# Patient Record
Sex: FEMALE | Race: WHITE | NOT HISPANIC OR LATINO | Employment: FULL TIME | URBAN - METROPOLITAN AREA
[De-identification: names, ages, dates, MRNs, and addresses within clinical notes are randomized per-mention and may not be internally consistent; named-entity substitution may affect disease eponyms.]

---

## 2017-01-30 ENCOUNTER — ALLSCRIPTS OFFICE VISIT (OUTPATIENT)
Dept: OTHER | Facility: OTHER | Age: 42
End: 2017-01-30

## 2017-01-30 DIAGNOSIS — R00.2 PALPITATIONS: ICD-10-CM

## 2017-01-30 DIAGNOSIS — G35 MULTIPLE SCLEROSIS (HCC): ICD-10-CM

## 2017-02-01 ENCOUNTER — GENERIC CONVERSION - ENCOUNTER (OUTPATIENT)
Dept: OTHER | Facility: OTHER | Age: 42
End: 2017-02-01

## 2017-02-10 ENCOUNTER — ALLSCRIPTS OFFICE VISIT (OUTPATIENT)
Dept: OTHER | Facility: OTHER | Age: 42
End: 2017-02-10

## 2017-02-21 ENCOUNTER — GENERIC CONVERSION - ENCOUNTER (OUTPATIENT)
Dept: OTHER | Facility: OTHER | Age: 42
End: 2017-02-21

## 2017-02-28 ENCOUNTER — HOSPITAL ENCOUNTER (OUTPATIENT)
Dept: NON INVASIVE DIAGNOSTICS | Facility: CLINIC | Age: 42
Discharge: HOME | End: 2017-02-28
Payer: COMMERCIAL

## 2017-02-28 ENCOUNTER — GENERIC CONVERSION - ENCOUNTER (OUTPATIENT)
Dept: OTHER | Facility: OTHER | Age: 42
End: 2017-02-28

## 2017-02-28 DIAGNOSIS — R00.2 PALPITATIONS: ICD-10-CM

## 2017-02-28 PROCEDURE — 93306 TTE W/DOPPLER COMPLETE: CPT

## 2017-04-01 ENCOUNTER — LAB CONVERSION - ENCOUNTER (OUTPATIENT)
Dept: OTHER | Facility: OTHER | Age: 42
End: 2017-04-01

## 2017-04-01 LAB
A/G RATIO (HISTORICAL): 0.9 (CALC) (ref 1–2.5)
ALBUMIN SERPL BCP-MCNC: 3.6 G/DL (ref 3.6–5.1)
ALP SERPL-CCNC: 49 U/L (ref 33–115)
ALT SERPL W P-5'-P-CCNC: 11 U/L (ref 6–29)
AST SERPL W P-5'-P-CCNC: 13 U/L (ref 10–30)
BASOPHILS # BLD AUTO: 0.5 %
BASOPHILS # BLD AUTO: 21 CELLS/UL (ref 0–200)
BILIRUB SERPL-MCNC: 0.5 MG/DL (ref 0.2–1.2)
BILIRUBIN DIRECT (HISTORICAL): 0.1 MG/DL
DEPRECATED RDW RBC AUTO: 13.5 % (ref 11–15)
EOSINOPHIL # BLD AUTO: 152 CELLS/UL (ref 15–500)
EOSINOPHIL # BLD AUTO: 3.7 %
GAMMA GLOBULIN (HISTORICAL): 4 G/DL (CALC) (ref 1.9–3.7)
HCT VFR BLD AUTO: 38 % (ref 35–45)
HGB BLD-MCNC: 12.5 G/DL (ref 11.7–15.5)
INDIRECT BILIRUBIN (HISTORICAL): 0.4 MG/DL (CALC) (ref 0.2–1.2)
LYMPHOCYTES # BLD AUTO: 1480 CELLS/UL (ref 850–3900)
LYMPHOCYTES # BLD AUTO: 36.1 %
MCH RBC QN AUTO: 29.7 PG (ref 27–33)
MCHC RBC AUTO-ENTMCNC: 32.8 G/DL (ref 32–36)
MCV RBC AUTO: 90.4 FL (ref 80–100)
MONOCYTES # BLD AUTO: 287 CELLS/UL (ref 200–950)
MONOCYTES (HISTORICAL): 7 %
NEUTROPHILS # BLD AUTO: 2161 CELLS/UL (ref 1500–7800)
NEUTROPHILS # BLD AUTO: 52.7 %
PLATELET # BLD AUTO: 215 THOUSAND/UL (ref 140–400)
PMV BLD AUTO: 9 FL (ref 7.5–12.5)
RBC # BLD AUTO: 4.2 MILLION/UL (ref 3.8–5.1)
TOTAL PROTEIN (HISTORICAL): 7.6 G/DL (ref 6.1–8.1)
WBC # BLD AUTO: 4.1 THOUSAND/UL (ref 3.8–10.8)

## 2017-05-08 ENCOUNTER — ALLSCRIPTS OFFICE VISIT (OUTPATIENT)
Dept: OTHER | Facility: OTHER | Age: 42
End: 2017-05-08

## 2017-05-08 DIAGNOSIS — G35 MULTIPLE SCLEROSIS (HCC): ICD-10-CM

## 2017-07-12 ENCOUNTER — HOSPITAL ENCOUNTER (OUTPATIENT)
Dept: MRI IMAGING | Facility: HOSPITAL | Age: 42
Discharge: HOME | End: 2017-07-12
Payer: COMMERCIAL

## 2017-07-12 DIAGNOSIS — G35 MULTIPLE SCLEROSIS (HCC): ICD-10-CM

## 2017-07-12 PROCEDURE — A9585 GADOBUTROL INJECTION: HCPCS | Performed by: PHYSICIAN ASSISTANT

## 2017-07-12 PROCEDURE — 72156 MRI NECK SPINE W/O & W/DYE: CPT

## 2017-07-12 PROCEDURE — 72157 MRI CHEST SPINE W/O & W/DYE: CPT

## 2017-07-12 RX ADMIN — GADOBUTROL 6 ML: 604.72 INJECTION INTRAVENOUS at 15:25

## 2017-07-31 ENCOUNTER — HOSPITAL ENCOUNTER (OUTPATIENT)
Dept: MRI IMAGING | Facility: HOSPITAL | Age: 42
Discharge: HOME | End: 2017-07-31
Payer: COMMERCIAL

## 2017-07-31 DIAGNOSIS — G35 MULTIPLE SCLEROSIS (HCC): ICD-10-CM

## 2017-07-31 PROCEDURE — 70553 MRI BRAIN STEM W/O & W/DYE: CPT

## 2017-07-31 PROCEDURE — A9585 GADOBUTROL INJECTION: HCPCS | Performed by: RADIOLOGY

## 2017-07-31 RX ADMIN — GADOBUTROL 6 ML: 604.72 INJECTION INTRAVENOUS at 23:28

## 2017-08-31 ENCOUNTER — LAB CONVERSION - ENCOUNTER (OUTPATIENT)
Dept: OTHER | Facility: OTHER | Age: 42
End: 2017-08-31

## 2017-08-31 LAB
A/G RATIO (HISTORICAL): 0.9 (CALC) (ref 1–2.5)
ALBUMIN SERPL BCP-MCNC: 3.9 G/DL (ref 3.6–5.1)
ALP SERPL-CCNC: 57 U/L (ref 33–115)
ALT SERPL W P-5'-P-CCNC: 14 U/L (ref 6–29)
AST SERPL W P-5'-P-CCNC: 16 U/L (ref 10–30)
BASOPHILS # BLD AUTO: 0.6 %
BASOPHILS # BLD AUTO: 40 CELLS/UL (ref 0–200)
BILIRUB SERPL-MCNC: 0.4 MG/DL (ref 0.2–1.2)
BUN SERPL-MCNC: 12 MG/DL (ref 7–25)
BUN/CREA RATIO (HISTORICAL): ABNORMAL (CALC) (ref 6–22)
CALCIUM SERPL-MCNC: 8.9 MG/DL (ref 8.6–10.2)
CHLORIDE SERPL-SCNC: 105 MMOL/L (ref 98–110)
CO2 SERPL-SCNC: 25 MMOL/L (ref 20–31)
CREAT SERPL-MCNC: 0.66 MG/DL (ref 0.5–1.1)
DEPRECATED RDW RBC AUTO: 11.8 % (ref 11–15)
EGFR AFRICAN AMERICAN (HISTORICAL): 125 ML/MIN/1.73M2
EGFR-AMERICAN CALC (HISTORICAL): 107 ML/MIN/1.73M2
EOSINOPHIL # BLD AUTO: 191 CELLS/UL (ref 15–500)
EOSINOPHIL # BLD AUTO: 2.9 %
GAMMA GLOBULIN (HISTORICAL): 4.4 G/DL (CALC) (ref 1.9–3.7)
GLUCOSE (HISTORICAL): 87 MG/DL (ref 65–99)
HCT VFR BLD AUTO: 40.7 % (ref 35–45)
HGB BLD-MCNC: 13.4 G/DL (ref 11.7–15.5)
LYMPHOCYTES # BLD AUTO: 2422 CELLS/UL (ref 850–3900)
LYMPHOCYTES # BLD AUTO: 36.7 %
MCH RBC QN AUTO: 29.8 PG (ref 27–33)
MCHC RBC AUTO-ENTMCNC: 32.9 G/DL (ref 32–36)
MCV RBC AUTO: 90.4 FL (ref 80–100)
MONOCYTES # BLD AUTO: 455 CELLS/UL (ref 200–950)
MONOCYTES (HISTORICAL): 6.9 %
NEUTROPHILS # BLD AUTO: 3491 CELLS/UL (ref 1500–7800)
NEUTROPHILS # BLD AUTO: 52.9 %
PLATELET # BLD AUTO: 227 THOUSAND/UL (ref 140–400)
PMV BLD AUTO: 10.4 FL (ref 7.5–12.5)
POTASSIUM SERPL-SCNC: 5 MMOL/L (ref 3.5–5.3)
RBC # BLD AUTO: 4.5 MILLION/UL (ref 3.8–5.1)
SODIUM SERPL-SCNC: 137 MMOL/L (ref 135–146)
TOTAL PROTEIN (HISTORICAL): 8.3 G/DL (ref 6.1–8.1)
WBC # BLD AUTO: 6.6 THOUSAND/UL (ref 3.8–10.8)

## 2017-09-06 ENCOUNTER — ALLSCRIPTS OFFICE VISIT (OUTPATIENT)
Dept: OTHER | Facility: OTHER | Age: 42
End: 2017-09-06

## 2017-09-06 DIAGNOSIS — R20.0 ANESTHESIA OF SKIN: ICD-10-CM

## 2017-09-06 DIAGNOSIS — G35 MULTIPLE SCLEROSIS (HCC): ICD-10-CM

## 2017-09-06 DIAGNOSIS — E55.9 VITAMIN D DEFICIENCY: ICD-10-CM

## 2017-10-25 ENCOUNTER — GENERIC CONVERSION - ENCOUNTER (OUTPATIENT)
Dept: OTHER | Facility: OTHER | Age: 42
End: 2017-10-25

## 2017-11-08 ENCOUNTER — TRANSCRIBE ORDERS (OUTPATIENT)
Dept: LAB | Facility: CLINIC | Age: 42
End: 2017-11-08

## 2017-11-08 ENCOUNTER — APPOINTMENT (OUTPATIENT)
Dept: LAB | Facility: CLINIC | Age: 42
End: 2017-11-08
Payer: COMMERCIAL

## 2017-11-08 ENCOUNTER — ALLSCRIPTS OFFICE VISIT (OUTPATIENT)
Dept: OTHER | Facility: OTHER | Age: 42
End: 2017-11-08

## 2017-11-08 DIAGNOSIS — D47.2 MONOCLONAL PARAPROTEINEMIA: Primary | ICD-10-CM

## 2017-11-08 DIAGNOSIS — D47.2 MONOCLONAL GAMMOPATHY: ICD-10-CM

## 2017-11-08 DIAGNOSIS — D47.2 MONOCLONAL PARAPROTEINEMIA: ICD-10-CM

## 2017-11-08 LAB
ALBUMIN SERPL BCP-MCNC: 3.3 G/DL (ref 3.5–5)
ALP SERPL-CCNC: 48 U/L (ref 46–116)
ALT SERPL W P-5'-P-CCNC: 22 U/L (ref 12–78)
ANION GAP SERPL CALCULATED.3IONS-SCNC: 7 MMOL/L (ref 4–13)
AST SERPL W P-5'-P-CCNC: 13 U/L (ref 5–45)
BASOPHILS # BLD AUTO: 0.03 THOUSANDS/ΜL (ref 0–0.1)
BASOPHILS NFR BLD AUTO: 1 % (ref 0–1)
BILIRUB SERPL-MCNC: 0.5 MG/DL (ref 0.2–1)
BUN SERPL-MCNC: 13 MG/DL (ref 5–25)
CALCIUM SERPL-MCNC: 8.3 MG/DL (ref 8.3–10.1)
CHLORIDE SERPL-SCNC: 104 MMOL/L (ref 100–108)
CO2 SERPL-SCNC: 29 MMOL/L (ref 21–32)
CREAT SERPL-MCNC: 0.74 MG/DL (ref 0.6–1.3)
EOSINOPHIL # BLD AUTO: 0.15 THOUSAND/ΜL (ref 0–0.61)
EOSINOPHIL NFR BLD AUTO: 3 % (ref 0–6)
ERYTHROCYTE [DISTWIDTH] IN BLOOD BY AUTOMATED COUNT: 12.3 % (ref 11.6–15.1)
GFR SERPL CREATININE-BSD FRML MDRD: 99 ML/MIN/1.73SQ M
GLUCOSE SERPL-MCNC: 106 MG/DL (ref 65–140)
HCT VFR BLD AUTO: 40.9 % (ref 34.8–46.1)
HGB BLD-MCNC: 12.7 G/DL (ref 11.5–15.4)
IGA SERPL-MCNC: 74 MG/DL (ref 70–400)
IGG SERPL-MCNC: 3000 MG/DL (ref 700–1600)
IGM SERPL-MCNC: 40 MG/DL (ref 40–230)
LYMPHOCYTES # BLD AUTO: 1.5 THOUSANDS/ΜL (ref 0.6–4.47)
LYMPHOCYTES NFR BLD AUTO: 34 % (ref 14–44)
MCH RBC QN AUTO: 29.7 PG (ref 26.8–34.3)
MCHC RBC AUTO-ENTMCNC: 31.1 G/DL (ref 31.4–37.4)
MCV RBC AUTO: 96 FL (ref 82–98)
MONOCYTES # BLD AUTO: 0.3 THOUSAND/ΜL (ref 0.17–1.22)
MONOCYTES NFR BLD AUTO: 7 % (ref 4–12)
NEUTROPHILS # BLD AUTO: 2.48 THOUSANDS/ΜL (ref 1.85–7.62)
NEUTS SEG NFR BLD AUTO: 55 % (ref 43–75)
PLATELET # BLD AUTO: 254 THOUSANDS/UL (ref 149–390)
PMV BLD AUTO: 10.3 FL (ref 8.9–12.7)
POTASSIUM SERPL-SCNC: 3.7 MMOL/L (ref 3.5–5.3)
PROT SERPL-MCNC: 8.5 G/DL (ref 6.4–8.2)
RBC # BLD AUTO: 4.27 MILLION/UL (ref 3.81–5.12)
SODIUM SERPL-SCNC: 140 MMOL/L (ref 136–145)
WBC # BLD AUTO: 4.46 THOUSAND/UL (ref 4.31–10.16)

## 2017-11-08 PROCEDURE — 36415 COLL VENOUS BLD VENIPUNCTURE: CPT

## 2017-11-08 PROCEDURE — 83883 ASSAY NEPHELOMETRY NOT SPEC: CPT

## 2017-11-08 PROCEDURE — 82784 ASSAY IGA/IGD/IGG/IGM EACH: CPT

## 2017-11-08 PROCEDURE — 85025 COMPLETE CBC W/AUTO DIFF WBC: CPT

## 2017-11-08 PROCEDURE — 86334 IMMUNOFIX E-PHORESIS SERUM: CPT

## 2017-11-08 PROCEDURE — 84165 PROTEIN E-PHORESIS SERUM: CPT

## 2017-11-08 PROCEDURE — 80053 COMPREHEN METABOLIC PANEL: CPT

## 2017-11-09 LAB
KAPPA LC FREE SER-MCNC: 6.9 MG/L (ref 3.3–19.4)
KAPPA LC FREE/LAMBDA FREE SER: 0.28 {RATIO} (ref 0.26–1.65)
LAMBDA LC FREE SERPL-MCNC: 24.3 MG/L (ref 5.7–26.3)

## 2017-11-09 NOTE — CONSULTS
Assessment  1  Monoclonal gammopathy of unknown significance (MGUS) (273 1) (D47 2)    Plan  Monoclonal gammopathy of unknown significance (MGUS)    · (1) CBC/PLT/DIFF; Status:Active; Requested DKY:[id number];    Perform:[facility]; IOE:[id number]; Ordered; For:Monoclonal gammopathy of unknown significance (MGUS); Ordered By:Ziggy Vega;   · (1) COMPREHENSIVE METABOLIC PANEL; Status:Active; Requested VOL:[id number];    Perform:[facility]; ZFH:[id number]; Ordered; For:Monoclonal gammopathy of unknown significance (MGUS); Ordered By:Ziggy Vega;   · (1) FREE LIGHT CHAINS, SERUM; Status:Active; Requested OUT:[id number];    Perform:[facility]; LRO:[id number]; Ordered; For:Monoclonal gammopathy of unknown significance (MGUS); Ordered By:Ziggy Vega;   · (1) PROTEIN ELECTRO, SERUM; Status:Active; Requested UWF:[id number];    Perform:[facility]; EQV:[id number]; Ordered;gammopathy of unknown significance (MGUS); Ordered By:Ziggy Vega;   · (Q) IMMUNOFIXATION IGA,IGG,IGM QT  IMMUNOFIXATION SERUM IMMUNOGLOBULIN;Status:Active; Requested GGI:[id number];    Perform:[facility]; XGK:[id number]; Ordered;gammopathy of unknown significance (MGUS); Ordered By:Ziggy Vega;   · Follow-up visit in 2 weeks Evaluation and Treatment  Follow-up  Status: Complete  Done:22Nov2017   Ordered;Monoclonal gammopathy of unknown significance (MGUS); Ordered By: Morris Parker Performed:  Due: [id number]; Last Updated By: Frederic Dodge; 11/8/2017 9:57:07 AM    Discussion/Summary    MGUS : Chart reviewed, 1 blood work showed IgG level is more than 2000 mg in 2016, we do not have SPEP showing the monoclonal band, however her CSF fluid analysis also showed monoclonal band which could be related with MS or a result of systemic MGUS or due to immunoglobin injection for MS  She is status post bone survey a year ago and cervical and thoracic spine MRI in May of 2017, with no bone lesions identified   She has no other CRAB symptoms suggesting active multiple myeloma  She has no bone marrow biopsy in the pastSPEP, immunofixation, free light chain  on the level of M spike, we may consider bone marrow biopsy for high risk MGUS in 2 weeks  total time of encounter was 40 minutes-- and-- 20 minutes was spent counseling  Chief Complaint   I have MGUS       History of Present Illness  Patient is a 68-year-old  female, initially from [place], recently had a diagnosis of MS, following neurology, her MS was diagnosed based on findings of lumbar puncture and MRI brain; 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, which was diagnosed after reporting having a bad taste  she reported 2 years ago had a physical examination with blood work for insurance purposes, bear day found elevated immunoglobulin level, which lead to diagnosis of MGUS  She reported it is IgG MGUS, she cannot recall the exact protein amount, she had bone survey about a year half ago, and MRI of cervical and thoracic spine which is not revealing  She was following Dr Dylan Meredith hematology oncologist in [facility] [place] [facility] for a year in 2015 to 2016, then lost to follow-up after physician left the practice  She was referred to Hematology Oncology today to establish care  came in today by herself  She reported overall at baseline health status, she has no new weight loss, no energy level change, no fever chills, no nausea vomiting no bleeding no easy bruises  She has no bone pain, no recent fractures, no chest pain, dyspnea, cough or hemoptysis, no abdominal pain, no change of urination bowel movement habits  She reported bad taste ongoing for a year  is not a smoker, not drinking alcohol, lives with , with 2 kids  She works in [employer]  Review of Systems   Constitutional: feeling tired, but-- no fever,-- no recent weight gain,-- no chills-- and-- no recent weight loss    Eyes: No complaints of eye pain, no red eyes, no eyesight problems, no discharge, no dry eyes, no itching of eyes  ENT: no complaints of earache, no loss of hearing, no nose bleeds, no nasal discharge, no sore throat, no hoarseness  Cardiovascular: No complaints of slow heart rate, no fast heart rate, no chest pain, no palpitations, no leg claudication, no lower extremity edema  Respiratory: No complaints of shortness of breath, no wheezing, no cough, no SOB on exertion, no orthopnea, no PND  Gastrointestinal: No complaints of abdominal pain, no constipation, no nausea or vomiting, no diarrhea, no bloody stools  Genitourinary: No complaints of dysuria, no incontinence, no pelvic pain, no dysmenorrhea, no vaginal discharge or bleeding  Musculoskeletal: No complaints of arthralgias, no myalgias, no joint swelling or stiffness, no limb pain or swelling  Integumentary: No complaints of skin rash or lesions, no itching, no skin wounds, no breast pain or lump  Neurological: No complaints of headache, no confusion, no convulsions, no numbness, no dizziness or fainting, no tingling, no limb weakness, no difficulty walking  Psychiatric: Not suicidal, no sleep disturbance, no anxiety or depression, no change in personality, no emotional problems  Endocrine: No complaints of proptosis, no hot flashes, no muscle weakness, no deepening of the voice, no feelings of weakness  Hematologic/Lymphatic: No complaints of swollen glands, no swollen glands in the neck, does not bleed easily, does not bruise easily  Active Problems  1  Chest pain (786 50) (R07 9)   2  Monoclonal gammopathy of unknown significance (MGUS) (273 1) (D47 2)   3  Multiple sclerosis (340) (G35)   4  Nonspecific abnormal electrocardiogram (ECG) (EKG) (794 31) (R94 31)   5  Numbness of tongue (782 0) (R20 0)   6  Palpitations (785 1) (R00 2)   7  Shortness of breath (786 05) (R06 02)   8  Vitamin D deficiency (268 9) (E55 9)    Past Medical History  1  History of Benign neoplasm of skin of face (216 3) (D23 30)   2  History of Malignant Skin Neoplasm (V10 83)    Surgical History  1  History of  Section    Family History  Mother    1  No pertinent family history  Family History    2  Family history of Rhythm Disorder    The family history was reviewed and updated today  Social History     · Never A Smoker  The social history was reviewed and updated today  Current Meds   1  Copaxone 40 MG/ML Subcutaneous Solution Prefilled Syringe; INJECT 40 MG (1 ML) UNDER THE SKIN THREE TIMES A WEEK; Therapy: [id number] to (Last TM:[id number])  Requested for: 25OKB2116 Ordered    The medication list was reviewed and updated today  Allergies  1  No Known Drug Allergies    Vitals  Vital Signs    Recorded: 21RUG3689 09:20AM   Temperature 97 9 F   Heart Rate 65   Respiration 16   Systolic 877   Diastolic 70   Height 5 ft 5 in   Weight 130 lb    BMI Calculated 21 63   BSA Calculated 1 65   O2 Saturation 98   Pain Scale 0       Physical Exam   Constitutional  General appearance: No acute distress, well appearing and well nourished  Eyes  Conjunctiva and lids: No swelling, erythema or discharge  Ears, Nose, Mouth, and Throat  External inspection of ears and nose: Normal    Pulmonary  Respiratory effort: No increased work of breathing or signs of respiratory distress  Auscultation of lungs: Clear to auscultation  Cardiovascular  Palpation of heart: Normal PMI, no thrills  Auscultation of heart: Normal rate and rhythm, normal S1 and S2, without murmurs  Examination of extremities for edema and/or varicosities: Normal    Carotid pulses: Normal    Abdomen  Abdomen: Non-tender, no masses  Liver and spleen: No hepatomegaly or splenomegaly  Lymphatic  Palpation of lymph nodes in neck: No lymphadenopathy     Musculoskeletal  Gait and station: Normal    Psychiatric  Orientation to person, place, and time: Normal    Mood and affect: Normal         ECOG 0       Future Appointments    Date/Time Provider Specialty Site 01/08/2018 11:00 AM HERNANDO Romano   Neurology [address] [facility]       Signatures   Electronically signed by : Angella Najera MD; Nov 8 2017 10:07AM EST                       (Author)    Electronically signed by : Angella Najera MD; Nov 10 2017  2:46PM EST                       (Author)

## 2017-11-10 LAB
ALBUMIN SERPL ELPH-MCNC: 4.05 G/DL (ref 3.5–5)
ALBUMIN SERPL ELPH-MCNC: 47.6 % (ref 52–65)
ALPHA1 GLOB SERPL ELPH-MCNC: 0.26 G/DL (ref 0.1–0.4)
ALPHA1 GLOB SERPL ELPH-MCNC: 3.1 % (ref 2.5–5)
ALPHA2 GLOB SERPL ELPH-MCNC: 0.64 G/DL (ref 0.4–1.2)
ALPHA2 GLOB SERPL ELPH-MCNC: 7.5 % (ref 7–13)
BETA GLOB ABNORMAL SERPL ELPH-MCNC: 0.4 G/DL (ref 0.4–0.8)
BETA1 GLOB SERPL ELPH-MCNC: 4.7 % (ref 5–13)
BETA2 GLOB SERPL ELPH-MCNC: 3 % (ref 2–8)
BETA2+GAMMA GLOB SERPL ELPH-MCNC: 0.26 G/DL (ref 0.2–0.5)
GAMMA GLOB ABNORMAL SERPL ELPH-MCNC: 2.9 G/DL (ref 0.5–1.6)
GAMMA GLOB SERPL ELPH-MCNC: 34.1 % (ref 12–22)
IGG/ALB SER: 0.91 {RATIO} (ref 1.1–1.8)
INTERPRETATION UR IFE-IMP: NORMAL
M PROTEIN 1 MFR SERPL ELPH: 29.6 %
M PROTEIN 1 SERPL ELPH-MCNC: 2.52 G/DL
PROT SERPL-MCNC: 8.5 G/DL (ref 6.4–8.2)

## 2017-11-22 ENCOUNTER — GENERIC CONVERSION - ENCOUNTER (OUTPATIENT)
Dept: OTHER | Facility: OTHER | Age: 42
End: 2017-11-22

## 2017-12-28 ENCOUNTER — HOSPITAL ENCOUNTER (OUTPATIENT)
Dept: CT IMAGING | Facility: HOSPITAL | Age: 42
Discharge: HOME | End: 2017-12-28
Attending: INTERNAL MEDICINE | Admitting: RADIOLOGY
Payer: COMMERCIAL

## 2017-12-28 ENCOUNTER — GENERIC CONVERSION - ENCOUNTER (OUTPATIENT)
Dept: OTHER | Facility: OTHER | Age: 42
End: 2017-12-28

## 2017-12-28 VITALS
BODY MASS INDEX: 22.16 KG/M2 | HEIGHT: 65 IN | OXYGEN SATURATION: 98 % | HEART RATE: 72 BPM | TEMPERATURE: 98.7 F | WEIGHT: 133 LBS | DIASTOLIC BLOOD PRESSURE: 50 MMHG | SYSTOLIC BLOOD PRESSURE: 99 MMHG | RESPIRATION RATE: 20 BRPM

## 2017-12-28 DIAGNOSIS — D47.2 MONOCLONAL PARAPROTEINEMIA: ICD-10-CM

## 2017-12-28 LAB — EXT PREGNANCY TEST URINE: NEGATIVE

## 2017-12-28 PROCEDURE — 81025 URINE PREGNANCY TEST: CPT | Performed by: RADIOLOGY

## 2017-12-28 PROCEDURE — 88365 INSITU HYBRIDIZATION (FISH): CPT | Performed by: INTERNAL MEDICINE

## 2017-12-28 PROCEDURE — 88313 SPECIAL STAINS GROUP 2: CPT | Performed by: INTERNAL MEDICINE

## 2017-12-28 PROCEDURE — 88305 TISSUE EXAM BY PATHOLOGIST: CPT | Performed by: INTERNAL MEDICINE

## 2017-12-28 PROCEDURE — 77012 CT SCAN FOR NEEDLE BIOPSY: CPT

## 2017-12-28 PROCEDURE — 88185 FLOWCYTOMETRY/TC ADD-ON: CPT

## 2017-12-28 PROCEDURE — 88311 DECALCIFY TISSUE: CPT | Performed by: INTERNAL MEDICINE

## 2017-12-28 PROCEDURE — 88341 IMHCHEM/IMCYTCHM EA ADD ANTB: CPT | Performed by: INTERNAL MEDICINE

## 2017-12-28 PROCEDURE — 88333 PATH CONSLTJ SURG CYTO XM 1: CPT | Performed by: INTERNAL MEDICINE

## 2017-12-28 PROCEDURE — 88184 FLOWCYTOMETRY/ TC 1 MARKER: CPT | Performed by: INTERNAL MEDICINE

## 2017-12-28 PROCEDURE — 88342 IMHCHEM/IMCYTCHM 1ST ANTB: CPT | Performed by: INTERNAL MEDICINE

## 2017-12-28 PROCEDURE — 85097 BONE MARROW INTERPRETATION: CPT | Performed by: INTERNAL MEDICINE

## 2017-12-28 PROCEDURE — 38221 DX BONE MARROW BIOPSIES: CPT

## 2017-12-28 PROCEDURE — 88374 M/PHMTRC ALYS ISHQUANT/SEMIQ: CPT

## 2017-12-28 PROCEDURE — 38220 DX BONE MARROW ASPIRATIONS: CPT

## 2017-12-28 PROCEDURE — 88367 INSITU HYBRIDIZATION AUTO: CPT

## 2017-12-28 PROCEDURE — 88373 M/PHMTRC ALYS ISHQUANT/SEMIQ: CPT

## 2017-12-28 PROCEDURE — 99152 MOD SED SAME PHYS/QHP 5/>YRS: CPT

## 2017-12-28 RX ORDER — GLATIRAMER ACETATE 20 MG/ML
40 INJECTION, SOLUTION SUBCUTANEOUS 3 TIMES WEEKLY
COMMUNITY
End: 2018-05-08 | Stop reason: DRUGHIGH

## 2017-12-28 RX ORDER — FENTANYL CITRATE 50 UG/ML
INJECTION, SOLUTION INTRAMUSCULAR; INTRAVENOUS CODE/TRAUMA/SEDATION MEDICATION
Status: COMPLETED | OUTPATIENT
Start: 2017-12-28 | End: 2017-12-28

## 2017-12-28 RX ORDER — MIDAZOLAM HYDROCHLORIDE 1 MG/ML
INJECTION INTRAMUSCULAR; INTRAVENOUS CODE/TRAUMA/SEDATION MEDICATION
Status: COMPLETED | OUTPATIENT
Start: 2017-12-28 | End: 2017-12-28

## 2017-12-28 RX ORDER — SODIUM CHLORIDE 9 MG/ML
75 INJECTION, SOLUTION INTRAVENOUS CONTINUOUS
Status: DISCONTINUED | OUTPATIENT
Start: 2017-12-28 | End: 2017-12-29 | Stop reason: HOSPADM

## 2017-12-28 RX ADMIN — FENTANYL CITRATE 50 MCG: 50 INJECTION INTRAMUSCULAR; INTRAVENOUS at 09:33

## 2017-12-28 RX ADMIN — MIDAZOLAM HYDROCHLORIDE 1 MG: 1 INJECTION, SOLUTION INTRAMUSCULAR; INTRAVENOUS at 09:40

## 2017-12-28 RX ADMIN — FENTANYL CITRATE 50 MCG: 50 INJECTION INTRAMUSCULAR; INTRAVENOUS at 09:40

## 2017-12-28 RX ADMIN — MIDAZOLAM HYDROCHLORIDE 1 MG: 1 INJECTION, SOLUTION INTRAMUSCULAR; INTRAVENOUS at 09:33

## 2017-12-28 RX ADMIN — FENTANYL CITRATE 50 MCG: 50 INJECTION INTRAMUSCULAR; INTRAVENOUS at 09:48

## 2017-12-28 NOTE — PROGRESS NOTES
Vascular and Interventional Radiology Pre Procedure Note    Diagnosis:  MGUS, high risk    Procedure:  Image guided bone marrow biopsy    HPI: 39-year-old female with prior diagnosis of MGUS 2016, presenting with continued laboratory abnormalities  Patient reports receiving prior therapy  Bone marrow biopsy is requested for diagnosis and therapy guidance  Patient denies current chest pain, shortness of breath, fever, chills, nausea or vomiting  Exam:  General:  Awake, alert, oriented x3, no acute distress  Neck:  Soft, supple, nontender  Chest:  Nontender, no deformity  Abdomen:  Soft, nontender, nondistended, no guarding, no rebound  Cardiac:  S1-S2, regular rate and rhythm  Lungs:  Clear to auscultation bilaterally, nonlabored breathing, speaking in full sentences    Labs:  Not required by department policy  Imaging:  No relevant imaging is available for review    Plan:  Proceed with image guided bone marrow biopsy    I discussed the procedure, its details, risks, benefits and alternatives with the patient  Alternatives discussed include observation, foregoing the procedure, rescheduling the procedure, and surgical biopsy  All questions were answered  Patient elects to proceed with the procedure  H&P was reviewed

## 2017-12-28 NOTE — BRIEF OP NOTE (RAD/CATH)
CT BONE MARROW BIOPSY AND ASPIRATION  Procedure Note    PATIENT NAME: Charles Leigh  : 1975  MRN: [id number]     Pre-op Diagnosis:   1  Monoclonal paraproteinemia      Post-op Diagnosis:   1  Monoclonal paraproteinemia        Surgeon:   Nikolai Rojas MD  Assistants:     No qualified resident was available, Resident is only observing    Estimated Blood Loss:  Minimal, less than 1 mL not including the specimen  Findings:  Unremarkable appearance of bone on pre-procedure CT  Successful bone marrow biopsy of right iliac bone  Post imaging demonstrates no acute abnormality or complication      Specimens:  8 mL marrow aspirate, 3 cm core biopsy    Complications:  None immediately    Anesthesia: Conscious sedation and Local    Nikolai Rojas MD     Date: 2017  Time: 10:28 AM

## 2017-12-28 NOTE — PROGRESS NOTES
Vascular and Interventional Radiology brief note    Patient seen and examined in recovery  No current complaints  Patient denies pain  No fever or chills  No bleeding  Patient feels well  Exam:  General:  Awake, alert, oriented x3, no acute distress  Abdomen:  Soft, nontender, nondistended, no guarding, no rebound    77-year-old female with MGUS, now status post bone marrow biopsy earlier today recovering well  Patient may be discharged once she meets criteria  She will follow up with Dr Tamara Zamora of Hematology/Oncology for biopsy results and any required therapy

## 2017-12-28 NOTE — DISCHARGE INSTRUCTIONS
Bone Marrow Biopsy     WHAT YOU NEED TO KNOW:   A bone marrow biopsy is a procedure to remove a small amount of bone marrow from your bone  Bone marrow is the soft tissue inside your bone that helps to make blood cells  The sample is tested for disease or infection  DISCHARGE INSTRUCTIONS:     1  Limit your activities day of biopsy as directed by your doctor  2  Use medication as ordered  3  Return to your normal diet  Small sips of flat soda will help with nausea  4  Remove band-aid or dressing 24 hours after procedure  Contact Interventional Radiology at [phone] [place] PATIENTS: Contact Interventional Radiology at [phone]) [place] PATIENTS: Contact Interventional Radiology at [phone]) if:    1  Difficulty breathing, nausea or vomiting  2  Chills or fever above 101 F     3  Pain at biopsy site not relieved by medication  4  Develop any redness, swelling, heat, unusual drainage, heavy bruising or bleeding from biopsy site

## 2017-12-29 ENCOUNTER — APPOINTMENT (OUTPATIENT)
Dept: LAB | Facility: CLINIC | Age: 42
End: 2017-12-29
Payer: COMMERCIAL

## 2017-12-29 ENCOUNTER — TRANSCRIBE ORDERS (OUTPATIENT)
Dept: ADMINISTRATIVE | Facility: HOSPITAL | Age: 42
End: 2017-12-29

## 2017-12-29 ENCOUNTER — HOSPITAL ENCOUNTER (OUTPATIENT)
Dept: RADIOLOGY | Facility: HOSPITAL | Age: 42
Discharge: HOME | End: 2017-12-29
Attending: INTERNAL MEDICINE
Payer: COMMERCIAL

## 2017-12-29 ENCOUNTER — GENERIC CONVERSION - ENCOUNTER (OUTPATIENT)
Dept: OTHER | Facility: OTHER | Age: 42
End: 2017-12-29

## 2017-12-29 DIAGNOSIS — C90.00 MULTIPLE MYELOMA, REMISSION STATUS UNSPECIFIED (HCC): Primary | ICD-10-CM

## 2017-12-29 DIAGNOSIS — D47.2 MONOCLONAL GAMMOPATHY: ICD-10-CM

## 2017-12-29 DIAGNOSIS — G35 MULTIPLE SCLEROSIS (HCC): Primary | ICD-10-CM

## 2017-12-29 DIAGNOSIS — E55.9 VITAMIN D DEFICIENCY: ICD-10-CM

## 2017-12-29 DIAGNOSIS — R20.0 ANESTHESIA OF SKIN: ICD-10-CM

## 2017-12-29 LAB
25(OH)D3 SERPL-MCNC: 21.8 NG/ML (ref 30–100)
ALBUMIN SERPL BCP-MCNC: 3.2 G/DL (ref 3.5–5)
ALP SERPL-CCNC: 51 U/L (ref 46–116)
ALT SERPL W P-5'-P-CCNC: 22 U/L (ref 12–78)
ANION GAP SERPL CALCULATED.3IONS-SCNC: 8 MMOL/L (ref 4–13)
AST SERPL W P-5'-P-CCNC: 16 U/L (ref 5–45)
BASOPHILS # BLD AUTO: 0.02 THOUSANDS/ΜL (ref 0–0.1)
BASOPHILS NFR BLD AUTO: 0 % (ref 0–1)
BILIRUB SERPL-MCNC: 0.5 MG/DL (ref 0.2–1)
BUN SERPL-MCNC: 17 MG/DL (ref 5–25)
CALCIUM SERPL-MCNC: 8.4 MG/DL (ref 8.3–10.1)
CHLORIDE SERPL-SCNC: 104 MMOL/L (ref 100–108)
CO2 SERPL-SCNC: 26 MMOL/L (ref 21–32)
CREAT SERPL-MCNC: 0.76 MG/DL (ref 0.6–1.3)
EOSINOPHIL # BLD AUTO: 0.11 THOUSAND/ΜL (ref 0–0.61)
EOSINOPHIL NFR BLD AUTO: 1 % (ref 0–6)
ERYTHROCYTE [DISTWIDTH] IN BLOOD BY AUTOMATED COUNT: 12.5 % (ref 11.6–15.1)
GFR SERPL CREATININE-BSD FRML MDRD: 96 ML/MIN/1.73SQ M
GLUCOSE SERPL-MCNC: 96 MG/DL (ref 65–140)
HCT VFR BLD AUTO: 41.7 % (ref 34.8–46.1)
HGB BLD-MCNC: 13 G/DL (ref 11.5–15.4)
LYMPHOCYTES # BLD AUTO: 1.56 THOUSANDS/ΜL (ref 0.6–4.47)
LYMPHOCYTES NFR BLD AUTO: 21 % (ref 14–44)
MCH RBC QN AUTO: 29.9 PG (ref 26.8–34.3)
MCHC RBC AUTO-ENTMCNC: 31.2 G/DL (ref 31.4–37.4)
MCV RBC AUTO: 96 FL (ref 82–98)
MONOCYTES # BLD AUTO: 0.41 THOUSAND/ΜL (ref 0.17–1.22)
MONOCYTES NFR BLD AUTO: 5 % (ref 4–12)
NEUTROPHILS # BLD AUTO: 5.49 THOUSANDS/ΜL (ref 1.85–7.62)
NEUTS SEG NFR BLD AUTO: 73 % (ref 43–75)
PLATELET # BLD AUTO: 229 THOUSANDS/UL (ref 149–390)
PMV BLD AUTO: 10.6 FL (ref 8.9–12.7)
POTASSIUM SERPL-SCNC: 4.3 MMOL/L (ref 3.5–5.3)
PROT SERPL-MCNC: 8.4 G/DL (ref 6.4–8.2)
RBC # BLD AUTO: 4.35 MILLION/UL (ref 3.81–5.12)
SODIUM SERPL-SCNC: 138 MMOL/L (ref 136–145)
VIT B12 SERPL-MCNC: 212 PG/ML (ref 100–900)
WBC # BLD AUTO: 7.59 THOUSAND/UL (ref 4.31–10.16)

## 2017-12-29 PROCEDURE — 85025 COMPLETE CBC W/AUTO DIFF WBC: CPT

## 2017-12-29 PROCEDURE — 82306 VITAMIN D 25 HYDROXY: CPT

## 2017-12-29 PROCEDURE — 82607 VITAMIN B-12: CPT

## 2017-12-29 PROCEDURE — 36415 COLL VENOUS BLD VENIPUNCTURE: CPT

## 2017-12-29 PROCEDURE — 80053 COMPREHEN METABOLIC PANEL: CPT

## 2017-12-29 PROCEDURE — 77075 RADEX OSSEOUS SURVEY COMPL: CPT

## 2018-01-02 LAB — SCAN RESULT: NORMAL

## 2018-01-04 LAB — MISCELLANEOUS LAB TEST RESULT: NORMAL

## 2018-01-08 ENCOUNTER — TRANSCRIBE ORDERS (OUTPATIENT)
Dept: ADMINISTRATIVE | Facility: HOSPITAL | Age: 43
End: 2018-01-08

## 2018-01-08 ENCOUNTER — HOSPITAL ENCOUNTER (EMERGENCY)
Facility: HOSPITAL | Age: 43
Discharge: HOME | End: 2018-01-08
Attending: EMERGENCY MEDICINE | Admitting: EMERGENCY MEDICINE
Payer: COMMERCIAL

## 2018-01-08 ENCOUNTER — ALLSCRIPTS OFFICE VISIT (OUTPATIENT)
Dept: OTHER | Facility: OTHER | Age: 43
End: 2018-01-08

## 2018-01-08 ENCOUNTER — APPOINTMENT (EMERGENCY)
Dept: RADIOLOGY | Facility: HOSPITAL | Age: 43
End: 2018-01-08
Payer: COMMERCIAL

## 2018-01-08 VITALS
SYSTOLIC BLOOD PRESSURE: 134 MMHG | DIASTOLIC BLOOD PRESSURE: 61 MMHG | OXYGEN SATURATION: 99 % | HEART RATE: 86 BPM | RESPIRATION RATE: 18 BRPM | BODY MASS INDEX: 21.43 KG/M2 | WEIGHT: 128.8 LBS | TEMPERATURE: 98.3 F

## 2018-01-08 DIAGNOSIS — M79.604 PAIN OF RIGHT LEG: ICD-10-CM
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
## 2018-01-08 DIAGNOSIS — S16.1XXA ACUTE STRAIN OF NECK MUSCLE, INITIAL ENCOUNTER: Primary | ICD-10-CM

## 2018-01-08 DIAGNOSIS — G35 MS (MULTIPLE SCLEROSIS) (HCC): Primary | ICD-10-CM

## 2018-01-08 DIAGNOSIS — G56.03 CARPAL TUNNEL SYNDROME, BILATERAL: ICD-10-CM

## 2018-01-08 PROCEDURE — 99284 EMERGENCY DEPT VISIT MOD MDM: CPT

## 2018-01-08 PROCEDURE — 72040 X-RAY EXAM NECK SPINE 2-3 VW: CPT

## 2018-01-08 NOTE — DISCHARGE INSTRUCTIONS
Cervical Strain   WHAT YOU NEED TO KNOW:   A cervical strain is a stretched or torn muscle or tendon in your neck  Tendons are strong tissues that connect muscles to bones  Common causes of cervical strains include a car accident, a fall, or a sports injury  DISCHARGE INSTRUCTIONS:   Return to the emergency department if:   · You have pain or numbness from your shoulder down to your hand  · You have problems with your vision, hearing, or balance  · You feel confused or cannot concentrate  · You have problems with movement and strength  Contact your healthcare provider if:   · You have increased swelling or pain in your neck  · You have questions or concerns about your condition or care  Medicines: You may need any of the following:  · Acetaminophen  decreases pain and fever  It is available without a doctor's order  Ask how much to take and how often to take it  Follow directions  Read the labels of all other medicines you are using to see if they also contain acetaminophen, or ask your doctor or pharmacist  Acetaminophen can cause liver damage if not taken correctly  Do not use more than 4 grams (4,000 milligrams) total of acetaminophen in one day  · NSAIDs , such as ibuprofen, help decrease swelling, pain, and fever  This medicine is available with or without a doctor's order  NSAIDs can cause stomach bleeding or kidney problems in certain people  If you take blood thinner medicine, always ask your healthcare provider if NSAIDs are safe for you  Always read the medicine label and follow directions  · Muscle relaxers  help decrease pain and muscle spasms  · Prescription pain medicine  may be given  Ask your healthcare provider how to take this medicine safely  Some prescription pain medicines contain acetaminophen  Do not take other medicines that contain acetaminophen without talking to your healthcare provider  Too much acetaminophen may cause liver damage   Prescription pain medicine may cause constipation  Ask your healthcare provider how to prevent or treat constipation  · Take your medicine as directed  Contact your healthcare provider if you think your medicine is not helping or if you have side effects  Tell him or her if you are allergic to any medicine  Keep a list of the medicines, vitamins, and herbs you take  Include the amounts, and when and why you take them  Bring the list or the pill bottles to follow-up visits  Carry your medicine list with you in case of an emergency  Manage your symptoms:   · Apply heat  on your neck for 15 to 20 minutes, 4 to 6 times a day or as directed  Heat helps decrease pain, stiffness, and muscle spasms  · Begin gentle neck exercises  as soon as you can move your neck without pain  Exercises will help decrease stiffness and improve the strength and movement of your neck  Ask your healthcare provider what kind of exercises you should do  · Gradually return to your usual activities as directed  Stop if you have pain  Avoid activities that can cause more damage to your neck, such as heavy lifting or strenuous exercise  · Sleep without a pillow  to help decrease pain  Instead, roll a small towel tightly and place it under your neck  · Go to physical therapy as directed  A physical therapist teaches you exercises to help improve movement and strength, and to decrease pain  Prevent neck injury:   · Drive safely  Make sure everyone in your car wears a seatbelt  A seatbelt can save your life if you are in an accident  Do not use your cell phone when you are driving  This could distract you and cause an accident  Pull over if you need to make a call or send a text message  · Wear helmets, lifejackets, and protective gear  Always wear a helmet when you ride a bike or motorcycle, go skiing, or play sports that could cause a head injury  Wear protective equipment when you play sports   Wear a lifejacket when you are on a boat or doing water sports  Follow up with your healthcare provider as directed: You may be referred to an orthopedist or physical therapies  Write down your questions so you remember to ask them during your visits  © 2017 [address] [employer] Information is for End User's use only and may not be sold, redistributed or otherwise used for commercial purposes  All illustrations and images included in CareNotes® are the copyrighted property of A D A M , Inc  or [employer]  The above information is an  only  It is not intended as medical advice for individual conditions or treatments  Talk to your doctor, nurse or pharmacist before following any medical regimen to see if it is safe and effective for you
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
## 2018-01-10 ENCOUNTER — ALLSCRIPTS OFFICE VISIT (OUTPATIENT)
Dept: OTHER | Facility: OTHER | Age: 43
End: 2018-01-10

## 2018-01-10 ENCOUNTER — APPOINTMENT (OUTPATIENT)
Dept: LAB | Facility: CLINIC | Age: 43
End: 2018-01-10
Payer: COMMERCIAL

## 2018-01-10 DIAGNOSIS — C90.00 MULTIPLE MYELOMA NOT HAVING ACHIEVED REMISSION (HCC): ICD-10-CM

## 2018-01-10 DIAGNOSIS — C90.00 MULTIPLE MYELOMA, REMISSION STATUS UNSPECIFIED (HCC): ICD-10-CM

## 2018-01-10 LAB
ALBUMIN SERPL BCP-MCNC: 3.4 G/DL (ref 3.5–5)
ALP SERPL-CCNC: 50 U/L (ref 46–116)
ALT SERPL W P-5'-P-CCNC: 23 U/L (ref 12–78)
ANION GAP SERPL CALCULATED.3IONS-SCNC: 7 MMOL/L (ref 4–13)
AST SERPL W P-5'-P-CCNC: 11 U/L (ref 5–45)
BASOPHILS # BLD AUTO: 0.02 THOUSANDS/ΜL (ref 0–0.1)
BASOPHILS NFR BLD AUTO: 0 % (ref 0–1)
BILIRUB SERPL-MCNC: 0.6 MG/DL (ref 0.2–1)
BUN SERPL-MCNC: 14 MG/DL (ref 5–25)
CALCIUM SERPL-MCNC: 8.4 MG/DL (ref 8.3–10.1)
CHLORIDE SERPL-SCNC: 105 MMOL/L (ref 100–108)
CO2 SERPL-SCNC: 27 MMOL/L (ref 21–32)
CREAT SERPL-MCNC: 0.69 MG/DL (ref 0.6–1.3)
EOSINOPHIL # BLD AUTO: 0.07 THOUSAND/ΜL (ref 0–0.61)
EOSINOPHIL NFR BLD AUTO: 1 % (ref 0–6)
ERYTHROCYTE [DISTWIDTH] IN BLOOD BY AUTOMATED COUNT: 12.4 % (ref 11.6–15.1)
GFR SERPL CREATININE-BSD FRML MDRD: 106 ML/MIN/1.73SQ M
GLUCOSE SERPL-MCNC: 102 MG/DL (ref 65–140)
HCG SERPL QL: NEGATIVE
HCT VFR BLD AUTO: 41.9 % (ref 34.8–46.1)
HGB BLD-MCNC: 12.8 G/DL (ref 11.5–15.4)
LYMPHOCYTES # BLD AUTO: 1.69 THOUSANDS/ΜL (ref 0.6–4.47)
LYMPHOCYTES NFR BLD AUTO: 21 % (ref 14–44)
MCH RBC QN AUTO: 29.3 PG (ref 26.8–34.3)
MCHC RBC AUTO-ENTMCNC: 30.5 G/DL (ref 31.4–37.4)
MCV RBC AUTO: 96 FL (ref 82–98)
MONOCYTES # BLD AUTO: 0.35 THOUSAND/ΜL (ref 0.17–1.22)
MONOCYTES NFR BLD AUTO: 4 % (ref 4–12)
NEUTROPHILS # BLD AUTO: 5.77 THOUSANDS/ΜL (ref 1.85–7.62)
NEUTS SEG NFR BLD AUTO: 74 % (ref 43–75)
PLATELET # BLD AUTO: 238 THOUSANDS/UL (ref 149–390)
PMV BLD AUTO: 10.4 FL (ref 8.9–12.7)
POTASSIUM SERPL-SCNC: 3.8 MMOL/L (ref 3.5–5.3)
PROT SERPL-MCNC: 8.6 G/DL (ref 6.4–8.2)
RBC # BLD AUTO: 4.37 MILLION/UL (ref 3.81–5.12)
SODIUM SERPL-SCNC: 139 MMOL/L (ref 136–145)
WBC # BLD AUTO: 7.9 THOUSAND/UL (ref 4.31–10.16)

## 2018-01-10 PROCEDURE — 36415 COLL VENOUS BLD VENIPUNCTURE: CPT

## 2018-01-10 PROCEDURE — 84703 CHORIONIC GONADOTROPIN ASSAY: CPT

## 2018-01-10 PROCEDURE — 80053 COMPREHEN METABOLIC PANEL: CPT

## 2018-01-10 PROCEDURE — 85025 COMPLETE CBC W/AUTO DIFF WBC: CPT

## 2018-01-10 PROCEDURE — 82232 ASSAY OF BETA-2 PROTEIN: CPT

## 2018-01-11 LAB — B2 MICROGLOB SERPL-MCNC: 1.1 MG/L (ref 0.6–2.4)

## 2018-01-11 NOTE — CONSULTS
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, which was diagnosed after reporting having a bad taste  she reported 2 years ago had a physical examination with blood work for insurance purposes, bear day found elevated immunoglobulin level, which lead to diagnosis of MGUS  She reported it is IgG MGUS, she cannot recall the exact protein amount, she had bone survey about a year half ago, and MRI of cervical and thoracic spine which is not revealing  She was following Dr Shahana Bee hematology oncologist in [facility] for [facility] for a year in 2015 to 2016, then lost to follow-up after physician left the practice  She was referred to Hematology Oncology today to establish care  She came in today by herself  She reported overall at baseline health status, she has no new weight loss, no energy level change, no fever chills, no nausea vomiting no bleeding no easy bruises  She has no bone pain, no recent fractures, no chest pain, dyspnea, cough or hemoptysis, no abdominal pain, no change of urination bowel movement habits  She reported bad taste ongoing for a year  She is not a smoker, not drinking alcohol, lives with , with 2 kids  She works in [employer]  1 Amended By: Rene Baca; Nov 10 2017 2:43 PM EST    Review of Systems    Constitutional: feeling tired, but no fever, no recent weight gain, no chills and no recent weight loss  Eyes: No complaints of eye pain, no red eyes, no eyesight problems, no discharge, no dry eyes, no itching of eyes  ENT: no complaints of earache, no loss of hearing, no nose bleeds, no nasal discharge, no sore throat, no hoarseness     Cardiovascular: No complaints of slow heart rate, no fast heart rate, no chest pain, no palpitations, no leg claudication, no lower extremity edema  Respiratory: No complaints of shortness of breath, no wheezing, no cough, no SOB on exertion, no orthopnea, no PND  Gastrointestinal: No complaints of abdominal pain, no constipation, no nausea or vomiting, no diarrhea, no bloody stools  Genitourinary: No complaints of dysuria, no incontinence, no pelvic pain, no dysmenorrhea, no vaginal discharge or bleeding  Musculoskeletal: No complaints of arthralgias, no myalgias, no joint swelling or stiffness, no limb pain or swelling  Integumentary: No complaints of skin rash or lesions, no itching, no skin wounds, no breast pain or lump  Neurological: No complaints of headache, no confusion, no convulsions, no numbness, no dizziness or fainting, no tingling, no limb weakness, no difficulty walking  Psychiatric: Not suicidal, no sleep disturbance, no anxiety or depression, no change in personality, no emotional problems  Endocrine: No complaints of proptosis, no hot flashes, no muscle weakness, no deepening of the voice, no feelings of weakness  Hematologic/Lymphatic: No complaints of swollen glands, no swollen glands in the neck, does not bleed easily, does not bruise easily  Active Problems   1  Chest pain (786 50) (R07 9)  2  Monoclonal gammopathy of unknown significance (MGUS) (273 1) (D47 2)  3  Multiple sclerosis (340) (G35)  4  Nonspecific abnormal electrocardiogram (ECG) (EKG) (794 31) (R94 31)  5  Numbness of tongue (782 0) (R20 0)  6  Palpitations (785 1) (R00 2)  7  Shortness of breath (786 05) (R06 02)  8   Vitamin D deficiency (268 9) (E55 9)    Past Medical History    · History of Benign neoplasm of skin of face (216 3) (D23 30)   · History of Malignant Skin Neoplasm (V10 83)    Surgical History    · History of  Section    Family History    · No pertinent family history    · Family history of Rhythm Disorder    The family history was reviewed and updated today  Social History    · Never A Smoker  The social history was reviewed and updated today  Current Meds  1  Copaxone 40 MG/ML Subcutaneous Solution Prefilled Syringe; INJECT 40 MG (1 ML)   UNDER THE SKIN THREE TIMES A WEEK; Therapy: 92STO5967 to (Last KV:23IVO5651)  Requested for: 78XXW6023 Ordered    The medication list was reviewed and updated today  Allergies   1  No Known Drug Allergies    Vitals   Recorded: 10QEF4081 09:20AM   Temperature 97 9 F   Heart Rate 65   Respiration 16   Systolic 228   Diastolic 70   Height 5 ft 5 in   Weight 130 lb    BMI Calculated 21 63   BSA Calculated 1 65   O2 Saturation 98   Pain Scale 0     Physical Exam    Constitutional   General appearance: No acute distress, well appearing and well nourished  Eyes   Conjunctiva and lids: No swelling, erythema or discharge  Ears, Nose, Mouth, and Throat   External inspection of ears and nose: Normal     Pulmonary   Respiratory effort: No increased work of breathing or signs of respiratory distress  Auscultation of lungs: Clear to auscultation  Cardiovascular   Palpation of heart: Normal PMI, no thrills  Auscultation of heart: Normal rate and rhythm, normal S1 and S2, without murmurs  Examination of extremities for edema and/or varicosities: Normal     Carotid pulses: Normal     Abdomen   Abdomen: Non-tender, no masses  Liver and spleen: No hepatomegaly or splenomegaly  Lymphatic   Palpation of lymph nodes in neck: No lymphadenopathy  Musculoskeletal   Gait and station: Normal     Psychiatric   Orientation to person, place, and time: Normal     Mood and affect: Normal         ECOG 0       Assessment   1  Monoclonal gammopathy of unknown significance (MGUS) (273 1) (D47 2)    Plan  Monoclonal gammopathy of unknown significance (MGUS)    · (1) CBC/PLT/DIFF; Status:Active; Requested RGD:[id number];   Perform:[facility]; SPK:30PCN8010; Ordered;  For:Monoclonal gammopathy   of unknown significance (MGUS); Ordered By:Jani Vega;   · (1) COMPREHENSIVE METABOLIC PANEL; Status:Active; Requested LYJ:30QGH8006;   Perform:[facility]; PRA:71MXC3910; Ordered; For:Monoclonal gammopathy   of unknown significance (MGUS); Ordered By:Jani Vega;   · (1) FREE LIGHT CHAINS, SERUM; Status:Active; Requested TLU:89CGG8349;   Perform:[facility]; SDN:79DUW0028; Ordered; For:Monoclonal gammopathy   of unknown significance (MGUS); Ordered By:Jani Vega;   · (1) PROTEIN ELECTRO, SERUM; Status:Active; Requested UOY:11IWD1836;   Perform:[facility]; OSC:76HTI4517; Ordered; For:Monoclonal gammopathy   of unknown significance (MGUS); Ordered By:Jani Vega;   · (Q) IMMUNOFIXATION IGA,IGG,IGM QT  IMMUNOFIXATION SERUM IMMUNOGLOBULIN;  Status:Active; Requested GCY:54LZT3936;   Perform:[facility]; MAYITO:37DTQ5912; Ordered; For:Monoclonal gammopathy of unknown   significance (MGUS); Ordered By:Jani Vega;   · Follow-up visit in 2 weeks Evaluation and Treatment  Follow-up  Status: Complete  Done:  00TOP3144  Ordered; For: Monoclonal gammopathy of unknown significance (MGUS); Ordered By:   Tiffany Duong  Performed:   Due: 76DUN4201; Last Updated By: Jas Newton; 11/8/2017 9:57:07 AM    Discussion/Summary    MGUS : Chart reviewed, 1 blood work showed IgG level is more than 2000 mg in 2016, we do not have SPEP showing the monoclonal band, however her CSF fluid analysis also showed monoclonal band which could be related with MS or a result of systemic MGUS or due to immunoglobin injection for MS  1  She is status post bone survey a year ago and cervical and thoracic spine MRI in May of 2017, with no bone lesions identified  She has no other CRAB symptoms suggesting active multiple myeloma  She has no bone marrow biopsy in the past  --check SPEP, immunofixation, free light chain  --depending on the level of M spike, we may consider bone marrow biopsy for high risk MGUS     --follow-up in 2 weeks total time of encounter was 40 minutes and 20 minutes was spent counseling         1 Amended By: Pita Adams; Nov 10 2017 2:46 PM EST    Signatures   Electronically signed by : Viky Romano MD; Nov 10 2017  2:46PM EST                       (Author)

## 2018-01-11 NOTE — PROGRESS NOTES
Assessment   1  Multiple myeloma (203 00) (C90 00)    Plan   Multiple myeloma    · Acyclovir 400 MG Oral Tablet; TAKE 1 TABLET TWICE DAILY   Rx By: Keaton Castro; Dispense: 60 Days ; #:120 Tablet; Refill: 5;For: Multiple myeloma; TIFFANIE = N; Verified Transmission to [facility] 209/115; Last Updated By: System NOMAD [employer]; 1/10/2018 9:27:54 AM   · Allopurinol 100 MG Oral Tablet; TAKE 1 TABLET DAILY   Rx By: Keaton Castro; Dispense: 28 Days ; #:28 Tablet; Refill: 0;For: Multiple myeloma; TIFFANIE = N; Verified Transmission to [facility]-/115; Last Updated By: System NOMAD GOODS; 1/10/2018 9:27:54 AM   · Dexamethasone 4 MG Oral Tablet; 10 pills with food weekly x 4   Rx By: Keaton Castro; Dispense: 12 Days ; #:120 Tablet; Refill: 2;For: Multiple myeloma; TIFFANIE = N; Verified Transmission to [facility] 209/115; Last Updated By: System, [employer]; 1/10/2018 9:27:54 AM   · Revlimid 25 MG Oral Capsule; Take 25mg PO daily for days 1 through 21   Rx By: Keaton Castro; Dispense: 21 Days ; #:21 Capsule; Refill: 0;For: Multiple myeloma; TIFFANIE = N; Print Rx; Msg to Pharmacy: Adult Female of Reproductive Potential Auth #:; Last Updated By: Lisbet Carvalho; 1/10/2018 8:57:12 AM   · (1) B-2 MICROGLOBULIN; Status:Active; Requested PSJ:[id number]; Perform:[facility]; Order Comments:today on 1/10; NSL:[id number];OIFGEFE; For:Multiple myeloma; Ordered By:Lady Vega;   · (1) CBC/PLT/DIFF; Status:Active; Requested for:12Mcf5056; Perform:[facility]; LZW:76CFS0792; Last Updated By:Maryse Goel; 1/10/2018 10:09:43 AM;Ordered; For:Multiple myeloma; Ordered By:Lady Vega;   · (1) CBC/PLT/DIFF; Status:Active; Requested OVH:85SXM3752; Perform:[facility]; XJQ:97NHI4880;NKCHKOM; For:Multiple myeloma; Ordered By:Lady Vega;   · (1) CBC/PLT/DIFF; Status:Active; Requested OQK:[id number]; Perform:[facility]; MNM:80AUS3570; Last Updated By:Maryse Goel; 1/10/2018 10:09:22 AM;Ordered; For:Multiple myeloma; Ordered By:Raquel Vega;   · (1) CBC/PLT/DIFF; Status:Active; Requested UTR:[id number]; Perform:[facility]; GRC:85NZL8277; Last Updated By:Maribeth Goel; 1/10/2018 10:09:35 AM;Ordered; For:Multiple myeloma; Ordered By:Raquel Vega;   · (1) CBC/PLT/DIFF; Status:Complete; Requested for:Recurring Schedule: 1/12/2018;    1/19/2018; 1/26/2018; 2/2/2018 ; Perform:[facility]; AAT:83NOS2642;XHOKTLZ; For:Multiple myeloma; Ordered By:Raquel Vega;   · (1) COMPREHENSIVE METABOLIC PANEL; Status:Active; Requested for:56Hcm8293; Perform:[facility]; TIK:17ATZ0642; Last Updated By:Hilda Goel; 1/10/2018 10:09:43 AM;Ordered; For:Multiple myeloma; Ordered By:Raquel Vega;   · (1) COMPREHENSIVE METABOLIC PANEL; Status:Active; Requested YAQ:02YMS2627; Perform:[facility]; IFK:01SEA2435;GGPHQNV; For:Multiple myeloma; Ordered By:Raquel Vega;   · (1) COMPREHENSIVE METABOLIC PANEL; Status:Active; Requested YCN:76TIC4387; Perform:[facility]; TOT:52QRO4285; Last Updated By:Hilda Goel; 1/10/2018 10:09:22 AM;Ordered; For:Multiple myeloma; Ordered By:Raquel Vega;   · (1) COMPREHENSIVE METABOLIC PANEL; Status:Active; Requested JIR:[id number]; Perform:[facility]; YWH:[id number]; Last Updated By:Maribeth Goel; 1/10/2018 10:09:35 AM;Ordered; For:Multiple myeloma; Ordered By:Raquel Vega;   · (1) COMPREHENSIVE METABOLIC PANEL; Status:Complete; Requested for:Recurring    Schedule: 1/12/2018; 1/19/2018; 1/26/2018; 2/2/2018 ; Perform:[facility]; UBF:11PJI8485;PCCGRLC; For:Multiple myeloma; Ordered By:Raquel Vega;   · (1) PREGNANCY TEST (HCG QUALITATIVE); Status:Active - Retrospective By Protocol    Authorization;  Requested for:PRN Schedule: ;    Perform:[facility]; Order Comments:Needs 1st pregnancy test completed and negative result, then complete the 2nd pregnancy test 10-14 days after with negative results, then weekly X4 with continued negative results ; Due:42Fkm6642; Last Updated By:Lazaro Matias; 1/10/2018 8:59:55 AM;Ordered; For:Multiple myeloma; Ordered By:Derik Vega;   · Follow-up Visit in 4 Weeks Evaluation and Treatment  lab every wednesday x 4 , starting    TODAY    Follow-up in 3 weeks  Status: Hold For - Scheduling  Requested for: 17GDY0977   Ordered; For: Multiple myeloma; Ordered By: Thi Vo Performed:  Due: 42FGB3605   · 3 - Dedra Mendez MD, Bella Hamilton (Hematology/Oncology) Co-Management  *  Status: Hold For -    Scheduling  Requested for: 41VQO3206   Ordered; For: Multiple myeloma; Ordered By: Thi Vo Performed:  Due: 91PEW2187  are Referring to a non-SL Preferred Provider : Established Patient  Care Summary provided  : Yes   ·  Referral Other Co-Management  insurance and social support  Status:    Hold For - Scheduling  Requested for: 05WMV8587   Ordered; For: Multiple myeloma; Ordered By: Thi Vo Performed:  Due: 06MCZ4283  are Referring to a non-SL Preferred Provider : Established Patient  Care Summary provided  : Yes    Discussion/Summary   Discussion Summary:    High risk Smoldering MM: normal cytogenetics = standard risk myeloma per ISS  BM : 12/2017 : 25% plasma cell  M spike : igG lambda, 2 5 g; IgG > 3000  2000 mg in 2016  normal cytogenetics NO CRAB : bone survey in 2016 and cervical and thoracic spine MRI in May of 2017, with no bone lesions identified  overall prognosis discussed with pt; high risk SMM; need MM therapy  1) induction by VRd; 2) stem cell / auto transplant 3) maintenance therapy  induction : VRd:  Velcade : 1 3 mg / m2 SC injection D 1, 8, 15 / 28d Revlimid : 25 mg po daily D1-21 / 28d Dex 40 mg po D1, 8, 15 / 28d supportive care : allopurinol 100 mg po daily x 1 m then stop ; AS A 81 mg po daily once on Revlimid  ; Acyclovir 400 mg po bid      :  start Vd next Wednesday 1/17 process for Revlimid approval  once approved will add to regimen , and counted as cycle # 1  lab CBC, CMP weekly  F/U in 3 weeks  refer to Dr Preet Diego worker referral     Counseling Documentation With Imm: The patient was counseled regarding diagnostic results,-- prognosis  total time of encounter was 60 minutes-- and-- 40 minutes was spent counseling  Chief Complaint   Chief Complaint Free Text Note Form: follow up for MGUS      History of Present Illness   HPI: Patient is a 68-year-old  female, initially from [place], recently had a diagnosis of MS, following neurology, her MS was diagnosed based on findings of lumbar puncture and MRI brain; 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, which was diagnosed after reporting having a bad taste  she reported 2 years ago had a physical examination with blood work for insurance purposes, bear day found elevated immunoglobulin level, which lead to diagnosis of MGUS  She reported it is IgG MGUS, she cannot recall the exact protein amount, she had bone survey about a year half ago, and MRI of cervical and thoracic spine which is not revealing  She was following Dr Jay Gaona hematology oncologist in [facility] for a year in 2015 to 2016, then lost to follow-up after physician left the practice  She was referred to Hematology Oncology today to establish care  came in today by herself  She reported overall at baseline health status, she has no new weight loss, no energy level change, no fever chills, no nausea vomiting no bleeding no easy bruises  She has no bone pain, no recent fractures, no chest pain, dyspnea, cough or hemoptysis, no abdominal pain, no change of urination bowel movement habits  She reported bad taste ongoing for a year  is not a smoker, not drinking alcohol, lives with , with 2 kids  She works in [employer]  Interval History: 1/10 : F/U : She is off IVIG injection till January of 2018 for her MS  overall at baseline health status   some skin rash, numbness / tingling in her oral mucosa  MRI to be done , ordered by neurology  Review of Systems   Complete-Female:      Constitutional: feeling tired, but-- no fever,-- no recent weight gain,-- no chills-- and-- no recent weight loss  Eyes: No complaints of eye pain, no red eyes, no eyesight problems, no discharge, no dry eyes, no itching of eyes  ENT: no complaints of earache, no loss of hearing, no nose bleeds, no nasal discharge, no sore throat, no hoarseness  Cardiovascular: No complaints of slow heart rate, no fast heart rate, no chest pain, no palpitations, no leg claudication, no lower extremity edema  Respiratory: No complaints of shortness of breath, no wheezing, no cough, no SOB on exertion, no orthopnea, no PND  Gastrointestinal: No complaints of abdominal pain, no constipation, no nausea or vomiting, no diarrhea, no bloody stools  Genitourinary: No complaints of dysuria, no incontinence, no pelvic pain, no dysmenorrhea, no vaginal discharge or bleeding  Musculoskeletal: No complaints of arthralgias, no myalgias, no joint swelling or stiffness, no limb pain or swelling  Integumentary: No complaints of skin rash or lesions, no itching, no skin wounds, no breast pain or lump  Neurological: No complaints of headache, no confusion, no convulsions, no numbness, no dizziness or fainting, no tingling, no limb weakness, no difficulty walking  Psychiatric: Not suicidal, no sleep disturbance, no anxiety or depression, no change in personality, no emotional problems  Endocrine: No complaints of proptosis, no hot flashes, no muscle weakness, no deepening of the voice, no feelings of weakness  Hematologic/Lymphatic: No complaints of swollen glands, no swollen glands in the neck, does not bleed easily, does not bruise easily  Active Problems   1  Bilateral carpal tunnel syndrome (354 0) (G56 03)   2  Chest pain (786 50) (R07 9)   3   Monoclonal gammopathy of unknown significance (MGUS) (273 1) (D47 2)   4  Multiple sclerosis (340) (G35)   5  Nonspecific abnormal electrocardiogram (ECG) (EKG) (794 31) (R94 31)   6  Numbness of tongue (782 0) (R20 0)   7  Palpitations (785 1) (R00 2)   8  Right leg pain (729 5) (M79 604)   9  Shortness of breath (786 05) (R06 02)   10  Vitamin D deficiency (268 9) (E55 9)    Past Medical History   1  History of Benign neoplasm of skin of face (216 3) (D23 30)   2  History of Malignant Skin Neoplasm (V10 83)    Surgical History   1  History of  Section    Family History   Mother    1  No pertinent family history  Family History    2  Family history of Rhythm Disorder  Family History Reviewed: The family history was reviewed and updated today  Social History    · Never A Smoker  Social History Reviewed: The social history was reviewed and updated today  Current Meds    1  Copaxone 40 MG/ML Subcutaneous Solution Prefilled Syringe; INJECT 40 MG (1 ML)     UNDER THE SKIN THREE TIMES A WEEK; Therapy: 81RWV0348 to (Last NK:12IEM6703)  Requested for: 03KMA7526 Ordered   2  Vitamin D (Ergocalciferol) 25056 UNIT Oral Capsule; 1 CAPSULE WEEKLY FOR THE     NEXT 8 WEEKS; Therapy: 13YSK6690 to (Last Rx:16Ubo1798)  Requested for: 26Woj2361 Ordered  Medication List Reviewed: The medication list was reviewed and updated today  Allergies   1  No Known Drug Allergies    Vitals   Vital Signs    Recorded: 54ECI3831 08:42AM   Temperature 98 8 F   Heart Rate 97   Respiration 16   Systolic 001   Diastolic 80   Height 5 ft 5 in   Weight 130 lb    BMI Calculated 21 63   BSA Calculated 1 65   O2 Saturation 99   Pain Scale 2     Physical Exam        Constitutional      General appearance: No acute distress, well appearing and well nourished  Eyes      Conjunctiva and lids: No swelling, erythema or discharge         Ears, Nose, Mouth, and Throat      External inspection of ears and nose: Normal        Pulmonary      Respiratory effort: No increased work of breathing or signs of respiratory distress  Auscultation of lungs: Clear to auscultation  Cardiovascular      Palpation of heart: Normal PMI, no thrills  Auscultation of heart: Normal rate and rhythm, normal S1 and S2, without murmurs  Examination of extremities for edema and/or varicosities: Normal        Carotid pulses: Normal        Abdomen      Abdomen: Non-tender, no masses  Liver and spleen: No hepatomegaly or splenomegaly  Lymphatic      Palpation of lymph nodes in neck: No lymphadenopathy         Musculoskeletal      Gait and station: Normal        Psychiatric      Orientation to person, place, and time: Normal        Mood and affect: Normal            ECOG 0       Signatures    Electronically signed by : Juni Burden MD; Noe 10 2018 10:15AM EST                       (Author)

## 2018-01-12 VITALS
DIASTOLIC BLOOD PRESSURE: 70 MMHG | WEIGHT: 130 LBS | BODY MASS INDEX: 21.66 KG/M2 | OXYGEN SATURATION: 98 % | HEIGHT: 65 IN | RESPIRATION RATE: 16 BRPM | HEART RATE: 65 BPM | SYSTOLIC BLOOD PRESSURE: 110 MMHG | TEMPERATURE: 97.9 F

## 2018-01-12 DIAGNOSIS — C90.00 MULTIPLE MYELOMA NOT HAVING ACHIEVED REMISSION (HCC): ICD-10-CM

## 2018-01-13 VITALS
HEART RATE: 84 BPM | DIASTOLIC BLOOD PRESSURE: 60 MMHG | HEIGHT: 65 IN | BODY MASS INDEX: 21.99 KG/M2 | WEIGHT: 132 LBS | RESPIRATION RATE: 14 BRPM | SYSTOLIC BLOOD PRESSURE: 116 MMHG

## 2018-01-13 VITALS
DIASTOLIC BLOOD PRESSURE: 76 MMHG | HEIGHT: 65 IN | SYSTOLIC BLOOD PRESSURE: 124 MMHG | WEIGHT: 132 LBS | HEART RATE: 88 BPM | OXYGEN SATURATION: 98 % | BODY MASS INDEX: 21.99 KG/M2

## 2018-01-13 VITALS
BODY MASS INDEX: 22.46 KG/M2 | SYSTOLIC BLOOD PRESSURE: 134 MMHG | HEART RATE: 89 BPM | DIASTOLIC BLOOD PRESSURE: 66 MMHG | WEIGHT: 135 LBS

## 2018-01-13 NOTE — MISCELLANEOUS
To Whom it May Concern,             Car Huerta  1975 is currently being treated at [facility]  Due to her medical condition, please allow patient to take breaks from walking  as needed  Please contact my office with any questions or concerns at [phone]  Sincerely,     Viry Willson PA-C             Electronically signed by:Thea Quarles [facility]  Oct 25 2017 12:05PM EST Author

## 2018-01-14 VITALS
WEIGHT: 135.75 LBS | HEART RATE: 73 BPM | OXYGEN SATURATION: 99 % | HEIGHT: 65 IN | SYSTOLIC BLOOD PRESSURE: 119 MMHG | BODY MASS INDEX: 22.62 KG/M2 | DIASTOLIC BLOOD PRESSURE: 64 MMHG | RESPIRATION RATE: 16 BRPM

## 2018-01-17 ENCOUNTER — HOSPITAL ENCOUNTER (OUTPATIENT)
Dept: INFUSION CENTER | Facility: CLINIC | Age: 43
Discharge: HOME | End: 2018-01-17
Payer: COMMERCIAL

## 2018-01-17 VITALS
HEIGHT: 65 IN | DIASTOLIC BLOOD PRESSURE: 72 MMHG | BODY MASS INDEX: 21.45 KG/M2 | WEIGHT: 128.75 LBS | HEART RATE: 89 BPM | OXYGEN SATURATION: 98 % | RESPIRATION RATE: 20 BRPM | TEMPERATURE: 98.7 F | SYSTOLIC BLOOD PRESSURE: 108 MMHG

## 2018-01-17 PROCEDURE — 96401 CHEMO ANTI-NEOPL SQ/IM: CPT

## 2018-01-17 RX ADMIN — BORTEZOMIB 2.1 MG: 3.5 INJECTION, POWDER, LYOPHILIZED, FOR SOLUTION INTRAVENOUS; SUBCUTANEOUS at 11:28

## 2018-01-17 NOTE — RESULT NOTES
Verified Results  ECHO COMPLETE WITH CONTRAST IF INDICATED [id number] 01:53PM Ceferino Hsu Order Number: [id number]    - Patient Instructions: To schedule this appointment, please contact Central Scheduling at [phone]  For Nu Curran, please call [phone]  Test Name Result Flag Reference   ECHO COMPLETE WITH CONTRAST IF INDICATED (Report)     Anne Marie 65, [address] [facility]   [phone]     Transthoracic Echocardiogram   2D, M-mode, and Color Doppler     Study date: 2017     Patient: Sissy Edwards   MR number: [id number]   Account number: [de-identified]   : 1975   Age: 35 years   Gender: Female   Status: Outpatient   Location: [facility]   Height: 65 in   Weight: 132 lb   BP: 124/ 76 mmHg     Indications: Palpitations  Diagnoses: R00 2 - Palpitations     Sonographer: Zamudio RCS   Primary Physician: Fernando Garcias   Referring Physician: Lorenza Gallardo MD   Group: [facility]   Interpreting Physician: Lorenza Gallarod MD     SUMMARY     LEFT VENTRICLE:   Ejection fraction was estimated to be 60 %  There were no regional wall motion abnormalities  TRICUSPID VALVE:   There was trace regurgitation  HISTORY: PRIOR HISTORY: Multiple sclerosis  PROCEDURE: The study was performed in the [address] [place] [address]  This was a routine study  The transthoracic approach was used  The study included complete 2D imaging, M-mode, and color Doppler  The heart rate was 74 bpm, at the   start of the study  Images were obtained from the parasternal, apical, subcostal, and suprasternal notch acoustic windows  Image quality was adequate  LEFT VENTRICLE: Size was normal  Ejection fraction was estimated to be 60 %  There were no regional wall motion abnormalities   DOPPLER: Left ventricular diastolic function parameters were normal      RIGHT VENTRICLE: The size was normal  Systolic function was normal  Wall thickness was normal      LEFT ATRIUM: Size was normal      RIGHT ATRIUM: Size was normal      MITRAL VALVE: Valve structure was normal  There was normal leaflet separation  DOPPLER: The transmitral velocity was within the normal range  There was no evidence for stenosis  There was no regurgitation  AORTIC VALVE: The valve was not well visualized  TRICUSPID VALVE: The valve structure was normal  There was normal leaflet separation  DOPPLER: The transtricuspid velocity was within the normal range  There was no evidence for stenosis  There was trace regurgitation  PULMONIC VALVE: Leaflets exhibited normal thickness, no calcification, and normal cuspal separation  DOPPLER: The transpulmonic velocity was within the normal range  There was no regurgitation  PERICARDIUM: There was no pericardial effusion  The pericardium was normal in appearance  AORTA: The root exhibited normal size  SYSTEM MEASUREMENT TABLES     Apical four chamber   4 chamber Left Atrium Volume Index; Planimetry; End Systole; Apical four chamber;: 13 53 cm2   Left Ventricular Diastolic Area; Method of Disks, Single Plane; End Diastole; Apical four chamber;: 32 31 cm2   Left Ventricular Ejection Fraction; Method of Disks, Single Plane; Apical four chamber;: 66 1 %   Left Ventricular systolic Area; Method of Disks, Single Plane; End Systole; Apical four chamber;: 17 09 cm2   Right Atrium Systolic Area; Planimetry; End Systole; Apical four chamber;: 10 97 cm2   Right Ventricular Internal Diastolic Dimension; End Diastole; Apical four chamber;: 30 7 mm   TAPSE: 25 2 mm     Unspecified Scan Mode   Aortic Root Diameter; End Systole;: 28 7 mm   Gradient Pressure, Peak; Simplified Bernoulli; Antegrade Flow; Systole;: 9 8 mm[Hg]   Gradient pressure, average; Simplified Bernoulli; Antegrade Flow; Systole;: 6 2 mm[Hg]   Left atrial diameter; End Diastole;: 32 6 mm   Cardiac Output;  Teichholz; Systole;: 4 71 L/min   Heart rate; Teichholz;: 80 {H  B }/min   Interventricular Septum Diastolic Thickness; Teichholz; End Diastole;: 6 8 mm   Left Ventricle Internal End Diastolic Dimension; Teichholz;: 42 5 mm   Left Ventricle Internal Systolic Dimension; Teichholz; End Systole;: 24 8 mm   Left Ventricle Mass; Mass AVCube with Teichholz; End Diastole;: 99 g   Left Ventricle Posterior Wall Diastolic Thickness; Teichholz; End Diastole;: 8 7 mm   Left Ventricular Ejection Fraction; Teichholz;: 72 9 %   Left Ventricular End Diastolic Volume; Teichholz;: 80 8 ml   Left Ventricular End Systolic Volume; Teichholz;: 21 9 ml   Left Ventricular Fractional Shortening;: 41 6 %   Stroke volume;  Teichholz; Systole;: 58 9 ml   Mitral Valve Area; Area by Pressure Half-Time; Systole;: 4 49 cm2   Mitral Valve E to A Ratio; Systole;: 0 99   Pressure half time; Diastole;: 0 05 s   Maximum Tricuspid valve regurgitation pressure gradient; Regurgitant Flow; Systole;: 19 7 mm[Hg]     [facility] Accredited [facility]ch[facility]     Prepared and electronically signed by     Jorge Callahan MD   Signed [id number] 16:37:08

## 2018-01-17 NOTE — PROGRESS NOTES
Pt tolerated treatment well  Velcade information sheet given as asked  Pt instructed to get labs a few days before next treatment as pt thought she could get labs drawn immediately after this shot  Verbalized understanding,  Aware of next appt  AVS declined

## 2018-01-17 NOTE — PROGRESS NOTES
Pt resting with no complaints, vitals stable, labs within parameters for treatment, call bell within reach  Will continue to monitor

## 2018-01-19 ENCOUNTER — GENERIC CONVERSION - ENCOUNTER (OUTPATIENT)
Dept: OTHER | Facility: OTHER | Age: 43
End: 2018-01-19

## 2018-01-20 ENCOUNTER — APPOINTMENT (OUTPATIENT)
Dept: LAB | Facility: HOSPITAL | Age: 43
End: 2018-01-20
Attending: INTERNAL MEDICINE
Payer: COMMERCIAL

## 2018-01-20 PROCEDURE — 80053 COMPREHEN METABOLIC PANEL: CPT | Performed by: INTERNAL MEDICINE

## 2018-01-20 PROCEDURE — 85025 COMPLETE CBC W/AUTO DIFF WBC: CPT | Performed by: INTERNAL MEDICINE

## 2018-01-20 PROCEDURE — 84703 CHORIONIC GONADOTROPIN ASSAY: CPT | Performed by: INTERNAL MEDICINE

## 2018-01-20 PROCEDURE — 36415 COLL VENOUS BLD VENIPUNCTURE: CPT | Performed by: INTERNAL MEDICINE

## 2018-01-22 VITALS
DIASTOLIC BLOOD PRESSURE: 62 MMHG | SYSTOLIC BLOOD PRESSURE: 100 MMHG | HEIGHT: 65 IN | BODY MASS INDEX: 21.41 KG/M2 | HEART RATE: 96 BPM | RESPIRATION RATE: 16 BRPM | WEIGHT: 128.5 LBS

## 2018-01-22 VITALS
HEART RATE: 74 BPM | DIASTOLIC BLOOD PRESSURE: 72 MMHG | TEMPERATURE: 97.1 F | OXYGEN SATURATION: 99 % | WEIGHT: 133 LBS | SYSTOLIC BLOOD PRESSURE: 110 MMHG | RESPIRATION RATE: 16 BRPM | HEIGHT: 65 IN | BODY MASS INDEX: 22.16 KG/M2

## 2018-01-23 ENCOUNTER — GENERIC CONVERSION - ENCOUNTER (OUTPATIENT)
Dept: OTHER | Facility: OTHER | Age: 43
End: 2018-01-23

## 2018-01-23 VITALS
WEIGHT: 130 LBS | BODY MASS INDEX: 21.66 KG/M2 | RESPIRATION RATE: 16 BRPM | DIASTOLIC BLOOD PRESSURE: 80 MMHG | HEIGHT: 65 IN | OXYGEN SATURATION: 99 % | SYSTOLIC BLOOD PRESSURE: 120 MMHG | TEMPERATURE: 98.8 F | HEART RATE: 97 BPM

## 2018-01-23 NOTE — MISCELLANEOUS
Message  Called patient and reminded her she needs to go for another pregnancy test 1/20 or after so we can receive her Revlimid  Patient reviewed she is going tomorrow and then requested we not leave messages on her home phone only her cell phone so that her children do not hear  Reviewed I would have someone correct this in her chart  Active Problems    1  Bilateral carpal tunnel syndrome (354 0) (G56 03)   2  Chest pain (786 50) (R07 9)   3  Monoclonal gammopathy of unknown significance (MGUS) (273 1) (D47 2)   4  Multiple myeloma (203 00) (C90 00)   5  Multiple sclerosis (340) (G35)   6  Nonspecific abnormal electrocardiogram (ECG) (EKG) (794 31) (R94 31)   7  Numbness of tongue (782 0) (R20 0)   8  Palpitations (785 1) (R00 2)   9  Right leg pain (729 5) (M79 604)   10  Shortness of breath (786 05) (R06 02)   11  Vitamin D deficiency (268 9) (E55 9)    Current Meds   1  Acyclovir 400 MG Oral Tablet; TAKE 1 TABLET TWICE DAILY; Therapy: 97YJE4154 to (Evaluate:05Jan2019)  Requested for: 14JGI2232; Last   Rx:10Jan2018 Ordered   2  Allopurinol 100 MG Oral Tablet; TAKE 1 TABLET DAILY; Therapy: 79JKV2075 to (Evaluate:70Jbt1549)  Requested for: 98EWD8367; Last   Rx:10Jan2018 Ordered   3  Copaxone 40 MG/ML Subcutaneous Solution Prefilled Syringe (Glatiramer Acetate); INJECT 40 MG (1 ML) UNDER THE SKIN THREE TIMES A   WEEK; Therapy: 73HVS9118 to (Last JJ:83NYW4522)  Requested for: 80HJO7247 Ordered   4  Dexamethasone 4 MG Oral Tablet; 10 pills with food weekly x 4;   Therapy: 60QLQ3939 to (Evaluate:72Jts0023)  Requested for: 87TLH5499; Last   Rx:10Jan2018 Ordered   5  Revlimid 25 MG Oral Capsule; Take 25mg PO daily for days 1 through 21;   Therapy: 77VTF1407 to (Evaluate:31Jan2018); Last Rx:10Jan2018 Ordered   6  Vitamin D (Ergocalciferol) 29174 UNIT Oral Capsule; 1 CAPSULE WEEKLY FOR THE   NEXT 8 WEEKS; Therapy: 30MJO1981 to (Last Rx:76Gle2107)  Requested for: 06Yqt1315 Ordered    Allergies    1   No Known Drug Allergies    Signatures   Electronically signed by : Josué Bruno, ; Jan 19 2018  8:43AM EST                       (Author)

## 2018-01-23 NOTE — RESULT NOTES
Verified Results  (1) CBC/PLT/DIFF 29Mfu4647 09:51AM Salty Ca Order Number: [id number]     Test Name Result Flag Reference   WBC COUNT 7 59 Thousand/uL  4 31-10 16   RBC COUNT 4 35 Million/uL  3 81-5 12   HEMOGLOBIN 13 0 g/dL  11 5-15 4   HEMATOCRIT 41 7 %  34 8-46  1   MCV 96 fL  82-98   MCH 29 9 pg  26 8-34 3   MCHC 31 2 g/dL L 31 4-37 4   RDW 12 5 %  11 6-15 1   MPV 10 6 fL  8 9-12 7   PLATELET COUNT 462 Thousands/uL  149-390   NEUTROPHILS RELATIVE PERCENT 73 %  43-75   LYMPHOCYTES RELATIVE PERCENT 21 %  14-44   MONOCYTES RELATIVE PERCENT 5 %  4-12   EOSINOPHILS RELATIVE PERCENT 1 %  0-6   BASOPHILS RELATIVE PERCENT 0 %  0-1   NEUTROPHILS ABSOLUTE COUNT 5 49 Thousands/? ??L  1 85-7 62   LYMPHOCYTES ABSOLUTE COUNT 1 56 Thousands/? ??L  0 60-4 47   MONOCYTES ABSOLUTE COUNT 0 41 Thousand/? ??L  0 17-1 22   EOSINOPHILS ABSOLUTE COUNT 0 11 Thousand/? ??L  0 00-0 61   BASOPHILS ABSOLUTE COUNT 0 02 Thousands/? ??L  0 00-0 10     (1) COMPREHENSIVE METABOLIC PANEL 16SCZ0499 01:14KY Salty Ca Order Number: [id number]     Test Name Result Flag Reference   GLUCOSE,RANDM 96 mg/dL     If the patient is fasting, the ADA then defines impaired fasting glucose as > 100 mg/dL and diabetes as > or equal to 123 mg/dL  Specimen collection should occur prior to Sulfasalazine administration due to the potential for falsely depressed results  Specimen collection should occur prior to Sulfapyridine administration due to the potential for falsely elevated results     SODIUM 138 mmol/L  136-145   POTASSIUM 4 3 mmol/L  3 5-5 3   CHLORIDE 104 mmol/L  100-108   CARBON DIOXIDE 26 mmol/L  21-32   ANION GAP (CALC) 8 mmol/L  4-13   BLOOD UREA NITROGEN 17 mg/dL  5-25   CREATININE 0 76 mg/dL  0 60-1 30   Standardized to IDMS reference method   CALCIUM 8 4 mg/dL  8 3-10 1   BILI, TOTAL 0 50 mg/dL  0 20-1 00   ALK PHOSPHATAS 51 U/L     ALT (SGPT) 22 U/L  12-78   Specimen collection should occur prior to Sulfasalazine administration due to the potential for falsely depressed results  AST(SGOT) 16 U/L  5-45   Specimen collection should occur prior to Sulfasalazine administration due to the potential for falsely depressed results  ALBUMIN 3 2 g/dL L 3 5-5 0   TOTAL PROTEIN 8 4 g/dL H 6 4-8 2   eGFR 96 ml/min/1 73sq m     National Kidney Disease Education Program recommendations are as follows:  GFR calculation is accurate only with a steady state creatinine  Chronic Kidney disease less than 60 ml/min/1 73 sq  meters  Kidney failure less than 15 ml/min/1 73 sq  meters  (1) VITAMIN B12 10Ukn3490 09:51AM Vinicio Freddy Order Number: [id number]     Test Name Result Flag Reference   VITAMIN B12 212 pg/mL  100-900     (1) VITAMIN D 25-HYDROXY 93YEQ2480 09:51AM [facility]    Order Number: [id number]     Test Name Result Flag Reference   VIT D 25-HYDROX 21 8 ng/mL L 30 0-100 0   This assay is a certified procedure of the CDC Vitamin D Standardization Certification Program (VDSCP)     Deficiency <20ng/ml   Insufficiency 20-30ng/ml   Sufficient  ng/ml     *Patients undergoing fluorescein dye angiography may retain small amounts of fluorescein in the body for 48-72 hours post procedure  Samples containing fluorescein can produce falsely elevated Vitamin D values  If the patient had this procedure, a specimen should be resubmitted post fluorescein clearance         Plan  Vitamin D deficiency    · Vitamin D (Ergocalciferol) 70195 UNIT Oral Capsule; 1 CAPSULE WEEKLY FOR  THE NEXT 8 WEEKS

## 2018-01-24 ENCOUNTER — HOSPITAL ENCOUNTER (OUTPATIENT)
Dept: MRI IMAGING | Facility: HOSPITAL | Age: 43
Discharge: HOME | End: 2018-01-24
Attending: PSYCHIATRY & NEUROLOGY
Payer: COMMERCIAL

## 2018-01-24 ENCOUNTER — HOSPITAL ENCOUNTER (OUTPATIENT)
Dept: INFUSION CENTER | Facility: CLINIC | Age: 43
Discharge: HOME | End: 2018-01-24
Payer: COMMERCIAL

## 2018-01-24 VITALS
HEART RATE: 79 BPM | SYSTOLIC BLOOD PRESSURE: 131 MMHG | BODY MASS INDEX: 21.55 KG/M2 | WEIGHT: 129.5 LBS | DIASTOLIC BLOOD PRESSURE: 69 MMHG | TEMPERATURE: 98.9 F | RESPIRATION RATE: 20 BRPM

## 2018-01-24 DIAGNOSIS — M79.604 PAIN OF RIGHT LEG: ICD-10-CM

## 2018-01-24 PROCEDURE — 96401 CHEMO ANTI-NEOPL SQ/IM: CPT

## 2018-01-24 PROCEDURE — 72148 MRI LUMBAR SPINE W/O DYE: CPT

## 2018-01-24 RX ADMIN — BORTEZOMIB 2.1 MG: 3.5 INJECTION, POWDER, LYOPHILIZED, FOR SOLUTION INTRAVENOUS; SUBCUTANEOUS at 14:43

## 2018-01-24 NOTE — PROGRESS NOTES
Patient tolerated velcade injection without complications  Patient aware of upcoming appointments   Declined AVS

## 2018-01-24 NOTE — MISCELLANEOUS
Message   Recorded as Task   Date: 01/23/2018 12:35 PM, Created By: Narciso Huber   Task Name: Care Coordination   Assigned To: eDvika Riley   Regarding Patient: Charles Downs, Status: In Progress   Comment:    Maribeth Goel - 23 Jan 2018 12:35 PM     TASK CREATED  Caller: Self; Care Coordination; [phone] (Home)  stated that her job just called her about the FMLA paperwork  It looks like we forgot to fill something out  Call back at [address] [place] [address] - 23 Jan 2018 1:01 PM     TASK IN PROGRESS   Spoke with patient and she reviewed section 5B needed number of hours  So I completed this correction on FMLA p/w and initialed it, and emailed back to [email or web address]  Active Problems    1  Bilateral carpal tunnel syndrome (354 0) (G56 03)   2  Chest pain (786 50) (R07 9)   3  Monoclonal gammopathy of unknown significance (MGUS) (273 1) (D47 2)   4  Multiple myeloma (203 00) (C90 00)   5  Multiple sclerosis (340) (G35)   6  Nonspecific abnormal electrocardiogram (ECG) (EKG) (794 31) (R94 31)   7  Numbness of tongue (782 0) (R20 0)   8  Palpitations (785 1) (R00 2)   9  Right leg pain (729 5) (M79 604)   10  Shortness of breath (786 05) (R06 02)   11  Vitamin D deficiency (268 9) (E55 9)    Current Meds   1  Acyclovir 400 MG Oral Tablet; TAKE 1 TABLET TWICE DAILY; Therapy: 06VGZ8928 to (Evaluate:05Jan2019)  Requested for: 75RQY6669; Last   Rx:10Jan2018 Ordered   2  Allopurinol 100 MG Oral Tablet; TAKE 1 TABLET DAILY; Therapy: 47JYD6191 to (Evaluate:72Wmb7473)  Requested for: 91PWA2181; Last   Rx:10Jan2018 Ordered   3  Copaxone 40 MG/ML Subcutaneous Solution Prefilled Syringe (Glatiramer Acetate); INJECT 40 MG (1 ML) UNDER THE SKIN THREE TIMES A   WEEK; Therapy: 58YFL7973 to (Last VN:63UDO6784)  Requested for: 67JVX4787 Ordered   4   Dexamethasone 4 MG Oral Tablet; 10 pills with food weekly x 4;   Therapy: 46AWR7358 to (Evaluate:31Hsl1130)  Requested for: 70QSC3711; Last   Rx:10Jan2018 Ordered   5  Revlimid 25 MG Oral Capsule; Take 25mg PO daily for days 1 through 21;   Therapy: 65XVD0258 to (Evaluate:70Rue4597); Last Rx:22Jan2018; Status: ACTIVE -   Retrospective By Protocol Authorization Ordered   6  Vitamin D (Ergocalciferol) 34733 UNIT Oral Capsule; 1 CAPSULE WEEKLY FOR THE   NEXT 8 WEEKS; Therapy: 83LZF2481 to (Last Rx:22Lns3097)  Requested for: 96Yfp7759 Ordered    Allergies    1   No Known Drug Allergies    Signatures   Electronically signed by : Emily Ramsay, ; Jan 23 2018  1:06PM EST                       (Author)

## 2018-01-25 ENCOUNTER — TELEPHONE (OUTPATIENT)
Dept: HEMATOLOGY ONCOLOGY | Facility: CLINIC | Age: 43
End: 2018-01-25

## 2018-01-25 NOTE — TELEPHONE ENCOUNTER
Stated that she received the Revlimid and needs to go over the instructions for you  She stated that she wont be able to speak with you until Wednesday so she req that you call her back today

## 2018-01-25 NOTE — TELEPHONE ENCOUNTER
Spoke with patient and reviewed per Dr Singh Mcclelland to start her [facility] today D9 (1/25/18) through D21 (2/6/18) and then take a week off  When she sees Dr Singh Mcclelland on 2/6/18 appointment then she can review new start date of Revlimid with cycle #2 on 2/14/18 for next 21 day cycle  Additionally, patient reviewed she has been having trouble sleeping due to anxiety and just thinking too much  She reviewed she also has a lot of pain at time in her bones and followed up with her neurologist yesterday  She states the pain in in her back and legs  I told patient I would review with Dr Singh Mcclelland and determine if there was something he could prescribe for the pain/anxiety  Additionally, reviewed I completed the FMLA form for 3rd time with requested corrections and emailed to [address]  Reviewed she should let us know if they need anymore additional information

## 2018-01-26 ENCOUNTER — TELEPHONE (OUTPATIENT)
Dept: NEUROLOGY | Facility: CLINIC | Age: 43
End: 2018-01-26

## 2018-01-26 NOTE — TELEPHONE ENCOUNTER
----- Message from Manuelito Chavez MD sent at 1/25/2018 12:05 PM EST -----  Please call pt and let her know mri lumbar spine with disc disease particularly at L4/5 with pinched nerve on the right at L5    Please see if pt would like referal to pain mx to further access

## 2018-01-27 ENCOUNTER — APPOINTMENT (OUTPATIENT)
Dept: LAB | Facility: HOSPITAL | Age: 43
End: 2018-01-27
Attending: INTERNAL MEDICINE
Payer: COMMERCIAL

## 2018-01-27 DIAGNOSIS — C90.00 MULTIPLE MYELOMA NOT HAVING ACHIEVED REMISSION (HCC): ICD-10-CM

## 2018-01-27 PROCEDURE — 80053 COMPREHEN METABOLIC PANEL: CPT | Performed by: INTERNAL MEDICINE

## 2018-01-27 PROCEDURE — 85025 COMPLETE CBC W/AUTO DIFF WBC: CPT | Performed by: INTERNAL MEDICINE

## 2018-01-27 PROCEDURE — 84703 CHORIONIC GONADOTROPIN ASSAY: CPT | Performed by: INTERNAL MEDICINE

## 2018-01-27 PROCEDURE — 36415 COLL VENOUS BLD VENIPUNCTURE: CPT | Performed by: INTERNAL MEDICINE

## 2018-01-31 ENCOUNTER — HOSPITAL ENCOUNTER (OUTPATIENT)
Dept: INFUSION CENTER | Facility: CLINIC | Age: 43
Discharge: HOME | End: 2018-01-31
Payer: COMMERCIAL

## 2018-01-31 VITALS
DIASTOLIC BLOOD PRESSURE: 78 MMHG | SYSTOLIC BLOOD PRESSURE: 106 MMHG | BODY MASS INDEX: 21.72 KG/M2 | RESPIRATION RATE: 18 BRPM | OXYGEN SATURATION: 99 % | WEIGHT: 130.5 LBS | TEMPERATURE: 98.2 F | HEART RATE: 77 BPM

## 2018-01-31 PROCEDURE — 96401 CHEMO ANTI-NEOPL SQ/IM: CPT

## 2018-01-31 RX ADMIN — BORTEZOMIB 2.1 MG: 3.5 INJECTION, POWDER, LYOPHILIZED, FOR SOLUTION INTRAVENOUS; SUBCUTANEOUS at 09:09

## 2018-01-31 NOTE — PROGRESS NOTES
Dr Ross Aid here and assessed pt's rash  He told her to put some cream on it and she is going to see him on Tuesday   He told her to continue the revilmid as well

## 2018-01-31 NOTE — PROGRESS NOTES
Pt is here for velcade  She states she started her revilmid 2 days ago  She states she has developed a rash that starts on her chest at her bra line, travels to shoulders and on the top of her back  She states it itches  It is a fine red non raised rash  She also complains of night sweats  She states she has had these before but feel these are different  Ethan Hugo rn was notified   She states pt is ok for her velcade and she will notify Dr Tyron Duron about the rash

## 2018-02-01 NOTE — TELEPHONE ENCOUNTER
Pt called returning our call regarding below which she verbalized understanding  At this time, she declined a referral to pain mx  Denies any issues or concern at this time

## 2018-02-03 ENCOUNTER — TRANSCRIBE ORDERS (OUTPATIENT)
Dept: ADMINISTRATIVE | Facility: HOSPITAL | Age: 43
End: 2018-02-03

## 2018-02-03 ENCOUNTER — APPOINTMENT (OUTPATIENT)
Dept: LAB | Facility: HOSPITAL | Age: 43
End: 2018-02-03
Attending: INTERNAL MEDICINE
Payer: COMMERCIAL

## 2018-02-03 PROCEDURE — 84703 CHORIONIC GONADOTROPIN ASSAY: CPT | Performed by: INTERNAL MEDICINE

## 2018-02-03 PROCEDURE — 80053 COMPREHEN METABOLIC PANEL: CPT | Performed by: INTERNAL MEDICINE

## 2018-02-03 PROCEDURE — 85025 COMPLETE CBC W/AUTO DIFF WBC: CPT | Performed by: INTERNAL MEDICINE

## 2018-02-03 PROCEDURE — 36415 COLL VENOUS BLD VENIPUNCTURE: CPT | Performed by: INTERNAL MEDICINE

## 2018-02-06 ENCOUNTER — OFFICE VISIT (OUTPATIENT)
Dept: HEMATOLOGY ONCOLOGY | Facility: CLINIC | Age: 43
End: 2018-02-06
Payer: COMMERCIAL

## 2018-02-06 VITALS
SYSTOLIC BLOOD PRESSURE: 110 MMHG | RESPIRATION RATE: 16 BRPM | TEMPERATURE: 98.2 F | WEIGHT: 132 LBS | OXYGEN SATURATION: 97 % | HEART RATE: 86 BPM | DIASTOLIC BLOOD PRESSURE: 70 MMHG | HEIGHT: 65 IN | BODY MASS INDEX: 21.99 KG/M2

## 2018-02-06 DIAGNOSIS — Z51.11 ENCOUNTER FOR CHEMOTHERAPY MANAGEMENT: ICD-10-CM

## 2018-02-06 DIAGNOSIS — R10.9 ABDOMINAL PAIN, UNSPECIFIED ABDOMINAL LOCATION: ICD-10-CM

## 2018-02-06 DIAGNOSIS — D64.9 ANEMIA, UNSPECIFIED TYPE: ICD-10-CM

## 2018-02-06 DIAGNOSIS — R21 SKIN RASH: ICD-10-CM

## 2018-02-06 DIAGNOSIS — C90.00 MULTIPLE MYELOMA NOT HAVING ACHIEVED REMISSION (HCC): Primary | ICD-10-CM

## 2018-02-06 PROCEDURE — 99215 OFFICE O/P EST HI 40 MIN: CPT | Performed by: INTERNAL MEDICINE

## 2018-02-06 RX ORDER — ALLOPURINOL 100 MG/1
1 TABLET ORAL DAILY
COMMUNITY
Start: 2018-01-10 | End: 2018-02-14 | Stop reason: ALTCHOICE

## 2018-02-06 RX ORDER — ACYCLOVIR 400 MG/1
1 TABLET ORAL 2 TIMES DAILY
COMMUNITY
Start: 2018-01-10 | End: 2018-11-14 | Stop reason: SDUPTHER

## 2018-02-06 RX ORDER — BIOTIN 1 MG
1 TABLET ORAL WEEKLY
COMMUNITY
Start: 2017-12-29 | End: 2018-02-14 | Stop reason: CLARIF

## 2018-02-06 RX ORDER — DEXAMETHASONE 4 MG/1
4 TABLET ORAL DAILY
COMMUNITY
Start: 2018-01-10 | End: 2018-02-06 | Stop reason: SDUPTHER

## 2018-02-06 RX ORDER — DEXAMETHASONE 4 MG/1
40 TABLET ORAL WEEKLY
Qty: 40 TABLET | Refills: 2 | Status: SHIPPED | OUTPATIENT
Start: 2018-02-06 | End: 2018-05-08 | Stop reason: SDUPTHER
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
## 2018-02-07 ENCOUNTER — TELEPHONE (OUTPATIENT)
Dept: HEMATOLOGY ONCOLOGY | Facility: CLINIC | Age: 43
End: 2018-02-07

## 2018-02-07 NOTE — TELEPHONE ENCOUNTER
Stated that she checked with the pharmacy this morning and they haven't rec'vd the other medications that were prescribed yesterday  She was only able to  the Dexamethasone yesterday but not any of the other ones  There were 3 others ones that she should of picked up   Req a call back

## 2018-02-12 DIAGNOSIS — C90.00 MULTIPLE MYELOMA NOT HAVING ACHIEVED REMISSION (HCC): Primary | ICD-10-CM

## 2018-02-12 RX ORDER — LENALIDOMIDE 25 MG/1
25 CAPSULE ORAL DAILY
Qty: 21 EACH | Refills: 0 | Status: SHIPPED | OUTPATIENT
Start: 2018-02-12 | End: 2018-02-28 | Stop reason: SDUPTHER

## 2018-02-12 RX ORDER — LENALIDOMIDE 25 MG/1
25 CAPSULE ORAL DAILY
COMMUNITY
Start: 2018-02-14 | End: 2018-02-12 | Stop reason: CLARIF

## 2018-02-14 ENCOUNTER — TELEPHONE (OUTPATIENT)
Dept: HEMATOLOGY ONCOLOGY | Facility: CLINIC | Age: 43
End: 2018-02-14

## 2018-02-14 ENCOUNTER — HOSPITAL ENCOUNTER (OUTPATIENT)
Dept: INFUSION CENTER | Facility: CLINIC | Age: 43
Discharge: HOME | End: 2018-02-14
Payer: COMMERCIAL

## 2018-02-14 ENCOUNTER — HOSPITAL ENCOUNTER (OUTPATIENT)
Dept: ULTRASOUND IMAGING | Facility: HOSPITAL | Age: 43
Discharge: HOME | End: 2018-02-14
Attending: INTERNAL MEDICINE
Payer: COMMERCIAL

## 2018-02-14 VITALS
HEART RATE: 76 BPM | WEIGHT: 130.5 LBS | HEIGHT: 65 IN | RESPIRATION RATE: 18 BRPM | TEMPERATURE: 98.4 F | DIASTOLIC BLOOD PRESSURE: 62 MMHG | OXYGEN SATURATION: 99 % | BODY MASS INDEX: 21.74 KG/M2 | SYSTOLIC BLOOD PRESSURE: 98 MMHG

## 2018-02-14 DIAGNOSIS — R10.9 ABDOMINAL PAIN, UNSPECIFIED ABDOMINAL LOCATION: ICD-10-CM

## 2018-02-14 DIAGNOSIS — C90.00 MULTIPLE MYELOMA NOT HAVING ACHIEVED REMISSION (HCC): ICD-10-CM

## 2018-02-14 LAB
ALBUMIN SERPL BCP-MCNC: 3 G/DL (ref 3.5–5)
ALP SERPL-CCNC: 53 U/L (ref 46–116)
ALT SERPL W P-5'-P-CCNC: 24 U/L (ref 12–78)
ANION GAP SERPL CALCULATED.3IONS-SCNC: 6 MMOL/L (ref 4–13)
AST SERPL W P-5'-P-CCNC: 14 U/L (ref 5–45)
BASOPHILS # BLD AUTO: 0.05 THOUSANDS/ΜL (ref 0–0.1)
BASOPHILS NFR BLD AUTO: 1 % (ref 0–1)
BILIRUB SERPL-MCNC: 0.6 MG/DL (ref 0.2–1)
BUN SERPL-MCNC: 16 MG/DL (ref 5–25)
CALCIUM SERPL-MCNC: 7.9 MG/DL (ref 8.3–10.1)
CHLORIDE SERPL-SCNC: 106 MMOL/L (ref 100–108)
CO2 SERPL-SCNC: 26 MMOL/L (ref 21–32)
CREAT SERPL-MCNC: 0.7 MG/DL (ref 0.6–1.3)
EOSINOPHIL # BLD AUTO: 0.13 THOUSAND/ΜL (ref 0–0.61)
EOSINOPHIL NFR BLD AUTO: 2 % (ref 0–6)
ERYTHROCYTE [DISTWIDTH] IN BLOOD BY AUTOMATED COUNT: 12.8 % (ref 11.6–15.1)
GFR SERPL CREATININE-BSD FRML MDRD: 106 ML/MIN/1.73SQ M
GLUCOSE P FAST SERPL-MCNC: 100 MG/DL (ref 65–99)
GLUCOSE SERPL-MCNC: 100 MG/DL (ref 65–140)
HCT VFR BLD AUTO: 37.5 % (ref 34.8–46.1)
HGB BLD-MCNC: 12 G/DL (ref 11.5–15.4)
LYMPHOCYTES # BLD AUTO: 1.61 THOUSANDS/ΜL (ref 0.6–4.47)
LYMPHOCYTES NFR BLD AUTO: 26 % (ref 14–44)
MCH RBC QN AUTO: 30.2 PG (ref 26.8–34.3)
MCHC RBC AUTO-ENTMCNC: 32 G/DL (ref 31.4–37.4)
MCV RBC AUTO: 94 FL (ref 82–98)
MONOCYTES # BLD AUTO: 0.61 THOUSAND/ΜL (ref 0.17–1.22)
MONOCYTES NFR BLD AUTO: 10 % (ref 4–12)
NEUTROPHILS # BLD AUTO: 3.8 THOUSANDS/ΜL (ref 1.85–7.62)
NEUTS SEG NFR BLD AUTO: 61 % (ref 43–75)
PLATELET # BLD AUTO: 192 THOUSANDS/UL (ref 149–390)
PMV BLD AUTO: 9.9 FL (ref 8.9–12.7)
POTASSIUM SERPL-SCNC: 4.1 MMOL/L (ref 3.5–5.3)
PROT SERPL-MCNC: 7.1 G/DL (ref 6.4–8.2)
RBC # BLD AUTO: 3.98 MILLION/UL (ref 3.81–5.12)
SODIUM SERPL-SCNC: 138 MMOL/L (ref 136–145)
WBC # BLD AUTO: 6.2 THOUSAND/UL (ref 4.31–10.16)

## 2018-02-14 PROCEDURE — 80053 COMPREHEN METABOLIC PANEL: CPT

## 2018-02-14 PROCEDURE — 76700 US EXAM ABDOM COMPLETE: CPT

## 2018-02-14 PROCEDURE — 96401 CHEMO ANTI-NEOPL SQ/IM: CPT

## 2018-02-14 PROCEDURE — 85025 COMPLETE CBC W/AUTO DIFF WBC: CPT

## 2018-02-14 RX ORDER — ERGOCALCIFEROL 1.25 MG/1
50000 CAPSULE ORAL WEEKLY
COMMUNITY
End: 2018-03-16

## 2018-02-14 RX ADMIN — BORTEZOMIB 2.1 MG: 3.5 INJECTION, POWDER, LYOPHILIZED, FOR SOLUTION INTRAVENOUS; SUBCUTANEOUS at 09:44

## 2018-02-14 NOTE — TELEPHONE ENCOUNTER
Spoke with patient and she confirmed taking two forms of birth control  Reviewed this with [facility] and they stated her [employer]of[employer] Rubinstein number was in good standing and patient will get Revlimid

## 2018-02-14 NOTE — PLAN OF CARE
Problem: Knowledge Deficit  Goal: Patient/family/caregiver demonstrates understanding of disease process, treatment plan, medications, and discharge instructions  Complete learning assessment and assess knowledge base  Interventions:  - Provide teaching at level of understanding  - Provide teaching via preferred learning methods   Outcome: Progressing      Problem: PAIN - ADULT  Goal: Verbalizes/displays adequate comfort level or baseline comfort level  Interventions:  - Encourage patient to monitor pain and request assistance  - Assess pain using appropriate pain scale  - Administer analgesics based on type and severity of pain and evaluate response  - Implement non-pharmacological measures as appropriate and evaluate response  - Consider cultural and social influences on pain and pain management  - Notify physician/advanced practitioner if interventions unsuccessful or patient reports new pain  Outcome: Progressing      Problem: INFECTION - ADULT  Goal: Absence or prevention of progression during hospitalization  INTERVENTIONS:  - Assess and monitor for signs and symptoms of infection  - Monitor lab/diagnostic results  - Monitor all insertion sites, i e  indwelling lines, tubes, and drains  - Monitor endotracheal (as able) and nasal secretions for changes in amount and color  - Moca appropriate cooling/warming therapies per order  - Administer medications as ordered  - Instruct and encourage patient and family to use good hand hygiene technique  - Identify and instruct in appropriate isolation precautions for identified infection/condition  Outcome: Progressing      Problem: SAFETY ADULT  Goal: Patient will remain free of falls  INTERVENTIONS:  - Assess patient frequently for physical needs  -  Identify cognitive and physical deficits and behaviors that affect risk of falls    -  Moca fall precautions as indicated by assessment   - Educate patient/family on patient safety including physical limitations  - Instruct patient to call for assistance with activity based on assessment  - Modify environment to reduce risk of injury  - Consider OT/PT consult to assist with strengthening/mobility  Outcome: Progressing

## 2018-02-14 NOTE — TELEPHONE ENCOUNTER
ERIGENE NEEDS TO CONFRIM PT'S 2 FORMS OF BIRTHCONTROL IN RE TO HER REVLIMID  PLEASE CALL AND USE REF #[id number]    TXS

## 2018-02-14 NOTE — PROGRESS NOTES
Patient here for cycle 1 day 2 velcade and is doing well  She did not have labs drawn since 2/3/18  Called and spoke with Dr Roberta Murray who confirmed with Dr Jose Alejandro Durbin that we should draw today and wait for results prior to treatment  Reviewed need for weekly labs with patient as well as medications she should be taking per Dr Liliana Mccullough note  She verbalized understanding of all instructions

## 2018-02-14 NOTE — PROGRESS NOTES
Patient tolerated velcade to right abdomen without incident and was discharged  She will RTO in 1 week for next dosing  Outpatient lab script faxed from office and given to patient to ensure labs are done for next dosing  Patient offers no c/o at time of discharge

## 2018-02-17 ENCOUNTER — APPOINTMENT (OUTPATIENT)
Dept: LAB | Facility: HOSPITAL | Age: 43
End: 2018-02-17
Attending: INTERNAL MEDICINE
Payer: COMMERCIAL

## 2018-02-17 PROCEDURE — 36415 COLL VENOUS BLD VENIPUNCTURE: CPT | Performed by: INTERNAL MEDICINE

## 2018-02-17 PROCEDURE — 84703 CHORIONIC GONADOTROPIN ASSAY: CPT | Performed by: INTERNAL MEDICINE

## 2018-02-17 PROCEDURE — 80053 COMPREHEN METABOLIC PANEL: CPT | Performed by: INTERNAL MEDICINE

## 2018-02-17 PROCEDURE — 85025 COMPLETE CBC W/AUTO DIFF WBC: CPT | Performed by: INTERNAL MEDICINE

## 2018-02-21 ENCOUNTER — HOSPITAL ENCOUNTER (OUTPATIENT)
Dept: INFUSION CENTER | Facility: CLINIC | Age: 43
Discharge: HOME | End: 2018-02-21
Payer: COMMERCIAL

## 2018-02-21 VITALS
SYSTOLIC BLOOD PRESSURE: 90 MMHG | RESPIRATION RATE: 18 BRPM | HEART RATE: 73 BPM | TEMPERATURE: 98.7 F | OXYGEN SATURATION: 98 % | DIASTOLIC BLOOD PRESSURE: 56 MMHG

## 2018-02-21 PROCEDURE — 96401 CHEMO ANTI-NEOPL SQ/IM: CPT

## 2018-02-21 RX ADMIN — BORTEZOMIB 2.1 MG: 3.5 INJECTION, POWDER, LYOPHILIZED, FOR SOLUTION INTRAVENOUS; SUBCUTANEOUS at 09:23

## 2018-02-24 ENCOUNTER — APPOINTMENT (OUTPATIENT)
Dept: LAB | Facility: HOSPITAL | Age: 43
End: 2018-02-24
Attending: INTERNAL MEDICINE
Payer: COMMERCIAL

## 2018-02-24 DIAGNOSIS — C90.00 MULTIPLE MYELOMA NOT HAVING ACHIEVED REMISSION (HCC): ICD-10-CM

## 2018-02-24 DIAGNOSIS — R10.9 ABDOMINAL PAIN, UNSPECIFIED ABDOMINAL LOCATION: ICD-10-CM

## 2018-02-24 LAB
IGA SERPL-MCNC: 52 MG/DL (ref 70–400)
IGG SERPL-MCNC: 1590 MG/DL (ref 700–1600)
IGM SERPL-MCNC: 49 MG/DL (ref 40–230)
LIPASE SERPL-CCNC: 151 U/L (ref 73–393)

## 2018-02-24 PROCEDURE — 36415 COLL VENOUS BLD VENIPUNCTURE: CPT | Performed by: INTERNAL MEDICINE

## 2018-02-24 PROCEDURE — 82784 ASSAY IGA/IGD/IGG/IGM EACH: CPT

## 2018-02-24 PROCEDURE — 84703 CHORIONIC GONADOTROPIN ASSAY: CPT | Performed by: INTERNAL MEDICINE

## 2018-02-24 PROCEDURE — 84165 PROTEIN E-PHORESIS SERUM: CPT | Performed by: PATHOLOGY

## 2018-02-24 PROCEDURE — 84165 PROTEIN E-PHORESIS SERUM: CPT

## 2018-02-24 PROCEDURE — 85025 COMPLETE CBC W/AUTO DIFF WBC: CPT | Performed by: INTERNAL MEDICINE

## 2018-02-24 PROCEDURE — 83883 ASSAY NEPHELOMETRY NOT SPEC: CPT

## 2018-02-24 PROCEDURE — 83690 ASSAY OF LIPASE: CPT

## 2018-02-24 PROCEDURE — 80053 COMPREHEN METABOLIC PANEL: CPT | Performed by: INTERNAL MEDICINE

## 2018-02-27 LAB
ALBUMIN SERPL ELPH-MCNC: 3.63 G/DL (ref 3.5–5)
ALBUMIN SERPL ELPH-MCNC: 53.4 % (ref 52–65)
ALPHA1 GLOB SERPL ELPH-MCNC: 0.29 G/DL (ref 0.1–0.4)
ALPHA1 GLOB SERPL ELPH-MCNC: 4.3 % (ref 2.5–5)
ALPHA2 GLOB SERPL ELPH-MCNC: 0.64 G/DL (ref 0.4–1.2)
ALPHA2 GLOB SERPL ELPH-MCNC: 9.4 % (ref 7–13)
BETA GLOB ABNORMAL SERPL ELPH-MCNC: 0.4 G/DL (ref 0.4–0.8)
BETA1 GLOB SERPL ELPH-MCNC: 5.9 % (ref 5–13)
BETA2 GLOB SERPL ELPH-MCNC: 3.2 % (ref 2–8)
BETA2+GAMMA GLOB SERPL ELPH-MCNC: 0.22 G/DL (ref 0.2–0.5)
GAMMA GLOB ABNORMAL SERPL ELPH-MCNC: 1.62 G/DL (ref 0.5–1.6)
GAMMA GLOB SERPL ELPH-MCNC: 23.8 % (ref 12–22)
IGG/ALB SER: 1.15 {RATIO} (ref 1.1–1.8)
KAPPA LC FREE SER-MCNC: 8.2 MG/L (ref 3.3–19.4)
KAPPA LC FREE/LAMBDA FREE SER: 0.59 {RATIO} (ref 0.26–1.65)
LAMBDA LC FREE SERPL-MCNC: 13.8 MG/L (ref 5.7–26.3)
M PROTEIN 1 MFR SERPL ELPH: 19.1 %
M PROTEIN 1 SERPL ELPH-MCNC: 1.3 G/DL
PROT SERPL-MCNC: 6.8 G/DL (ref 6.4–8.2)

## 2018-02-28 ENCOUNTER — HOSPITAL ENCOUNTER (OUTPATIENT)
Dept: INFUSION CENTER | Facility: CLINIC | Age: 43
Discharge: HOME | End: 2018-02-28
Payer: COMMERCIAL

## 2018-02-28 ENCOUNTER — OFFICE VISIT (OUTPATIENT)
Dept: HEMATOLOGY ONCOLOGY | Facility: CLINIC | Age: 43
End: 2018-02-28
Payer: COMMERCIAL

## 2018-02-28 VITALS
BODY MASS INDEX: 21.66 KG/M2 | RESPIRATION RATE: 16 BRPM | DIASTOLIC BLOOD PRESSURE: 68 MMHG | WEIGHT: 130 LBS | HEIGHT: 65 IN | TEMPERATURE: 98.8 F | HEART RATE: 85 BPM | OXYGEN SATURATION: 98 % | SYSTOLIC BLOOD PRESSURE: 106 MMHG

## 2018-02-28 VITALS
TEMPERATURE: 98.5 F | WEIGHT: 130 LBS | HEIGHT: 65 IN | BODY MASS INDEX: 21.66 KG/M2 | DIASTOLIC BLOOD PRESSURE: 51 MMHG | SYSTOLIC BLOOD PRESSURE: 97 MMHG | HEART RATE: 73 BPM | RESPIRATION RATE: 20 BRPM

## 2018-02-28 DIAGNOSIS — C90.00 MULTIPLE MYELOMA NOT HAVING ACHIEVED REMISSION (HCC): Primary | ICD-10-CM

## 2018-02-28 DIAGNOSIS — C90.00 MULTIPLE MYELOMA NOT HAVING ACHIEVED REMISSION (HCC): ICD-10-CM

## 2018-02-28 PROCEDURE — 99214 OFFICE O/P EST MOD 30 MIN: CPT | Performed by: INTERNAL MEDICINE

## 2018-02-28 PROCEDURE — 96401 CHEMO ANTI-NEOPL SQ/IM: CPT

## 2018-02-28 RX ORDER — LENALIDOMIDE 25 MG/1
25 CAPSULE ORAL DAILY
Qty: 21 EACH | Refills: 0 | Status: SHIPPED | OUTPATIENT
Start: 2018-02-28 | End: 2018-03-08 | Stop reason: SDUPTHER

## 2018-02-28 RX ADMIN — BORTEZOMIB 2.1 MG: 3.5 INJECTION, POWDER, LYOPHILIZED, FOR SOLUTION INTRAVENOUS; SUBCUTANEOUS at 10:49

## 2018-02-28 NOTE — PROGRESS NOTES
Pt  Tolerated Velcade SC injection in RLQ of abd w/out adverse reaction  Confirmed pts  Next appt   Pt  Declined AVS
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
## 2018-02-28 NOTE — PROGRESS NOTES
Math rechecked with wt & ht from today & within 10% of ordered dose, labs reviewed from 2/24/18 & within treatment parameters for today's treatment

## 2018-03-01 RX ORDER — ERGOCALCIFEROL 1.25 MG/1
CAPSULE ORAL
Qty: 8 CAPSULE | Refills: 0 | OUTPATIENT
Start: 2018-03-01

## 2018-03-02 ENCOUNTER — TELEPHONE (OUTPATIENT)
Dept: HEMATOLOGY ONCOLOGY | Facility: CLINIC | Age: 43
End: 2018-03-02
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
## 2018-03-03 ENCOUNTER — APPOINTMENT (OUTPATIENT)
Dept: LAB | Facility: CLINIC | Age: 43
End: 2018-03-03
Payer: COMMERCIAL

## 2018-03-03 LAB
ALBUMIN SERPL BCP-MCNC: 3.1 G/DL (ref 3.5–5)
ALP SERPL-CCNC: 68 U/L (ref 46–116)
ALT SERPL W P-5'-P-CCNC: 48 U/L (ref 12–78)
ANION GAP SERPL CALCULATED.3IONS-SCNC: 5 MMOL/L (ref 4–13)
AST SERPL W P-5'-P-CCNC: 26 U/L (ref 5–45)
BASOPHILS # BLD AUTO: 0.11 THOUSANDS/ΜL (ref 0–0.1)
BASOPHILS NFR BLD AUTO: 2 % (ref 0–1)
BILIRUB SERPL-MCNC: 0.75 MG/DL (ref 0.2–1)
BUN SERPL-MCNC: 12 MG/DL (ref 5–25)
CALCIUM SERPL-MCNC: 8.2 MG/DL (ref 8.3–10.1)
CHLORIDE SERPL-SCNC: 103 MMOL/L (ref 100–108)
CO2 SERPL-SCNC: 29 MMOL/L (ref 21–32)
CREAT SERPL-MCNC: 0.69 MG/DL (ref 0.6–1.3)
EOSINOPHIL # BLD AUTO: 0.37 THOUSAND/ΜL (ref 0–0.61)
EOSINOPHIL NFR BLD AUTO: 6 % (ref 0–6)
ERYTHROCYTE [DISTWIDTH] IN BLOOD BY AUTOMATED COUNT: 13.3 % (ref 11.6–15.1)
GFR SERPL CREATININE-BSD FRML MDRD: 106 ML/MIN/1.73SQ M
GLUCOSE SERPL-MCNC: 109 MG/DL (ref 65–140)
HCT VFR BLD AUTO: 42 % (ref 34.8–46.1)
HGB BLD-MCNC: 13.4 G/DL (ref 11.5–15.4)
IGA SERPL-MCNC: 72 MG/DL (ref 70–400)
IGG SERPL-MCNC: 1560 MG/DL (ref 700–1600)
IGM SERPL-MCNC: 61 MG/DL (ref 40–230)
LYMPHOCYTES # BLD AUTO: 1.16 THOUSANDS/ΜL (ref 0.6–4.47)
LYMPHOCYTES NFR BLD AUTO: 19 % (ref 14–44)
MCH RBC QN AUTO: 30.5 PG (ref 26.8–34.3)
MCHC RBC AUTO-ENTMCNC: 31.9 G/DL (ref 31.4–37.4)
MCV RBC AUTO: 96 FL (ref 82–98)
MONOCYTES # BLD AUTO: 1.07 THOUSAND/ΜL (ref 0.17–1.22)
MONOCYTES NFR BLD AUTO: 17 % (ref 4–12)
NEUTROPHILS # BLD AUTO: 3.5 THOUSANDS/ΜL (ref 1.85–7.62)
NEUTS SEG NFR BLD AUTO: 56 % (ref 43–75)
NRBC BLD AUTO-RTO: 0 /100 WBCS
PLATELET # BLD AUTO: 198 THOUSANDS/UL (ref 149–390)
PMV BLD AUTO: 11.7 FL (ref 8.9–12.7)
POTASSIUM SERPL-SCNC: 3.5 MMOL/L (ref 3.5–5.3)
PROT SERPL-MCNC: 8 G/DL (ref 6.4–8.2)
RBC # BLD AUTO: 4.4 MILLION/UL (ref 3.81–5.12)
SODIUM SERPL-SCNC: 137 MMOL/L (ref 136–145)
WBC # BLD AUTO: 6.22 THOUSAND/UL (ref 4.31–10.16)

## 2018-03-03 PROCEDURE — 80053 COMPREHEN METABOLIC PANEL: CPT | Performed by: INTERNAL MEDICINE

## 2018-03-03 PROCEDURE — 82784 ASSAY IGA/IGD/IGG/IGM EACH: CPT | Performed by: INTERNAL MEDICINE

## 2018-03-03 PROCEDURE — 36415 COLL VENOUS BLD VENIPUNCTURE: CPT | Performed by: INTERNAL MEDICINE

## 2018-03-03 PROCEDURE — 84703 CHORIONIC GONADOTROPIN ASSAY: CPT | Performed by: INTERNAL MEDICINE

## 2018-03-03 PROCEDURE — 85025 COMPLETE CBC W/AUTO DIFF WBC: CPT | Performed by: INTERNAL MEDICINE

## 2018-03-03 PROCEDURE — 83883 ASSAY NEPHELOMETRY NOT SPEC: CPT | Performed by: INTERNAL MEDICINE

## 2018-03-06 LAB
KAPPA LC FREE SER-MCNC: 15.3 MG/L (ref 3.3–19.4)
KAPPA LC FREE/LAMBDA FREE SER: 0.6 {RATIO} (ref 0.26–1.65)
LAMBDA LC FREE SERPL-MCNC: 25.4 MG/L (ref 5.7–26.3)

## 2018-03-07 NOTE — PROCEDURES
Current Meds   1  Copaxone 40 MG/ML Subcutaneous Solution Prefilled Syringe; Inject 40mg   subcutaneously 3 times a week; Therapy: [id number] to (Evaluate:11Jan2018); Last Rx:16Jan2017 Ordered    Allergies    1  No Known Drug Allergies    Procedure    Procedure: Viktor Parsons was assessed with a Holter monitor  Indication: palpitations  The patient complains of no symptoms   Duration: 48 hours  Findings: This Holter monitor and analysis was done for a period of approximately 48 hours  Rhythm is sinus  Average heart rate is 81 bpm  Minimum heart rate 54 bpm  Maximum heart rate 125 bpm  There were many PVCs, a few episodes of ventricular bigeminy, trigeminy and ventricular couplets  No other ventricular events noted  There were 39 PACs and one supraventricular couplet  No other supraventricular events noted  No periods of bradycardia or pauses noted  No cardiovascular symptoms reported by the patient  Complications: The test was inconclusive because the Holter electrodes disconnected inadvertently   periods of disconnect        Future Appointments    Date/Time Provider Specialty Site   05/02/2017 08:45 AM Kailash Eddy, [address] [place] [address]     Signatures   Electronically signed by : HERNANDO Mar ; Feb 22 2017 10:44PM EST                       (Author)

## 2018-03-08 ENCOUNTER — TELEPHONE (OUTPATIENT)
Dept: HEMATOLOGY ONCOLOGY | Facility: CLINIC | Age: 43
End: 2018-03-08

## 2018-03-08 DIAGNOSIS — C90.00 MULTIPLE MYELOMA NOT HAVING ACHIEVED REMISSION (HCC): ICD-10-CM

## 2018-03-08 RX ORDER — LENALIDOMIDE 25 MG/1
25 CAPSULE ORAL DAILY
Qty: 21 EACH | Refills: 0 | Status: SHIPPED | OUTPATIENT
Start: 2018-03-08 | End: 2018-03-26 | Stop reason: SDUPTHER

## 2018-03-08 NOTE — TELEPHONE ENCOUNTER
Called Harley reviewing I had obtained an auth on 18, and a negative pregnancy test on 18 and sent to [facility] to be filled  Reviewed I had received confirmation from [address] [place] [address] 18 at 11:01am  Harley explained [facility] never filled the medication and now the authorization code had   Called [facility] and reviewed this information with Anup and she stated they had received the script on 18 but a pharmacist did not look at it until 3/5/18  Reviewed with Daljit Licona this was unacceptable that it was not processed  She stated the auth  3/3/18  Reviewed that was still 72 hours to fill the patients Revlimid and nothing was done  Additionally reviewed no phone call was placed to our office to express the need for a new auth  Reviewed with Daljit Licona the only way I was aware of this was because the patient herself called me  Daljit Licona expressed it was our fault for not feeling sooner  Requested to file a complaint  Was sent to  Vickey Baker  She stated she would follow up so this would not happen again  Requested if I get a new auth and script to them they will fill immediately and overnight to the patient  Sheila Fields stated she could not guarantee this  Explained this is poor care on their part as now my patient will have to wait for her medication again  [facility] and [facility] back, spoke with Haim Bains that confirmed she was immediately sending the auth to pharmacy  Reviewed the [place] was only good for two days and needs to be over nighted to the patient  She stated she would express this to pharmacy  Called patient and reviewed the situation  Apologized for the error of the pharmacy ([address]) and explained how she should expect a phone call for medication delivery by tomorrow  Patient verbalized understanding

## 2018-03-08 NOTE — TELEPHONE ENCOUNTER
Mauricio Rucker with [facility] calling regarding Revlimid that she spoke to you earlier  The prescription is ready to be scheduled  They called the patient and left a message

## 2018-03-08 NOTE — TELEPHONE ENCOUNTER
Stated that [employer] needs the [email or web address] auth#  We can call [email or web address] at [phone]

## 2018-03-10 ENCOUNTER — APPOINTMENT (OUTPATIENT)
Dept: LAB | Facility: CLINIC | Age: 43
End: 2018-03-10
Payer: COMMERCIAL

## 2018-03-10 PROCEDURE — 80053 COMPREHEN METABOLIC PANEL: CPT | Performed by: INTERNAL MEDICINE

## 2018-03-10 PROCEDURE — 36415 COLL VENOUS BLD VENIPUNCTURE: CPT | Performed by: INTERNAL MEDICINE

## 2018-03-10 PROCEDURE — 85025 COMPLETE CBC W/AUTO DIFF WBC: CPT | Performed by: INTERNAL MEDICINE

## 2018-03-14 ENCOUNTER — HOSPITAL ENCOUNTER (OUTPATIENT)
Dept: INFUSION CENTER | Facility: CLINIC | Age: 43
Discharge: HOME | End: 2018-03-14
Payer: COMMERCIAL

## 2018-03-14 VITALS
DIASTOLIC BLOOD PRESSURE: 62 MMHG | RESPIRATION RATE: 20 BRPM | SYSTOLIC BLOOD PRESSURE: 104 MMHG | OXYGEN SATURATION: 97 % | BODY MASS INDEX: 22.24 KG/M2 | TEMPERATURE: 98.8 F | WEIGHT: 133.5 LBS | HEIGHT: 65 IN | HEART RATE: 58 BPM

## 2018-03-14 PROCEDURE — 96401 CHEMO ANTI-NEOPL SQ/IM: CPT

## 2018-03-14 RX ADMIN — BORTEZOMIB 2.1 MG: 3.5 INJECTION, POWDER, LYOPHILIZED, FOR SOLUTION INTRAVENOUS; SUBCUTANEOUS at 12:41

## 2018-03-16 ENCOUNTER — TELEPHONE (OUTPATIENT)
Dept: NEUROLOGY | Facility: CLINIC | Age: 43
End: 2018-03-16

## 2018-03-16 ENCOUNTER — APPOINTMENT (EMERGENCY)
Dept: RADIOLOGY | Facility: HOSPITAL | Age: 43
End: 2018-03-16
Payer: COMMERCIAL

## 2018-03-16 ENCOUNTER — TELEPHONE (OUTPATIENT)
Dept: HEMATOLOGY ONCOLOGY | Facility: CLINIC | Age: 43
End: 2018-03-16

## 2018-03-16 ENCOUNTER — HOSPITAL ENCOUNTER (EMERGENCY)
Facility: HOSPITAL | Age: 43
Discharge: HOME | End: 2018-03-17
Attending: EMERGENCY MEDICINE
Payer: COMMERCIAL

## 2018-03-16 ENCOUNTER — APPOINTMENT (EMERGENCY)
Dept: ULTRASOUND IMAGING | Facility: HOSPITAL | Age: 43
End: 2018-03-16
Payer: COMMERCIAL

## 2018-03-16 DIAGNOSIS — M51.26 HERNIATED LUMBAR INTERVERTEBRAL DISC: Primary | ICD-10-CM

## 2018-03-16 DIAGNOSIS — M54.16 RADICULOPATHY, LUMBAR REGION: Primary | ICD-10-CM

## 2018-03-16 DIAGNOSIS — M51.16 SCIATICA OF RIGHT SIDE DUE TO DISPLACEMENT OF LUMBAR INTERVERTEBRAL DISC: ICD-10-CM

## 2018-03-16 DIAGNOSIS — E86.0 DEHYDRATION: ICD-10-CM

## 2018-03-16 DIAGNOSIS — N39.0 UTI (URINARY TRACT INFECTION): ICD-10-CM

## 2018-03-16 LAB
ALBUMIN SERPL BCP-MCNC: 3.4 G/DL (ref 3.5–5)
ALP SERPL-CCNC: 68 U/L (ref 46–116)
ALT SERPL W P-5'-P-CCNC: 35 U/L (ref 12–78)
ANION GAP SERPL CALCULATED.3IONS-SCNC: 9 MMOL/L (ref 4–13)
AST SERPL W P-5'-P-CCNC: 15 U/L (ref 5–45)
BACTERIA UR QL AUTO: ABNORMAL /HPF
BASOPHILS # BLD AUTO: 0.11 THOUSANDS/ΜL (ref 0–0.1)
BASOPHILS NFR BLD AUTO: 2 % (ref 0–1)
BILIRUB SERPL-MCNC: 0.4 MG/DL (ref 0.2–1)
BILIRUB UR QL STRIP: NEGATIVE
BUN SERPL-MCNC: 26 MG/DL (ref 5–25)
CALCIUM SERPL-MCNC: 7.7 MG/DL (ref 8.3–10.1)
CHLORIDE SERPL-SCNC: 102 MMOL/L (ref 100–108)
CK SERPL-CCNC: 78 U/L (ref 26–192)
CLARITY UR: CLEAR
CO2 SERPL-SCNC: 27 MMOL/L (ref 21–32)
COLOR UR: YELLOW
CREAT SERPL-MCNC: 0.75 MG/DL (ref 0.6–1.3)
EOSINOPHIL # BLD AUTO: 0.25 THOUSAND/ΜL (ref 0–0.61)
EOSINOPHIL NFR BLD AUTO: 3 % (ref 0–6)
ERYTHROCYTE [DISTWIDTH] IN BLOOD BY AUTOMATED COUNT: 13.1 % (ref 11.6–15.1)
EXT PREG TEST URINE: NEGATIVE
GFR SERPL CREATININE-BSD FRML MDRD: 97 ML/MIN/1.73SQ M
GLUCOSE SERPL-MCNC: 106 MG/DL (ref 65–140)
GLUCOSE UR STRIP-MCNC: NEGATIVE MG/DL
HCT VFR BLD AUTO: 38 % (ref 34.8–46.1)
HGB BLD-MCNC: 11.9 G/DL (ref 11.5–15.4)
HGB UR QL STRIP.AUTO: ABNORMAL
KETONES UR STRIP-MCNC: NEGATIVE MG/DL
LEUKOCYTE ESTERASE UR QL STRIP: ABNORMAL
LYMPHOCYTES # BLD AUTO: 2.63 THOUSANDS/ΜL (ref 0.6–4.47)
LYMPHOCYTES NFR BLD AUTO: 35 % (ref 14–44)
MCH RBC QN AUTO: 29.8 PG (ref 26.8–34.3)
MCHC RBC AUTO-ENTMCNC: 31.3 G/DL (ref 31.4–37.4)
MCV RBC AUTO: 95 FL (ref 82–98)
MONOCYTES # BLD AUTO: 1.19 THOUSAND/ΜL (ref 0.17–1.22)
MONOCYTES NFR BLD AUTO: 16 % (ref 4–12)
NEUTROPHILS # BLD AUTO: 3.3 THOUSANDS/ΜL (ref 1.85–7.62)
NEUTS SEG NFR BLD AUTO: 44 % (ref 43–75)
NITRITE UR QL STRIP: NEGATIVE
NON-SQ EPI CELLS URNS QL MICRO: ABNORMAL /HPF
PH UR STRIP.AUTO: 6 [PH] (ref 4.5–8)
PLATELET # BLD AUTO: 231 THOUSANDS/UL (ref 149–390)
PMV BLD AUTO: 10.1 FL (ref 8.9–12.7)
POTASSIUM SERPL-SCNC: 3.3 MMOL/L (ref 3.5–5.3)
PROT SERPL-MCNC: 7 G/DL (ref 6.4–8.2)
PROT UR STRIP-MCNC: NEGATIVE MG/DL
RBC # BLD AUTO: 4 MILLION/UL (ref 3.81–5.12)
RBC #/AREA URNS AUTO: ABNORMAL /HPF
SODIUM SERPL-SCNC: 138 MMOL/L (ref 136–145)
SP GR UR STRIP.AUTO: >=1.03 (ref 1–1.03)
UROBILINOGEN UR QL STRIP.AUTO: 0.2 E.U./DL
WBC # BLD AUTO: 7.48 THOUSAND/UL (ref 4.31–10.16)
WBC #/AREA URNS AUTO: ABNORMAL /HPF

## 2018-03-16 PROCEDURE — 85025 COMPLETE CBC W/AUTO DIFF WBC: CPT | Performed by: EMERGENCY MEDICINE

## 2018-03-16 PROCEDURE — 72100 X-RAY EXAM L-S SPINE 2/3 VWS: CPT

## 2018-03-16 PROCEDURE — 81001 URINALYSIS AUTO W/SCOPE: CPT

## 2018-03-16 PROCEDURE — 93971 EXTREMITY STUDY: CPT

## 2018-03-16 PROCEDURE — 87086 URINE CULTURE/COLONY COUNT: CPT

## 2018-03-16 PROCEDURE — 96361 HYDRATE IV INFUSION ADD-ON: CPT

## 2018-03-16 PROCEDURE — 80053 COMPREHEN METABOLIC PANEL: CPT | Performed by: EMERGENCY MEDICINE

## 2018-03-16 PROCEDURE — 36415 COLL VENOUS BLD VENIPUNCTURE: CPT | Performed by: EMERGENCY MEDICINE

## 2018-03-16 PROCEDURE — 81025 URINE PREGNANCY TEST: CPT | Performed by: EMERGENCY MEDICINE

## 2018-03-16 PROCEDURE — 82550 ASSAY OF CK (CPK): CPT | Performed by: EMERGENCY MEDICINE

## 2018-03-16 RX ORDER — POTASSIUM CHLORIDE 20 MEQ/1
20 TABLET, EXTENDED RELEASE ORAL ONCE
Status: COMPLETED | OUTPATIENT
Start: 2018-03-16 | End: 2018-03-16

## 2018-03-16 RX ORDER — KETOROLAC TROMETHAMINE 30 MG/ML
30 INJECTION, SOLUTION INTRAMUSCULAR; INTRAVENOUS ONCE
Status: COMPLETED | OUTPATIENT
Start: 2018-03-16 | End: 2018-03-17

## 2018-03-16 RX ADMIN — SODIUM CHLORIDE 1000 ML: 0.9 INJECTION, SOLUTION INTRAVENOUS at 23:48

## 2018-03-16 RX ADMIN — POTASSIUM CHLORIDE 20 MEQ: 1500 TABLET, EXTENDED RELEASE ORAL at 23:45

## 2018-03-16 NOTE — TELEPHONE ENCOUNTER
Spoke with patient and she expressed she has lower back pain on her one side and down her leg  Patient struggled to verbalize the pain she felt but expressed she has had nerve pain in the past  States she saw a neurologist and they reviewed it was her nerves not related to her MS Patient reviewed she said its like a sharp tingling pain that runs all the way down her leg  I reviewed signs and symptoms of a blood clot with patient given she is on Revlimid  Patient denied any of these symptoms  Patient requested pain medications but I reviewed Dr Kathy West was not back until 3/19/18 and we don't see her for nerve pain  Encouraged patient to see family doctor or go to Urgent care today to get the pain checked out  Patient stated she wouldn't be able to get in with her PCP today but if it doesn't feel better tomorrow she would either go to Urgent care or ER tomorrow  Reviewed signs and symptoms of blood clot again and requested patient to provide teach back; patient verbalized understanding  Encouraged patient to take pain medications over the counter to try and manage the pain  Patient again reviewed if it was not better she would go to urgent care or Er

## 2018-03-16 NOTE — TELEPHONE ENCOUNTER
Pt is followed by us for her ms  Pt also with lumbar right L5 radiculopathy  Pt already on steroids due to her multiple myeloma  Pt should also notify her pcp as well as heme onc of her acute pain  Pt already taking nsaids as well as steroids  I will not be rxing pain meds at this time  Due to the acuteness of her sxs, ie not getting out of bed or being able to lie on side, pt needs to go to er for further assessment in setting of MM as well as known disc and no pain mx prior seen  Agree pt needs pain mx consultation, but this will not help her acute pain at this time  This particular sxs is not coming from her ms  Needs acute care and consultation in er for her back sxs

## 2018-03-16 NOTE — TELEPHONE ENCOUNTER
Pt has discussed her symptoms w/PCP and she feels that she got nowhere w/them, she will call oncology today  Pt agreeable to going to ED if she still feels this way tomorrow after talking to oncology    Pt is ok when she is up moving around, only has the pain from sitting to standing, or turning side to side    Pt understands the pain management referral will not help current pain but she is hoping to f/u w/them, pt does request the referral to them as you mentioned to her previously

## 2018-03-16 NOTE — TELEPHONE ENCOUNTER
Pt c/o LBP, chronic ongoing, had MRI done in Jan and declined pain management referral @that time, pain has now returned x2days  C/o LBP & R/leg pain, eleuterio was previously only in the leg  Always has numbness/tingling in the legs, nothing new  No incontinence    Taking dexamethasone 4mg, 10tabs Qweek per oncology    Taking ibu for a few days w/minimal relief  Currently taking copaxone 40mg 3/week    Pt requesting pain management referral now, maybe pain medication as well, she cannot get out of bed or turn side to side w/out being in severe pain    Please place order if agreeable    Please advise

## 2018-03-17 VITALS
SYSTOLIC BLOOD PRESSURE: 124 MMHG | RESPIRATION RATE: 18 BRPM | HEART RATE: 70 BPM | TEMPERATURE: 98.2 F | OXYGEN SATURATION: 99 % | DIASTOLIC BLOOD PRESSURE: 78 MMHG

## 2018-03-17 PROCEDURE — 96374 THER/PROPH/DIAG INJ IV PUSH: CPT

## 2018-03-17 PROCEDURE — 93971 EXTREMITY STUDY: CPT | Performed by: SURGERY

## 2018-03-17 PROCEDURE — 99284 EMERGENCY DEPT VISIT MOD MDM: CPT

## 2018-03-17 RX ORDER — METAXALONE 800 MG/1
800 TABLET ORAL 3 TIMES DAILY
Qty: 20 TABLET | Refills: 0 | Status: SHIPPED | OUTPATIENT
Start: 2018-03-17 | End: 2019-02-04

## 2018-03-17 RX ORDER — SULFAMETHOXAZOLE AND TRIMETHOPRIM 800; 160 MG/1; MG/1
1 TABLET ORAL ONCE
Status: COMPLETED | OUTPATIENT
Start: 2018-03-17 | End: 2018-03-17

## 2018-03-17 RX ORDER — SULFAMETHOXAZOLE AND TRIMETHOPRIM 800; 160 MG/1; MG/1
1 TABLET ORAL 2 TIMES DAILY
Qty: 14 TABLET | Refills: 0 | Status: SHIPPED | OUTPATIENT
Start: 2018-03-17 | End: 2018-03-24

## 2018-03-17 RX ORDER — OXYCODONE HYDROCHLORIDE AND ACETAMINOPHEN 5; 325 MG/1; MG/1
1 TABLET ORAL EVERY 4 HOURS PRN
Qty: 20 TABLET | Refills: 0 | Status: SHIPPED | OUTPATIENT
Start: 2018-03-17 | End: 2018-03-21

## 2018-03-17 RX ORDER — NAPROXEN SODIUM 550 MG/1
550 TABLET ORAL 2 TIMES DAILY WITH MEALS
Qty: 20 TABLET | Refills: 0 | Status: SHIPPED | OUTPATIENT
Start: 2018-03-17 | End: 2019-02-04

## 2018-03-17 RX ADMIN — SULFAMETHOXAZOLE AND TRIMETHOPRIM 1 TABLET: 800; 160 TABLET ORAL at 00:51

## 2018-03-17 RX ADMIN — KETOROLAC TROMETHAMINE 30 MG: 30 INJECTION, SOLUTION INTRAMUSCULAR at 00:51

## 2018-03-17 NOTE — DISCHARGE INSTRUCTIONS
Dehydration   WHAT YOU NEED TO KNOW:   Dehydration is a condition that develops when your body does not have enough fluid  You may become dehydrated if you do not drink enough water or lose too much fluid  Fluid loss may also cause loss of electrolytes (minerals), such as sodium  DISCHARGE INSTRUCTIONS:   Return to the emergency department if:   · You have a seizure  · You are confused or cannot think clearly  · You are extremely sleepy, or another person cannot wake you  · You become dizzy or faint when you stand  · You are not able to urinate  · You have trouble breathing  · You have a fast or irregular heartbeat  · Your hands or feet are cold, or your face is pale  Contact your healthcare provider if:   · You have trouble drinking liquids because you are vomiting  · Your symptoms get worse  · You have a fever  · You feel very weak or tired  · You have questions or concerns about your condition or care  Follow up with your healthcare provider as directed:  Write down your questions so you remember to ask them during your visits  Prevent or manage dehydration:   · Drink liquids as directed  Liquids that contain water, sugar, and minerals can help your body hold in fluid and help prevent dehydration  Drink liquids throughout the day, not just when you feel thirsty  Men should drink about 3 liters (13 eight-ounce cups) of liquid each day  Women should drink about 2 liters (9 eight-ounce cups) of liquid each day  Drink even more liquid if you will be outdoors, in the sun for a long time, or exercising  · Stay cool  Limit the time you spend outdoors during the hottest part of the day  Dress in lightweight clothes  · Keep track of how often you urinate  If you urinate less than usual or your urine is darker, drink more liquids    © 2017 [employer] Information is for End User's use only and may not be sold, redistributed or otherwise used for commercial purposes  All illustrations and images included in CareNotes® are the copyrighted property of A D [employer] M , Inc  or [employer]  The above information is an  only  It is not intended as medical advice for individual conditions or treatments  Talk to your doctor, nurse or pharmacist before following any medical regimen to see if it is safe and effective for you  Sciatica   WHAT YOU NEED TO KNOW:   Sciatica is a condition that causes pain along your sciatic nerve  The sciatic nerve runs from your spine through both sides of your buttocks  It then runs down the back of your thigh, into your lower leg and foot  Your sciatic nerve may be compressed, inflamed, irritated, or stretched  DISCHARGE INSTRUCTIONS:   Medicines:   · NSAIDs:  These medicines decrease swelling and pain  NSAIDs are available without a doctor's order  Ask your healthcare provider which medicine is right for you  Ask how much to take and when to take it  Take as directed  NSAIDs can cause stomach bleeding or kidney problems if not taken correctly  · Acetaminophen: This medicine decreases pain  Acetaminophen is available without a doctor's order  Ask how much to take and when to take it  Follow directions  Acetaminophen can cause liver damage if not taken correctly  · Muscle relaxers  help decrease pain and muscle spasms  · Take your medicine as directed  Contact your healthcare provider if you think your medicine is not helping or if you have side effects  Tell him of her if you are allergic to any medicine  Keep a list of the medicines, vitamins, and herbs you take  Include the amounts, and when and why you take them  Bring the list or the pill bottles to follow-up visits  Carry your medicine list with you in case of an emergency  Follow up with your healthcare provider as directed:  Write down your questions so you remember to ask them during your visits     Manage your symptoms:   · Activity:  Decrease your activity  Do not lift heavy objects or twist your back for at least 6 weeks  Slowly return to your usual activity  · Ice:  Ice helps decrease swelling and pain  Ice may also help prevent tissue damage  Use an ice pack, or put crushed ice in a plastic bag  Cover it with a towel and place it on your low back or leg for 15 to 20 minutes every hour or as directed  · Heat:  Heat helps decrease pain and muscle spasms  Apply heat on the area for 20 to 30 minutes every 2 hours for as many days as directed  · Physical therapy:  You may need to see physical therapist to teach you exercises to help improve movement and strength, and to decrease pain  An occupational therapist teaches you skills to help with your daily activities  · Use assistive devices if directed: You may need to wear back support, such as a back brace  You may need crutches, a cane, or a walker to decrease stress on your lower back and leg muscles  Ask your healthcare provider for more information about assistive devices and how to use them correctly  Self-care:   · Avoid pressure on your back and legs:  Do not  lift heavy objects, or stand or sit for long periods of time  · Lift objects safely:  Keep your back straight and bend your knees when you  an object  Do not bend or twist your back when you lift  · Maintain a healthy weight:  Ask your healthcare provider how much you should weigh  Ask him to help you create a weight loss plan if you are overweight  · Exercise:  Ask your healthcare provider about the best stretching, warmup, and exercise plan for you  Contact your healthcare provider if:   · You have pain in your lower back at night or when resting  · You have pain in your lower back with numbness below the knee  · You have weakness in one leg only  · You have questions or concerns about your condition or care    Return to the emergency department if:   · You have trouble holding back your urine or bowel movements  · You have weakness in both legs  · You have numbness in your groin or buttocks  © 2017 [address] [employer]  Information is for End User's use only and may not be sold, redistributed or otherwise used for commercial purposes  All illustrations and images included in CareNotes® are the copyrighted property of A D A M , Inc  or [employer]  The above information is an  only  It is not intended as medical advice for individual conditions or treatments  Talk to your doctor, nurse or pharmacist before following any medical regimen to see if it is safe and effective for you  Urinary Tract Infection in Women   WHAT YOU NEED TO KNOW:   A urinary tract infection (UTI) is caused by bacteria that get inside your urinary tract  Most bacteria that enter your urinary tract come out when you urinate  If the bacteria stay in your urinary tract, you may get an infection  Your urinary tract includes your kidneys, ureters, bladder, and urethra  Urine is made in your kidneys, and it flows from the ureters to the bladder  Urine leaves the bladder through the urethra  A UTI is more common in your lower urinary tract, which includes your bladder and urethra  DISCHARGE INSTRUCTIONS:   Return to the emergency department if:   · You are urinating very little or not at all  · You have a high fever with shaking chills  · You have side or back pain that gets worse  Contact your healthcare provider if:   · You have a fever  · You do not feel better after 2 days of taking antibiotics  · You are vomiting  · You have questions or concerns about your condition or care  Medicines:   · Antibiotics  help fight a bacterial infection  · Medicines  may be given to decrease pain and burning when you urinate  They will also help decrease the feeling that you need to urinate often  These medicines will make your urine orange or red      · Take your medicine as directed  Contact your healthcare provider if you think your medicine is not helping or if you have side effects  Tell him or her if you are allergic to any medicine  Keep a list of the medicines, vitamins, and herbs you take  Include the amounts, and when and why you take them  Bring the list or the pill bottles to follow-up visits  Carry your medicine list with you in case of an emergency  Follow up with your healthcare provider as directed:  Write down your questions so you remember to ask them during your visits  Prevent another UTI:   · Empty your bladder often  Urinate and empty your bladder as soon as you feel the need  Do not hold your urine for long periods of time  · Wipe from front to back after you urinate or have a bowel movement  This will help prevent germs from getting into your urinary tract through your urethra  · Drink liquids as directed  Ask how much liquid to drink each day and which liquids are best for you  You may need to drink more liquids than usual to help flush out the bacteria  Do not drink alcohol, caffeine, or citrus juices  These can irritate your bladder and increase your symptoms  Your healthcare provider may recommend cranberry juice to help prevent a UTI  · Urinate after you have sex  This can help flush out bacteria passed during sex  · Do not douche or use feminine deodorants  These can change the chemical balance in your vagina  · Change sanitary pads or tampons often  This will help prevent germs from getting into your urinary tract  · Do pelvic muscle exercises often  Pelvic muscle exercises may help you start and stop urinating  Strong pelvic muscles may help you empty your bladder easier  Squeeze these muscles tightly for 5 seconds like you are trying to hold back urine  Then relax for 5 seconds  Gradually work up to squeezing for 10 seconds  Do 3 sets of 15 repetitions a day, or as directed    © 2017 [employer] Information is for End User's use only and may not be sold, redistributed or otherwise used for commercial purposes  All illustrations and images included in CareNotes® are the copyrighted property of [employer]  or [employer]  The above information is an  only  It is not intended as medical advice for individual conditions or treatments  Talk to your doctor, nurse or pharmacist before following any medical regimen to see if it is safe and effective for you  Lumbar Disc Herniation   WHAT YOU NEED TO KNOW:   Lumbar disc herniation occurs when a disc in your lumbar spine (lower back) bulges out  Lumbar discs are spongy cushions between the vertebrae (bones) in your spine  The herniated disc may press on your nerves or spinal cord  DISCHARGE INSTRUCTIONS:   Return to the emergency department if:   · You cannot control when you urinate or have a bowel movement  · You are unable to move one or both of your legs  · You lose feeling in your groin or buttocks  Contact your healthcare provider if:   · You have numbness in one or both of your legs  · You have low back pain while resting  · You have trouble moving one or both of your legs  · You begin leaking urine or bowel movement, and it is not normal for you  · Your pain gets worse, even after you take medicine  · You have questions or concerns about your condition or care  Medicines: You may need any of the following:  · NSAIDs , such as ibuprofen, help decrease swelling, pain, and fever  NSAIDs can cause stomach bleeding or kidney problems in certain people  If you take blood thinner medicine, always ask your healthcare provider if NSAIDs are safe for you  Always read the medicine label and follow directions  · Prescription pain medicine  may be given  Ask how to take this medicine safely  · Muscle relaxers  decrease pain and muscle spasms  · Take your medicine as directed    Contact your healthcare provider if you think your medicine is not helping or if you have side effects  Tell him or her if you are allergic to any medicine  Keep a list of the medicines, vitamins, and herbs you take  Include the amounts, and when and why you take them  Bring the list or the pill bottles to follow-up visits  Carry your medicine list with you in case of an emergency  Rest:  Your healthcare provider may have you rest in bed for a few days  It is best to rest on your side with your knees bent  Put a cushion between your knees to help decrease the pressure on your spine and nerves  Ask how long you should rest and when you can return to your daily activities  Heat:  Apply heat on your lower back for 20 to 30 minutes every 2 hours for as many days as directed  Heat helps decrease pain and muscle spasms  Physical therapy:  A physical therapist teaches you exercises to help improve movement and strength, and to decrease pain  A physical therapist can teach you safe ways to bend, lift, sit, and stand to help relieve back pain  Exercise and activity:  Exercises that do not stress your back muscles may help decrease your pain  Examples of low-stress exercises are walking, swimming, and biking  Avoid heavy lifting while your back is healing  Try not to sit for long periods of time  Talk to your healthcare provider before you start any new exercise program   Follow up with your healthcare provider as directed:  Write down your questions so you remember to ask them during your visits  © 2017 [employer] [employer] Information is for End User's use only and may not be sold, redistributed or otherwise used for commercial purposes  All illustrations and images included in CareNotes® are the copyrighted property of [employer]  or [employer]  The above information is an  only  It is not intended as medical advice for individual conditions or treatments   Talk to your doctor, nurse or pharmacist before following any medical regimen to see if it is safe and effective for you  Back Pain   WHAT YOU NEED TO KNOW:   Back pain is common  It can be caused by many conditions, such as arthritis or the breakdown of spinal discs  Your risk for back pain is increased by injuries, lack of activity, or repeated bending and twisting  You may feel sore or stiff on one or both sides of your back  The pain may spread to your buttocks or thighs  DISCHARGE INSTRUCTIONS:   Medicines:   · NSAIDs  help decrease swelling and pain  This medicine is available with or without a doctor's order  NSAIDs can cause stomach bleeding or kidney problems in certain people  If you take blood thinner medicine, always ask your healthcare provider if NSAIDs are safe for you  Always read the medicine label and follow directions  · Acetaminophen  decreases pain  It is available without a doctor's order  Ask how much to take and how often to take it  Follow directions  Acetaminophen can cause liver damage if not taken correctly  · Prescription pain medicine  may be given  Ask your healthcare provider how to take this medicine safely  · Take your medicine as directed  Contact your healthcare provider if you think your medicine is not helping or if you have side effects  Tell him or her if you are allergic to any medicine  Keep a list of the medicines, vitamins, and herbs you take  Include the amounts, and when and why you take them  Bring the list or the pill bottles to follow-up visits  Carry your medicine list with you in case of an emergency  Follow up with your healthcare provider in 2 weeks, or as directed:  Write down your questions so you remember to ask them during your visits  How to manage your back pain:   · Apply ice  on your back or affected area for 15 to 20 minutes every hour or as directed  Use an ice pack, or put crushed ice in a plastic bag  Cover it with a towel  Ice helps prevent tissue damage and decreases pain      · Apply heat on your back or affected area for 20 to 30 minutes every 2 hours for as many days as directed  Heat helps decrease pain and muscle spasms  · Stay active  as much as you can without causing more pain  Bed rest could make your back pain worse  Avoid heavy lifting until your pain is gone  Return to the emergency department if:   · You have pain, numbness, or weakness in one or both legs  · Your pain becomes so severe that you cannot walk  · You cannot control your urine or bowel movements  · You have severe back pain with chest pain  · You have severe back pain, nausea, and vomiting  · You have severe back pain that spreads to your side or genital area  Contact your healthcare provider if:   · You have back pain that does not get better with rest and pain medicine  · You have a fever  · You have pain that worsens when you are on your back or when you rest     · You have pain that worsens when you cough or sneeze  · You lose weight without trying  · You have questions or concerns about your condition or care  © 2017 [facility] [facility] Information is for End User's use only and may not be sold, redistributed or otherwise used for commercial purposes  All illustrations and images included in CareNotes® are the copyrighted property of [employer]  or [employer]  The above information is an  only  It is not intended as medical advice for individual conditions or treatments  Talk to your doctor, nurse or pharmacist before following any medical regimen to see if it is safe and effective for you

## 2018-03-17 NOTE — ED PROVIDER NOTES
History  Chief Complaint   Patient presents with    Back Pain     pt reports lower back pain, onset Wednesday  Pt reports it is OK during the day, but very sore in AM and interfering with sleep  Pain is also in right lower leg  Pt w history Multiple Myeloma  Patient is a 38year old female with worsening right lower back pain with radiation to R calf since this past Wednesday  No trauma  No fever  No N/V  No incontinence or weakness  No travel  Has a h/o multiple sclerosis and multiple myeloma  LMP - 18  States she drove here  Tried ibuprofen without relief  Pain worse at night while trying to sleep  States she had a MRI in January this year showed "IMPRESSION:     Central and slightly right paramedian protrusion type disc herniation superimposed on annular bulging L4-5 results in mild central and moderate right lateral recess stenosis  Correlate for right L5 radiculopathy      Mild degenerative changes elsewhere as described         Workstation performed: [id number]"  Old records reviewed by me and was last seen at this ED on 18 for neck strain  [facility] website checked on this patient and patient not found  History provided by:  Patient   used: No    Back Pain   Associated symptoms: no fever, no numbness and no weakness        Prior to Admission Medications   Prescriptions Last Dose Informant Patient Reported?  Taking?   acyclovir (ZOVIRAX) 400 MG tablet  Self Yes Yes   Sig: Take 1 tablet by mouth 2 (two) times a day   bortezomib (VELCADE)   Yes Yes   Sig: Infuse 2 1 mg into a venous catheter once   cyanocobalamin 100 MCG tablet  Self Yes Yes   Si mcg every 30 (thirty) days injection   dexamethasone (DECADRON) 4 mg tablet  Self No Yes   Sig: Take 10 tablets (40 mg total) by mouth once a week   glatiramer acetate (COPAXONE) 20 mg/mL SOSY injection syringe  Self Yes Yes   Sig: Inject 40 mg under the skin 3 (three) times a week   lenalidomide (REVLIMID) 25 MG CAPS   No Yes   Sig: Take 1 capsule (25 mg total) by mouth daily Days 1 through 21 Q 28 days  Facility-Administered Medications: None       Past Medical History:   Diagnosis Date    Multiple myeloma ([facility] )     Multiple sclerosis ([facility] )        History reviewed  No pertinent surgical history  Family History   Problem Relation Age of Onset    No Known Problems Mother     No Known Problems Father      I have reviewed and agree with the history as documented  Social History   Substance Use Topics    Smoking status: Never Smoker    Smokeless tobacco: Never Used    Alcohol use No        Review of Systems   Constitutional: Negative for fever  Gastrointestinal: Negative for nausea and vomiting  Genitourinary: Negative for difficulty urinating  Musculoskeletal: Positive for back pain and myalgias  Neurological: Negative for weakness and numbness  All other systems reviewed and are negative  Physical Exam  ED Triage Vitals [03/16/18 2135]   Temperature Pulse Respirations Blood Pressure SpO2   98 2 °F (36 8 °C) 68 16 132/62 100 %      Temp Source Heart Rate Source Patient Position - Orthostatic VS BP Location FiO2 (%)   Oral -- -- -- --      Pain Score       6           Orthostatic Vital Signs  Vitals:    03/16/18 2135   BP: 132/62   Pulse: 68       Physical Exam   Constitutional: She is oriented to person, place, and time  She appears distressed (moderate)  HENT:   Head: Normocephalic and atraumatic  Moist mucous membranes  Eyes: No scleral icterus  Neck: Normal range of motion  Neck supple  Cardiovascular: Normal rate, regular rhythm and normal heart sounds  No murmur heard  Pulmonary/Chest: Effort normal and breath sounds normal  No stridor  No respiratory distress  Abdominal: Soft  Bowel sounds are normal  There is no tenderness  Musculoskeletal: She exhibits tenderness (R paravertebral lumbar tenderness and gluteal tenderness and R calf tenderness)   She exhibits no edema or deformity  NVI  Neurological: She is alert and oriented to person, place, and time  Skin: Skin is warm and dry  No rash noted  Psychiatric: She has a normal mood and affect  Nursing note and vitals reviewed  ED Medications  Medications   ketorolac (TORADOL) injection 30 mg (not administered)   sodium chloride 0 9 % bolus 1,000 mL (1,000 mL Intravenous New Bag 3/16/18 2348)   sulfamethoxazole-trimethoprim (BACTRIM DS) 800-160 mg per tablet 1 tablet (not administered)   potassium chloride (K-DUR,KLOR-CON) CR tablet 20 mEq (20 mEq Oral Given 3/16/18 2345)       Diagnostic Studies  Results Reviewed     Procedure Component Value Units Date/Time    Urine Microscopic [[id number]]  (Abnormal) Collected:  03/16/18 2152    Lab Status:  Final result Specimen:  Urine from Urine, Clean Catch Updated:  03/16/18 2315     RBC, UA 2-4 (A) /hpf      WBC, UA 10-20 (A) /hpf      Epithelial Cells Innumerable (A) /hpf      Bacteria, UA Moderate (A) /hpf     Urine culture [[id number]] Collected:  03/16/18 2152    Lab Status: In process Specimen:  Urine from Urine, Clean Catch Updated:  03/16/18 2315    Comprehensive metabolic panel [[id number]]  (Abnormal) Collected:  03/16/18 2247    Lab Status:  Final result Specimen:  Blood from Arm, Left Updated:  03/16/18 2306     Sodium 138 mmol/L      Potassium 3 3 (L) mmol/L      Chloride 102 mmol/L      CO2 27 mmol/L      Anion Gap 9 mmol/L      BUN 26 (H) mg/dL      Creatinine 0 75 mg/dL      Glucose 106 mg/dL      Calcium 7 7 (L) mg/dL      AST 15 U/L      ALT 35 U/L      Alkaline Phosphatase 68 U/L      Total Protein 7 0 g/dL      Albumin 3 4 (L) g/dL      Total Bilirubin 0 40 mg/dL      eGFR 97 ml/min/1 73sq m     Narrative:         National Kidney Disease Education Program recommendations are as follows:  GFR calculation is accurate only with a steady state creatinine  Chronic Kidney disease less than 60 ml/min/1 73 sq  meters  Kidney failure less than 15 ml/min/1 73 sq  meters  CK Total with Reflex CKMB [[id number]]  (Normal) Collected:  03/16/18 2247    Lab Status:  Final result Specimen:  Blood from Arm, Left Updated:  03/16/18 2306     Total CK 78 U/L     POCT pregnancy, urine [[id number]]  (Normal) Resulted:  03/16/18 2257    Lab Status:  Final result Updated:  03/16/18 2257     EXT PREG TEST UR (Ref: Negative) Negative    CBC and differential [[id number]]  (Abnormal) Collected:  03/16/18 2247    Lab Status:  Final result Specimen:  Blood from Arm, Left Updated:  03/16/18 2251     WBC 7 48 Thousand/uL      RBC 4 00 Million/uL      Hemoglobin 11 9 g/dL      Hematocrit 38 0 %      MCV 95 fL      MCH 29 8 pg      MCHC 31 3 (L) g/dL      RDW 13 1 %      MPV 10 1 fL      Platelets 631 Thousands/uL      Neutrophils Relative 44 %      Lymphocytes Relative 35 %      Monocytes Relative 16 (H) %      Eosinophils Relative 3 %      Basophils Relative 2 (H) %      Neutrophils Absolute 3 30 Thousands/µL      Lymphocytes Absolute 2 63 Thousands/µL      Monocytes Absolute 1 19 Thousand/µL      Eosinophils Absolute 0 25 Thousand/µL      Basophils Absolute 0 11 (H) Thousands/µL     ED Urine Macroscopic [[id number]]  (Abnormal) Collected:  03/16/18 2152    Lab Status:  Final result Specimen:  Urine Updated:  03/16/18 2249     Color, UA Yellow     Clarity, UA Clear     pH, UA 6 0     Leukocytes, UA Small (A)     Nitrite, UA Negative     Protein, UA Negative mg/dl      Glucose, UA Negative mg/dl      Ketones, UA Negative mg/dl      Urobilinogen, UA 0 2 E U /dl      Bilirubin, UA Negative     Blood, UA Moderate (A)     Specific Gravity, UA >=1 030    Narrative:       CLINITEK RESULT                 XR lumbar spine 2 or 3 views   ED Interpretation by Ashwin Vera MD (03/17 0002)   No fx and (+) calcified fibroid in pelvis with phleboliths read by me         VAS lower limb venous duplex study, unilateral/limited   ED Interpretation by Ashwin Vera MD (03/16 2303)   No DVT as per [facility] Procedures  Procedures       Phone Contacts  ED Phone Contact    ED Course  ED Course as of Mar 17 0031   Fri Mar 16, 2018   2311 Labs d/w patient and IVFs ordered for dehydration and K-dur ordered for somewhat low potassium  Sat Mar 17, 2018   0013 UA and x-ray d/w patient  MDM  Number of Diagnoses or Management Options  Diagnosis management comments: DDx including but not limited to: sciatica, herniated disc, arthritis, spinal stenosis, strain, sprain; doubt fracture, cauda equina syndrome, epidural abscess, AAA  DDX including but not limited to: DVT, Baker's cyst, cellulitis, strain, rhabdomyolysis, metabolic abnormality; doubt arterial occlusion  Amount and/or Complexity of Data Reviewed  Clinical lab tests: ordered and reviewed  Tests in the radiology section of CPT®: ordered and reviewed  Decide to obtain previous medical records or to obtain history from someone other than the patient: yes  Review and summarize past medical records: yes  Independent visualization of images, tracings, or specimens: yes      CritCare Time    Disposition  Final diagnoses:   Herniated lumbar intervertebral disc   Sciatica of right side due to displacement of lumbar intervertebral disc   UTI (urinary tract infection)   Dehydration     Time reflects when diagnosis was documented in both MDM as applicable and the Disposition within this note     Time User Action Codes Description Comment    3/17/2018 12:28 AM Suresh Nieto Add [M51 26] Herniated lumbar intervertebral disc     3/17/2018 12:28 AM Suresh Nieto Add [M54 31] Sciatica of right side due to displacement of lumbar intervertebral disc     3/17/2018 12:28 AM Suresh Nieto Add [N39 0] UTI (urinary tract infection)     3/17/2018 12:28 AM Suresh Nieto Add [E86 0] Dehydration       ED Disposition     ED Disposition Condition Comment    Discharge  Rashida Goodman discharge to home/self care      Condition at discharge: Stable        Follow-up Information     Follow up With Specialties Details Why Contact Toby Lira MD  Call in 3 days Drink fluids  Return sooner if increased pain, fever, vomiting, diarrhea, difficulty breathing or urinating, incontinence, weakness, numbness, rash, lethargy  No driving with percocet or skelaxin  Do not use acetaminophen with percocet  [address] [facility] Call in 3 days  [address] [place] [address] [phone]        Patient's Medications   Discharge Prescriptions    METAXALONE (SKELAXIN) 800 MG TABLET    Take 1 tablet (800 mg total) by mouth 3 (three) times a day for 7 days       Start Date: 3/17/2018 End Date: 3/24/2018       Order Dose: 800 mg       Quantity: 20 tablet    Refills: 0    NAPROXEN SODIUM (ANAPROX) 550 MG TABLET    Take 1 tablet (550 mg total) by mouth 2 (two) times a day with meals for 10 days       Start Date: 3/17/2018 End Date: 3/27/2018       Order Dose: 550 mg       Quantity: 20 tablet    Refills: 0    OXYCODONE-ACETAMINOPHEN (PERCOCET) 5-325 MG PER TABLET    Take 1 tablet by mouth every 4 (four) hours as needed for moderate pain for up to 4 days Max Daily Amount: 6 tablets       Start Date: 3/17/2018 End Date: 3/21/2018       Order Dose: 1 tablet       Quantity: 20 tablet    Refills: 0    SULFAMETHOXAZOLE-TRIMETHOPRIM (BACTRIM DS) 800-160 MG PER TABLET    Take 1 tablet by mouth 2 (two) times a day for 7 days       Start Date: 3/17/2018 End Date: 3/24/2018       Order Dose: 1 tablet       Quantity: 14 tablet    Refills: 0     No discharge procedures on file      ED Provider  Electronically Signed by           Chinyere Smith MD  03/17/18 0286

## 2018-03-18 LAB — BACTERIA UR CULT: NORMAL

## 2018-03-19 ENCOUNTER — APPOINTMENT (OUTPATIENT)
Dept: LAB | Facility: CLINIC | Age: 43
End: 2018-03-19
Payer: COMMERCIAL

## 2018-03-19 PROCEDURE — 80053 COMPREHEN METABOLIC PANEL: CPT | Performed by: INTERNAL MEDICINE

## 2018-03-19 PROCEDURE — 36415 COLL VENOUS BLD VENIPUNCTURE: CPT | Performed by: INTERNAL MEDICINE

## 2018-03-19 PROCEDURE — 85025 COMPLETE CBC W/AUTO DIFF WBC: CPT | Performed by: INTERNAL MEDICINE

## 2018-03-21 ENCOUNTER — HOSPITAL ENCOUNTER (OUTPATIENT)
Dept: INFUSION CENTER | Facility: CLINIC | Age: 43
Discharge: HOME | End: 2018-03-21
Payer: COMMERCIAL

## 2018-03-21 VITALS
SYSTOLIC BLOOD PRESSURE: 100 MMHG | BODY MASS INDEX: 22.33 KG/M2 | OXYGEN SATURATION: 98 % | DIASTOLIC BLOOD PRESSURE: 58 MMHG | RESPIRATION RATE: 18 BRPM | WEIGHT: 134 LBS | HEART RATE: 81 BPM | TEMPERATURE: 98.5 F

## 2018-03-21 PROCEDURE — 96401 CHEMO ANTI-NEOPL SQ/IM: CPT

## 2018-03-21 RX ADMIN — BORTEZOMIB 2.1 MG: 3.5 INJECTION, POWDER, LYOPHILIZED, FOR SOLUTION INTRAVENOUS; SUBCUTANEOUS at 09:06

## 2018-03-21 NOTE — PROGRESS NOTES
Patient arrived for Velcade injection  Patient still complaining of back pain that radiates down right leg since last week  Patient had spoke with Ethan CHANEL in Dr Kirstin Gonzales office when pain started last week and was seen in ER on 3/16/18  Patient diagnosed with herniated disc and given pain medication, and muscle relaxers  No other complaints

## 2018-03-24 ENCOUNTER — APPOINTMENT (OUTPATIENT)
Dept: LAB | Facility: CLINIC | Age: 43
End: 2018-03-24
Payer: COMMERCIAL

## 2018-03-24 DIAGNOSIS — C90.00 MULTIPLE MYELOMA NOT HAVING ACHIEVED REMISSION (HCC): ICD-10-CM

## 2018-03-24 LAB
ALBUMIN SERPL BCP-MCNC: 3.6 G/DL (ref 3.5–5)
ALP SERPL-CCNC: 55 U/L (ref 46–116)
ALT SERPL W P-5'-P-CCNC: 33 U/L (ref 12–78)
ANION GAP SERPL CALCULATED.3IONS-SCNC: 4 MMOL/L (ref 4–13)
AST SERPL W P-5'-P-CCNC: 20 U/L (ref 5–45)
BASOPHILS # BLD AUTO: 0.07 THOUSANDS/ΜL (ref 0–0.1)
BASOPHILS NFR BLD AUTO: 1 % (ref 0–1)
BILIRUB SERPL-MCNC: 0.56 MG/DL (ref 0.2–1)
BUN SERPL-MCNC: 14 MG/DL (ref 5–25)
CALCIUM SERPL-MCNC: 8.1 MG/DL (ref 8.3–10.1)
CHLORIDE SERPL-SCNC: 103 MMOL/L (ref 100–108)
CO2 SERPL-SCNC: 30 MMOL/L (ref 21–32)
CREAT SERPL-MCNC: 0.75 MG/DL (ref 0.6–1.3)
EOSINOPHIL # BLD AUTO: 0.5 THOUSAND/ΜL (ref 0–0.61)
EOSINOPHIL NFR BLD AUTO: 9 % (ref 0–6)
ERYTHROCYTE [DISTWIDTH] IN BLOOD BY AUTOMATED COUNT: 13.5 % (ref 11.6–15.1)
GFR SERPL CREATININE-BSD FRML MDRD: 97 ML/MIN/1.73SQ M
GLUCOSE P FAST SERPL-MCNC: 104 MG/DL (ref 65–99)
HCT VFR BLD AUTO: 39.8 % (ref 34.8–46.1)
HGB BLD-MCNC: 12.4 G/DL (ref 11.5–15.4)
LYMPHOCYTES # BLD AUTO: 1.16 THOUSANDS/ΜL (ref 0.6–4.47)
LYMPHOCYTES NFR BLD AUTO: 21 % (ref 14–44)
MCH RBC QN AUTO: 30 PG (ref 26.8–34.3)
MCHC RBC AUTO-ENTMCNC: 31.2 G/DL (ref 31.4–37.4)
MCV RBC AUTO: 96 FL (ref 82–98)
MONOCYTES # BLD AUTO: 0.51 THOUSAND/ΜL (ref 0.17–1.22)
MONOCYTES NFR BLD AUTO: 9 % (ref 4–12)
NEUTROPHILS # BLD AUTO: 3.19 THOUSANDS/ΜL (ref 1.85–7.62)
NEUTS SEG NFR BLD AUTO: 60 % (ref 43–75)
NRBC BLD AUTO-RTO: 1 /100 WBCS
PLATELET # BLD AUTO: 181 THOUSANDS/UL (ref 149–390)
PMV BLD AUTO: 12.1 FL (ref 8.9–12.7)
POTASSIUM SERPL-SCNC: 4 MMOL/L (ref 3.5–5.3)
PROT SERPL-MCNC: 7 G/DL (ref 6.4–8.2)
RBC # BLD AUTO: 4.14 MILLION/UL (ref 3.81–5.12)
SODIUM SERPL-SCNC: 137 MMOL/L (ref 136–145)
WBC # BLD AUTO: 5.46 THOUSAND/UL (ref 4.31–10.16)

## 2018-03-24 PROCEDURE — 85025 COMPLETE CBC W/AUTO DIFF WBC: CPT

## 2018-03-24 PROCEDURE — 36415 COLL VENOUS BLD VENIPUNCTURE: CPT

## 2018-03-24 PROCEDURE — 84703 CHORIONIC GONADOTROPIN ASSAY: CPT | Performed by: INTERNAL MEDICINE

## 2018-03-24 PROCEDURE — 80053 COMPREHEN METABOLIC PANEL: CPT

## 2018-03-26 ENCOUNTER — TELEPHONE (OUTPATIENT)
Dept: HEMATOLOGY ONCOLOGY | Facility: CLINIC | Age: 43
End: 2018-03-26

## 2018-03-26 ENCOUNTER — HOSPITAL ENCOUNTER (OUTPATIENT)
Dept: NEUROLOGY | Facility: CLINIC | Age: 43
Discharge: HOME | End: 2018-03-26
Payer: COMMERCIAL

## 2018-03-26 DIAGNOSIS — C90.00 MULTIPLE MYELOMA NOT HAVING ACHIEVED REMISSION (HCC): ICD-10-CM

## 2018-03-26 DIAGNOSIS — G56.03 CARPAL TUNNEL SYNDROME, BILATERAL: ICD-10-CM

## 2018-03-26 PROCEDURE — 95886 MUSC TEST DONE W/N TEST COMP: CPT | Performed by: PSYCHIATRY & NEUROLOGY

## 2018-03-26 PROCEDURE — 95913 NRV CNDJ TEST 13/> STUDIES: CPT | Performed by: PSYCHIATRY & NEUROLOGY

## 2018-03-26 RX ORDER — LENALIDOMIDE 25 MG/1
25 CAPSULE ORAL DAILY
Qty: 21 EACH | Refills: 0 | Status: SHIPPED | OUTPATIENT
Start: 2018-03-26 | End: 2018-11-14

## 2018-03-26 NOTE — TELEPHONE ENCOUNTER
SPOKE [address] ON 3/23/18 REGARDING PATIENT VISIT WITH DR Everton Sandoval AND NEED FOR DX TESTING BEFORE SCHEDULING STEM CELL TRANSPLANT  CORKY STATED SHE HAD SPOKEN WITH THE PATIENT AND DUE TO DISTANCE WOULD LIKE TO OBTAIN (DX TESTS AT [facility]) SINCE IT IS CLOSER TO HOME  WHEN [facility] CALLED TO SCHEDULE WITH PATIENT SHE EXPRESSED UNCERTAINTY AND DID NOT WANT TO SCHEDULE YET  PLACED F/U CALL 3/26/18 TO SEE IF THE PATIENT HAD REVIEWED IF SHE WOULD LIKE TO HAVE DX TESTS SCHEDULED HERE AT [facility] OR AT [facility]  PATIENT EXPRESSED SHE NEEDED MORE TIME AS SHE IS GOING FOR A SECOND OPINION REGARDING CANCER OPTIONS  PATIENT EXPRESSED SHE WILL CALL US BACK WITH HER DECISION 4/12/18  CORKY STALEY FROM [facility] CAN BE REACHED AT #[phone]  PATIENT ALSO REVIEWED SHE WILL NEED REFILL OF REVLIMID 3/28/18  REVIEWED I WILL COMPLETE CELGENE AUTH AND SEND NEW SCRIPT TO [facility]  PATIENT WAS APPRECIATIVE

## 2018-03-28 ENCOUNTER — HOSPITAL ENCOUNTER (OUTPATIENT)
Dept: INFUSION CENTER | Facility: CLINIC | Age: 43
Discharge: HOME | End: 2018-03-28
Payer: COMMERCIAL

## 2018-03-28 VITALS
HEART RATE: 71 BPM | OXYGEN SATURATION: 99 % | DIASTOLIC BLOOD PRESSURE: 66 MMHG | RESPIRATION RATE: 16 BRPM | BODY MASS INDEX: 22.41 KG/M2 | WEIGHT: 134.5 LBS | TEMPERATURE: 99 F | SYSTOLIC BLOOD PRESSURE: 94 MMHG

## 2018-03-28 PROCEDURE — 96401 CHEMO ANTI-NEOPL SQ/IM: CPT

## 2018-03-28 RX ADMIN — BORTEZOMIB 2.1 MG: 3.5 INJECTION, POWDER, LYOPHILIZED, FOR SOLUTION INTRAVENOUS; SUBCUTANEOUS at 10:30

## 2018-04-02 ENCOUNTER — OFFICE VISIT (OUTPATIENT)
Dept: OBGYN CLINIC | Facility: CLINIC | Age: 43
End: 2018-04-02
Payer: COMMERCIAL

## 2018-04-02 VITALS
BODY MASS INDEX: 23.16 KG/M2 | HEIGHT: 65 IN | HEART RATE: 80 BPM | DIASTOLIC BLOOD PRESSURE: 78 MMHG | SYSTOLIC BLOOD PRESSURE: 126 MMHG | WEIGHT: 139 LBS

## 2018-04-02 DIAGNOSIS — M51.16 LUMBAR DISC HERNIATION WITH RADICULOPATHY: Primary | ICD-10-CM

## 2018-04-02 DIAGNOSIS — M62.830 SPASM OF LUMBAR PARASPINOUS MUSCLE: ICD-10-CM

## 2018-04-02 PROCEDURE — 99203 OFFICE O/P NEW LOW 30 MIN: CPT | Performed by: INTERNAL MEDICINE

## 2018-04-02 RX ORDER — CYCLOBENZAPRINE HCL 10 MG
10 TABLET ORAL 3 TIMES DAILY PRN
Qty: 30 TABLET | Refills: 0 | Status: SHIPPED | OUTPATIENT
Start: 2018-04-02 | End: 2019-02-15 | Stop reason: ALTCHOICE

## 2018-04-02 RX ORDER — PREDNISONE 10 MG/1
10 TABLET ORAL DAILY
Qty: 6 TABLET | Refills: 0 | Status: CANCELLED | OUTPATIENT
Start: 2018-04-02 | End: 2018-04-08

## 2018-04-02 NOTE — PROGRESS NOTES
Assessment/Plan:  Assessment/Plan   Diagnoses and all orders for this visit:    Lumbar disc herniation with radiculopathy  -     Ambulatory referral to Physical Therapy; Future  -     predniSONE 10 mg tablet; Take 1 tablet (10 mg total) by mouth daily for 6 days    Spasm of lumbar paraspinous muscle  -     cyclobenzaprine (FLEXERIL) 10 mg tablet; Take 1 tablet (10 mg total) by mouth 3 (three) times a day as needed for muscle spasms  -     Ambulatory referral to Physical Therapy; Future      My clinical impression is that Ms Narayan Hernandez low back pain with right lower extremity radiculopathy is due to  her disc herniation / protrusion with reultant impingement of her lumbosacral nerve root resulting to her lower extremity symptoms as well as the right EHL weakness  Subjective clinical presentation is also Suspicious for a recurrent intermittent lumbosacral muscle spasms  However this was not present today  Given that she experiences  resolution of her symptoms when she takes her Dexamethasone, I have started her on prednisone  10 mg p o  daily x6 days  She will start taking the prednisone 8 a day after she takes her dexamethasone  For example, if she takes the dexamethasone on Wednesday then she will start a prednisone on Thursday x6 days  I have also referred her to physical therapy for rehabilitation  She will start daily home exercise program once she starts physical therapy rehabilitation and follow up for re-evaluation in 6-8 weeks  However, I counseled patient to notify our office if she develops any symptom exacerbation while she is on these recommended treatment  Subjective:   Patient ID: Silvia Juan is a 38 y o  female  HPI    Ms Talya Muñoz is a pleasant 66-year-old female  With history of multiple myeloma, multiple sclerosis, presents for initial  Evaluation of an insidious onset right leg pain which she initially 1st noted around July of 2017      That pain was primarily located in the right lateral leg and was deep inside  At that time, she was having some  Pain exacerbation with  Knee flexion  She subsequently developed louann low back pain around the beginning of this year  However, approximately 3 weeks ago, she noted exquisite pain exacerbation of her low back pain of her low back  The pain was reportedly is bad enough that she went to the emergency room  Her pain was exacerbated  By standing, ambulation, she was having difficulty with lying down to sleep  As a matter of fact, the pain was keeping our out up at night  She was acutely treated in the ED on 3/17/2018  However, before that she has done a lumbar MRI on 1/24/2018 and subsequently also did a lower extremity Doppler ultrasound on 3/16/2018  The Doppler ultrasound was negative for any DVT  The lumbar x-ray is significant for Dd  The MRI was reviewed today and is significant for circumferential disc bulging at L4/L5 and L5/S1 with nerve root compression  The following portions of the patient's history were reviewed and updated as appropriate: allergies, current medications, past family history, past medical history, past social history, past surgical history and problem list     Review of Systems  Review of Systems   Constitutional: Negative  HENT: Negative  Eyes: Negative  Respiratory: Negative  Cardiovascular: Negative  Musculoskeletal:        As per history of present illness   Skin: Negative  Neurological:        As per history of present illness   Psychiatric/Behavioral: Negative  Objective:  Back Exam     Tenderness   The patient is experiencing no tenderness  Range of Motion   Extension: normal   Back flexion: Mild pain exacerbation with forward flexion    Otherwise, range of motion is normal    Lateral Bend Right: normal   Lateral Bend Left: normal   Rotation Right: normal   Rotation Left: normal     Muscle Strength   Back normal muscle strength: 3+/5 right foot EHL strength  Tests   Right straight leg raise test: Equivocal right lower extremity straight leg raise test      Reflexes   Patellar: 1/4  Achilles: normal    Other   Sensation: normal  Erythema: no back redness  Scars: absent            Physical Exam  Constitutional: Oriented to person, place, and time  Well-developed and well-nourished  HENT:   Head: Normocephalic and atraumatic  Eyes: Conjunctivae are normal    Cardiovascular: Normal rate  Pulmonary/Chest: Effort normal    Neurological: Alert and oriented to person, place, and time  Skin: Skin is warm and dry  Psychiatric: Normal mood and affect  I have personally reviewed pertinent films in PACS and my interpretation is Lumbar MRI done on 1/24/2018 is significant for  L2-L4 with minimal left foraminal narrowing at L3/L4 area  L4/L5 and L5/S1 circumferential annular bulge with central canal and right paramedian herniation resulting in mild central canal and lateral recess stenosis on the right side abutting into the L5 nerve root multilevel facet arthropathy noted from L3-S1  Tyler Newell

## 2018-04-04 ENCOUNTER — TELEPHONE (OUTPATIENT)
Dept: OBGYN CLINIC | Facility: CLINIC | Age: 43
End: 2018-04-04

## 2018-04-04 DIAGNOSIS — M51.16 LUMBAR DISC HERNIATION WITH RADICULOPATHY: Primary | ICD-10-CM

## 2018-04-04 RX ORDER — PREDNISONE 10 MG/1
10 TABLET ORAL DAILY
Qty: 6 TABLET | Refills: 0 | Status: SHIPPED | OUTPATIENT
Start: 2018-04-04 | End: 2018-04-10

## 2018-04-04 NOTE — TELEPHONE ENCOUNTER
Tari Ackermanr, I tried calling patient a few times but the line wasn't going through  Can you please let her know that I've sent the steroid and that I apologize for any inconvenience

## 2018-04-04 NOTE — TELEPHONE ENCOUNTER
Caller: dr Minna Link patient  Ph: [phone]  Patient reports the pharmacy did not receive the order for steroid medication  Please process at the [facility] in [facility]  Thank you

## 2018-04-06 ENCOUNTER — TELEPHONE (OUTPATIENT)
Dept: OBGYN CLINIC | Facility: HOSPITAL | Age: 43
End: 2018-04-06

## 2018-04-06 DIAGNOSIS — M62.830 LUMBAR PARASPINAL MUSCLE SPASM: ICD-10-CM

## 2018-04-06 DIAGNOSIS — M48.07 FORAMINAL STENOSIS OF LUMBOSACRAL REGION: ICD-10-CM

## 2018-04-06 DIAGNOSIS — M54.41 ACUTE BILATERAL LOW BACK PAIN WITH RIGHT-SIDED SCIATICA: Primary | ICD-10-CM

## 2018-04-06 NOTE — TELEPHONE ENCOUNTER
Caller: patient  Call back number: [phone]  Patient's doctor: Dr Erik Hernández    Patient called stating that her back pain is increasing  She states that she has not been able to work this week due to it  She states she did start the steroid yesterday  What can she do for the pain?  Please advise

## 2018-04-06 NOTE — TELEPHONE ENCOUNTER
I just put in a pan management/spine referral for Ms  Anjel Moment  Can you please fax it to her (at [phone]) ASAP       Thanks,  Darrell Wilson

## 2018-04-06 NOTE — PROGRESS NOTES
Patient called to report that her low back pain with lower extremity radiculopathy is getting worse  During our last encounter, started her on prednisone and cyclobenzaprine  She started taking the prednisone yesterday  Her pain has not improved  She reports that she has not gone to work in the past couple of days due to the worsening pain  I advised patient to go to closest emergency room  I also recommended following up with pain management for further evaluation and management and need of possible corticosteroid injection  She is agreeable to it  She understands that she can go to the closest emergency room if her pain does not improve in the next couple of hours  I have placed in and a referral to pain and spine service for further evaluation and management

## 2018-04-07 ENCOUNTER — HOSPITAL ENCOUNTER (EMERGENCY)
Facility: HOSPITAL | Age: 43
Discharge: HOME | End: 2018-04-07
Attending: EMERGENCY MEDICINE | Admitting: EMERGENCY MEDICINE
Payer: COMMERCIAL

## 2018-04-07 ENCOUNTER — TRANSCRIBE ORDERS (OUTPATIENT)
Dept: ADMINISTRATIVE | Facility: HOSPITAL | Age: 43
End: 2018-04-07

## 2018-04-07 ENCOUNTER — APPOINTMENT (OUTPATIENT)
Dept: LAB | Facility: CLINIC | Age: 43
End: 2018-04-07
Payer: COMMERCIAL

## 2018-04-07 VITALS
RESPIRATION RATE: 14 BRPM | TEMPERATURE: 98 F | DIASTOLIC BLOOD PRESSURE: 65 MMHG | OXYGEN SATURATION: 99 % | WEIGHT: 130 LBS | HEART RATE: 92 BPM | BODY MASS INDEX: 21.66 KG/M2 | SYSTOLIC BLOOD PRESSURE: 144 MMHG

## 2018-04-07 DIAGNOSIS — M62.830 SPASM OF LUMBAR PARASPINOUS MUSCLE: ICD-10-CM

## 2018-04-07 DIAGNOSIS — M51.16 LUMBAR DISC HERNIATION WITH RADICULOPATHY: Primary | ICD-10-CM

## 2018-04-07 PROCEDURE — 36415 COLL VENOUS BLD VENIPUNCTURE: CPT | Performed by: INTERNAL MEDICINE

## 2018-04-07 PROCEDURE — 80053 COMPREHEN METABOLIC PANEL: CPT | Performed by: INTERNAL MEDICINE

## 2018-04-07 PROCEDURE — 99283 EMERGENCY DEPT VISIT LOW MDM: CPT

## 2018-04-07 PROCEDURE — 85025 COMPLETE CBC W/AUTO DIFF WBC: CPT | Performed by: INTERNAL MEDICINE

## 2018-04-07 PROCEDURE — 96372 THER/PROPH/DIAG INJ SC/IM: CPT

## 2018-04-07 RX ORDER — METHYLPREDNISOLONE 4 MG/1
TABLET ORAL
Qty: 21 TABLET | Refills: 0 | Status: SHIPPED | OUTPATIENT
Start: 2018-04-07 | End: 2019-02-04

## 2018-04-07 RX ORDER — KETOROLAC TROMETHAMINE 30 MG/ML
15 INJECTION, SOLUTION INTRAMUSCULAR; INTRAVENOUS ONCE
Status: COMPLETED | OUTPATIENT
Start: 2018-04-07 | End: 2018-04-07

## 2018-04-07 RX ORDER — DIAZEPAM 5 MG/1
5 TABLET ORAL 2 TIMES DAILY PRN
Qty: 8 TABLET | Refills: 0 | Status: SHIPPED | OUTPATIENT
Start: 2018-04-07 | End: 2019-02-15 | Stop reason: ALTCHOICE

## 2018-04-07 RX ADMIN — KETOROLAC TROMETHAMINE 15 MG: 30 INJECTION, SOLUTION INTRAMUSCULAR at 07:59

## 2018-04-07 NOTE — DISCHARGE INSTRUCTIONS
Take methylprednisolone as directed starting today instead of the prednisone 10 mg  You may additionally use diazepam via instead of the Flexeril previously prescribed  Lumbar Disc Herniation   WHAT YOU NEED TO KNOW:   Lumbar disc herniation occurs when a disc in your lumbar spine (lower back) bulges out  Lumbar discs are spongy cushions between the vertebrae (bones) in your spine  The herniated disc may press on your nerves or spinal cord  DISCHARGE INSTRUCTIONS:   Seek care immediately if:   · You cannot control when you urinate or have a bowel movement  · You are unable to move one or both of your legs  · You lose feeling in your groin or buttocks  Contact your healthcare provider if:   · You have numbness in one or both of your legs  · You have low back pain while resting  · You have trouble moving one or both of your legs  · You begin leaking urine or bowel movement, and it is not normal for you  · Your pain gets worse, even after you take medicine  · You have questions or concerns about your condition or care  Medicines: You may need any of the following:  · NSAIDs , such as ibuprofen, help decrease swelling, pain, and fever  NSAIDs can cause stomach bleeding or kidney problems in certain people  If you take blood thinner medicine, always ask your healthcare provider if NSAIDs are safe for you  Always read the medicine label and follow directions  · Prescription pain medicine  may be given  Ask how to take this medicine safely  · Muscle relaxers  decrease pain and muscle spasms  · Take your medicine as directed  Contact your healthcare provider if you think your medicine is not helping or if you have side effects  Tell him or her if you are allergic to any medicine  Keep a list of the medicines, vitamins, and herbs you take  Include the amounts, and when and why you take them  Bring the list or the pill bottles to follow-up visits   Carry your medicine list with you in case of an emergency  Rest:  Your healthcare provider may have you rest in bed for a few days  It is best to rest on your side with your knees bent  Put a cushion between your knees to help decrease the pressure on your spine and nerves  Ask how long you should rest and when you can return to your daily activities  Heat:  Apply heat on your lower back for 20 to 30 minutes every 2 hours for as many days as directed  Heat helps decrease pain and muscle spasms  Physical therapy:  A physical therapist teaches you exercises to help improve movement and strength, and to decrease pain  A physical therapist can teach you safe ways to bend, lift, sit, and stand to help relieve back pain  Exercise and activity:  Exercises that do not stress your back muscles may help decrease your pain  Examples of low-stress exercises are walking, swimming, and biking  Avoid heavy lifting while your back is healing  Try not to sit for long periods of time  Talk to your healthcare provider before you start any new exercise program   Follow up with your healthcare provider as directed:  Write down your questions so you remember to ask them during your visits  © 2017 [facility] Information is for End User's use only and may not be sold, redistributed or otherwise used for commercial purposes  All illustrations and images included in CareNotes® are the copyrighted property of [employer]  or [address]  The above information is an  only  It is not intended as medical advice for individual conditions or treatments  Talk to your doctor, nurse or pharmacist before following any medical regimen to see if it is safe and effective for you

## 2018-04-07 NOTE — ED PROVIDER NOTES
History  Chief Complaint   Patient presents with    Back Pain     Pt c/o lower back pain for several days  Pt states that the pain radiates down her right hip/buttock and to her right ankle  Pt reports taking oxycodone for the pain  Pt states she was evaluated here and was dx with a herniated disc  58-year-old female presents to the emergency department relating that 3 weeks ago she developed back pain  She relates that she came to the emergency department was prescribed a medication (oxycodone)  She relates that the medication was not working  She explains that she then saw her primary care doctor who did not prescribe any medication but had her follow up with Orthopedics  She relates that her appointment this Monday she felt much better    Over the past couple days pain is not worsened  She gestures to the right lower back and around to the right groin as the site of her discomfort  She additionally gestures to the right lateral ankle and relates that this is swollen  She relates that she was prescribed medication from orthopedic specialist but that this is not helping  She took the muscle relaxer (cyclobenzaprine) yesterday evening without improvement  She has been taking 10 mg of prednisone orally daily  Patient denies recall of any recent injury  History of intermittent back pain in the past   Recent MRI revealed disc herniation with nerve root compression at L4-L5 and L5/S1  Patient has not had any fevers  No nausea, vomiting or problems with urination or defecation  It is difficult to find a comfortable position  Laying down is extremely uncomfortable  Patient denies having numbness in the legs though pain does radiate down the posterior aspect of the leg  No recent instrumentation or illness  Recent orthopedics note reviewed  Prior to Admission Medications   Prescriptions Last Dose Informant Patient Reported?  Taking?   acyclovir (ZOVIRAX) 400 MG tablet  Self Yes No   Sig: Take 1 tablet by mouth 2 (two) times a day   bortezomib (VELCADE)   Yes No   Sig: Infuse 2 1 mg into a venous catheter once   cyanocobalamin 100 MCG tablet  Self Yes No   Si mcg every 30 (thirty) days injection   cyclobenzaprine (FLEXERIL) 10 mg tablet   No No   Sig: Take 1 tablet (10 mg total) by mouth 3 (three) times a day as needed for muscle spasms   dexamethasone (DECADRON) 4 mg tablet  Self No No   Sig: Take 10 tablets (40 mg total) by mouth once a week   glatiramer acetate (COPAXONE) 20 mg/mL SOSY injection syringe  Self Yes No   Sig: Inject 40 mg under the skin 3 (three) times a week   lenalidomide (REVLIMID) 25 MG CAPS   No No   Sig: Take 1 capsule (25 mg total) by mouth daily Days 1 through 21 Q 28 days  metaxalone (SKELAXIN) 800 mg tablet   No No   Sig: Take 1 tablet (800 mg total) by mouth 3 (three) times a day for 7 days   naproxen sodium (ANAPROX) 550 mg tablet   No No   Sig: Take 1 tablet (550 mg total) by mouth 2 (two) times a day with meals for 10 days   predniSONE 10 mg tablet   No No   Sig: Take 1 tablet (10 mg total) by mouth daily for 6 days      Facility-Administered Medications: None       Past Medical History:   Diagnosis Date    [facility]     [facility] sclerosis ([facility] )        Past Surgical History:   Procedure Laterality Date     SECTION         Family History   Problem Relation Age of Onset    No Known Problems Mother     No Known Problems Father      I have reviewed and agree with the history as documented  Social History   Substance Use Topics    Smoking status: Never Smoker    Smokeless tobacco: Never Used    Alcohol use No        Review of Systems   Respiratory: Negative for shortness of breath  Cardiovascular: Negative for chest pain  Gastrointestinal: Negative for abdominal pain  Skin: Negative for rash  All other systems reviewed and are negative        Physical Exam  ED Triage Vitals [18 0659]   Temperature Pulse Respirations Blood Pressure SpO2   98 °F (36 7 °C) 92 14 144/65 99 %      Temp Source Heart Rate Source Patient Position - Orthostatic VS BP Location FiO2 (%)   Oral Monitor Sitting Right arm --      Pain Score       8           Orthostatic Vital Signs  Vitals:    04/07/18 0659   BP: 144/65   Pulse: 92   Patient Position - Orthostatic VS: Sitting       Physical Exam   Constitutional: She is oriented to person, place, and time  She appears well-developed and well-nourished  Uncomfortable appearing standing in the room  Ambulates without difficulty  Appears uncomfortable upon sitting and laying supine  Eyes: Conjunctivae and EOM are normal    Cardiovascular: Normal rate and regular rhythm  Pulmonary/Chest: Effort normal and breath sounds normal    Abdominal: Soft  Bowel sounds are normal  There is no tenderness  Musculoskeletal: Normal range of motion  She exhibits edema (Plus one bilateral ankles  No calf tenderness  )  No tenderness of the thoracic, lumbar or sacral spine  Discomfort is exacerbated with movements including flexion and extension of the back  Neurological: She is alert and oriented to person, place, and time  5/5 strength with bilateral hip flexion, dorsi and plantar flexion  5/5 strength with bilateral knee extension and flexion  + 2 bilateral patellar reflexes  Skin: Skin is warm and dry  Capillary refill takes less than 2 seconds  Psychiatric: She has a normal mood and affect  Her behavior is normal    Vitals reviewed        ED Medications  Medications   ketorolac (TORADOL) injection 15 mg (15 mg Intramuscular Given 4/7/18 7932)       Diagnostic Studies  Results Reviewed     None                 No orders to display              Procedures  Procedures       Phone Contacts  ED Phone Contact    ED Course  ED Course                                MDM  CritCare Time    Disposition  Final diagnoses:   Lumbar disc herniation with radiculopathy   Spasm of lumbar paraspinous muscle     Time reflects when diagnosis was documented in both MDM as applicable and the Disposition within this note     Time User Action Codes Description Comment    4/7/2018  7:55 AM Luevenia Star A Add [M51 16] Lumbar disc herniation with radiculopathy     4/7/2018  7:55 AM Luevenia Star A Add [C57 801] Spasm of lumbar paraspinous muscle       ED Disposition     ED Disposition Condition Comment    Discharge  Merlyn Grey discharge to home/self care      Condition at discharge: Good        Follow-up Information     Follow up With Specialties Details Why Contact Info    Kell Holliday MD Sports Medicine, Orthopedic Surgery Schedule an appointment as soon as possible for a visit  [place] [address] [place] [address]  [phone]          Discharge Medication List as of 4/7/2018  8:03 AM      START taking these medications    Details   diazepam (VALIUM) 5 mg tablet Take 1 tablet (5 mg total) by mouth 2 (two) times a day as needed for muscle spasms, Starting Sat 4/7/2018, Print      Methylprednisolone 4 MG TBPK Use as directed on package, Print         CONTINUE these medications which have NOT CHANGED    Details   acyclovir (ZOVIRAX) 400 MG tablet Take 1 tablet by mouth 2 (two) times a day, Starting Wed 1/10/2018, Historical Med      bortezomib (VELCADE) Infuse 2 1 mg into a venous catheter once, Historical Med      cyanocobalamin 100 MCG tablet 100 mcg every 30 (thirty) days injection, Historical Med      cyclobenzaprine (FLEXERIL) 10 mg tablet Take 1 tablet (10 mg total) by mouth 3 (three) times a day as needed for muscle spasms, Starting Mon 4/2/2018, Normal      dexamethasone (DECADRON) 4 mg tablet Take 10 tablets (40 mg total) by mouth once a week, Starting Tue 2/6/2018, Normal      glatiramer acetate (COPAXONE) 20 mg/mL SOSY injection syringe Inject 40 mg under the skin 3 (three) times a week, Historical Med      lenalidomide (REVLIMID) 25 MG CAPS Take 1 capsule (25 mg total) by mouth daily Days 1 through 21 Q 28 days  , Starting Mon 3/26/2018, Normal      metaxalone (SKELAXIN) 800 mg tablet Take 1 tablet (800 mg total) by mouth 3 (three) times a day for 7 days, Starting Sat 3/17/2018, Until Sat 3/24/2018, Print      naproxen sodium (ANAPROX) 550 mg tablet Take 1 tablet (550 mg total) by mouth 2 (two) times a day with meals for 10 days, Starting Sat 3/17/2018, Until Tue 3/27/2018, Print      predniSONE 10 mg tablet Take 1 tablet (10 mg total) by mouth daily for 6 days, Starting Wed 4/4/2018, Until Tue 4/10/2018, Normal           No discharge procedures on file      ED Provider  Electronically Signed by           Darlyn Patrick MD  04/08/18 3867

## 2018-04-09 ENCOUNTER — TELEPHONE (OUTPATIENT)
Dept: OBGYN CLINIC | Facility: HOSPITAL | Age: 43
End: 2018-04-09

## 2018-04-09 DIAGNOSIS — M54.16 LUMBAR BACK PAIN WITH RADICULOPATHY AFFECTING RIGHT LOWER EXTREMITY: Primary | ICD-10-CM

## 2018-04-09 RX ORDER — GABAPENTIN 300 MG/1
300 CAPSULE ORAL 3 TIMES DAILY
Qty: 90 CAPSULE | Refills: 0 | Status: SHIPPED | OUTPATIENT
Start: 2018-04-09 | End: 2018-11-28 | Stop reason: SDUPTHER

## 2018-04-09 NOTE — TELEPHONE ENCOUNTER
Caller: patient  Call back number: [phone]    Patient called stating that she is having increased pain  She sates it is a 8/10  She took methylprednisolone and valium  She states it is not helping  She is scheduled with spine and pain but not until 4/30  What can she do for this   Please advise

## 2018-04-10 ENCOUNTER — APPOINTMENT (OUTPATIENT)
Dept: PHYSICAL THERAPY | Facility: CLINIC | Age: 43
End: 2018-04-10
Payer: COMMERCIAL

## 2018-04-11 ENCOUNTER — OFFICE VISIT (OUTPATIENT)
Dept: HEMATOLOGY ONCOLOGY | Facility: CLINIC | Age: 43
End: 2018-04-11
Payer: COMMERCIAL

## 2018-04-11 ENCOUNTER — HOSPITAL ENCOUNTER (OUTPATIENT)
Dept: INFUSION CENTER | Facility: CLINIC | Age: 43
Discharge: HOME | End: 2018-04-11
Payer: COMMERCIAL

## 2018-04-11 VITALS
DIASTOLIC BLOOD PRESSURE: 74 MMHG | HEIGHT: 65 IN | RESPIRATION RATE: 18 BRPM | HEART RATE: 80 BPM | TEMPERATURE: 98.8 F | BODY MASS INDEX: 22.33 KG/M2 | SYSTOLIC BLOOD PRESSURE: 116 MMHG | WEIGHT: 134 LBS | OXYGEN SATURATION: 100 %

## 2018-04-11 VITALS
BODY MASS INDEX: 22.33 KG/M2 | HEIGHT: 65 IN | DIASTOLIC BLOOD PRESSURE: 64 MMHG | HEART RATE: 86 BPM | SYSTOLIC BLOOD PRESSURE: 110 MMHG | OXYGEN SATURATION: 99 % | TEMPERATURE: 99.1 F | WEIGHT: 134 LBS

## 2018-04-11 DIAGNOSIS — C90.00 MULTIPLE MYELOMA NOT HAVING ACHIEVED REMISSION (HCC): Primary | ICD-10-CM

## 2018-04-11 PROCEDURE — 99214 OFFICE O/P EST MOD 30 MIN: CPT | Performed by: INTERNAL MEDICINE

## 2018-04-11 PROCEDURE — 96401 CHEMO ANTI-NEOPL SQ/IM: CPT

## 2018-04-11 RX ADMIN — BORTEZOMIB 2.2 MG: 3.5 INJECTION, POWDER, LYOPHILIZED, FOR SOLUTION INTRAVENOUS; SUBCUTANEOUS at 09:15
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
## 2018-04-11 NOTE — PROGRESS NOTES
Pt to clinic for velcade injection, pt offers no complaints at this time, aware of next appointment, declines avs

## 2018-04-13 ENCOUNTER — EVALUATION (OUTPATIENT)
Dept: PHYSICAL THERAPY | Facility: CLINIC | Age: 43
End: 2018-04-13
Payer: COMMERCIAL

## 2018-04-13 DIAGNOSIS — M62.830 SPASM OF LUMBAR PARASPINOUS MUSCLE: ICD-10-CM

## 2018-04-13 DIAGNOSIS — M51.16 LUMBAR DISC HERNIATION WITH RADICULOPATHY: Primary | ICD-10-CM

## 2018-04-13 PROCEDURE — 97110 THERAPEUTIC EXERCISES: CPT | Performed by: PHYSICAL THERAPIST

## 2018-04-13 PROCEDURE — 97162 PT EVAL MOD COMPLEX 30 MIN: CPT | Performed by: PHYSICAL THERAPIST

## 2018-04-13 PROCEDURE — G8981 BODY POS CURRENT STATUS: HCPCS | Performed by: PHYSICAL THERAPIST

## 2018-04-13 PROCEDURE — G8982 BODY POS GOAL STATUS: HCPCS | Performed by: PHYSICAL THERAPIST

## 2018-04-13 PROCEDURE — 97112 NEUROMUSCULAR REEDUCATION: CPT | Performed by: PHYSICAL THERAPIST

## 2018-04-13 NOTE — PROGRESS NOTES
PT Evaluation     Today's date: 2018  Patient name: Bebe Higgins  : 1975  MRN: [id number]  Referring provider: Jeff Herrera MD  Dx:   Encounter Diagnosis     ICD-10-CM    1  Lumbar disc herniation with radiculopathy M51 16    2  Spasm of lumbar paraspinous muscle M62 830                   Assessment  Impairments: impaired physical strength and pain with function  Functional limitations: Increased time to perform functional tasks  Intermittently takes time off of work  Assessment details: Patient is a 38 y o  Female with exacerbation of chronic right distal LE pain  She has constant pain in right L4 dermatome with intermittent reports of right lower back pain  Objective findings are consistent with L4-5 disc derangement and possible sciatic decreased neuromobility  Symptoms in distal LE centralized with extension based program Patient will benefit from skilled PT services in order to decrease her back and radicular symptoms in order to resume her previous level of function  Understanding of Dx/Px/POC: good   Prognosis: good    Goals  STG ( 4 weeks):  1  Patient will report pain as a 5-6/10 at worst with lifting and carrying boxes at work  2  Patient will demonstrate good body mechanics with lifting objects to prevent injury  LTG (8 weeks):  1  Patient will report pain as a 0-1/10 at worst with normal activity  2   Patient will be independent and compliant with a HEP in order to maintain gains made with skilled PT services    Plan  Patient would benefit from: skilled PT  Referral necessary: No  Planned therapy interventions: manual therapy, home exercise program, therapeutic exercise, therapeutic activities, stretching, strengthening, patient education, postural training and body mechanics training  Frequency: 2x week  Duration in weeks: 8  Treatment plan discussed with: patient        Subjective Evaluation    History of Present Illness  Date of onset: 2017  Mechanism of injury: Patient states she had onset of right lateral calf pain in 2017  She saw her neurologist who ordered an MRI which showed degenerative changes in spine and L4-S1 disc herniation  Then, about 4 weeks ago she began having right lower back pain  She has been to the ED twice for pain  She is referred now to outpatient PT services  Recurrent probem    Quality of life: good    Pain  Current pain ratin  At best pain rating: 3  At worst pain rating: 10  Location: right L5 dermatome, right lower back and right buttock  Quality: knife-like  Progression: improved    Social Support  Lives in: multiple-level home  Lives with: adult children and spouse    Employment status: working ([employer]  (+) heavy lifting)  Exercise history: None      Diagnostic Tests  X-ray: abnormal  MRI studies: abnormal  Treatments  Previous treatment: medication  Patient Goals  Patient goal: To abolish lower back pain        Objective     Special Questions  Positive for history of cancer  Negative for night pain, bladder dysfunction, bowel dysfunction and saddle (S4) numbness    Static Posture     Lumbar Spine   Increased lordosis  Palpation   Left   No palpable tenderness to the erector spinae, lumbar paraspinals and quadratus lumborum  Right   No palpable tenderness to the erector spinae, lumbar paraspinals and quadratus lumborum  Tenderness     Lumbar Spine  No tenderness in the spinous process, facet joint, left transverse process and right transverse process  Left Hip   No tenderness in the ASIS and PSIS  Right Hip   No tenderness in the ASIS and PSIS       Neurological Testing     Sensation     Lumbar   Left   Intact: light touch    Right   Intact: light touch    Reflexes   Left   Patellar (L4): normal (2+)  Achilles (S1): normal (2+)    Right   Patellar (L4): normal (2+)  Achilles (S1): normal (2+)    Active Range of Motion     Additional Active Range of Motion Details  Lumbar ROM WFL except flexion (50% and pain)    Strength/Myotome Testing     Left Hip   Planes of Motion   Flexion: 4+  Extension: 4+  Abduction: 4+    Right Hip   Planes of Motion   Flexion: 4+  Extension: 4+  Abduction: 4+    Left Knee   Flexion: 4+  Extension: 4+    Right Knee   Flexion: 4+  Extension: 4+    Left Ankle/Foot   Dorsiflexion: 4+    Right Ankle/Foot   Dorsiflexion: 4    Tests       Thoracic   Positive slump  Lumbar   Positive repeated extension and slumped  Left   Negative crossed SLR and femoral stretch  Right   Negative femoral stretch and passive SLR  Right Pelvic Girdle/Sacrum   Negative: sacrum compression and gapping       Additional Tests Details  Decreased pain with right LE txn    Ambulation     Ambulation: Level Surfaces   Ambulation without assistive device: independent    Ambulation: Stairs   Ascend stairs: independent  Pattern: reciprocal  Descend stairs: independent  Pattern: reciprocal    Observational Gait   Gait: within functional limits           Precautions: Ms, Multiple Myeloma    Daily Treatment Diary     Manual              Right LE long axis txn                                                                     Exercise Diary  4/13            UBE F/R             Webslide rows H,M,L             Stand HS stretch akanksha             Body mechanics lifting JF            Seated nerve glides right sciatic             PPT              PPT with marches             Right piriformis stretch             T-ball ab isometrics             Prone press-ups  2x10                                                                                                                                                  Modalities

## 2018-04-16 ENCOUNTER — TELEPHONE (OUTPATIENT)
Dept: HEMATOLOGY ONCOLOGY | Facility: CLINIC | Age: 43
End: 2018-04-16

## 2018-04-16 ENCOUNTER — APPOINTMENT (OUTPATIENT)
Dept: LAB | Facility: CLINIC | Age: 43
End: 2018-04-16
Payer: COMMERCIAL

## 2018-04-16 ENCOUNTER — CONSULT (OUTPATIENT)
Dept: PAIN MEDICINE | Facility: CLINIC | Age: 43
End: 2018-04-16
Payer: COMMERCIAL

## 2018-04-16 VITALS
BODY MASS INDEX: 22.66 KG/M2 | DIASTOLIC BLOOD PRESSURE: 50 MMHG | WEIGHT: 136 LBS | HEIGHT: 65 IN | TEMPERATURE: 98.2 F | HEART RATE: 78 BPM | SYSTOLIC BLOOD PRESSURE: 118 MMHG

## 2018-04-16 DIAGNOSIS — M51.16 LUMBAR DISC HERNIATION WITH RADICULOPATHY: Primary | ICD-10-CM

## 2018-04-16 PROCEDURE — 99244 OFF/OP CNSLTJ NEW/EST MOD 40: CPT | Performed by: ANESTHESIOLOGY

## 2018-04-16 NOTE — PROGRESS NOTES
Assessment:  1  Lumbar disc herniation with radiculopathy        Plan:  The patient's symptoms, history/physical are consistent with pain that is multifactorial in origin but predominantly the result of the patient's L4-5 disc herniation on the right which is leading to right-sided leg symptoms  At this time, I discussed that the natural history of her condition is that most patients have symptomatic relief within 6 months with conservative treatments  Due to the ongoing severity of the pain, I did discuss performing a right L4-5 transforaminal epidural steroid injection to help reduce swelling and inflammation which is leading to her pain symptoms  She was apprised of the most common risks and would like to proceed  She will be scheduled for an upcoming Tuesday or Thursday under fluoroscopic guidance  She started physical therapy last week which should help with the right lower extremity weakness that she experiences  I will have her take her gabapentin 300 mg 2 tablets at bedtime for better relief  Complete risks and benefits including bleeding, infection, tissue reaction, nerve injury and allergic reaction were discussed  The approach was demonstrated using models and literature was provided  Verbal and written consent was obtained  My impressions and treatment recommendations were discussed in detail with the patient who verbalized understanding and had no further questions  Discharge instructions were provided  I personally saw and examined the patient and I agree with the above discussed plan of care  Orders Placed This Encounter   Procedures    [place] spine and pain procedure     Standing Status:   Future     Standing Expiration Date:   10/16/2018     Order Specific Question:   Reason for Exam:     Answer:   Right L4-5 TF CR     Order Specific Question:   Anticoagulant hold needed? Answer:   No     Order Specific Question:   Is the patient pregnant?      Answer:   No     No orders of the defined types were placed in this encounter  History of Present Illness:    Rebecca Zamora is a 38 y o  female who presents for consultation in regards to lower back and right-sided leg pain  Symptoms have been present for several months without any precipitating injury or trauma  Pain is severe rated 9-10/10 on a numeric rating scale and felt nearly constantly  Symptoms are described to be sharp, dull, aching, pressure-like, burning and cramping and shooting with numbness and paresthesias that radiates down the right leg  She feels weakness of the right leg  Symptoms are aggravated with walking, bending  There is no change with coughing, sneezing or bowel movements  Treatment history has included heat/ice which provides no relief  She recently started physical therapy  She had been prescribed cyclobenzaprine as well as Prednisone  She has been taking ibuprofen 200 mg 3 tablets which provides mild relief  She was also prescribed gabapentin and has been taking it once a day but does not feel it is helping  She does also have a history of multiple myeloma and is currently in treatment with Dr Yessica Bragg  She will also be undergoing stem cell treatment in the future and has confirmed with them that it is okay to have an epidural steroid injection  I have personally reviewed and/or updated the patient's past medical history, past surgical history, family history, social history, current medications, allergies, and vital signs today  Review of Systems:    Review of Systems   Constitutional: Negative for fever and unexpected weight change  HENT: Negative for trouble swallowing  Eyes: Negative for visual disturbance  Respiratory: Negative for shortness of breath and wheezing  Cardiovascular: Negative for chest pain and palpitations  Gastrointestinal: Negative for constipation, diarrhea, nausea and vomiting  Endocrine: Negative for cold intolerance, heat intolerance and polydipsia  Genitourinary: Negative for difficulty urinating and frequency  Musculoskeletal: Positive for gait problem and joint swelling  Negative for arthralgias and myalgias  Skin: Negative for rash  Neurological: Negative for dizziness, seizures, syncope, weakness and headaches  Hematological: Does not bruise/bleed easily  Psychiatric/Behavioral: Negative for dysphoric mood  All other systems reviewed and are negative  Patient Active Problem List   Diagnosis    Multiple myeloma not having achieved remission ([facility] )    Carpal tunnel syndrome, bilateral    Lumbar disc herniation with radiculopathy    Spasm of lumbar paraspinous muscle       Past Medical History:   Diagnosis Date    Multiple myeloma ([facility] )     Multiple sclerosis ([facility] )        Past Surgical History:   Procedure Laterality Date     SECTION         Family History   Problem Relation Age of Onset    No Known Problems Mother     No Known Problems Father        Social History     Occupational History    Not on file       Social History Main Topics    Smoking status: Never Smoker    Smokeless tobacco: Never Used    Alcohol use No    Drug use: No    Sexual activity: Not on file       Current Outpatient Prescriptions on File Prior to Visit   Medication Sig    acyclovir (ZOVIRAX) 400 MG tablet Take 1 tablet by mouth 2 (two) times a day    bortezomib (VELCADE) Infuse 2 1 mg into a venous catheter once    cyanocobalamin 100 MCG tablet 100 mcg every 30 (thirty) days injection    cyclobenzaprine (FLEXERIL) 10 mg tablet Take 1 tablet (10 mg total) by mouth 3 (three) times a day as needed for muscle spasms    dexamethasone (DECADRON) 4 mg tablet Take 10 tablets (40 mg total) by mouth once a week    diazepam (VALIUM) 5 mg tablet Take 1 tablet (5 mg total) by mouth 2 (two) times a day as needed for muscle spasms    gabapentin (NEURONTIN) 300 mg capsule Take 1 capsule (300 mg total) by mouth 3 (three) times a day 1 tab p o  q h s  x3-5 days  If no drowsiness increase to 1 tab p o  t i d     glatiramer acetate (COPAXONE) 20 mg/mL SOSY injection syringe Inject 40 mg under the skin 3 (three) times a week    lenalidomide (REVLIMID) 25 MG CAPS Take 1 capsule (25 mg total) by mouth daily Days 1 through 21 Q 28 days   metaxalone (SKELAXIN) 800 mg tablet Take 1 tablet (800 mg total) by mouth 3 (three) times a day for 7 days    Methylprednisolone 4 MG TBPK Use as directed on package    naproxen sodium (ANAPROX) 550 mg tablet Take 1 tablet (550 mg total) by mouth 2 (two) times a day with meals for 10 days     No current facility-administered medications on file prior to visit  No Known Allergies    Physical Exam:    /50   Pulse 78   Temp 98 2 °F (36 8 °C) (Oral)   Ht 5' 5" (1 651 m)   Wt 61 7 kg (136 lb)   LMP 03/21/2018 (LMP Unknown)   BMI 22 63 kg/m²     Constitutional: normal, well developed, well nourished, alert, in no distress and non-toxic and no overt pain behavior    Eyes: anicteric  HEENT: grossly intact  Neck: supple, symmetric, trachea midline and no masses   Pulmonary:even and unlabored  Cardiovascular:No edema or pitting edema present  Skin:Normal without rashes or lesions and well hydrated  Psychiatric:Mood and affect appropriate  Neurologic:Cranial Nerves II-XII grossly intact  Musculoskeletal:antalgic     Lumbar Spine Exam  Appearance:  Normal lordosis  Palpation/Tenderness:  right piriformis tenderness  Sensory:  no sensory deficits noted  Range of Motion:  Flexion:  Minimally limited  with pain  Extension:  Moderately limited  with pain  Lateral Flexion - Left:  No limitation  without pain  Lateral Flexion - Right:  Minimally limited  with pain  Rotation - Left:  No limitation  without pain  Rotation - Right:  No limitation  without pain  Motor Strength:  Left hip flexion:  5/5  Left hip extension:  5/5  Right hip flexion:  5/5  Right hip extension:  5/5  Left knee flexion:  5/5  Left knee extension:  5/5  Right knee flexion:  5/5  Right knee extension:  5/5  Left foot dorsiflexion:  5/5  Left foot plantar flexion:  5/5  Right foot dorsiflexion:  4/5  Right foot plantar flexion:  5/5  Reflexes:  Left Patellar:  3+   Right Patellar:  3+   Left Achilles:  2+   Right Achilles:  2+   Special Tests:  Left Straight Leg Test:  negative  Right Straight Leg Test:  positive  Left Jose Eduardo's Maneuver:  negative  Right Jose Eduardo's Maneuver:  negative    Imaging    MRI LUMBAR SPINE WITHOUT CONTRAST (1/24/2018)     INDICATION:  Pain in the right lower leg      COMPARISON:  None      TECHNIQUE:  Sagittal T1, sagittal T2, sagittal inversion recovery, axial T1 and axial T2, coronal T2        IMAGE QUALITY:  Diagnostic     FINDINGS:     ALIGNMENT:  Minimal retrolisthesis C3-4 with mild retrolisthesis C4-5 measuring 4 mm      MARROW SIGNAL:  Normal marrow signal is identified within the visualized bony structures  No discrete marrow lesion      DISTAL CORD AND CONUS:  Normal size and signal within the distal cord and conus  The conus ends at the T12-L1 level      PARASPINAL SOFT TISSUES:  Paraspinal soft tissues are unremarkable      SACRUM:  Normal signal within the sacrum  No evidence of insufficiency or stress fracture      LOWER THORACIC DISC SPACES:  Normal disc height and signal   No disc herniation, canal stenosis or foraminal narrowing      LUMBAR DISC SPACES:          L1-L2:  Normal      L2-L3:  Minimal annular bulge without significant central or foraminal narrowing      L3-L4:  Mild annular bulging with mild facet hypertrophy and ligamentous infolding  Small marginal osteophytes are noted  There is minimal left foraminal narrowing      L4-L5:  Mild diffuse annular bulge identified with a superimposed central and slightly right paramedian protrusion type disc herniation  Moderate facet hypertrophy  There is mild central and moderate right lateral recess stenosis    Correlate for right   L5 radiculopathy      L5-S1:  Mild facet hypertrophy  No significant central or foraminal narrowing      IMPRESSION:     Central and slightly right paramedian protrusion type disc herniation superimposed on annular bulging L4-5 results in mild central and moderate right lateral recess stenosis  Correlate for right L5 radiculopathy      Mild degenerative changes elsewhere as described      LUMBAR SPINE     INDICATION: 57-year-old female, low back pain     COMPARISON:  1/24/2018 MRI     VIEWS:  XR SPINE LUMBAR 2 OR 3 VIEWS INJURY        FINDINGS:     Alignment is unremarkable      Mild degenerative spondylosis L4-5 and L5-S1     There is no evidence of acute fracture or destructive osseous lesion      The pedicles appear intact      Right lower quadrant large calcified fibroid     IMPRESSION:     No acute osseous lumbar spinal abnormality     Mild degenerative spondylosis L4-5 and L5-S1     Consistent with MRI

## 2018-04-18 ENCOUNTER — TRANSCRIBE ORDERS (OUTPATIENT)
Dept: ADMINISTRATIVE | Facility: HOSPITAL | Age: 43
End: 2018-04-18

## 2018-04-18 ENCOUNTER — HOSPITAL ENCOUNTER (OUTPATIENT)
Dept: INFUSION CENTER | Facility: CLINIC | Age: 43
Discharge: HOME | End: 2018-04-18
Payer: COMMERCIAL

## 2018-04-18 ENCOUNTER — OFFICE VISIT (OUTPATIENT)
Dept: PHYSICAL THERAPY | Facility: CLINIC | Age: 43
End: 2018-04-18
Payer: COMMERCIAL

## 2018-04-18 VITALS
RESPIRATION RATE: 18 BRPM | HEIGHT: 65 IN | WEIGHT: 136.69 LBS | TEMPERATURE: 98.6 F | SYSTOLIC BLOOD PRESSURE: 110 MMHG | OXYGEN SATURATION: 99 % | HEART RATE: 80 BPM | BODY MASS INDEX: 22.77 KG/M2 | DIASTOLIC BLOOD PRESSURE: 58 MMHG

## 2018-04-18 DIAGNOSIS — M51.16 LUMBAR DISC HERNIATION WITH RADICULOPATHY: Primary | ICD-10-CM

## 2018-04-18 DIAGNOSIS — E85.9 MYELOMA ASSOCIATED AMYLOIDOSIS (HCC): Primary | ICD-10-CM

## 2018-04-18 DIAGNOSIS — M62.830 SPASM OF LUMBAR PARASPINOUS MUSCLE: ICD-10-CM

## 2018-04-18 DIAGNOSIS — C90.00 MYELOMA ASSOCIATED AMYLOIDOSIS (HCC): Primary | ICD-10-CM

## 2018-04-18 PROCEDURE — 97112 NEUROMUSCULAR REEDUCATION: CPT

## 2018-04-18 PROCEDURE — 97110 THERAPEUTIC EXERCISES: CPT

## 2018-04-18 PROCEDURE — 96401 CHEMO ANTI-NEOPL SQ/IM: CPT

## 2018-04-18 RX ADMIN — BORTEZOMIB 2.2 MG: 3.5 INJECTION, POWDER, LYOPHILIZED, FOR SOLUTION INTRAVENOUS; SUBCUTANEOUS at 09:53

## 2018-04-18 NOTE — PROGRESS NOTES
Pt c/o back pain, is scheduled for cortisone injection tomorrow  Labs reviewed, within parameters for treatment today  Dose re-calculated, within 10% of ordered dose

## 2018-04-19 ENCOUNTER — HOSPITAL ENCOUNTER (OUTPATIENT)
Dept: RADIOLOGY | Facility: CLINIC | Age: 43
Discharge: HOME | End: 2018-04-19
Attending: ANESTHESIOLOGY | Admitting: ANESTHESIOLOGY
Payer: COMMERCIAL

## 2018-04-19 ENCOUNTER — APPOINTMENT (OUTPATIENT)
Dept: PHYSICAL THERAPY | Facility: CLINIC | Age: 43
End: 2018-04-19
Payer: COMMERCIAL

## 2018-04-19 VITALS
SYSTOLIC BLOOD PRESSURE: 133 MMHG | OXYGEN SATURATION: 100 % | HEART RATE: 74 BPM | RESPIRATION RATE: 18 BRPM | DIASTOLIC BLOOD PRESSURE: 79 MMHG | TEMPERATURE: 99 F

## 2018-04-19 DIAGNOSIS — M51.16 LUMBAR DISC HERNIATION WITH RADICULOPATHY: ICD-10-CM

## 2018-04-19 PROCEDURE — 64484 NJX AA&/STRD TFRM EPI L/S EA: CPT | Performed by: ANESTHESIOLOGY

## 2018-04-19 PROCEDURE — 64483 NJX AA&/STRD TFRM EPI L/S 1: CPT | Performed by: ANESTHESIOLOGY

## 2018-04-19 RX ORDER — BUPIVACAINE HCL/PF 2.5 MG/ML
10 VIAL (ML) INJECTION ONCE
Status: COMPLETED | OUTPATIENT
Start: 2018-04-19 | End: 2018-04-19

## 2018-04-19 RX ORDER — LIDOCAINE HYDROCHLORIDE 10 MG/ML
10 INJECTION, SOLUTION EPIDURAL; INFILTRATION; INTRACAUDAL; PERINEURAL ONCE
Status: COMPLETED | OUTPATIENT
Start: 2018-04-19 | End: 2018-04-19

## 2018-04-19 RX ORDER — METHYLPREDNISOLONE ACETATE 80 MG/ML
80 INJECTION, SUSPENSION INTRA-ARTICULAR; INTRALESIONAL; INTRAMUSCULAR; PARENTERAL; SOFT TISSUE ONCE
Status: COMPLETED | OUTPATIENT
Start: 2018-04-19 | End: 2018-04-19

## 2018-04-19 RX ADMIN — LIDOCAINE HYDROCHLORIDE 8 ML: 10 INJECTION, SOLUTION EPIDURAL; INFILTRATION; INTRACAUDAL; PERINEURAL at 14:22

## 2018-04-19 RX ADMIN — METHYLPREDNISOLONE ACETATE 80 MG: 80 INJECTION, SUSPENSION INTRA-ARTICULAR; INTRALESIONAL; INTRAMUSCULAR; PARENTERAL; SOFT TISSUE at 14:25

## 2018-04-19 RX ADMIN — Medication 2 ML: at 14:25

## 2018-04-19 RX ADMIN — IOHEXOL 1 ML: 300 INJECTION, SOLUTION INTRAVENOUS at 14:24

## 2018-04-19 NOTE — H&P
History of Present Illness: The patient is a 38 y o  female who presents with complaints of right lower back and leg pain secondary to lumbar disc herniation is here today for right L4-5 transforaminal epidural steroid injection  Patient Active Problem List   Diagnosis    [facility] myel[facility] not having achieved remission [facility]    Carpal tunnel syndrome, bilateral    Lumbar disc herniation with radiculopathy    Spasm of lumbar paraspinous muscle       Past Medical History:   Diagnosis Date    [facility]     Multiple sclerosis ([facility]        Past Surgical History:   Procedure Laterality Date     SECTION           Current Outpatient Prescriptions:     acyclovir (ZOVIRAX) 400 MG tablet, Take 1 tablet by mouth 2 (two) times a day, Disp: , Rfl:     bortezomib (VELCADE), Infuse 2 1 mg into a venous catheter once, Disp: , Rfl:     cyanocobalamin 100 MCG tablet, 100 mcg every 30 (thirty) days injection, Disp: , Rfl:     cyclobenzaprine (FLEXERIL) 10 mg tablet, Take 1 tablet (10 mg total) by mouth 3 (three) times a day as needed for muscle spasms, Disp: 30 tablet, Rfl: 0    dexamethasone (DECADRON) 4 mg tablet, Take 10 tablets (40 mg total) by mouth once a week, Disp: 40 tablet, Rfl: 2    diazepam (VALIUM) 5 mg tablet, Take 1 tablet (5 mg total) by mouth 2 (two) times a day as needed for muscle spasms, Disp: 8 tablet, Rfl: 0    gabapentin (NEURONTIN) 300 mg capsule, Take 1 capsule (300 mg total) by mouth 3 (three) times a day 1 tab p o  q h s  x3-5 days  If no drowsiness increase to 1 tab p o  t i d , Disp: 90 capsule, Rfl: 0    glatiramer acetate (COPAXONE) 20 mg/mL SOSY injection syringe, Inject 40 mg under the skin 3 (three) times a week, Disp: , Rfl:     lenalidomide (REVLIMID) 25 MG CAPS, Take 1 capsule (25 mg total) by mouth daily Days 1 through 21 Q 28 days  , Disp: 21 each, Rfl: 0    metaxalone (SKELAXIN) 800 mg tablet, Take 1 tablet (800 mg total) by mouth 3 (three) times a day for 7 days, Disp: 20 tablet, Rfl: 0    Methylprednisolone 4 MG TBPK, Use as directed on package, Disp: 21 tablet, Rfl: 0    naproxen sodium (ANAPROX) 550 mg tablet, Take 1 tablet (550 mg total) by mouth 2 (two) times a day with meals for 10 days, Disp: 20 tablet, Rfl: 0    No Known Allergies    Physical Exam:   Vitals:    04/19/18 1410   BP: 124/81   Pulse: 77   Resp: 16   Temp: 99 °F (37 2 °C)   SpO2: 98%     General: Awake, Alert, Oriented x 3, Mood and affect appropriate  Respiratory: Respirations even and unlabored  Cardiovascular: Peripheral pulses intact; no edema  Musculoskeletal Exam:  Right lower back and leg tenderness    ASA Score: 1    Assessment:   1   Lumbar disc herniation with radiculopathy        Plan: Right L4-5 TF CR

## 2018-04-19 NOTE — DISCHARGE INSTRUCTIONS
Epidural Steroid Injection   WHAT YOU NEED TO KNOW:   An epidural steroid injection (CR) is a procedure to inject steroid medicine into the epidural space  The epidural space is between your spinal cord and vertebrae  Steroids reduce inflammation and fluid buildup in your spine that may be causing pain  You may be given pain medicine along with the steroids  ACTIVITY  · Do not drive or operate machinery today  · No strenuous activity today - bending, lifting, etc   · You may resume normal activites starting tomorrow - start slowly and as tolerated  · You may shower today, but no tub baths or hot tubs  · You may have numbness for several hours from the local anesthetic  Please use caution and common sense, especially with weight-bearing activities  CARE OF THE INJECTION SITE  · If you have soreness or pain, apply ice to the area today (20 minutes on/20 minutes off)  · Starting tomorrow, you may use warm, moist heat or ice if needed  · You may have an increase or change in your discomfort for 36-48 hours after your treatment  · Apply ice and continue with any pain medication you have been prescribed  · Notify [facility] if you have any of the following: redness, drainage, swelling, headache, stiff neck or fever above 100°F     SPECIAL INSTRUCTIONS  · Our office will contact you in approximately 7 days for a progress report  MEDICATIONS  · Continue to take all routine medications  · Our office may have instructed you to hold some medications  If you have a problem specifically related to your procedure, please call our office at [phone]  Problems not related to your procedure should be directed to your primary care physician

## 2018-04-21 ENCOUNTER — APPOINTMENT (OUTPATIENT)
Dept: LAB | Facility: HOSPITAL | Age: 43
End: 2018-04-21
Attending: INTERNAL MEDICINE
Payer: COMMERCIAL

## 2018-04-21 DIAGNOSIS — C90.00 MULTIPLE MYELOMA NOT HAVING ACHIEVED REMISSION (HCC): ICD-10-CM

## 2018-04-21 PROCEDURE — 83883 ASSAY NEPHELOMETRY NOT SPEC: CPT

## 2018-04-24 LAB
KAPPA LC FREE SER-MCNC: 8.8 MG/L (ref 3.3–19.4)
KAPPA LC FREE/LAMBDA FREE SER: 0.79 {RATIO} (ref 0.26–1.65)
LAMBDA LC FREE SERPL-MCNC: 11.2 MG/L (ref 5.7–26.3)

## 2018-04-25 ENCOUNTER — HOSPITAL ENCOUNTER (OUTPATIENT)
Dept: INFUSION CENTER | Facility: CLINIC | Age: 43
Discharge: HOME | End: 2018-04-25
Payer: COMMERCIAL

## 2018-04-25 ENCOUNTER — OFFICE VISIT (OUTPATIENT)
Dept: PHYSICAL THERAPY | Facility: CLINIC | Age: 43
End: 2018-04-25
Payer: COMMERCIAL

## 2018-04-25 VITALS
DIASTOLIC BLOOD PRESSURE: 60 MMHG | RESPIRATION RATE: 18 BRPM | HEART RATE: 66 BPM | HEIGHT: 65 IN | WEIGHT: 133 LBS | SYSTOLIC BLOOD PRESSURE: 114 MMHG | OXYGEN SATURATION: 97 % | BODY MASS INDEX: 22.16 KG/M2 | TEMPERATURE: 98.7 F

## 2018-04-25 DIAGNOSIS — M51.16 LUMBAR DISC HERNIATION WITH RADICULOPATHY: Primary | ICD-10-CM

## 2018-04-25 PROCEDURE — 97110 THERAPEUTIC EXERCISES: CPT

## 2018-04-25 PROCEDURE — 96409 CHEMO IV PUSH SNGL DRUG: CPT

## 2018-04-25 PROCEDURE — 97140 MANUAL THERAPY 1/> REGIONS: CPT

## 2018-04-25 PROCEDURE — 97112 NEUROMUSCULAR REEDUCATION: CPT

## 2018-04-25 RX ADMIN — BORTEZOMIB 2.2 MG: 3.5 INJECTION, POWDER, LYOPHILIZED, FOR SOLUTION INTRAVENOUS; SUBCUTANEOUS at 09:35

## 2018-04-25 NOTE — PROGRESS NOTES
Daily Note     Today's date: 2018  Patient name: Fabricio Genao  : 1975  MRN: [id number]  Referring provider: Rosa Root MD  Dx:   Encounter Diagnosis     ICD-10-CM    1  Lumbar disc herniation with radiculopathy M51 16                   Subjective: Pt denies any pain upon arrival this morning  She states that she has been practicing her HEP and it is helping  Objective: See treatment diary below  Precautions: Ms, Multiple Myeloma     Daily Treatment Diary      Manual                      Right LE long axis txn  3'  SA                                                                                                                         Exercise Diary                   UBE F/R    5'/5'  5'f/5'b                 Webslide rows H,M,L   Red 2x10 each  RTB 20x                 Stand HS stretch akanksha   3x30"  30" 3x                 Body mechanics lifting JF                     Seated nerve glides right sciatic    15x  15x                 PPT    5" hold 15x  5" 20x                 PPT with marches   20x  20x                 Right piriformis stretch   3x30"  30" 3x                 T-ball ab isometrics   10" hold 10x  10" 10x                 Prone press-ups  2x10 2x10  2x10                                                                                                                                                                                                                                                                       Modalities                                                                                                       Assessment: Pt had no pain post treatment today  She noted feeling a little less tension in her LB and R piriformis post stretches  She was educated on HEP including stretches to decrease tension outside of therapy  Pt will benefit from continued skilled PT to decrease tension  Plan: Progress treatment as tolerated

## 2018-04-25 NOTE — PROGRESS NOTES
Velcade per MD order: Injection given in Abdomen ( RUQ ) without incident: No adverse reactions noted: Verified follow up appt with patient: Declined AVS

## 2018-04-25 NOTE — PROGRESS NOTES
Patient to Tavia for Velcade: Offers no complaints at present time: Lab work ( 04/21/18 ) reviewed:  Within parameters to treat: Confirms taking oral Dexamethasone / Revlimid at home

## 2018-04-26 ENCOUNTER — OFFICE VISIT (OUTPATIENT)
Dept: PHYSICAL THERAPY | Facility: CLINIC | Age: 43
End: 2018-04-26
Payer: COMMERCIAL

## 2018-04-26 DIAGNOSIS — M62.830 SPASM OF LUMBAR PARASPINOUS MUSCLE: ICD-10-CM

## 2018-04-26 DIAGNOSIS — M51.16 LUMBAR DISC HERNIATION WITH RADICULOPATHY: Primary | ICD-10-CM

## 2018-04-26 PROCEDURE — 97112 NEUROMUSCULAR REEDUCATION: CPT | Performed by: PHYSICAL THERAPIST

## 2018-04-26 PROCEDURE — 97110 THERAPEUTIC EXERCISES: CPT | Performed by: PHYSICAL THERAPIST

## 2018-04-26 NOTE — PROGRESS NOTES
Daily Note     Today's date: 2018  Patient name: Yesi Oden  : 1975  MRN: [id number]  Referring provider: Micah Villafuerte MD  Dx:   Encounter Diagnosis     ICD-10-CM    1  Lumbar disc herniation with radiculopathy M51 16    2  Spasm of lumbar paraspinous muscle M62 830                   Subjective: Patient reports she has been pain-free for 3 days  Objective: See treatment diary below  Manual                      Right LE long axis txn  3'  SA                                                                                                                         Exercise Diary                 UBE F/R    5'/5'  5'f/5'b  L3x10'               Webslide rows H,M,L   Red 2x10 each  RTB 20x  RTB x 30               Stand HS stretch akanksha   3x30"  30" 3x  3x30"               Body mechanics lifting JF                     Seated nerve glides right sciatic    15x  15x  np               PPT    5" hold 15x  5" 20x  5"30               PPT with marches   20x  20x  x20               Right piriformis stretch   3x30"  30" 3x  3x30"               T-ball ab isometrics   10" hold 10x  10" 10x  10"x10               Prone press-ups  2x10 2x10  2x10  2x10                                                                                                                                                                                                                                                                     Modalities                                                                                                        Assessment: Tolerated treatment well  Patient would benefit from continued PT  No pain with TE today  VC's correct technique with TE      Plan: Continue per plan of care

## 2018-04-27 ENCOUNTER — TELEPHONE (OUTPATIENT)
Dept: RADIOLOGY | Facility: CLINIC | Age: 43
End: 2018-04-27

## 2018-04-27 NOTE — TELEPHONE ENCOUNTER
Patient returned call  She states that she is doing better  She states there is almost no pain  She has had at least a 95% relief

## 2018-04-30 ENCOUNTER — OFFICE VISIT (OUTPATIENT)
Dept: PHYSICAL THERAPY | Facility: CLINIC | Age: 43
End: 2018-04-30
Payer: COMMERCIAL

## 2018-04-30 ENCOUNTER — HOSPITAL ENCOUNTER (OUTPATIENT)
Dept: PULMONOLOGY | Facility: HOSPITAL | Age: 43
Discharge: HOME | End: 2018-04-30
Payer: COMMERCIAL

## 2018-04-30 ENCOUNTER — HOSPITAL ENCOUNTER (OUTPATIENT)
Dept: NON INVASIVE DIAGNOSTICS | Facility: HOSPITAL | Age: 43
Discharge: HOME | End: 2018-04-30
Payer: COMMERCIAL

## 2018-04-30 DIAGNOSIS — M51.16 LUMBAR DISC HERNIATION WITH RADICULOPATHY: Primary | ICD-10-CM

## 2018-04-30 DIAGNOSIS — C90.00 MYELOMA ASSOCIATED AMYLOIDOSIS (HCC): ICD-10-CM

## 2018-04-30 DIAGNOSIS — E85.9 MYELOMA ASSOCIATED AMYLOIDOSIS (HCC): ICD-10-CM

## 2018-04-30 PROCEDURE — 93306 TTE W/DOPPLER COMPLETE: CPT | Performed by: INTERNAL MEDICINE

## 2018-04-30 PROCEDURE — 97110 THERAPEUTIC EXERCISES: CPT

## 2018-04-30 PROCEDURE — 94760 N-INVAS EAR/PLS OXIMETRY 1: CPT

## 2018-04-30 PROCEDURE — 97112 NEUROMUSCULAR REEDUCATION: CPT

## 2018-04-30 PROCEDURE — 94726 PLETHYSMOGRAPHY LUNG VOLUMES: CPT

## 2018-04-30 PROCEDURE — 94060 EVALUATION OF WHEEZING: CPT | Performed by: INTERNAL MEDICINE

## 2018-04-30 PROCEDURE — 94060 EVALUATION OF WHEEZING: CPT

## 2018-04-30 PROCEDURE — 94729 DIFFUSING CAPACITY: CPT | Performed by: INTERNAL MEDICINE

## 2018-04-30 PROCEDURE — 97140 MANUAL THERAPY 1/> REGIONS: CPT

## 2018-04-30 PROCEDURE — 94729 DIFFUSING CAPACITY: CPT

## 2018-04-30 PROCEDURE — 93306 TTE W/DOPPLER COMPLETE: CPT

## 2018-04-30 PROCEDURE — 94726 PLETHYSMOGRAPHY LUNG VOLUMES: CPT | Performed by: INTERNAL MEDICINE

## 2018-04-30 RX ORDER — ALBUTEROL SULFATE 2.5 MG/3ML
2.5 SOLUTION RESPIRATORY (INHALATION) EVERY 6 HOURS PRN
Status: DISCONTINUED | OUTPATIENT
Start: 2018-04-30 | End: 2018-05-04 | Stop reason: HOSPADM

## 2018-04-30 RX ADMIN — ALBUTEROL SULFATE 2.5 MG: 2.5 SOLUTION RESPIRATORY (INHALATION) at 10:12

## 2018-04-30 NOTE — PROGRESS NOTES
Daily Note     Today's date: 2018  Patient name: Devyn Corona  : 1975  MRN: [id number]  Referring provider: Olga Bobo MD  Dx:   Encounter Diagnosis     ICD-10-CM    1  Lumbar disc herniation with radiculopathy M51 16                   Subjective: Pt denies any pain upon arrival today  She states that her muscles are cold today  Objective: See treatment diary below  Manual                    Right LE long axis txn  3'  SA  SA                                                                                                                       Exercise Diary               UBE F/R    5'/5'  5'f/5'b  L3x10'  L3 10'             Webslide rows H,M,L   Red 2x10 each  RTB 20x  RTB x 30  BTB 30x             Stand HS stretch akanksha   3x30"  30" 3x  3x30"  30" 3x             Body mechanics lifting JF                     Seated nerve glides right sciatic    15x  15x  np  NP             PPT    5" hold 15x  5" 20x  5"30  5" 30x             PPT with marches   20x  20x  x20  20x             Right piriformis stretch   3x30"  30" 3x  3x30"  30" 3x             T-ball ab isometrics   10" hold 10x  10" 10x  10"x10  10" 10x             Prone press-ups  2x10 2x10  2x10  2x10  2x10                                                                                                                                                                                                                                                                   Modalities                                                                                                       Assessment: Pt had no pain throughout nor post treatment today  She noted feeling as if her ms were worked but no pain  She was educated on HEP including correct postural alignment to decrease tension outside of therapy  Plan: Progress treatment as tolerated

## 2018-05-01 ENCOUNTER — OFFICE VISIT (OUTPATIENT)
Dept: PHYSICAL THERAPY | Facility: CLINIC | Age: 43
End: 2018-05-01
Payer: COMMERCIAL

## 2018-05-01 DIAGNOSIS — M51.16 LUMBAR DISC HERNIATION WITH RADICULOPATHY: Primary | ICD-10-CM

## 2018-05-01 PROCEDURE — 97110 THERAPEUTIC EXERCISES: CPT

## 2018-05-01 PROCEDURE — 97112 NEUROMUSCULAR REEDUCATION: CPT

## 2018-05-01 PROCEDURE — 97140 MANUAL THERAPY 1/> REGIONS: CPT

## 2018-05-01 NOTE — PROGRESS NOTES
Daily Note     Today's date: 2018  Patient name: Leena Sin  : 1975  MRN: [id number]  Referring provider: Daniele Molina MD  Dx:   Encounter Diagnosis     ICD-10-CM    1  Lumbar disc herniation with radiculopathy M51 16                   Subjective: Pt c/o 2/10 pain today in LB  She states that she has pain when she bends over today  Objective: See treatment diary below  Manual                  Right LE long axis txn  3'  SA  SA  SA                piriformis stretch        SA                                                                                             Exercise Diary             UBE F/R    5'/5'  5'f/5'b  L3x10'  L3 10'  L3 10'           Webslide rows H,M,L   Red 2x10 each  RTB 20x  RTB x 30  BTB 30x  BTB 30x           Stand HS stretch akanksha   3x30"  30" 3x  3x30"  30" 3x  30" 3x           Body mechanics lifting JF                     Seated nerve glides right sciatic    15x  15x  np  NP  NP           PPT    5" hold 15x  5" 20x  5"30  5" 30x  5" 30x           PPT with marches   20x  20x  x20  20x  20x           Right piriformis stretch   3x30"  30" 3x  3x30"  30" 3x  30" 3x           T-ball ab isometrics   10" hold 10x  10" 10x  10"x10  10" 10x  10" 10x           Prone press-ups  2x10 2x10  2x10  2x10  2x10  2x10           pigeon stretch on table            30" 3x                                                                                                                                                                                                                                         Modalities                                                                                                      Assessment: Pt had little to no pain post treatment today  She noted feeling much better post manual and pigeon stretch  She was educated on HEP including stretches to decrease symptoms outside of therapy   Pt will benefit from continued PT to decrease pain  Plan: Progress treatment as tolerated

## 2018-05-02 ENCOUNTER — OFFICE VISIT (OUTPATIENT)
Dept: NEUROLOGY | Facility: CLINIC | Age: 43
End: 2018-05-02
Payer: COMMERCIAL

## 2018-05-02 VITALS
DIASTOLIC BLOOD PRESSURE: 60 MMHG | BODY MASS INDEX: 22.47 KG/M2 | RESPIRATION RATE: 14 BRPM | SYSTOLIC BLOOD PRESSURE: 129 MMHG | HEART RATE: 80 BPM | WEIGHT: 135 LBS

## 2018-05-02 DIAGNOSIS — G56.03 CARPAL TUNNEL SYNDROME, BILATERAL: ICD-10-CM

## 2018-05-02 DIAGNOSIS — M54.6 ACUTE LEFT-SIDED THORACIC BACK PAIN: ICD-10-CM

## 2018-05-02 DIAGNOSIS — E53.8 VITAMIN B12 DEFICIENCY: ICD-10-CM

## 2018-05-02 DIAGNOSIS — E85.9 MYELOMA ASSOCIATED AMYLOIDOSIS (HCC): ICD-10-CM

## 2018-05-02 DIAGNOSIS — E55.9 VITAMIN D DEFICIENCY: ICD-10-CM

## 2018-05-02 DIAGNOSIS — M51.16 LUMBAR DISC HERNIATION WITH RADICULOPATHY: Primary | ICD-10-CM

## 2018-05-02 DIAGNOSIS — C90.00 MYELOMA ASSOCIATED AMYLOIDOSIS (HCC): ICD-10-CM

## 2018-05-02 PROCEDURE — 99214 OFFICE O/P EST MOD 30 MIN: CPT | Performed by: PSYCHIATRY & NEUROLOGY

## 2018-05-02 NOTE — ASSESSMENT & PLAN NOTE
Pt here for neuro follow up  Neuro exam stable since last visit  Pt last seen in [address]  Since our last visit,the patient diagnosed with multiple myeloma and has just completed her chemo  Pt will be going for stem cell transplantion  Pt remains on tiw copaxone brand name necessary  Pt karla med well  Overall ms wise no new sxs  Pt to call for any new sxs  Pt also with right L5 radiculopathy and just had epidural and much better from pain standpoint  Seeing dr Yeimi Redding  Pt also with left upper quad pain and has discussed with dr Imer Lee and had ultrasound done  Will also check mri t spine to ensure no hug from ms

## 2018-05-02 NOTE — PROGRESS NOTES
Patient ID: Levon Ellison is a 37 y o  female  Assessment/Plan:    Multiple sclerosis (HCC)  Pt here for neuro follow up  Neuro exam stable since last visit  Pt last seen in [address]  Since our last visit,the patient diagnosed with multiple myeloma and has just completed her chemo  Pt will be going for stem cell transplantion  Pt remains on tiw copaxone brand name necessary  Pt karla med well  Overall ms wise no new sxs  Pt to call for any new sxs  Pt also with right L5 radiculopathy and just had epidural and much better from pain standpoint  Seeing dr Rodriguez Solid  Pt also with left upper quad pain and has discussed with dr Chito Baca and had ultrasound done  Will also check mri t spine to ensure no hug from ms       Diagnoses and all orders for this visit:    Lumbar disc herniation with radiculopathy    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HCC)    Carpal tunnel syndrome, bilateral    Vitamin B12 deficiency  -     CBC and differential; Future  -     Hepatic function panel; Future  -     Vitamin B12; Future  -     Methylmalonic acid, serum; Future    Vitamin D deficiency  -     Vitamin D 25 hydroxy; Future    Acute left-sided thoracic back pain  -     MRI thoracic spine with and without contrast; Future           Subjective:    HPI    HPI: Patient is a 37year old female with unremarkable PMH who presented in November 2016 for MS eval  Patient initially seen by hematology, Dr Dylan Meredith, after being found to have elevated globulin and abnormal SPEP on routine labs  She was diagnosed with IgG lambda MGUS  Due to mentioning paresthesias of her chin and tongue, she was referred to neurology  Patient has seen Dr Bhavana Anaya at [facility] for her prior neurologic care  Patient states she did not return to that practice due to complications after a LP and not getting a leave of absence note from their office  She had a post-LP headache and not able to work for about a week after the LP   Patient has noted paresthesias in the arms and legs at times as well  LP completed July 2016  Glucose 64, 0 cells, 1 RBC, Lyme PCR neg, IgG synthesis -5 8, MBP <2, 1 paired band in serum and CSF  Brain W/WO 7/11/16: There are multiple scattered foci of nonenhancing T2 signal hyperintensity at the pericallosal and deep subcortical white matter, having a parallel orientation towards the midline  reminiscent of Schwab's fingers and are in keeping with patients stated history of Multiple Sclerosis  re rev MRI Cervical W/Wo 7/11/16: The cervical cord caliber and signal intensity are normally maintained  There is no abnormal focus of enhancement within the cord  No manifestation of multiple sclerosis are demonstrated at the cervical cord  Moderate multilevel degenerative changes are demonstrated  At C4/C5 there is mild narrowing of the central canal  At C5-C6, C6-C7 there is moderate narrowing of the central canal impinging upon the ventral surface of the descending cervical cord  re rev MRI Thoracic W/WO 7/11/16: Unremarkable evaluation of the thoracic spine and thoracic cord  Patient was diagnosed with MS by previous neurologist but chose not to start on treatment and get a second opinion  Kept above records due to importance of pmh and work up done to date  Patient was started on Copaxone by our office  She started the medication in December 2016  She has overall tolerated the medication well  She says occasionally she will get some redness and itching at the injection site, but nothing too bothersome for her  Pt last seen in [address]  Since our last visit,the patient diagnosed with multiple myeloma and has just completed her chemo  Pt is following with dr Livingston Seip from [facility] from heme / onc and also going to [place] for her transplantation  Pt will be going for stem cell transplantion within the next few weeks  pt remains on tiw copaxone brand name necessary  Pt karla med well  Overall ms wise no new sxs  No falls or trips  No change in bowel or bladder    No change in speech or swallowing  No loc  No sz  No vertigo  Pt notes some burning sensation in her extremities  Pt notes vision going from near to far slightly blurry  No loss of vision  No diplopia  Pt with evidence of mild cts on emg done in march and borderline cts on the left  Pt to wear wrist splint at night     Pt also with right L5 radiculopathy and just had epidural and much better from pain standpoint  Seeing dr Saray Powell from pain mx and feeling a lot better  Mri lumbar spine with evidence of right L5 radiculopathy  Pt had right L4 and right L5 transforaminal epdiural steroid injection on 4/19 which helped a lot  Pt much more relieved from her pain     Pt also with left upper quad pain and has discussed with dr Kevin Reynoso and had ultrasound done  Will also check mri t spine to ensure no hug from ms and no new lesions  re rev MRI brain 7/31/17 compared to July 2016 and stable, moderate chronic MS  No new or enhancing lesions  re rev MRI c-spine stable, no cord lesions  Normal signal within the cervical cord  multilevel degenerative spondylosis  small right central disc protrusion at c5/6  small central disc protrusion c6/7 , small left central disc protrusion C7/t1  no pathological enh  consistent with prior study  Prior MRI t-spine stable, no cord lesions at that time  Hand sxs are most likely related to her cts shara since updated emg with confirmation of diagnosis  Pt has completed chemo for her MM about one week ago  Overall she tolerated the chemo well and following closely with heme  pt with h/o low vit b12 of 212  question contributory to some of her ongoing sxs  rec continued replacement by pcp with interval lab testing to ensure better levels  Pt thinks she had about 5 injections so far  Recommend updated levels to see if any additional im vit b12 injections needed  Total time spent today 25 minutes   Greater than 50% of total time was spent with the patient and / or family counseling and / or coordination of care               The following portions of the patient's history were reviewed and updated as appropriate: allergies, current medications, past family history, past medical history, past social history, past surgical history and problem list          Objective:    Blood pressure 129/60, pulse 80, resp  rate 14, weight 61 2 kg (135 lb), last menstrual period 03/21/2018, not currently breastfeeding  Physical Exam   Constitutional: She appears well-developed and well-nourished  Eyes: EOM are normal  Pupils are equal, round, and reactive to light  Cardiovascular: Intact distal pulses  Neurological: She has normal strength and normal reflexes  Gait normal    Psychiatric: Her speech is normal        Neurological Exam    Mental Status  The patient is alert and oriented to person, place, time, and situation  Her recent and remote memory are normal  Her speech is normal  Her language is fluent with no aphasia  She has normal attention span and concentration  She has a normal fund of knowledge  Cranial Nerves    CN II: The patient's visual acuity and visual fields are normal   CN III, IV, VI: The patient's pupils are equally round and reactive to light and ocular movements are normal   CN V: The patient has normal facial sensation  CN VII:  The patient has symmetric facial movement  CN VIII:  The patient's hearing is normal   CN IX, X: The patient has symmetric palate movement and normal gag reflex  CN XI: The patient's shoulder shrug strength is normal   CN XII: The patient's tongue is midline without atrophy or fasciculations  Motor  The patient has normal muscle bulk throughout  Her overall muscle tone is normal throughout  Her strength is 5/5 throughout all four extremities  Sensory  The patient's sensation is normal in all four extremities  Reflexes  Deep tendon reflexes are 2+ and symmetric in all four extremities with downgoing toes bilaterally      Gait and Coordination  The patient has normal gait and station  She has normal right finger to nose and normal left finger to nose coordination  ROS:    Review of Systems   Constitutional: Negative  Negative for appetite change and fever  HENT: Negative  Negative for hearing loss, tinnitus, trouble swallowing and voice change  Eyes: Negative  Negative for photophobia and pain  Respiratory: Negative  Negative for shortness of breath  Cardiovascular: Negative  Negative for palpitations  Gastrointestinal: Negative  Negative for nausea and vomiting  Endocrine: Negative  Negative for cold intolerance and heat intolerance  Genitourinary: Negative  Negative for dysuria, frequency and urgency  Musculoskeletal: Negative  Negative for myalgias and neck pain  Skin: Negative  Negative for rash  Neurological: Negative  Negative for dizziness, tremors, seizures, syncope, facial asymmetry, speech difficulty, weakness, light-headedness, numbness and headaches  Hematological: Negative  Does not bruise/bleed easily  Psychiatric/Behavioral: Negative  Negative for confusion, hallucinations and sleep disturbance

## 2018-05-02 NOTE — PATIENT INSTRUCTIONS
Multiple sclerosis ([facility] [address] )  Pt here for neuro follow up  Neuro exam stable since last visit  Pt last seen in [address]  Since our last visit,the patient diagnosed with multiple myeloma and has just completed her chemo  Pt will be going for stem cell transplantion  Pt remains on tiw copaxone brand name necessary  Pt karla med well  Overall ms wise no new sxs  Pt to call for any new sxs

## 2018-05-03 ENCOUNTER — TELEPHONE (OUTPATIENT)
Dept: HEMATOLOGY ONCOLOGY | Facility: CLINIC | Age: 43
End: 2018-05-03

## 2018-05-03 NOTE — TELEPHONE ENCOUNTER
Pt called  Saw Dr Negro Kerr yesterday and recommending 1 more month of chemo   Please call to discuss after 2:00 pm  # [phone]

## 2018-05-07 ENCOUNTER — TELEPHONE (OUTPATIENT)
Dept: NEUROLOGY | Facility: CLINIC | Age: 43
End: 2018-05-07

## 2018-05-07 ENCOUNTER — TELEPHONE (OUTPATIENT)
Dept: HEMATOLOGY ONCOLOGY | Facility: CLINIC | Age: 43
End: 2018-05-07

## 2018-05-07 DIAGNOSIS — G35 MULTIPLE SCLEROSIS (HCC): Primary | ICD-10-CM

## 2018-05-07 NOTE — TELEPHONE ENCOUNTER
pt called and states that she needs PA for copaxone  [facility]  brand name copaxone needs PA    Will complete on cover my meds

## 2018-05-07 NOTE — TELEPHONE ENCOUNTER
attempted to do PA on [facility]  they states that Pa already submitted and in process  i called [facility] and spoke to [facility]  Pa completed over the phone  denied due to pt not trying glatimer  are you ok with pt trying generic?

## 2018-05-08 ENCOUNTER — EVALUATION (OUTPATIENT)
Dept: PHYSICAL THERAPY | Facility: CLINIC | Age: 43
End: 2018-05-08
Payer: COMMERCIAL

## 2018-05-08 DIAGNOSIS — M51.16 LUMBAR DISC HERNIATION WITH RADICULOPATHY: Primary | ICD-10-CM

## 2018-05-08 DIAGNOSIS — C90.00 MULTIPLE MYELOMA NOT HAVING ACHIEVED REMISSION (HCC): ICD-10-CM

## 2018-05-08 DIAGNOSIS — C90.00 MULTIPLE MYELOMA NOT HAVING ACHIEVED REMISSION (HCC): Primary | ICD-10-CM

## 2018-05-08 PROCEDURE — G8980 MOBILITY D/C STATUS: HCPCS

## 2018-05-08 PROCEDURE — 97112 NEUROMUSCULAR REEDUCATION: CPT

## 2018-05-08 PROCEDURE — G8979 MOBILITY GOAL STATUS: HCPCS

## 2018-05-08 PROCEDURE — 97110 THERAPEUTIC EXERCISES: CPT

## 2018-05-08 RX ORDER — DEXAMETHASONE 4 MG/1
40 TABLET ORAL WEEKLY
Qty: 40 TABLET | Refills: 0 | Status: SHIPPED | OUTPATIENT
Start: 2018-05-08 | End: 2018-06-07

## 2018-05-08 RX ORDER — GLATIRAMER 40 MG/ML
INJECTION, SOLUTION SUBCUTANEOUS
Qty: 12 SYRINGE | Refills: 3 | Status: SHIPPED | OUTPATIENT
Start: 2018-05-08 | End: 2019-04-03 | Stop reason: ALTCHOICE

## 2018-05-08 NOTE — PROGRESS NOTES
PT Discharge    Today's date: 2018  Patient name: Pan Wilkins  : 1975  MRN: [id number]  Referring provider: Catarina Yanez MD  Dx:   Encounter Diagnosis     ICD-10-CM    1  Lumbar disc herniation with radiculopathy M51 16        Start Time:   Stop Time: 1623  Total time in clinic (min): 38 minutes    Assessment  Impairments: impaired physical strength and pain with function  Functional limitations: Increased time to perform functional tasks  Intermittently takes time off of work  Assessment details: Patient has done very well since starting PT services  She demonstrates good lumbar ROM, improved core strength, and denies pain  She has resumed all of her previous functional activities without limitations related to pain  She is independent with a HEP and requires no further skilled PT services at this time  Goals  STG ( 4 weeks):  1  Patient will report pain as a 5-6/10 at worst with lifting and carrying boxes at work - met  2  Patient will demonstrate good body mechanics with lifting objects to prevent injury - met  LTG (8 weeks):  1  Patient will report pain as a 0-1/10 at worst with normal activity - met  2  Patient will be independent and compliant with a HEP in order to maintain gains made with skilled PT services - met    Plan  Patient would benefit from: skilled PT  Referral necessary: No  Planned therapy interventions: home exercise program, patient education and postural training  Treatment plan discussed with: patient        Subjective Evaluation    History of Present Illness  Date of onset: 2017  Mechanism of injury: Patient states she has been feeling very good the past 2-3 weeks  She is not having any back, buttock or right LE pain  She performs her HEP daily and denies functional limitations    She feels she is ready for DC to HEP  Recurrent probem    Quality of life: excellent    Pain  Current pain ratin  At best pain ratin  At worst pain rating: 0  Location: right L5 dermatome, right lower back and right buttock  Progression: resolved    Social Support  Lives in: multiple-level home  Lives with: adult children and spouse    Employment status: working ([employer]  (+) heavy lifting)  Exercise history: None      Diagnostic Tests  X-ray: abnormal  MRI studies: abnormal  Treatments  Previous treatment: medication  Current treatment: physical therapy  Patient Goals  Patient goal: To abolish lower back pain        Objective     Special Questions  Positive for history of cancer  Negative for night pain, bladder dysfunction, bowel dysfunction and saddle (S4) numbness    Static Posture     Lumbar Spine   Increased lordosis  Palpation   Left   No palpable tenderness to the erector spinae, lumbar paraspinals and quadratus lumborum  Right   No palpable tenderness to the erector spinae, lumbar paraspinals and quadratus lumborum  Tenderness     Lumbar Spine  No tenderness in the spinous process, facet joint, left transverse process and right transverse process  Left Hip   No tenderness in the ASIS and PSIS  Right Hip   No tenderness in the ASIS and PSIS       Neurological Testing     Sensation     Lumbar   Left   Intact: light touch    Right   Intact: light touch    Reflexes   Left   Patellar (L4): normal (2+)  Achilles (S1): normal (2+)    Right   Patellar (L4): normal (2+)  Achilles (S1): normal (2+)    Active Range of Motion     Additional Active Range of Motion Details  Lumbar ROM WFL except flexion (50% and pain)    Strength/Myotome Testing     Left Hip   Planes of Motion   Flexion: 4+  Extension: 4+  Abduction: 4+    Right Hip   Planes of Motion   Flexion: 4+  Extension: 4+  Abduction: 4+    Left Knee   Flexion: 4+  Extension: 4+    Right Knee   Flexion: 4+  Extension: 4+    Left Ankle/Foot   Dorsiflexion: 4+    Right Ankle/Foot   Dorsiflexion: 4+    Additional Strength Details  Upper abs 4+/5  Lower abs 4/5    Tests       Thoracic Negative slump  Lumbar   Negative repeated extension and slump  Left   Negative crossed SLR and femoral stretch  Right   Negative femoral stretch and passive SLR  Right Pelvic Girdle/Sacrum   Negative: sacrum compression and gapping       Ambulation     Ambulation: Level Surfaces   Ambulation without assistive device: independent    Ambulation: Stairs   Ascend stairs: independent  Pattern: reciprocal  Descend stairs: independent  Pattern: reciprocal    Observational Gait   Gait: within functional limits       Flowsheet Rows      Most Recent Value   PT/OT G-Codes   Current Score  94   Projected Score  60   FOTO information reviewed  Yes   Assessment Type  Discharge   G code set  Mobility: Walking & Moving Around   Mobility: Walking and Moving Around Goal Status ()  CK   Mobility: Walking and Moving Around Discharge Status ()  CI          Precautions: Ms, Multiple Myeloma    Daily Treatment Diary     Manual              Right LE long axis txn                                                                     Exercise Diary  4/13            UBE F/R             Ponce rows H,M,L             Stand HS stretch akanksha             Body mechanics lifting JF            Seated nerve glides right sciatic             PPT              PPT with marches             Right piriformis stretch             T-ball ab isometrics             Prone press-ups  2x10                                                                                                                                                  Modalities

## 2018-05-08 NOTE — PROGRESS NOTES
Daily Note     Today's date: 2018  Patient name: Leena Sin  : 1975  MRN: [id number]  Referring provider: Daniele Molina MD  Dx:   Encounter Diagnosis     ICD-10-CM    1  Lumbar disc herniation with radiculopathy M51 16                   Subjective: Pt denies any pain upon arrival today  She states that she is ready to DC to HEP  Objective: See treatment diary below  Manual                  Right LE long axis txn  3'  SA  SA  SA               piriformis stretch        SA                                                                                             Exercise Diary           UBE F/R    5'/5'  5'f/5'b  L3x10'  L3 10'  L3 10'  L3 10'         Webslide rows H,M,L   Red 2x10 each  RTB 20x  RTB x 30  BTB 30x  BTB 30x  BTB 30x         Stand HS stretch akanksha   3x30"  30" 3x  3x30"  30" 3x  30" 3x  30" 3x         Body mechanics lifting JF                     Seated nerve glides right sciatic    15x  15x  np  NP  NP  NP         PPT    5" hold 15x  5" 20x  5"30  5" 30x  5" 30x  5" 30x         PPT with marches   20x  20x  x20  20x  20x  20x         Right piriformis stretch   3x30"  30" 3x  3x30"  30" 3x  30" 3x  30" 3x         T-ball ab isometrics   10" hold 10x  10" 10x  10"x10  10" 10x  10" 10x  10" 10x         Prone press-ups  2x10 2x10  2x10  2x10  2x10  2x10  2x 10         pigeon stretch on table            30" 3x  NP                                                                                                                                                                                                                                       Modalities                                                                                                    Assessment: Pt had no pain throughout nor post treatment today   She noted feeling ready to DC to HEP as she does not have pain daily and when she does have pain she performs her stretches and no longer has pain  Pt was DC to HEP  Plan: Potential discharge next visit

## 2018-05-08 NOTE — TELEPHONE ENCOUNTER
SPOKE WITH PATIENT AND DR PORTILLO FROM [facility] THAT WILL BE DOING HER STEM CELL TRANSPLANT WOULD LIKE PATIENT TO CONTINUE ONE MORE CYCLE OF VELCADE, REVLIMID, AND DEXAMETHASONE FOR THREE WEEKS  [address] PATIENT STARTING ON 5/16  I REVIEWED WITH PATIENT [facility] A NEW SCRIPT OF REVLIMID FOR PATIENT  PATIENT WAS APPRECIATIVE  REVIEWED WITH PATIENT SHE NEEDS TO GO FOR LABS AND PREGNANCY TEST PRIOR TO STARTING [place]  PATIENT VERBALIZED UNDERSTANDING  REVIEWED PATIENT DOES NOT NEED AN APPT WITH DR YANG DURING THIS CYCLE UNLESS PATIENT WOULD LIKE TO, AND SHE DENIED  REVIEWED IF THERE IS PROBLEM WITH HER LABWORK WE WILL CONTACT HER TO REVIEW

## 2018-05-09 ENCOUNTER — TRANSCRIBE ORDERS (OUTPATIENT)
Dept: ADMINISTRATIVE | Facility: HOSPITAL | Age: 43
End: 2018-05-09

## 2018-05-09 ENCOUNTER — APPOINTMENT (OUTPATIENT)
Dept: LAB | Facility: CLINIC | Age: 43
End: 2018-05-09
Payer: COMMERCIAL

## 2018-05-09 ENCOUNTER — APPOINTMENT (OUTPATIENT)
Dept: PHYSICAL THERAPY | Facility: CLINIC | Age: 43
End: 2018-05-09
Payer: COMMERCIAL

## 2018-05-09 DIAGNOSIS — Z34.90 PREGNANCY, UNSPECIFIED GESTATIONAL AGE: Primary | ICD-10-CM

## 2018-05-09 DIAGNOSIS — Z34.90 PREGNANCY, UNSPECIFIED GESTATIONAL AGE: ICD-10-CM

## 2018-05-09 LAB — HCG SERPL QL: NEGATIVE

## 2018-05-09 PROCEDURE — 84703 CHORIONIC GONADOTROPIN ASSAY: CPT

## 2018-05-14 ENCOUNTER — OFFICE VISIT (OUTPATIENT)
Dept: OBGYN CLINIC | Facility: CLINIC | Age: 43
End: 2018-05-14
Payer: COMMERCIAL

## 2018-05-14 ENCOUNTER — APPOINTMENT (OUTPATIENT)
Dept: LAB | Facility: CLINIC | Age: 43
End: 2018-05-14
Payer: COMMERCIAL

## 2018-05-14 VITALS
HEIGHT: 65 IN | WEIGHT: 134.92 LBS | BODY MASS INDEX: 22.48 KG/M2 | SYSTOLIC BLOOD PRESSURE: 115 MMHG | HEART RATE: 76 BPM | DIASTOLIC BLOOD PRESSURE: 70 MMHG

## 2018-05-14 DIAGNOSIS — M54.16 LUMBAR BACK PAIN WITH RADICULOPATHY AFFECTING RIGHT LOWER EXTREMITY: Primary | ICD-10-CM

## 2018-05-14 DIAGNOSIS — E53.8 VITAMIN B12 DEFICIENCY: ICD-10-CM

## 2018-05-14 DIAGNOSIS — M62.830 SPASM OF LUMBAR PARASPINOUS MUSCLE: ICD-10-CM

## 2018-05-14 DIAGNOSIS — C90.00 MULTIPLE MYELOMA NOT HAVING ACHIEVED REMISSION (HCC): ICD-10-CM

## 2018-05-14 DIAGNOSIS — E55.9 VITAMIN D DEFICIENCY: ICD-10-CM

## 2018-05-14 LAB
25(OH)D3 SERPL-MCNC: 17.1 NG/ML (ref 30–100)
ALBUMIN SERPL BCP-MCNC: 3.6 G/DL (ref 3.5–5)
ALP SERPL-CCNC: 60 U/L (ref 46–116)
ALT SERPL W P-5'-P-CCNC: 34 U/L (ref 12–78)
ANION GAP SERPL CALCULATED.3IONS-SCNC: 7 MMOL/L (ref 4–13)
AST SERPL W P-5'-P-CCNC: 15 U/L (ref 5–45)
BASOPHILS # BLD AUTO: 0.06 THOUSANDS/ΜL (ref 0–0.1)
BASOPHILS NFR BLD AUTO: 1 % (ref 0–1)
BILIRUB DIRECT SERPL-MCNC: 0.08 MG/DL (ref 0–0.2)
BILIRUB SERPL-MCNC: 0.4 MG/DL (ref 0.2–1)
BUN SERPL-MCNC: 11 MG/DL (ref 5–25)
CALCIUM SERPL-MCNC: 8.2 MG/DL (ref 8.3–10.1)
CHLORIDE SERPL-SCNC: 106 MMOL/L (ref 100–108)
CO2 SERPL-SCNC: 28 MMOL/L (ref 21–32)
CREAT SERPL-MCNC: 0.6 MG/DL (ref 0.6–1.3)
EOSINOPHIL # BLD AUTO: 0.08 THOUSAND/ΜL (ref 0–0.61)
EOSINOPHIL NFR BLD AUTO: 1 % (ref 0–6)
ERYTHROCYTE [DISTWIDTH] IN BLOOD BY AUTOMATED COUNT: 13.6 % (ref 11.6–15.1)
GFR SERPL CREATININE-BSD FRML MDRD: 110 ML/MIN/1.73SQ M
GLUCOSE SERPL-MCNC: 96 MG/DL (ref 65–140)
HCT VFR BLD AUTO: 42 % (ref 34.8–46.1)
HGB BLD-MCNC: 13.3 G/DL (ref 11.5–15.4)
LYMPHOCYTES # BLD AUTO: 2.14 THOUSANDS/ΜL (ref 0.6–4.47)
LYMPHOCYTES NFR BLD AUTO: 31 % (ref 14–44)
MCH RBC QN AUTO: 30.1 PG (ref 26.8–34.3)
MCHC RBC AUTO-ENTMCNC: 31.7 G/DL (ref 31.4–37.4)
MCV RBC AUTO: 95 FL (ref 82–98)
MONOCYTES # BLD AUTO: 0.47 THOUSAND/ΜL (ref 0.17–1.22)
MONOCYTES NFR BLD AUTO: 7 % (ref 4–12)
NEUTROPHILS # BLD AUTO: 4.14 THOUSANDS/ΜL (ref 1.85–7.62)
NEUTS SEG NFR BLD AUTO: 60 % (ref 43–75)
PLATELET # BLD AUTO: 194 THOUSANDS/UL (ref 149–390)
PMV BLD AUTO: 10.2 FL (ref 8.9–12.7)
POTASSIUM SERPL-SCNC: 3.9 MMOL/L (ref 3.5–5.3)
PROT SERPL-MCNC: 7 G/DL (ref 6.4–8.2)
RBC # BLD AUTO: 4.42 MILLION/UL (ref 3.81–5.12)
SODIUM SERPL-SCNC: 141 MMOL/L (ref 136–145)
VIT B12 SERPL-MCNC: 428 PG/ML (ref 100–900)
WBC # BLD AUTO: 6.89 THOUSAND/UL (ref 4.31–10.16)

## 2018-05-14 PROCEDURE — 85025 COMPLETE CBC W/AUTO DIFF WBC: CPT

## 2018-05-14 PROCEDURE — 82607 VITAMIN B-12: CPT

## 2018-05-14 PROCEDURE — 36415 COLL VENOUS BLD VENIPUNCTURE: CPT

## 2018-05-14 PROCEDURE — 99213 OFFICE O/P EST LOW 20 MIN: CPT | Performed by: INTERNAL MEDICINE

## 2018-05-14 PROCEDURE — 80053 COMPREHEN METABOLIC PANEL: CPT

## 2018-05-14 PROCEDURE — 82248 BILIRUBIN DIRECT: CPT

## 2018-05-14 PROCEDURE — 82306 VITAMIN D 25 HYDROXY: CPT

## 2018-05-14 PROCEDURE — 83918 ORGANIC ACIDS TOTAL QUANT: CPT

## 2018-05-14 NOTE — PROGRESS NOTES
Assessment/Plan:  Assessment/Plan   Diagnoses and all orders for this visit:    Lumbar back pain with radiculopathy affecting right lower extremity    Spasm of lumbar paraspinous muscle      Patient's low back pain has subsided and her physical examination is essentially normal today  Although she experienced some mild right-sided SI joint discomfort yesterday, this was transient and has subsided  I have recommended that she continue doing her daily home exercise program and incorporated into her daily routine  Otherwise, she can continue all activities as tolerated  I have discharged patient from my service today  Patient can followup with me as needed or if any issues arises  Patient was advised to call and return to the clinic sooner or go to the closest emergency room if she develops any symptom exacerbation  This document was recorded using voice recognition software and errors may be noted  Subjective:   Patient ID: Tiffany Bardales is a 37 y o  female  Shiv Talbot is a pleasant 10-year-old female who presents today for follow-up evaluation of low back pain with right radiating symptoms  She reports that she has recently been discharged from formal physical therapy  Her low back pain and radiating symptoms have resolved  She reports that only yesterday she experienced mild soreness in her right SI region, but other than that she has had no recent symptoms or pain  She denies any subsequent bruising, swelling, numbness, tingling, or mechanical symptoms  She is not currently taking any routine medications for pain       The following portions of the patient's history were reviewed and updated as appropriate: allergies, current medications and problem list     Past Medical History:   Diagnosis Date    [facility] ([facility] [address] [facility]     [facility]      Past Surgical History:   Procedure Laterality Date     SECTION       Family History   Problem Relation Age of Onset    No Known Problems Mother     No Known Problems Father      Social History     Social History    Marital status: /Civil Union     Spouse name: N/A    Number of children: N/A    Years of education: N/A     Social History Main Topics    Smoking status: Never Smoker    Smokeless tobacco: Never Used    Alcohol use No    Drug use: No    Sexual activity: Not on file     Other Topics Concern    Not on file     Social History Narrative    No narrative on file       Review of Systems   Constitutional: Negative for chills, fever and unexpected weight change  HENT: Negative for hearing loss, nosebleeds and sore throat  Eyes: Negative for pain, redness and visual disturbance  Respiratory: Negative for cough, shortness of breath and wheezing  Cardiovascular: Negative for chest pain, palpitations and leg swelling  Gastrointestinal: Negative for abdominal pain, nausea and vomiting  Endocrine: Negative for polydipsia and polyuria  Genitourinary: Negative for dysuria and hematuria  Musculoskeletal:        As noted in HPI/PE   Skin: Negative for rash and wound  Neurological: Negative for dizziness, numbness and headaches  Psychiatric/Behavioral: Negative for decreased concentration and suicidal ideas  The patient is not nervous/anxious  Objective:    Vitals:    05/14/18 1040   BP: 115/70   Pulse: 76   Weight: 61 2 kg (134 lb 14 7 oz)   Height: 5' 5" (1 651 m)       Right Hip Exam     Range of Motion   Extension: 20   Flexion: 120   Internal Rotation: normal   External Rotation: normal   Abduction: 45   Adduction: normal     Muscle Strength   Abduction: 5/5   Adduction: 5/5   Flexion: 5/5     Tests   YUVAL: negative  Oswald: negative    Other   Erythema: absent  Scars: absent  Sensation: normal  Pulse: present    Comments:  No obvious deformity noted  No tenderness to palpation in right SI, right low back, right, glute max or glute med, or midline       -YUVAL  -FADIR  -SLR, LBP with supine SLR at 80°  -logroll            Physical Exam   Constitutional: She is oriented to person, place, and time  She appears well-developed and well-nourished  HENT:   Right Ear: External ear normal    Left Ear: External ear normal    Nose: Nose normal    Eyes: Conjunctivae and EOM are normal  Pupils are equal, round, and reactive to light  Neck: Normal range of motion  Cardiovascular: Intact distal pulses  Pulmonary/Chest: Effort normal    Musculoskeletal: Normal range of motion  Neurological: She is alert and oriented to person, place, and time  Skin: Skin is warm and dry  Psychiatric: She has a normal mood and affect   Her behavior is normal  Judgment and thought content normal        No new imaging reviewed this visit     Scribe Attestation    I,:   Rosa Brown am acting as a scribe while in the presence of the attending physician :        I,:   Kike Sheldon MD personally performed the services described in this documentation    as scribed in my presence :

## 2018-05-15 ENCOUNTER — TELEPHONE (OUTPATIENT)
Dept: NEUROLOGY | Facility: CLINIC | Age: 43
End: 2018-05-15

## 2018-05-15 NOTE — TELEPHONE ENCOUNTER
----- Message from Pete Moss MD sent at 5/14/2018  4:37 PM EDT -----  Let pt know her vit d is low at 17   rec follow up with her pcp for replacement and reason for low level    Let pt know calcium slighty low   Send labs to pcp and have pt follow up with pcp and heme

## 2018-05-16 ENCOUNTER — HOSPITAL ENCOUNTER (OUTPATIENT)
Dept: INFUSION CENTER | Facility: CLINIC | Age: 43
Discharge: HOME | End: 2018-05-16
Payer: COMMERCIAL

## 2018-05-16 VITALS
HEART RATE: 87 BPM | SYSTOLIC BLOOD PRESSURE: 98 MMHG | BODY MASS INDEX: 22.33 KG/M2 | DIASTOLIC BLOOD PRESSURE: 60 MMHG | WEIGHT: 134 LBS | TEMPERATURE: 98.7 F | RESPIRATION RATE: 18 BRPM | HEIGHT: 65 IN | OXYGEN SATURATION: 97 %

## 2018-05-16 PROCEDURE — 96401 CHEMO ANTI-NEOPL SQ/IM: CPT

## 2018-05-16 RX ADMIN — BORTEZOMIB 2.2 MG: 3.5 INJECTION, POWDER, LYOPHILIZED, FOR SOLUTION INTRAVENOUS; SUBCUTANEOUS at 08:58

## 2018-05-16 NOTE — PLAN OF CARE
Problem: PAIN - ADULT  Goal: Verbalizes/displays adequate comfort level or baseline comfort level  Interventions:  - Encourage patient to monitor pain and request assistance  - Assess pain using appropriate pain scale  - Administer analgesics based on type and severity of pain and evaluate response  - Implement non-pharmacological measures as appropriate and evaluate response  - Consider cultural and social influences on pain and pain management  - Notify physician/advanced practitioner if interventions unsuccessful or patient reports new pain  Outcome: Progressing      Problem: INFECTION - ADULT  Goal: Absence or prevention of progression during hospitalization  INTERVENTIONS:  - Assess and monitor for signs and symptoms of infection  - Monitor lab/diagnostic results  - Monitor all insertion sites, i e  indwelling lines, tubes, and drains  - Monitor endotracheal (as able) and nasal secretions for changes in amount and color  - Armstrong appropriate cooling/warming therapies per order  - Administer medications as ordered  - Instruct and encourage patient and family to use good hand hygiene technique  - Identify and instruct in appropriate isolation precautions for identified infection/condition  Outcome: Progressing      Problem: SAFETY ADULT  Goal: Patient will remain free of falls  INTERVENTIONS:  - Assess patient frequently for physical needs  -  Identify cognitive and physical deficits and behaviors that affect risk of falls    -  Armstrong fall precautions as indicated by assessment   - Educate patient/family on patient safety including physical limitations  - Instruct patient to call for assistance with activity based on assessment  - Modify environment to reduce risk of injury  - Consider OT/PT consult to assist with strengthening/mobility  Outcome: Progressing

## 2018-05-16 NOTE — PROGRESS NOTES
Patient here for final cycle velcade day 1 and is doing well, confirmed she will start revlimid and decadron today at home  She offers no c/o

## 2018-05-16 NOTE — PROGRESS NOTES
Patient tolerated velcade to left abdomen as ordered, bandaid applied  She offers no c/o and will RTO for day 8 5/23/18

## 2018-05-17 ENCOUNTER — HOSPITAL ENCOUNTER (OUTPATIENT)
Dept: MRI IMAGING | Facility: HOSPITAL | Age: 43
Discharge: HOME | End: 2018-05-17
Attending: PSYCHIATRY & NEUROLOGY
Payer: COMMERCIAL

## 2018-05-17 DIAGNOSIS — M54.6 ACUTE LEFT-SIDED THORACIC BACK PAIN: ICD-10-CM

## 2018-05-17 LAB — METHYLMALONATE SERPL-SCNC: 191 NMOL/L (ref 0–378)

## 2018-05-17 PROCEDURE — A9585 GADOBUTROL INJECTION: HCPCS | Performed by: PSYCHIATRY & NEUROLOGY

## 2018-05-17 PROCEDURE — 72157 MRI CHEST SPINE W/O & W/DYE: CPT

## 2018-05-17 RX ADMIN — GADOBUTROL 6 ML: 604.72 INJECTION INTRAVENOUS at 14:11

## 2018-05-19 ENCOUNTER — APPOINTMENT (OUTPATIENT)
Dept: LAB | Facility: HOSPITAL | Age: 43
End: 2018-05-19
Attending: INTERNAL MEDICINE
Payer: COMMERCIAL

## 2018-05-23 ENCOUNTER — HOSPITAL ENCOUNTER (OUTPATIENT)
Dept: INFUSION CENTER | Facility: CLINIC | Age: 43
Discharge: HOME | End: 2018-05-23
Payer: COMMERCIAL

## 2018-05-23 VITALS
TEMPERATURE: 98.6 F | WEIGHT: 134.5 LBS | BODY MASS INDEX: 22.41 KG/M2 | RESPIRATION RATE: 18 BRPM | DIASTOLIC BLOOD PRESSURE: 64 MMHG | HEART RATE: 79 BPM | SYSTOLIC BLOOD PRESSURE: 116 MMHG | OXYGEN SATURATION: 98 % | HEIGHT: 65 IN

## 2018-05-23 PROCEDURE — 96402 CHEMO HORMON ANTINEOPL SQ/IM: CPT

## 2018-05-23 RX ADMIN — BORTEZOMIB 2.2 MG: 3.5 INJECTION, POWDER, LYOPHILIZED, FOR SOLUTION INTRAVENOUS; SUBCUTANEOUS at 08:30

## 2018-05-23 NOTE — PROGRESS NOTES
Velcade per MD order: Injection given in Abdomen ( RLQ ) without incident: No adverse reactions noted: Verified follow up appt with patient: Declined AVS

## 2018-05-23 NOTE — PROGRESS NOTES
Patient to [facility] for Velcade: Offers no complaints at present time: Lab work ( 05/19/18 ) reviewed:  Within parameters to treat

## 2018-05-24 DIAGNOSIS — L65.9 ALOPECIA: Primary | ICD-10-CM

## 2018-05-24 NOTE — TELEPHONE ENCOUNTER
Pt called requesting a script for a wig  Entered and sent to Dr kD Amin to sign  Pt would like to  Wed 5/30/18 when comes to [place] for chemo

## 2018-05-26 ENCOUNTER — APPOINTMENT (OUTPATIENT)
Dept: LAB | Facility: CLINIC | Age: 43
End: 2018-05-26
Payer: COMMERCIAL

## 2018-05-29 DIAGNOSIS — L65.9 ALOPECIA: ICD-10-CM

## 2018-05-29 DIAGNOSIS — C90.00 MULTIPLE MYELOMA NOT HAVING ACHIEVED REMISSION (HCC): Primary | ICD-10-CM

## 2018-05-30 ENCOUNTER — HOSPITAL ENCOUNTER (OUTPATIENT)
Dept: INFUSION CENTER | Facility: CLINIC | Age: 43
Discharge: HOME | End: 2018-05-30
Payer: COMMERCIAL

## 2018-05-30 VITALS
DIASTOLIC BLOOD PRESSURE: 58 MMHG | OXYGEN SATURATION: 99 % | WEIGHT: 138 LBS | SYSTOLIC BLOOD PRESSURE: 112 MMHG | RESPIRATION RATE: 16 BRPM | HEART RATE: 75 BPM | BODY MASS INDEX: 22.96 KG/M2 | TEMPERATURE: 98.2 F

## 2018-05-30 LAB — EXTERNAL HIV SCREEN: NORMAL

## 2018-05-30 PROCEDURE — 96401 CHEMO ANTI-NEOPL SQ/IM: CPT

## 2018-05-30 RX ADMIN — BORTEZOMIB 2.2 MG: 3.5 INJECTION, POWDER, LYOPHILIZED, FOR SOLUTION INTRAVENOUS; SUBCUTANEOUS at 10:07

## 2018-06-18 ENCOUNTER — TRANSCRIBE ORDERS (OUTPATIENT)
Dept: ADMINISTRATIVE | Facility: HOSPITAL | Age: 43
End: 2018-06-18

## 2018-06-18 ENCOUNTER — APPOINTMENT (OUTPATIENT)
Dept: LAB | Facility: CLINIC | Age: 43
End: 2018-06-18
Payer: COMMERCIAL

## 2018-06-18 DIAGNOSIS — C90.00 MULTIPLE MYELOMA NOT HAVING ACHIEVED REMISSION (HCC): ICD-10-CM

## 2018-06-18 DIAGNOSIS — C90.00 MULTIPLE MYELOMA NOT HAVING ACHIEVED REMISSION (HCC): Primary | ICD-10-CM

## 2018-06-18 LAB
ALBUMIN SERPL BCP-MCNC: 3.5 G/DL (ref 3.5–5)
ALP SERPL-CCNC: 61 U/L (ref 46–116)
ALT SERPL W P-5'-P-CCNC: 25 U/L (ref 12–78)
ANION GAP SERPL CALCULATED.3IONS-SCNC: 5 MMOL/L (ref 4–13)
AST SERPL W P-5'-P-CCNC: 12 U/L (ref 5–45)
BASOPHILS # BLD MANUAL: 0.03 THOUSAND/UL (ref 0–0.1)
BASOPHILS NFR MAR MANUAL: 7 % (ref 0–1)
BILIRUB SERPL-MCNC: 0.98 MG/DL (ref 0.2–1)
BUN SERPL-MCNC: 15 MG/DL (ref 5–25)
CALCIUM SERPL-MCNC: 7.9 MG/DL (ref 8.3–10.1)
CHLORIDE SERPL-SCNC: 108 MMOL/L (ref 100–108)
CO2 SERPL-SCNC: 26 MMOL/L (ref 21–32)
CREAT SERPL-MCNC: 0.63 MG/DL (ref 0.6–1.3)
EOSINOPHIL # BLD MANUAL: 0.01 THOUSAND/UL (ref 0–0.4)
EOSINOPHIL NFR BLD MANUAL: 3 % (ref 0–6)
ERYTHROCYTE [DISTWIDTH] IN BLOOD BY AUTOMATED COUNT: 12.5 % (ref 11.6–15.1)
GFR SERPL CREATININE-BSD FRML MDRD: 109 ML/MIN/1.73SQ M
GIANT PLATELETS BLD QL SMEAR: PRESENT
GLUCOSE P FAST SERPL-MCNC: 88 MG/DL (ref 65–99)
HCT VFR BLD AUTO: 36.6 % (ref 34.8–46.1)
HGB BLD-MCNC: 11.3 G/DL (ref 11.5–15.4)
LYMPHOCYTES # BLD AUTO: 0.31 THOUSAND/UL (ref 0.6–4.47)
LYMPHOCYTES # BLD AUTO: 80 % (ref 14–44)
MAGNESIUM SERPL-MCNC: 2.4 MG/DL (ref 1.6–2.6)
MCH RBC QN AUTO: 30.5 PG (ref 26.8–34.3)
MCHC RBC AUTO-ENTMCNC: 30.9 G/DL (ref 31.4–37.4)
MCV RBC AUTO: 99 FL (ref 82–98)
MONOCYTES # BLD AUTO: 0.01 THOUSAND/UL (ref 0–1.22)
MONOCYTES NFR BLD: 3 % (ref 4–12)
NEUTROPHILS # BLD MANUAL: 0.03 THOUSAND/UL (ref 1.85–7.62)
NEUTS SEG NFR BLD AUTO: 7 % (ref 43–75)
NRBC BLD AUTO-RTO: 0 /100 WBCS
PLATELET # BLD AUTO: 114 THOUSANDS/UL (ref 149–390)
PLATELET BLD QL SMEAR: ABNORMAL
PMV BLD AUTO: 11.9 FL (ref 8.9–12.7)
POTASSIUM SERPL-SCNC: 3.9 MMOL/L (ref 3.5–5.3)
PROT SERPL-MCNC: 6.6 G/DL (ref 6.4–8.2)
RBC # BLD AUTO: 3.71 MILLION/UL (ref 3.81–5.12)
SODIUM SERPL-SCNC: 139 MMOL/L (ref 136–145)
WBC # BLD AUTO: 0.39 THOUSAND/UL (ref 4.31–10.16)

## 2018-06-18 PROCEDURE — 36415 COLL VENOUS BLD VENIPUNCTURE: CPT

## 2018-06-18 PROCEDURE — 85007 BL SMEAR W/DIFF WBC COUNT: CPT

## 2018-06-18 PROCEDURE — 85027 COMPLETE CBC AUTOMATED: CPT

## 2018-06-18 PROCEDURE — 83735 ASSAY OF MAGNESIUM: CPT

## 2018-06-18 PROCEDURE — 80053 COMPREHEN METABOLIC PANEL: CPT

## 2018-06-29 ENCOUNTER — TELEPHONE (OUTPATIENT)
Dept: NEUROLOGY | Facility: CLINIC | Age: 43
End: 2018-06-29

## 2018-06-29 NOTE — TELEPHONE ENCOUNTER
Left sided abdominal pain and squeezing sensation in mid abdomen, question ms hug from cord lesion  Also pt treated for multiple myeloma, looking for any body lesions as well in t spine  Mri t spine ordered from ms as well as from MM standpoint

## 2018-06-29 NOTE — TELEPHONE ENCOUNTER
Patient states she needs a letter stating why the patient was sent for the MRI done on 5/17; requested by [facility])    [phone]  Claim # [id number]  Fax: [phone]

## 2018-07-26 ENCOUNTER — TRANSCRIBE ORDERS (OUTPATIENT)
Dept: ADMINISTRATIVE | Facility: HOSPITAL | Age: 43
End: 2018-07-26

## 2018-07-26 ENCOUNTER — APPOINTMENT (OUTPATIENT)
Dept: LAB | Facility: CLINIC | Age: 43
End: 2018-07-26
Payer: COMMERCIAL

## 2018-07-26 DIAGNOSIS — C90.00 MULTIPLE MYELOMA, REMISSION STATUS UNSPECIFIED (HCC): ICD-10-CM

## 2018-07-26 DIAGNOSIS — C90.00 MULTIPLE MYELOMA, REMISSION STATUS UNSPECIFIED (HCC): Primary | ICD-10-CM

## 2018-07-26 LAB — MAGNESIUM SERPL-MCNC: 2.2 MG/DL (ref 1.6–2.6)

## 2018-07-26 PROCEDURE — 83735 ASSAY OF MAGNESIUM: CPT

## 2018-07-30 ENCOUNTER — APPOINTMENT (OUTPATIENT)
Dept: LAB | Facility: CLINIC | Age: 43
End: 2018-07-30
Payer: COMMERCIAL

## 2018-07-30 ENCOUNTER — TRANSCRIBE ORDERS (OUTPATIENT)
Dept: ADMINISTRATIVE | Facility: HOSPITAL | Age: 43
End: 2018-07-30

## 2018-07-30 DIAGNOSIS — C90.00 MYELOMA ASSOCIATED AMYLOIDOSIS (HCC): ICD-10-CM

## 2018-07-30 DIAGNOSIS — E85.9 MYELOMA ASSOCIATED AMYLOIDOSIS (HCC): Primary | ICD-10-CM

## 2018-07-30 DIAGNOSIS — E85.9 MYELOMA ASSOCIATED AMYLOIDOSIS (HCC): ICD-10-CM

## 2018-07-30 DIAGNOSIS — C90.00 MYELOMA ASSOCIATED AMYLOIDOSIS (HCC): Primary | ICD-10-CM

## 2018-07-30 LAB — MAGNESIUM SERPL-MCNC: 2.3 MG/DL (ref 1.6–2.6)

## 2018-07-30 PROCEDURE — 83735 ASSAY OF MAGNESIUM: CPT

## 2018-08-02 ENCOUNTER — APPOINTMENT (OUTPATIENT)
Dept: LAB | Facility: CLINIC | Age: 43
End: 2018-08-02
Payer: COMMERCIAL

## 2018-08-02 DIAGNOSIS — C90.00 MYELOMA ASSOCIATED AMYLOIDOSIS (HCC): ICD-10-CM

## 2018-08-02 DIAGNOSIS — E85.9 MYELOMA ASSOCIATED AMYLOIDOSIS (HCC): ICD-10-CM

## 2018-08-02 LAB
ALBUMIN SERPL BCP-MCNC: 3.4 G/DL (ref 3.5–5)
ALP SERPL-CCNC: 60 U/L (ref 46–116)
ALT SERPL W P-5'-P-CCNC: 67 U/L (ref 12–78)
ANION GAP SERPL CALCULATED.3IONS-SCNC: 4 MMOL/L (ref 4–13)
ANISOCYTOSIS BLD QL SMEAR: PRESENT
AST SERPL W P-5'-P-CCNC: 44 U/L (ref 5–45)
BASOPHILS # BLD MANUAL: 0.05 THOUSAND/UL (ref 0–0.1)
BASOPHILS NFR MAR MANUAL: 1 % (ref 0–1)
BILIRUB SERPL-MCNC: 0.25 MG/DL (ref 0.2–1)
BUN SERPL-MCNC: 12 MG/DL (ref 5–25)
CALCIUM SERPL-MCNC: 8.1 MG/DL (ref 8.3–10.1)
CHLORIDE SERPL-SCNC: 110 MMOL/L (ref 100–108)
CO2 SERPL-SCNC: 28 MMOL/L (ref 21–32)
CREAT SERPL-MCNC: 0.66 MG/DL (ref 0.6–1.3)
EOSINOPHIL # BLD MANUAL: 0.05 THOUSAND/UL (ref 0–0.4)
EOSINOPHIL NFR BLD MANUAL: 1 % (ref 0–6)
ERYTHROCYTE [DISTWIDTH] IN BLOOD BY AUTOMATED COUNT: 15.9 % (ref 11.6–15.1)
GFR SERPL CREATININE-BSD FRML MDRD: 107 ML/MIN/1.73SQ M
GLUCOSE P FAST SERPL-MCNC: 88 MG/DL (ref 65–99)
HCT VFR BLD AUTO: 31.1 % (ref 34.8–46.1)
HGB BLD-MCNC: 9.8 G/DL (ref 11.5–15.4)
LYMPHOCYTES # BLD AUTO: 1.68 THOUSAND/UL (ref 0.6–4.47)
LYMPHOCYTES # BLD AUTO: 37 % (ref 14–44)
MAGNESIUM SERPL-MCNC: 2.2 MG/DL (ref 1.6–2.6)
MCH RBC QN AUTO: 31.6 PG (ref 26.8–34.3)
MCHC RBC AUTO-ENTMCNC: 31.5 G/DL (ref 31.4–37.4)
MCV RBC AUTO: 100 FL (ref 82–98)
METAMYELOCYTES NFR BLD MANUAL: 1 % (ref 0–1)
MONOCYTES # BLD AUTO: 0.68 THOUSAND/UL (ref 0–1.22)
MONOCYTES NFR BLD: 15 % (ref 4–12)
NEUTROPHILS # BLD MANUAL: 2.05 THOUSAND/UL (ref 1.85–7.62)
NEUTS SEG NFR BLD AUTO: 45 % (ref 43–75)
NRBC BLD AUTO-RTO: 0 /100 WBCS
PLATELET # BLD AUTO: 250 THOUSANDS/UL (ref 149–390)
PLATELET BLD QL SMEAR: ADEQUATE
PMV BLD AUTO: 10.8 FL (ref 8.9–12.7)
POTASSIUM SERPL-SCNC: 3.9 MMOL/L (ref 3.5–5.3)
PROT SERPL-MCNC: 6.7 G/DL (ref 6.4–8.2)
RBC # BLD AUTO: 3.1 MILLION/UL (ref 3.81–5.12)
RBC MORPH BLD: PRESENT
SODIUM SERPL-SCNC: 142 MMOL/L (ref 136–145)
WBC # BLD AUTO: 4.55 THOUSAND/UL (ref 4.31–10.16)

## 2018-08-02 PROCEDURE — 83735 ASSAY OF MAGNESIUM: CPT

## 2018-08-02 PROCEDURE — 85007 BL SMEAR W/DIFF WBC COUNT: CPT

## 2018-08-02 PROCEDURE — 80053 COMPREHEN METABOLIC PANEL: CPT

## 2018-08-02 PROCEDURE — 36415 COLL VENOUS BLD VENIPUNCTURE: CPT

## 2018-08-02 PROCEDURE — 85027 COMPLETE CBC AUTOMATED: CPT

## 2018-08-09 ENCOUNTER — APPOINTMENT (OUTPATIENT)
Dept: LAB | Facility: CLINIC | Age: 43
End: 2018-08-09
Payer: COMMERCIAL

## 2018-08-09 DIAGNOSIS — E85.9 MYELOMA ASSOCIATED AMYLOIDOSIS (HCC): ICD-10-CM

## 2018-08-09 DIAGNOSIS — C90.00 MYELOMA ASSOCIATED AMYLOIDOSIS (HCC): ICD-10-CM

## 2018-08-09 LAB — MAGNESIUM SERPL-MCNC: 2.2 MG/DL (ref 1.6–2.6)

## 2018-08-09 PROCEDURE — 83735 ASSAY OF MAGNESIUM: CPT

## 2018-08-16 ENCOUNTER — APPOINTMENT (OUTPATIENT)
Dept: LAB | Facility: CLINIC | Age: 43
End: 2018-08-16
Payer: COMMERCIAL

## 2018-08-16 DIAGNOSIS — E85.9 MYELOMA ASSOCIATED AMYLOIDOSIS (HCC): ICD-10-CM

## 2018-08-16 DIAGNOSIS — C90.00 MYELOMA ASSOCIATED AMYLOIDOSIS (HCC): ICD-10-CM

## 2018-08-16 LAB — MAGNESIUM SERPL-MCNC: 2.3 MG/DL (ref 1.6–2.6)

## 2018-08-16 PROCEDURE — 83735 ASSAY OF MAGNESIUM: CPT

## 2018-08-17 ENCOUNTER — TELEPHONE (OUTPATIENT)
Dept: NEUROLOGY | Facility: CLINIC | Age: 43
End: 2018-08-17

## 2018-08-17 NOTE — TELEPHONE ENCOUNTER
There is a telephone encounter from neurology at [facility] on 8/7/18 that says patient has discontinued the Copaxone and they are recommending she remain off it  Looks like they recommended she stop Copaxone at her appt with them in May due to stem cell transplant for her multiple myeloma    Thanks

## 2018-08-17 NOTE — TELEPHONE ENCOUNTER
Received refill request for patient's glatiramer  Patient recently saw Dr Car Eugene with [facility] for a second opinion because of her stem cell transplant  Has follow up with [facility] 9/19/18, no follow up scheduled with our office  Patient unsure if Dr Car Eugene will be prescribing her glatiramer, states she is not in need of refills at this time  Will call back after her follow up with more information I e  will she continue seeing our practice or strictly seeing [facility] and if they will be prescribing medications moving forward

## 2018-08-23 ENCOUNTER — APPOINTMENT (OUTPATIENT)
Dept: LAB | Facility: CLINIC | Age: 43
End: 2018-08-23
Payer: COMMERCIAL

## 2018-08-23 DIAGNOSIS — C90.00 MYELOMA ASSOCIATED AMYLOIDOSIS (HCC): ICD-10-CM

## 2018-08-23 DIAGNOSIS — E85.9 MYELOMA ASSOCIATED AMYLOIDOSIS (HCC): ICD-10-CM

## 2018-08-23 LAB — MAGNESIUM SERPL-MCNC: 2.3 MG/DL (ref 1.6–2.6)

## 2018-08-23 PROCEDURE — 83735 ASSAY OF MAGNESIUM: CPT

## 2018-08-30 ENCOUNTER — APPOINTMENT (OUTPATIENT)
Dept: LAB | Facility: CLINIC | Age: 43
End: 2018-08-30
Payer: COMMERCIAL

## 2018-08-30 ENCOUNTER — TRANSCRIBE ORDERS (OUTPATIENT)
Dept: ADMINISTRATIVE | Facility: HOSPITAL | Age: 43
End: 2018-08-30

## 2018-08-30 DIAGNOSIS — E85.9 MYELOMA ASSOCIATED AMYLOIDOSIS (HCC): ICD-10-CM

## 2018-08-30 DIAGNOSIS — C90.00 MYELOMA ASSOCIATED AMYLOIDOSIS (HCC): Primary | ICD-10-CM

## 2018-08-30 DIAGNOSIS — E85.9 MYELOMA ASSOCIATED AMYLOIDOSIS (HCC): Primary | ICD-10-CM

## 2018-08-30 DIAGNOSIS — C90.00 MYELOMA ASSOCIATED AMYLOIDOSIS (HCC): ICD-10-CM

## 2018-08-30 LAB — MAGNESIUM SERPL-MCNC: 2.4 MG/DL (ref 1.6–2.6)

## 2018-08-30 PROCEDURE — 83735 ASSAY OF MAGNESIUM: CPT

## 2018-10-21 ENCOUNTER — OFFICE VISIT (OUTPATIENT)
Dept: URGENT CARE | Facility: CLINIC | Age: 43
End: 2018-10-21
Payer: COMMERCIAL

## 2018-10-21 VITALS
OXYGEN SATURATION: 99 % | SYSTOLIC BLOOD PRESSURE: 128 MMHG | HEIGHT: 65 IN | WEIGHT: 134 LBS | HEART RATE: 97 BPM | TEMPERATURE: 99 F | DIASTOLIC BLOOD PRESSURE: 64 MMHG | BODY MASS INDEX: 22.33 KG/M2

## 2018-10-21 DIAGNOSIS — H10.13 ALLERGIC CONJUNCTIVITIS OF BOTH EYES: Primary | ICD-10-CM

## 2018-10-21 PROCEDURE — 99203 OFFICE O/P NEW LOW 30 MIN: CPT | Performed by: PHYSICIAN ASSISTANT

## 2018-10-21 RX ORDER — OLOPATADINE HYDROCHLORIDE 2 MG/ML
1 SOLUTION/ DROPS OPHTHALMIC DAILY
Qty: 2.5 ML | Refills: 0 | Status: SHIPPED | OUTPATIENT
Start: 2018-10-21 | End: 2019-02-04

## 2018-10-21 RX ORDER — ACYCLOVIR 800 MG/1
TABLET ORAL
Refills: 0 | COMMUNITY
Start: 2018-09-11 | End: 2019-01-07

## 2018-10-21 RX ORDER — ACYCLOVIR 800 MG/1
800 TABLET ORAL
COMMUNITY
Start: 2018-09-10 | End: 2019-01-07

## 2018-10-21 RX ORDER — OLOPATADINE HYDROCHLORIDE 2 MG/ML
1 SOLUTION/ DROPS OPHTHALMIC DAILY
Qty: 2.5 ML | Refills: 0 | Status: SHIPPED | OUTPATIENT
Start: 2018-10-21 | End: 2018-10-21 | Stop reason: SDUPTHER

## 2018-10-21 NOTE — PROGRESS NOTES
[facility]        NAME: Darlene Cisneros is a 37 y o  female  : 1975    MRN: [id number]  DATE: 2018  TIME: 4:16 PM    Assessment and Plan   Allergic conjunctivitis of both eyes [H10 13]  1  Allergic conjunctivitis of both eyes  olopatadine HCl (PATADAY) 0 2 % opth drops    DISCONTINUED: olopatadine HCl (PATADAY) 0 2 % opth drops     Likely allergic due to length of time of symptoms and benign exam with tearing  Patient Instructions     Refresh tears otc  Follow up with PCP in 3-5 days  Proceed to  ER if symptoms worsen  Chief Complaint     Chief Complaint   Patient presents with    Eye Pain     Two weeks ago  Not taking anything for pain  Pt  complains of general irritation  History of Present Illness       67-year-old female complains of left greater than right eye irritation for 2 weeks  She has some itchiness and occasional tearing drainage  No thick mucoid drainage  No vision changes or blurry vision  No cough runny nose          Review of Systems   Review of Systems      Current Medications       Current Outpatient Prescriptions:     acyclovir (ZOVIRAX) 800 mg tablet, Take 800 mg by mouth, Disp: , Rfl:     acyclovir (ZOVIRAX) 400 MG tablet, Take 1 tablet by mouth 2 (two) times a day, Disp: , Rfl:     acyclovir (ZOVIRAX) 800 mg tablet, , Disp: , Rfl: 0    bortezomib (VELCADE), Infuse 2 1 mg into a venous catheter once, Disp: , Rfl:     CRANIAL PROSTHESIS, RX,, Wig for Alopecia (Patient not taking: Reported on 10/21/2018 ), Disp: 2 Device, Rfl: 0    cyanocobalamin 100 MCG tablet, 100 mcg every 30 (thirty) days injection, Disp: , Rfl:     cyclobenzaprine (FLEXERIL) 10 mg tablet, Take 1 tablet (10 mg total) by mouth 3 (three) times a day as needed for muscle spasms (Patient not taking: Reported on 10/21/2018 ), Disp: 30 tablet, Rfl: 0    diazepam (VALIUM) 5 mg tablet, Take 1 tablet (5 mg total) by mouth 2 (two) times a day as needed for muscle spasms (Patient not taking: Reported on 10/21/2018 ), Disp: 8 tablet, Rfl: 0    gabapentin (NEURONTIN) 300 mg capsule, Take 1 capsule (300 mg total) by mouth 3 (three) times a day 1 tab p o  q h s  x3-5 days  If no drowsiness increase to 1 tab p o  t i d  (Patient not taking: Reported on 10/21/2018 ), Disp: 90 capsule, Rfl: 0    Glatiramer Acetate 40 MG/ML SOSY, Inject 1ml SQ three times a week with Whisperject (Patient not taking: Reported on 10/21/2018 ), Disp: 12 Syringe, Rfl: 3    lenalidomide (REVLIMID) 25 MG CAPS, Take 1 capsule (25 mg total) by mouth daily Days 1 through 21 Q 28 days  (Patient not taking: Reported on 10/21/2018 ), Disp: 21 each, Rfl: 0    metaxalone (SKELAXIN) 800 mg tablet, Take 1 tablet (800 mg total) by mouth 3 (three) times a day for 7 days, Disp: 20 tablet, Rfl: 0    Methylprednisolone 4 MG TBPK, Use as directed on package (Patient not taking: Reported on 10/21/2018 ), Disp: 21 tablet, Rfl: 0    naproxen sodium (ANAPROX) 550 mg tablet, Take 1 tablet (550 mg total) by mouth 2 (two) times a day with meals for 10 days, Disp: 20 tablet, Rfl: 0    olopatadine HCl (PATADAY) 0 2 % opth drops, Administer 1 drop to both eyes daily, Disp: 2 5 mL, Rfl: 0    Current Allergies     Allergies as of 10/21/2018    (No Known Allergies)            The following portions of the patient's history were reviewed and updated as appropriate: allergies, current medications, past family history, past medical history, past social history, past surgical history and problem list      Past Medical History:   Diagnosis Date    Multiple myeloma ([address] )     Multiple sclerosis ([facility] [address] )        Past Surgical History:   Procedure Laterality Date     SECTION         Family History   Problem Relation Age of Onset    No Known Problems Mother     No Known Problems Father          Medications have been verified          Objective   /64 (BP Location: Right arm, Patient Position: Sitting, Cuff Size: Adult)   Pulse 97   Temp 99 °F (37 2 °C) (Temporal)   Ht 5' 5" (1 651 m)   Wt 60 8 kg (134 lb)   SpO2 99%   BMI 22 30 kg/m²        Physical Exam     Physical Exam   Constitutional: She appears well-developed and well-nourished  No distress  HENT:   Right Ear: Tympanic membrane, external ear and ear canal normal    Left Ear: Tympanic membrane, external ear and ear canal normal    Nose: Nose normal  Right sinus exhibits no maxillary sinus tenderness and no frontal sinus tenderness  Left sinus exhibits no maxillary sinus tenderness and no frontal sinus tenderness  Mouth/Throat: Oropharynx is clear and moist  No posterior oropharyngeal erythema  Eyes: Pupils are equal, round, and reactive to light  EOM are normal  Right eye exhibits no chemosis, no discharge, no exudate and no hordeolum  No foreign body present in the right eye  Left eye exhibits no chemosis, no discharge, no exudate and no hordeolum  No foreign body present in the left eye  Right conjunctiva is injected  Right conjunctiva has no hemorrhage  Left conjunctiva is injected  Left conjunctiva has no hemorrhage  No scleral icterus  Bilateral conjunctiva mildly injected, no swelling or drainage  No fb  Neck: Normal range of motion  Neck supple  Cardiovascular: Normal rate, regular rhythm and normal heart sounds  Pulmonary/Chest: Effort normal and breath sounds normal  No respiratory distress  She has no wheezes  She has no rales  Abdominal: Soft  Bowel sounds are normal  She exhibits no distension and no mass  There is no tenderness  There is no rebound and no guarding  Lymphadenopathy:     She has no cervical adenopathy  Skin: Skin is warm and dry  No rash noted

## 2018-11-13 ENCOUNTER — TELEPHONE (OUTPATIENT)
Dept: HEMATOLOGY ONCOLOGY | Facility: CLINIC | Age: 43
End: 2018-11-13

## 2018-11-14 ENCOUNTER — OFFICE VISIT (OUTPATIENT)
Dept: HEMATOLOGY ONCOLOGY | Facility: CLINIC | Age: 43
End: 2018-11-14
Payer: COMMERCIAL

## 2018-11-14 ENCOUNTER — TELEPHONE (OUTPATIENT)
Dept: HEMATOLOGY ONCOLOGY | Facility: CLINIC | Age: 43
End: 2018-11-14

## 2018-11-14 ENCOUNTER — APPOINTMENT (OUTPATIENT)
Dept: LAB | Facility: CLINIC | Age: 43
End: 2018-11-14
Payer: COMMERCIAL

## 2018-11-14 ENCOUNTER — TRANSCRIBE ORDERS (OUTPATIENT)
Dept: LAB | Facility: CLINIC | Age: 43
End: 2018-11-14

## 2018-11-14 VITALS
HEART RATE: 69 BPM | RESPIRATION RATE: 16 BRPM | HEIGHT: 65 IN | OXYGEN SATURATION: 99 % | BODY MASS INDEX: 22.66 KG/M2 | TEMPERATURE: 98.2 F | WEIGHT: 136 LBS

## 2018-11-14 DIAGNOSIS — C90.00 MULTIPLE MYELOMA NOT HAVING ACHIEVED REMISSION (HCC): Primary | ICD-10-CM

## 2018-11-14 DIAGNOSIS — C90.00 MULTIPLE MYELOMA, REMISSION STATUS UNSPECIFIED (HCC): Primary | ICD-10-CM

## 2018-11-14 LAB
ALBUMIN SERPL BCP-MCNC: 4.2 G/DL (ref 3.5–5)
ALP SERPL-CCNC: 65 U/L (ref 46–116)
ALT SERPL W P-5'-P-CCNC: 35 U/L (ref 12–78)
ANION GAP SERPL CALCULATED.3IONS-SCNC: 9 MMOL/L (ref 4–13)
AST SERPL W P-5'-P-CCNC: 20 U/L (ref 5–45)
BASOPHILS # BLD AUTO: 0.03 THOUSANDS/ΜL (ref 0–0.1)
BASOPHILS NFR BLD AUTO: 1 % (ref 0–1)
BILIRUB SERPL-MCNC: 0.4 MG/DL (ref 0.2–1)
BUN SERPL-MCNC: 14 MG/DL (ref 5–25)
CALCIUM SERPL-MCNC: 9.2 MG/DL (ref 8.3–10.1)
CHLORIDE SERPL-SCNC: 106 MMOL/L (ref 100–108)
CO2 SERPL-SCNC: 29 MMOL/L (ref 21–32)
CREAT SERPL-MCNC: 0.65 MG/DL (ref 0.6–1.3)
EOSINOPHIL # BLD AUTO: 0.22 THOUSAND/ΜL (ref 0–0.61)
EOSINOPHIL NFR BLD AUTO: 4 % (ref 0–6)
ERYTHROCYTE [DISTWIDTH] IN BLOOD BY AUTOMATED COUNT: 11.9 % (ref 11.6–15.1)
GFR SERPL CREATININE-BSD FRML MDRD: 108 ML/MIN/1.73SQ M
GLUCOSE SERPL-MCNC: 99 MG/DL (ref 65–140)
HCG SERPL QL: NEGATIVE
HCT VFR BLD AUTO: 39.1 % (ref 34.8–46.1)
HGB BLD-MCNC: 12.4 G/DL (ref 11.5–15.4)
IMM GRANULOCYTES # BLD AUTO: 0.02 THOUSAND/UL (ref 0–0.2)
IMM GRANULOCYTES NFR BLD AUTO: 0 % (ref 0–2)
LYMPHOCYTES # BLD AUTO: 2.65 THOUSANDS/ΜL (ref 0.6–4.47)
LYMPHOCYTES NFR BLD AUTO: 46 % (ref 14–44)
MCH RBC QN AUTO: 30.4 PG (ref 26.8–34.3)
MCHC RBC AUTO-ENTMCNC: 31.7 G/DL (ref 31.4–37.4)
MCV RBC AUTO: 96 FL (ref 82–98)
MONOCYTES # BLD AUTO: 0.32 THOUSAND/ΜL (ref 0.17–1.22)
MONOCYTES NFR BLD AUTO: 6 % (ref 4–12)
NEUTROPHILS # BLD AUTO: 2.4 THOUSANDS/ΜL (ref 1.85–7.62)
NEUTS SEG NFR BLD AUTO: 43 % (ref 43–75)
NRBC BLD AUTO-RTO: 0 /100 WBCS
PLATELET # BLD AUTO: 186 THOUSANDS/UL (ref 149–390)
PMV BLD AUTO: 9.2 FL (ref 8.9–12.7)
POTASSIUM SERPL-SCNC: 3.9 MMOL/L (ref 3.5–5.3)
PROT SERPL-MCNC: 7.2 G/DL (ref 6.4–8.2)
RBC # BLD AUTO: 4.08 MILLION/UL (ref 3.81–5.12)
SODIUM SERPL-SCNC: 144 MMOL/L (ref 136–145)
WBC # BLD AUTO: 5.64 THOUSAND/UL (ref 4.31–10.16)

## 2018-11-14 PROCEDURE — 84165 PROTEIN E-PHORESIS SERUM: CPT | Performed by: INTERNAL MEDICINE

## 2018-11-14 PROCEDURE — 84703 CHORIONIC GONADOTROPIN ASSAY: CPT | Performed by: INTERNAL MEDICINE

## 2018-11-14 PROCEDURE — 86334 IMMUNOFIX E-PHORESIS SERUM: CPT | Performed by: INTERNAL MEDICINE

## 2018-11-14 PROCEDURE — 99214 OFFICE O/P EST MOD 30 MIN: CPT | Performed by: INTERNAL MEDICINE

## 2018-11-14 PROCEDURE — 85025 COMPLETE CBC W/AUTO DIFF WBC: CPT | Performed by: INTERNAL MEDICINE

## 2018-11-14 PROCEDURE — 86334 IMMUNOFIX E-PHORESIS SERUM: CPT | Performed by: PATHOLOGY

## 2018-11-14 PROCEDURE — 80053 COMPREHEN METABOLIC PANEL: CPT | Performed by: INTERNAL MEDICINE

## 2018-11-14 PROCEDURE — 36415 COLL VENOUS BLD VENIPUNCTURE: CPT | Performed by: INTERNAL MEDICINE

## 2018-11-14 PROCEDURE — 84165 PROTEIN E-PHORESIS SERUM: CPT | Performed by: PATHOLOGY

## 2018-11-14 RX ORDER — LENALIDOMIDE 10 MG/1
10 CAPSULE ORAL DAILY
Qty: 28 CAPSULE | Refills: 0 | Status: SHIPPED | OUTPATIENT
Start: 2018-11-14 | End: 2018-12-13 | Stop reason: SDUPTHER

## 2018-11-14 RX ORDER — ACYCLOVIR 400 MG/1
400 TABLET ORAL 2 TIMES DAILY
Qty: 120 TABLET | Refills: 0 | Status: CANCELLED | OUTPATIENT
Start: 2018-11-14 | End: 2019-01-13

## 2018-11-14 RX ORDER — ACYCLOVIR 400 MG/1
400 TABLET ORAL 2 TIMES DAILY
Qty: 120 TABLET | Refills: 0 | Status: SHIPPED | OUTPATIENT
Start: 2018-11-14 | End: 2020-05-20 | Stop reason: ALTCHOICE

## 2018-11-14 NOTE — TELEPHONE ENCOUNTER
CHANGED PREGNANCY TEST TO BLOOD DRAW Q 3 WEEKS  CALLED LAB TO REVIEW IT NEEDS TO BE ADDED TO HER BLOOD DRAW TODAY!

## 2018-11-14 NOTE — TELEPHONE ENCOUNTER
Patient called to confirm time of her follow up appointment this morning with Dr Izzy Velasquez this morning

## 2018-11-14 NOTE — TELEPHONE ENCOUNTER
Lab order pregnancy test was ordered for a urine sample  Ariadna informed, they only do blood testing for pregnancy  Maureen Liang is calling to have this changed to blood test order instead since urine test is not usually done at the lab  Please call back and advise  Thank you
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
## 2018-11-15 LAB
ALBUMIN SERPL ELPH-MCNC: 4.64 G/DL (ref 3.5–5)
ALBUMIN SERPL ELPH-MCNC: 64.4 % (ref 52–65)
ALPHA1 GLOB SERPL ELPH-MCNC: 0.27 G/DL (ref 0.1–0.4)
ALPHA1 GLOB SERPL ELPH-MCNC: 3.8 % (ref 2.5–5)
ALPHA2 GLOB SERPL ELPH-MCNC: 0.73 G/DL (ref 0.4–1.2)
ALPHA2 GLOB SERPL ELPH-MCNC: 10.1 % (ref 7–13)
BETA GLOB ABNORMAL SERPL ELPH-MCNC: 0.42 G/DL (ref 0.4–0.8)
BETA1 GLOB SERPL ELPH-MCNC: 5.9 % (ref 5–13)
BETA2 GLOB SERPL ELPH-MCNC: 3.9 % (ref 2–8)
BETA2+GAMMA GLOB SERPL ELPH-MCNC: 0.28 G/DL (ref 0.2–0.5)
GAMMA GLOB ABNORMAL SERPL ELPH-MCNC: 0.86 G/DL (ref 0.5–1.6)
GAMMA GLOB SERPL ELPH-MCNC: 11.9 % (ref 12–22)
IGG/ALB SER: 1.81 {RATIO} (ref 1.1–1.8)
INTERPRETATION UR IFE-IMP: NORMAL
PROT SERPL-MCNC: 7.2 G/DL (ref 6.4–8.2)

## 2018-11-21 ENCOUNTER — TELEPHONE (OUTPATIENT)
Dept: HEMATOLOGY ONCOLOGY | Facility: CLINIC | Age: 43
End: 2018-11-21

## 2018-11-21 NOTE — TELEPHONE ENCOUNTER
Patient wants to know if a note saying that she "should not work 40 hours/week"  has be sent to her employer yet  Please call and advise  Thanks!

## 2018-11-23 NOTE — TELEPHONE ENCOUNTER
I called patient leaving a voicemail message asking for a return call to get the fax number of her employer so her work note can be faxed  Please obtain fax number patient for employer    Thank you

## 2018-11-26 ENCOUNTER — TELEPHONE (OUTPATIENT)
Dept: HEMATOLOGY ONCOLOGY | Facility: CLINIC | Age: 43
End: 2018-11-26

## 2018-11-26 NOTE — TELEPHONE ENCOUNTER
I contacted patient leaving a voicemail message advising her work letter was put in the mail on Friday but since we go thru a "[employer]" system, it does not go out right away  I encouraged a call back if the patient wanted to  a copy of the letter  I will also wait for patient to call with fax number

## 2018-11-26 NOTE — TELEPHONE ENCOUNTER
Pt returning your call about her work note  States she thought it was going to be mailed to her but hasn't received it yet  Asked if it could be emailed and informed not  Requested work's fax #  States will call back Wednesday with the # but asked if can be mailed if it wasn't

## 2018-11-28 ENCOUNTER — TELEPHONE (OUTPATIENT)
Dept: NEUROLOGY | Facility: CLINIC | Age: 43
End: 2018-11-28

## 2018-11-28 DIAGNOSIS — G56.03 BILATERAL CARPAL TUNNEL SYNDROME: ICD-10-CM

## 2018-11-28 DIAGNOSIS — G35 MULTIPLE SCLEROSIS (HCC): Primary | ICD-10-CM

## 2018-11-28 DIAGNOSIS — M54.16 LUMBAR BACK PAIN WITH RADICULOPATHY AFFECTING RIGHT LOWER EXTREMITY: ICD-10-CM

## 2018-11-28 RX ORDER — GABAPENTIN 300 MG/1
300 CAPSULE ORAL
Qty: 30 CAPSULE | Refills: 2 | Status: SHIPPED | OUTPATIENT
Start: 2018-11-28 | End: 2019-04-03 | Stop reason: ALTCHOICE

## 2018-11-28 NOTE — TELEPHONE ENCOUNTER
Called patient  She did speak with her oncologist who suggested maybe it was her CTS and to call neuro (she had EMG in March 2018 that showed mild CTS)  Patient agreeable to EMG and c-spine MRI  She will also re-discuss with PCP and oncology  Symptoms are painful numbness and tingling, not joint pain, doubt she needs to see rheum for this  I am also going to start her on gabapentin at night  It is on her med list but she is not taking it  She said if we are not too concerned, then she does not need a sooner appt and will keep her appt with you in January  Alejandra,  Please call pt to assist with scheduling EMG and MRI c-spine  Thanks!

## 2018-11-28 NOTE — TELEPHONE ENCOUNTER
Pt called back with a fax # [phone] for her job  Pt asked if I could read to her the note  I could not find her note, but I assured her that I will relate the message to Dr José Miguel Francois team  Please address/ advise  Thanks

## 2018-11-28 NOTE — TELEPHONE ENCOUNTER
[facility], please call pt re her sxs  The painful aspect does not sound like MS  Question neck, cts, or also her history of multiple myeloma  Also question arthritis or rheum joint issue  We can get mri c spine emg emg to help from neurostandpoint, but I think she also needs to call her  pcp, heme/ onc and possibly rheum  [id number] Marly Cade with me to [facility] but she should discuss these sxs with her other teams as well  If anything new or acute, have her go to er

## 2018-11-28 NOTE — TELEPHONE ENCOUNTER
pt called and states that her bilat fingers are numb/painful at night   right greater than left  can't lay down   states that when her arms are down it is better  Having difficulty sleeping due to this  has numbness all the time, but can't tolerate at night  in the morning she has diffiulty bending her fingers and doing tasks with her fingers  states that she had this in the past and then went away but begain again 2 months ago  she is not wearing wrist splints at night  no other new weakness  no visual symptoms  no b and b changes  she has an appt jan 28   she is asking if she needs to be seen sooner  she only wants to see dr Gorge De León  i do not see a sooner appt with dr Gorge De León    she is only taking acyclovir at this time and will be starting revlimid in the next few days   please advise  [phone]

## 2018-11-29 NOTE — TELEPHONE ENCOUNTER
Magno Chavez has been schedule for both MRI and EMG    MRI 12/13/18 @4:00pm [place]   EMG 3/29/19 @ 8:45am [place]     Called patient and gave dates and time and told her will mail orders to her

## 2018-11-30 ENCOUNTER — DOCUMENTATION (OUTPATIENT)
Dept: HEMATOLOGY ONCOLOGY | Facility: CLINIC | Age: 43
End: 2018-11-30

## 2018-11-30 NOTE — TELEPHONE ENCOUNTER
I spoke with patient on Wednesday and read the letter to her  She asked if I could change it to say she could not work MORE than 40 hours  Letter was drafted, signed and faxed to number provided by patient

## 2018-12-12 ENCOUNTER — APPOINTMENT (OUTPATIENT)
Dept: LAB | Facility: CLINIC | Age: 43
End: 2018-12-12
Payer: COMMERCIAL

## 2018-12-12 ENCOUNTER — OFFICE VISIT (OUTPATIENT)
Dept: HEMATOLOGY ONCOLOGY | Facility: CLINIC | Age: 43
End: 2018-12-12
Payer: COMMERCIAL

## 2018-12-12 VITALS
OXYGEN SATURATION: 98 % | RESPIRATION RATE: 16 BRPM | TEMPERATURE: 98.2 F | HEIGHT: 65 IN | BODY MASS INDEX: 22.91 KG/M2 | HEART RATE: 72 BPM | WEIGHT: 137.5 LBS

## 2018-12-12 DIAGNOSIS — C90.00 MULTIPLE MYELOMA NOT HAVING ACHIEVED REMISSION (HCC): Primary | ICD-10-CM

## 2018-12-12 PROCEDURE — 99214 OFFICE O/P EST MOD 30 MIN: CPT | Performed by: INTERNAL MEDICINE
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
## 2018-12-13 DIAGNOSIS — C90.00 MULTIPLE MYELOMA NOT HAVING ACHIEVED REMISSION (HCC): ICD-10-CM

## 2018-12-13 RX ORDER — LENALIDOMIDE 10 MG/1
10 CAPSULE ORAL DAILY
Qty: 28 CAPSULE | Refills: 0 | Status: SHIPPED | OUTPATIENT
Start: 2018-12-13 | End: 2019-01-07 | Stop reason: SDUPTHER

## 2019-01-07 DIAGNOSIS — C90.00 MULTIPLE MYELOMA NOT HAVING ACHIEVED REMISSION (HCC): ICD-10-CM

## 2019-01-08 RX ORDER — LENALIDOMIDE 10 MG/1
10 CAPSULE ORAL DAILY
Qty: 28 CAPSULE | Refills: 0 | Status: SHIPPED | OUTPATIENT
Start: 2019-01-08 | End: 2019-02-04 | Stop reason: SDUPTHER

## 2019-01-12 ENCOUNTER — APPOINTMENT (OUTPATIENT)
Dept: LAB | Facility: HOSPITAL | Age: 44
End: 2019-01-12
Attending: INTERNAL MEDICINE
Payer: COMMERCIAL

## 2019-01-28 ENCOUNTER — OFFICE VISIT (OUTPATIENT)
Dept: NEUROLOGY | Facility: CLINIC | Age: 44
End: 2019-01-28
Payer: COMMERCIAL

## 2019-01-28 VITALS
HEIGHT: 65 IN | DIASTOLIC BLOOD PRESSURE: 63 MMHG | SYSTOLIC BLOOD PRESSURE: 113 MMHG | WEIGHT: 136 LBS | BODY MASS INDEX: 22.66 KG/M2 | HEART RATE: 92 BPM | RESPIRATION RATE: 12 BRPM

## 2019-01-28 DIAGNOSIS — M51.16 LUMBAR DISC HERNIATION WITH RADICULOPATHY: ICD-10-CM

## 2019-01-28 DIAGNOSIS — G56.03 CARPAL TUNNEL SYNDROME, BILATERAL: ICD-10-CM

## 2019-01-28 DIAGNOSIS — G35 MULTIPLE SCLEROSIS (HCC): Primary | ICD-10-CM

## 2019-01-28 PROCEDURE — 99215 OFFICE O/P EST HI 40 MIN: CPT | Performed by: PSYCHIATRY & NEUROLOGY

## 2019-01-28 NOTE — PROGRESS NOTES
Patient ID: Dalia Diallo is a 37 y o  female  Assessment/Plan:    Multiple sclerosis ([facility] [facility] )  Pt here for neuro follow up  Pt last seen in may 2018  Pt has been followed at [facility] and here thru hematology dr Quoc Mcclure over the past yr  Pt had compeltion of chemo in may 2018  Pt had stem cell transplant in June and was treated with cytoxan and also mesna  Pt now currently on revlimid  Pt cont to follow at both [facility] and [facility] from hematology/ oncology  Pt had clearance re her ms meds with [facility] dr parisi and copaxone was stopped prior to stem cell  Pt has remained off med due to relatively benign course and no new sxs since MM dx  Pt had updated mri head, c and t spine done at [facility] in dec 2018  Rev studies with pt at appt  Rev last note from [facility] neurology in sept 2018  Per mri head no significant changes  1  Numerous nonspecific white matter lesions in the brain, greater than expected for age, could potential be demyelinating although other infectious/inflammatory etiologies are also possible  No new lesion  2  No definite cervical or thoracic cord lesions  Questionable tiny lesion at C6/C7 which also may have been present on prior  There remains a question of c cord lesion at c6/7    This was not noted on 2017 c spine film  Pt is getting regular clinical and radiographical surviellance from [facility] ms team  For now, pt remains off copaxone with serial imaging  Pt presently at her baseline  Pt notes int akanksha hand pain and did use gabapentin which helped  Will rec emg of the upper ext and also needs to touch base with neurology at [facility]  Pt already has appt for emg   Pt with sxs right greater than left, under eval for cts as well with worsening related to chemo  Pt also discussed with neuro at [facility], and did not think sxs were related to her cord  Pt had prior emg done in march 2018 with mild cts on the right and borderline cts on the left  pts most recent mri c spine was ordered by us with and without  But when done at [facility] study done without contrast, question of real vs artifact, vs previous c6/7 lesion more difficult to say       Diagnoses and all orders for this visit:    Multiple sclerosis ([address] )    Carpal tunnel syndrome, bilateral    Lumbar disc herniation with radiculopathy           Subjective:    HPI    HPI: Patient is a 37year old female with unremarkable PMH who presented in November 2016 for MS eval pt last seen here on may 2, 2018  Per my last visit " Patient initially seen by hematology, Dr Kian Styles, after being found to have elevated globulin and abnormal SPEP on routine labs  She was diagnosed with IgG lambda MGUS  Due to mentioning paresthesias of her chin and tongue, she was referred to neurology  Patient has seen Dr Chiquita Marc at [facility] for her prior neurologic care  Patient states she did not return to that practice due to complications after a LP and not getting a leave of absence note from their office  She had a post-LP headache and not able to work for about a week after the LP  Patient has noted paresthesias in the arms and legs at times as well  LP completed July 2016  Glucose 64, 0 cells, 1 RBC, Lyme PCR neg, IgG synthesis -5 8, MBP <2, 1 paired band in serum and CSF  Brain W/WO 7/11/16: There are multiple scattered foci of nonenhancing T2 signal hyperintensity at the pericallosal and deep subcortical white matter, having a parallel orientation towards the midline  reminiscent of Schwab's fingers and are in keeping with patients stated history of Multiple Sclerosis  re rev MRI Cervical W/Wo 7/11/16: The cervical cord caliber and signal intensity are normally maintained  There is no abnormal focus of enhancement within the cord  No manifestation of multiple sclerosis are demonstrated at the cervical cord  Moderate multilevel degenerative changes are demonstrated   At C4/C5 there is mild narrowing of the central canal  At C5-C6, C6-C7 there is moderate narrowing of the central canal impinging upon the ventral surface of the descending cervical cord  re rev MRI Thoracic W/WO 7/11/16: Unremarkable evaluation of the thoracic spine and thoracic cord  Patient was diagnosed with MS by previous neurologist but chose not to start on treatment and get a second opinion  Kept above records due to importance of pmh and work up done to date   "       Patient was started on Copaxone by our office in December 2016  Pt had done well with medication as brain necessary  pt also with history of mild right cts and borderline left cts on prior emg eval from march 2018  Pt also followed for acute right L5 radiculopathy as well and sent to pain mx  At timing of prior visit, pt also dx with multiple  Myeloma  Pt is following both at [facility] as well as [facility] for her care  Pt is following with dr Freddy Coleman from [facility] from heme / onc and also went to [facility] for stem cell transplantion   Pt had compeltion of chemo in may 2018  Pt had stem cell transplant in June and was treated with cytoxan and also mesna  Pt now currently on revlimid  Pt had clearance re her ms meds with [facility] dr parisi and copaxone was stopped prior to stem cell  Pt remains off imd med due to benign course to date  Pt has had no new ms sxs since our last visit  No falls or trips  No change in bowel or bladder  No loc  No ha or n or v   No diplopia  No loss of vision  No sz  No recent infections  Pt had updated mri head, c and t spine done at [facility] in dec 2018  Rev studies with pt at [facility] thru care everywhere as studies not sent at time of eval   Also reviewed last note from [facility] neurology in sept 2018  Per mri head no significant changes  1  Numerous nonspecific white matter lesions in the brain, greater than expected for age, could potential be demyelinating although other, infectious/inflammatory etiologies are also possible  No new lesion  2  No definite cervical or thoracic cord lesions   Questionable tiny lesion at C6/C7 which also may have been present on prior     Pt had several questions about this reading  Re rev the report with pt  There remains a question of c cord lesion at c6/7  I looked back on prior study and this was not noted on 2017 c spine film  Pt remains off imd therapy  Pt is getting regular clinical and radiographical surviellance from [facility] ms team   Pt notes int akanksha hand pain and did use gabapentin which helped  Pt has emg already scheduled to compare to last yrs study [facility]ara in light of the mri c spine finding  Pt with sxs right greater than left, under eval for cts as well with worsening related to chemo  Pt also discussed with neuro at [facility], and did not think sxs were related to her cord per pt  Pt follows with her pcp for her vit b12 as well          Total time spent today 45 minutes  Greater than 50% of total time was spent with the patient and / or family counseling and / or coordination of care between 2 institutions as well as 2 subspecialty practices as well as eval of cns vs pns sxs  The following portions of the patient's history were reviewed and updated as appropriate: allergies, current medications, past family history, past medical history, past social history, past surgical history and problem list and ros rev personally  Objective:    Blood pressure 113/63, pulse 92, resp  rate 12, height 5' 5" (1 651 m), weight 61 7 kg (136 lb), not currently breastfeeding  Physical Exam   Constitutional: She appears well-developed and well-nourished  Eyes: Pupils are equal, round, and reactive to light  Lids are normal    Cardiovascular: Intact distal pulses  Neurological: She has normal strength and normal reflexes  Psychiatric: Her speech is normal        Neurological Exam  Mental Status  Awake, alert and oriented to person, place and time  Recent and remote memory are intact  Speech is normal  Language is fluent with no aphasia   Attention and concentration are normal  Fund of knowledge is appropriate for level of education  Cranial Nerves  CN II: Visual acuity is normal  Visual fields full to confrontation  CN III, IV, VI: Extraocular movements intact bilaterally  Normal lids and orbits bilaterally  Pupils equal round and reactive to light bilaterally  CN V: Facial sensation is normal   CN VII: Full and symmetric facial movement  CN VIII: Hearing is normal   CN IX, X: Palate elevates symmetrically  Normal gag reflex  CN XI: Shoulder shrug strength is normal   CN XII: Tongue midline without atrophy or fasciculations  Motor  Normal muscle bulk throughout  Normal muscle tone  No abnormal involuntary movements  Strength is 5/5 throughout all four extremities  Sensory  Sensation is intact to light touch, pinprick, vibration and proprioception in all four extremities  Reflexes  Deep tendon reflexes are 2+ and symmetric in all four extremities with downgoing toes bilaterally  Coordination  Right: Finger-to-nose normal  Rapid alternating movement normal   Left: Finger-to-nose normal  Rapid alternating movement normal     Gait  Casual gait is normal including stance, stride, and arm swing  ROS:    Review of Systems   Constitutional: Negative  Negative for appetite change and fever  HENT: Negative  Negative for hearing loss, tinnitus, trouble swallowing and voice change  Eyes: Negative  Negative for photophobia and pain  Respiratory: Negative  Negative for shortness of breath  Cardiovascular: Negative  Negative for palpitations  Gastrointestinal: Negative  Negative for nausea and vomiting  Endocrine: Negative  Negative for cold intolerance and heat intolerance  Genitourinary: Negative  Negative for dysuria, frequency and urgency  Musculoskeletal: Positive for back pain  Negative for myalgias and neck pain  Skin: Negative  Negative for rash  Neurological: Positive for numbness and headaches   Negative for dizziness, tremors, seizures, syncope, facial asymmetry, speech difficulty, weakness and light-headedness  Hematological: Negative  Does not bruise/bleed easily  Psychiatric/Behavioral: Negative  Negative for confusion, hallucinations and sleep disturbance

## 2019-01-28 NOTE — ASSESSMENT & PLAN NOTE
Pt here for neuro follow up  Pt last seen in may 2018  Pt has been followed at [facility] and here thru hematology dr Axel Eagle over the past yr  Pt had compeltion of chemo in may 2018  Pt had stem cell transplant in June and was treated with cytoxan and also mesna  Pt now currently on revlimid  Pt cont to follow at both [facility] and [facility] from hematology/ oncology  Pt had clearance re her ms meds with [facility] dr parisi and copaxone was stopped prior to stem cell  Pt has remained off med due to relatively benign course and no new sxs since MM dx  Pt had updated mri head, c and t spine done at [place] in dec 2018  Rev studies with pt at appt  Rev last note from [place] neurology in sept 2018  Per mri head no significant changes  1  Numerous nonspecific white matter lesions in the brain, greater than expected for age, could potential be demyelinating although other infectious/inflammatory etiologies are also possible  No new lesion  2  No definite cervical or thoracic cord lesions  Questionable tiny lesion at C6/C7 which also may have been present on prior  There remains a question of c cord lesion at c6/7    This was not noted on 2017 c spine film  Pt is getting regular clinical and radiographical surviellance from [place] ms team  For now, pt remains off copaxone with serial imaging  Pt presently at her baseline  Pt notes int akanksha hand pain and did use gabapentin which helped  Will rec emg of the upper ext and also needs to touch base with neurology at [place]  Pt already has appt for emg   Pt with sxs right greater than left, under eval for cts as well with worsening related to chemo  Pt also discussed with neuro at [place], and did not think sxs were related to her cord  Pt had prior emg done in march 2018 with mild cts on the right and borderline cts on the left  pts most recent mri c spine was ordered by us with and without  But when done at [place] study done without contrast, question of real vs artifact, vs previous c6/7 lesion more difficult to say

## 2019-01-28 NOTE — PATIENT INSTRUCTIONS
Multiple sclerosis ([facility] [address] )  Pt here for neuro follow up  Pt last seen in may 2018  Pt has been followed at [facility] and here thru hematology dr Rozella Schlatter over the past yr  Pt had compeltion of chemo in may 2018  Pt had stem cell transplant in June and was treated with cytoxan and also mesna  Pt now currently on revlimid  Pt cont to follow at both [facility] and [facility] from hematology/ oncology  Pt had clearance re her ms meds with [facility] dr parisi and copaxone was stopped prior to stem cell  Pt has remained off med due to relatively benign course and no new sxs since MM dx  Pt had updated mri head, c and t spine done at [place] in dec 2018  Rev studies with pt at [facility]  Rev last note from [place] neurology in sept 2018  Per mri head no significant changes  1  Numerous nonspecific white matter lesions in the brain, greater than expected for age, could potential be demyelinating although other infectious/inflammatory etiologies are also possible  No new lesion  2  No definite cervical or thoracic cord lesions  Questionable tiny lesion at C6/C7 which also may have been present on prior  There remains a question of c cord lesion at c6/7    This was not noted on 2017 c spine film  Pt is getting regular clinical and radiographical surviellance from [place] ms team  For now, pt remains off copaxone with serial imaging  Pt presently at her baseline

## 2019-01-30 ENCOUNTER — OFFICE VISIT (OUTPATIENT)
Dept: URGENT CARE | Facility: CLINIC | Age: 44
End: 2019-01-30
Payer: COMMERCIAL

## 2019-01-30 ENCOUNTER — APPOINTMENT (OUTPATIENT)
Dept: RADIOLOGY | Facility: CLINIC | Age: 44
End: 2019-01-30
Payer: COMMERCIAL

## 2019-01-30 ENCOUNTER — HOSPITAL ENCOUNTER (EMERGENCY)
Facility: HOSPITAL | Age: 44
Discharge: HOME | End: 2019-01-30
Attending: EMERGENCY MEDICINE
Payer: COMMERCIAL

## 2019-01-30 VITALS
HEART RATE: 92 BPM | WEIGHT: 135 LBS | SYSTOLIC BLOOD PRESSURE: 136 MMHG | BODY MASS INDEX: 22.47 KG/M2 | DIASTOLIC BLOOD PRESSURE: 78 MMHG | TEMPERATURE: 98.3 F | RESPIRATION RATE: 18 BRPM | OXYGEN SATURATION: 98 %

## 2019-01-30 VITALS
BODY MASS INDEX: 22.49 KG/M2 | TEMPERATURE: 99.5 F | HEIGHT: 65 IN | RESPIRATION RATE: 20 BRPM | WEIGHT: 135 LBS | SYSTOLIC BLOOD PRESSURE: 118 MMHG | OXYGEN SATURATION: 97 % | DIASTOLIC BLOOD PRESSURE: 68 MMHG | HEART RATE: 111 BPM

## 2019-01-30 DIAGNOSIS — S16.1XXA CERVICAL STRAIN, ACUTE: Primary | ICD-10-CM

## 2019-01-30 DIAGNOSIS — M25.512 ACUTE PAIN OF LEFT SHOULDER: ICD-10-CM

## 2019-01-30 DIAGNOSIS — M54.41 ACUTE RIGHT-SIDED LOW BACK PAIN WITH RIGHT-SIDED SCIATICA: ICD-10-CM
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
## 2019-01-30 DIAGNOSIS — S29.019A THORACIC MYOFASCIAL STRAIN: ICD-10-CM

## 2019-01-30 DIAGNOSIS — M54.2 NECK PAIN: Primary | ICD-10-CM

## 2019-01-30 PROCEDURE — 99283 EMERGENCY DEPT VISIT LOW MDM: CPT

## 2019-01-30 PROCEDURE — 96372 THER/PROPH/DIAG INJ SC/IM: CPT

## 2019-01-30 PROCEDURE — G0382 LEV 3 HOSP TYPE B ED VISIT: HCPCS | Performed by: PHYSICIAN ASSISTANT

## 2019-01-30 PROCEDURE — 73030 X-RAY EXAM OF SHOULDER: CPT

## 2019-01-30 PROCEDURE — 72040 X-RAY EXAM NECK SPINE 2-3 VW: CPT

## 2019-01-30 RX ORDER — CYCLOBENZAPRINE HCL 10 MG
10 TABLET ORAL 3 TIMES DAILY PRN
Qty: 21 TABLET | Refills: 0 | Status: SHIPPED | OUTPATIENT
Start: 2019-01-30 | End: 2019-02-15 | Stop reason: ALTCHOICE

## 2019-01-30 RX ORDER — KETOROLAC TROMETHAMINE 30 MG/ML
30 INJECTION, SOLUTION INTRAMUSCULAR; INTRAVENOUS ONCE
Status: COMPLETED | OUTPATIENT
Start: 2019-01-30 | End: 2019-01-30

## 2019-01-30 RX ADMIN — KETOROLAC TROMETHAMINE 30 MG: 30 INJECTION, SOLUTION INTRAMUSCULAR at 18:48
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
## 2019-01-31 ENCOUNTER — NURSE TRIAGE (OUTPATIENT)
Dept: PHYSICAL THERAPY | Facility: OTHER | Age: 44
End: 2019-01-31
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
## 2019-02-02 ENCOUNTER — APPOINTMENT (OUTPATIENT)
Dept: LAB | Facility: HOSPITAL | Age: 44
End: 2019-02-02
Attending: INTERNAL MEDICINE
Payer: COMMERCIAL

## 2019-02-02 PROCEDURE — 84165 PROTEIN E-PHORESIS SERUM: CPT

## 2019-02-02 PROCEDURE — 86334 IMMUNOFIX E-PHORESIS SERUM: CPT | Performed by: PATHOLOGY

## 2019-02-02 PROCEDURE — 86334 IMMUNOFIX E-PHORESIS SERUM: CPT

## 2019-02-02 PROCEDURE — 84165 PROTEIN E-PHORESIS SERUM: CPT | Performed by: PATHOLOGY

## 2019-02-04 ENCOUNTER — OFFICE VISIT (OUTPATIENT)
Dept: FAMILY MEDICINE CLINIC | Facility: CLINIC | Age: 44
End: 2019-02-04
Payer: COMMERCIAL

## 2019-02-04 VITALS
DIASTOLIC BLOOD PRESSURE: 64 MMHG | HEART RATE: 102 BPM | BODY MASS INDEX: 22.66 KG/M2 | TEMPERATURE: 99.4 F | WEIGHT: 136 LBS | SYSTOLIC BLOOD PRESSURE: 124 MMHG | OXYGEN SATURATION: 98 % | HEIGHT: 65 IN

## 2019-02-04 VITALS
DIASTOLIC BLOOD PRESSURE: 64 MMHG | OXYGEN SATURATION: 98 % | BODY MASS INDEX: 22.66 KG/M2 | HEIGHT: 65 IN | HEART RATE: 102 BPM | WEIGHT: 136 LBS | TEMPERATURE: 99.4 F | SYSTOLIC BLOOD PRESSURE: 124 MMHG

## 2019-02-04 DIAGNOSIS — E53.8 LOW SERUM VITAMIN B12: ICD-10-CM

## 2019-02-04 DIAGNOSIS — C90.00 MULTIPLE MYELOMA NOT HAVING ACHIEVED REMISSION (HCC): Primary | ICD-10-CM

## 2019-02-04 DIAGNOSIS — Z23 ENCOUNTER FOR IMMUNIZATION: ICD-10-CM

## 2019-02-04 DIAGNOSIS — M54.41 ACUTE BACK PAIN WITH SCIATICA, RIGHT: ICD-10-CM

## 2019-02-04 DIAGNOSIS — E55.9 VITAMIN D DEFICIENCY: ICD-10-CM

## 2019-02-04 DIAGNOSIS — C90.00 MULTIPLE MYELOMA NOT HAVING ACHIEVED REMISSION (HCC): ICD-10-CM

## 2019-02-04 DIAGNOSIS — Z76.89 ENCOUNTER TO ESTABLISH CARE: ICD-10-CM

## 2019-02-04 DIAGNOSIS — G47.00 INSOMNIA, UNSPECIFIED TYPE: ICD-10-CM

## 2019-02-04 DIAGNOSIS — G35 MULTIPLE SCLEROSIS (HCC): ICD-10-CM

## 2019-02-04 DIAGNOSIS — M54.2 NECK PAIN: ICD-10-CM

## 2019-02-04 DIAGNOSIS — Z51.81 MEDICATION MONITORING ENCOUNTER: Primary | ICD-10-CM

## 2019-02-04 DIAGNOSIS — J06.9 VIRAL URI: ICD-10-CM
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
## 2019-02-04 PROCEDURE — 99204 OFFICE O/P NEW MOD 45 MIN: CPT | Performed by: FAMILY MEDICINE

## 2019-02-04 PROCEDURE — 90715 TDAP VACCINE 7 YRS/> IM: CPT

## 2019-02-04 PROCEDURE — 90732 PPSV23 VACC 2 YRS+ SUBQ/IM: CPT

## 2019-02-04 PROCEDURE — 99214 OFFICE O/P EST MOD 30 MIN: CPT | Performed by: FAMILY MEDICINE

## 2019-02-04 PROCEDURE — 90472 IMMUNIZATION ADMIN EACH ADD: CPT

## 2019-02-04 PROCEDURE — 90471 IMMUNIZATION ADMIN: CPT

## 2019-02-04 RX ORDER — CHOLECALCIFEROL (VITAMIN D3) 25 MCG
1 TABLET,CHEWABLE ORAL DAILY
Qty: 100 CAPSULE | Refills: 0 | Status: SHIPPED | OUTPATIENT
Start: 2019-02-04 | End: 2019-06-17 | Stop reason: ALTCHOICE

## 2019-02-04 RX ORDER — ERGOCALCIFEROL (VITAMIN D2) 1250 MCG
50000 CAPSULE ORAL WEEKLY
Qty: 12 CAPSULE | Refills: 0 | Status: SHIPPED | OUTPATIENT
Start: 2019-02-04 | End: 2019-06-17 | Stop reason: ALTCHOICE

## 2019-02-04 RX ORDER — MELOXICAM 15 MG/1
15 TABLET ORAL DAILY
Qty: 30 TABLET | Refills: 2 | Status: SHIPPED | OUTPATIENT
Start: 2019-02-04 | End: 2019-04-03 | Stop reason: ALTCHOICE

## 2019-02-04 RX ORDER — LENALIDOMIDE 10 MG/1
10 CAPSULE ORAL DAILY
Qty: 28 CAPSULE | Refills: 0 | Status: SHIPPED | OUTPATIENT
Start: 2019-02-04 | End: 2019-03-04
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
## 2019-02-04 NOTE — LETTER
February 4, 2019     Patient: Gali Jimenez   YOB: 1975   Date of Visit: 2/4/2019       To Whom it May Concern:    Harrison Adkins is under my professional care  She was seen in my office on 2/4/2019  No work until she is rechecked in 2 weeks  If you have any questions or concerns, please don't hesitate to call           Sincerely,          Levi Angel MD        CC: No Recipients
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
## 2019-02-06 ENCOUNTER — OFFICE VISIT (OUTPATIENT)
Dept: HEMATOLOGY ONCOLOGY | Facility: CLINIC | Age: 44
End: 2019-02-06
Payer: COMMERCIAL

## 2019-02-06 VITALS
BODY MASS INDEX: 22.49 KG/M2 | OXYGEN SATURATION: 96 % | WEIGHT: 135 LBS | HEIGHT: 65 IN | HEART RATE: 93 BPM | RESPIRATION RATE: 18 BRPM

## 2019-02-06 DIAGNOSIS — Z23 ENCOUNTER FOR IMMUNIZATION: ICD-10-CM

## 2019-02-06 DIAGNOSIS — C90.01 MULTIPLE MYELOMA IN REMISSION (HCC): Primary | ICD-10-CM

## 2019-02-06 PROCEDURE — 99214 OFFICE O/P EST MOD 30 MIN: CPT | Performed by: INTERNAL MEDICINE
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
## 2019-02-08 ENCOUNTER — EVALUATION (OUTPATIENT)
Dept: PHYSICAL THERAPY | Facility: CLINIC | Age: 44
End: 2019-02-08
Payer: COMMERCIAL

## 2019-02-08 DIAGNOSIS — M54.2 NECK PAIN: ICD-10-CM

## 2019-02-08 DIAGNOSIS — M54.41 ACUTE BACK PAIN WITH SCIATICA, RIGHT: ICD-10-CM

## 2019-02-08 DIAGNOSIS — M51.16 LUMBAR DISC HERNIATION WITH RADICULOPATHY: Primary | ICD-10-CM

## 2019-02-08 PROCEDURE — 97110 THERAPEUTIC EXERCISES: CPT | Performed by: PHYSICAL THERAPIST

## 2019-02-08 PROCEDURE — 97162 PT EVAL MOD COMPLEX 30 MIN: CPT | Performed by: PHYSICAL THERAPIST
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
## 2019-02-11 ENCOUNTER — OFFICE VISIT (OUTPATIENT)
Dept: PHYSICAL THERAPY | Facility: CLINIC | Age: 44
End: 2019-02-11
Payer: COMMERCIAL

## 2019-02-11 DIAGNOSIS — M51.16 LUMBAR DISC HERNIATION WITH RADICULOPATHY: Primary | ICD-10-CM

## 2019-02-11 DIAGNOSIS — M54.41 ACUTE BACK PAIN WITH SCIATICA, RIGHT: ICD-10-CM

## 2019-02-11 DIAGNOSIS — M54.2 NECK PAIN: ICD-10-CM

## 2019-02-11 PROCEDURE — 97112 NEUROMUSCULAR REEDUCATION: CPT

## 2019-02-11 PROCEDURE — 97110 THERAPEUTIC EXERCISES: CPT

## 2019-02-11 NOTE — PROGRESS NOTES
Daily Note     Today's date: 2019  Patient name: Corrina Ghosh  : 1975  MRN: [id number]  Referring provider: Gissel Nicole MD  Dx:   Encounter Diagnosis     ICD-10-CM    1  Lumbar disc herniation with radiculopathy M51 16    2  Neck pain M54 2    3  Acute back pain with sciatica, right M54 41                   Subjective: Pt c/o 7/10 pain in R buttock and R ankle  She states that she is having a hard time moving around this morning  Objective: See treatment diary below  Precautions: MS, Bilateral carpal tunnel syndrome, Multiple Myeloma      Daily Treatment Diary      Manual                                                                                                                                                      Exercise Diary                     Treadmill    1 0mph 10'                   Iso scap ret     5" 30x                   C/S lat flex HEP  20x                   C/S rot  str  HEP  20x                   Prone press ups HEP  5" 10x                   Supine TA activation    5" 20x                   Supine TA with marches    30x                                                                                                                                                                                                                                                                                                                                                 Modalities                         prn  10'                                                                            Assessment: Pt had no change in pain throughout nor post treatment today despite noting feeling less stiff in her LB  She was educated on HEP including postural alignment to decrease pain  She was advised to continue with HEP including mobility to limit tension and stiffness  Plan: Progress treatment as tolerated

## 2019-02-14 ENCOUNTER — OFFICE VISIT (OUTPATIENT)
Dept: PHYSICAL THERAPY | Facility: CLINIC | Age: 44
End: 2019-02-14
Payer: COMMERCIAL

## 2019-02-14 DIAGNOSIS — M51.16 LUMBAR DISC HERNIATION WITH RADICULOPATHY: Primary | ICD-10-CM

## 2019-02-14 DIAGNOSIS — M54.41 ACUTE BACK PAIN WITH SCIATICA, RIGHT: ICD-10-CM

## 2019-02-14 DIAGNOSIS — M54.2 NECK PAIN: ICD-10-CM

## 2019-02-14 PROCEDURE — 97140 MANUAL THERAPY 1/> REGIONS: CPT | Performed by: PHYSICAL THERAPIST

## 2019-02-14 PROCEDURE — 97112 NEUROMUSCULAR REEDUCATION: CPT | Performed by: PHYSICAL THERAPIST

## 2019-02-14 PROCEDURE — 97110 THERAPEUTIC EXERCISES: CPT | Performed by: PHYSICAL THERAPIST

## 2019-02-14 NOTE — PROGRESS NOTES
Daily Note     Today's date: 2019  Patient name: Darlene Cisneros  : 1975  MRN: [id number]  Referring provider: Selina Goodwin MD  Dx:   Encounter Diagnosis     ICD-10-CM    1  Lumbar disc herniation with radiculopathy M51 16    2  Neck pain M54 2    3  Acute back pain with sciatica, right M54 41                   Subjective: Patient stated right calf pain 7/10 prior to treatment; C/S pain 5/10 prior to treatment session  Objective: See treatment diary below  Precautions: MS, Bilateral carpal tunnel syndrome, Multiple Myeloma      Daily Treatment Diary      Manual                         Prone P/A and Right P/A mob ; right LE LAD      KK                                                                                                                       Exercise Diary                   Treadmill    1 0mph 10'  1 0  Mph 10'                 Iso scap ret     5" 30x 5" 30x                  C/S lat flex HEP  20x  3x20"                 C/S rot  str  HEP  20x  3x20"                 Prone press ups HEP  5" 10x  2x10                 Supine TA activation    5" 20x  5" 20x                 Supine TA with marches    30x  30x                 Right side glides against wall       10x                                                                                                                                                                                                                                                                                                                       Modalities                      MH prn  10'  10' in sitting                                                                          Assessment: Patient demonstrated no change in right LE radicular symptoms with manual intervention; patient stated no change in right LE radicular symptoms with prone press ups or side glides; patient stated no significant change in pain level after MH application  Plan: Progress treatment as tolerated

## 2019-02-15 ENCOUNTER — OFFICE VISIT (OUTPATIENT)
Dept: FAMILY MEDICINE CLINIC | Facility: CLINIC | Age: 44
End: 2019-02-15
Payer: COMMERCIAL

## 2019-02-15 VITALS
SYSTOLIC BLOOD PRESSURE: 118 MMHG | WEIGHT: 137.4 LBS | HEART RATE: 82 BPM | BODY MASS INDEX: 22.89 KG/M2 | HEIGHT: 65 IN | TEMPERATURE: 99.3 F | DIASTOLIC BLOOD PRESSURE: 74 MMHG | OXYGEN SATURATION: 98 %

## 2019-02-15 DIAGNOSIS — M54.2 CERVICAL MUSCLE PAIN: ICD-10-CM

## 2019-02-15 DIAGNOSIS — M62.830 SPASM OF LUMBAR PARASPINOUS MUSCLE: Primary | ICD-10-CM

## 2019-02-15 PROCEDURE — 99214 OFFICE O/P EST MOD 30 MIN: CPT | Performed by: FAMILY MEDICINE

## 2019-02-15 RX ORDER — METAXALONE 800 MG/1
800 TABLET ORAL
Qty: 21 TABLET | Refills: 0 | Status: SHIPPED | OUTPATIENT
Start: 2019-02-15 | End: 2019-04-03 | Stop reason: ALTCHOICE
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
## 2019-02-15 NOTE — LETTER
February 15, 2019     Patient: Dakota Hopkins   YOB: 1975   Date of Visit: 2/15/2019       To Whom it May Concern:    Brandy Garcia is under my professional care  She was seen in my office on 2/15/2019  She may return to work on 3/11/19  If you have any questions or concerns, please don't hesitate to call           Sincerely,          Asha Acuna MD        CC: No Recipients

## 2019-02-18 ENCOUNTER — OFFICE VISIT (OUTPATIENT)
Dept: PHYSICAL THERAPY | Facility: CLINIC | Age: 44
End: 2019-02-18
Payer: COMMERCIAL

## 2019-02-18 DIAGNOSIS — M54.2 NECK PAIN: ICD-10-CM

## 2019-02-18 DIAGNOSIS — M51.16 LUMBAR DISC HERNIATION WITH RADICULOPATHY: Primary | ICD-10-CM

## 2019-02-18 DIAGNOSIS — M54.41 ACUTE BACK PAIN WITH SCIATICA, RIGHT: ICD-10-CM

## 2019-02-18 PROCEDURE — 97112 NEUROMUSCULAR REEDUCATION: CPT

## 2019-02-18 PROCEDURE — 97110 THERAPEUTIC EXERCISES: CPT

## 2019-02-18 NOTE — PROGRESS NOTES
Daily Note     Today's date: 2019  Patient name: Stu Deshpande  : 1975  MRN: [id number]  Referring provider: Grace Davies MD  Dx:   Encounter Diagnosis     ICD-10-CM    1  Lumbar disc herniation with radiculopathy M51 16    2  Neck pain M54 2    3  Acute back pain with sciatica, right M54 41                   Subjective: Pt c/o 5/10 pain in LB and cervical region today  She states that she is not having any radicular symptoms today  Objective: See treatment diary below  Precautions: MS, Bilateral carpal tunnel syndrome, Multiple Myeloma      Daily Treatment Diary      Manual                         Prone P/A and Right P/A mob ; right LE LAD      KK                                                                                                                       Exercise Diary                 Treadmill    1 0mph 10'  1 0  Mph 10'  1mph 10'               Iso scap ret     5" 30x 5" 30x   5" 30x               C/S lat flex HEP  20x  3x20"  20" 3x               C/S rot  str  HEP  20x  3x20"  20" 3x               Prone press ups HEP  5" 10x  2x10  2x10               Supine TA activation    5" 20x  5" 20x  5" 20x               Supine TA with marches    30x  30x  30x               Right side glides against wall       10x                                                                                                                                                                                                                                                                                                                       Modalities                    MH prn  10'  10' in sitting  10'in sitting                                                                       Assessment: Pt had no change in pain throughout nor post treatment today despite noting having less tension  She was educated on postural alignment including scapular retraction   She was advised to use heat outside of therapy to help with pain  Plan: Progress treatment as tolerated

## 2019-02-19 ENCOUNTER — OFFICE VISIT (OUTPATIENT)
Dept: PHYSICAL THERAPY | Facility: CLINIC | Age: 44
End: 2019-02-19
Payer: COMMERCIAL

## 2019-02-19 DIAGNOSIS — M54.41 ACUTE BACK PAIN WITH SCIATICA, RIGHT: ICD-10-CM

## 2019-02-19 DIAGNOSIS — M51.16 LUMBAR DISC HERNIATION WITH RADICULOPATHY: Primary | ICD-10-CM

## 2019-02-19 DIAGNOSIS — M54.2 NECK PAIN: ICD-10-CM

## 2019-02-19 PROCEDURE — 97112 NEUROMUSCULAR REEDUCATION: CPT

## 2019-02-19 PROCEDURE — 97110 THERAPEUTIC EXERCISES: CPT

## 2019-02-19 NOTE — PROGRESS NOTES
Daily Note     Today's date: 2019  Patient name: Ryan Luis  : 1975  MRN: [id number]  Referring provider: Maryellen Burrows MD  Dx:   Encounter Diagnosis     ICD-10-CM    1  Lumbar disc herniation with radiculopathy M51 16    2  Neck pain M54 2    3  Acute back pain with sciatica, right M54 41                   Subjective: Pt c/o 7/10 pain in LB and neck today  She states that she is worse today than yesterday because of the weather  Objective: See treatment diary below  Precautions: MS, Bilateral carpal tunnel syndrome, Multiple Myeloma      Daily Treatment Diary      Manual                         Prone P/A and Right P/A mob ; right LE LAD      KK                                                                                                                       Exercise Diary               Treadmill    1 0mph 10'  1 0  Mph 10'  1mph 10'  1 0mph 10'             Iso scap ret     5" 30x 5" 30x   5" 30x  5" 30x             C/S lat flex HEP  20x  3x20"  20" 3x  20" 3x             C/S rot  str  HEP  20x  3x20"  20" 3x  20" 3x             Prone press ups HEP  5" 10x  2x10  2x10  2x10             Supine TA activation    5" 20x  5" 20x  5" 20x  5" 20x             Supine TA with marches    30x  30x  30x  30x             Right side glides against wall       10x                                                                                                                                                                                                                                                                                                                       Modalities                    MH prn  10'  10' in sitting  10'in sitting                                                                       Assessment: Pt had no change in pain throughout nor post treatment today despite feeling less tension in her LB   She requested not have [address] today as she feels it may have made her dizzy later in the day at the grocery store  She was educated on postural alignment including scapular retraction to decrease pain outside of therapy  Plan: Progress treatment as tolerated

## 2019-02-20 ENCOUNTER — APPOINTMENT (OUTPATIENT)
Dept: PHYSICAL THERAPY | Facility: CLINIC | Age: 44
End: 2019-02-20
Payer: COMMERCIAL

## 2019-02-25 ENCOUNTER — OFFICE VISIT (OUTPATIENT)
Dept: PHYSICAL THERAPY | Facility: CLINIC | Age: 44
End: 2019-02-25
Payer: COMMERCIAL

## 2019-02-25 DIAGNOSIS — M54.2 NECK PAIN: ICD-10-CM

## 2019-02-25 DIAGNOSIS — M54.41 ACUTE BACK PAIN WITH SCIATICA, RIGHT: ICD-10-CM

## 2019-02-25 DIAGNOSIS — M51.16 LUMBAR DISC HERNIATION WITH RADICULOPATHY: Primary | ICD-10-CM

## 2019-02-25 PROCEDURE — 97112 NEUROMUSCULAR REEDUCATION: CPT | Performed by: PHYSICAL THERAPIST

## 2019-02-25 PROCEDURE — 97110 THERAPEUTIC EXERCISES: CPT | Performed by: PHYSICAL THERAPIST

## 2019-02-25 NOTE — PROGRESS NOTES
Daily Note     Today's date: 2019  Patient name: José Kendall  : 1975  MRN: [id number]  Referring provider: Viola Garcia MD  Dx:   Encounter Diagnosis     ICD-10-CM    1  Lumbar disc herniation with radiculopathy M51 16    2  Neck pain M54 2    3  Acute back pain with sciatica, right M54 41                   Subjective: Reports her pain levels has remained unchanged, no improvement yet  Pain located in her R LE today  Objective: See treatment diary below    Precautions: MS, Bilateral carpal tunnel syndrome, Multiple Myeloma      Daily Treatment Diary      Manual                       Prone P/A and Right P/A mob ; right LE LAD      KK                  C-spine lateral glides        DD                                                                                             Exercise Diary             Treadmill    1 0mph 10'  1 0  Mph 10'  1mph 10'  1 0mph 10'  1 0mph 10'           Iso scap ret     5" 30x 5" 30x   5" 30x  5" 30x   5" 30x           C/S lat flex HEP  20x  3x20"  20" 3x  20" 3x  Done manualy           C/S rot  str  HEP  20x  3x20"  20" 3x  20" 3x   Done Manually           Prone press ups HEP  5" 10x  2x10  2x10  2x10  10x w 10s hold           Supine TA activation    5" 20x  5" 20x  5" 20x  5" 20x  5" 20x           Supine TA with marches    30x  30x  30x  30x 30x           Right side glides against wall       10x      10x                                                                                                                                                                                                                                                                                                                 Modalities                    MH prn  10'  10' in sitting  10'in sitting                                                                       Assessment: Tolerated treatment fair   Demonstrated significant muscle guarding in RLE today in response to pain, which she states she has had before  Had difficulty isolating transverse abdominus muscle with compensation of superficial musculature, some improvement with verbal/tactile cues  Pt unable to SB and rotate more than a few degrees due to midline pain  Performed mobilizations followed by manual PROM which significantly improved available motion  No centralization of pain with prone press up  Would benefit from continued PT  Plan: Progress treatment as tolerated

## 2019-02-27 ENCOUNTER — OFFICE VISIT (OUTPATIENT)
Dept: PHYSICAL THERAPY | Facility: CLINIC | Age: 44
End: 2019-02-27
Payer: COMMERCIAL

## 2019-02-27 DIAGNOSIS — M54.2 NECK PAIN: ICD-10-CM

## 2019-02-27 DIAGNOSIS — M54.41 ACUTE BACK PAIN WITH SCIATICA, RIGHT: ICD-10-CM

## 2019-02-27 DIAGNOSIS — M51.16 LUMBAR DISC HERNIATION WITH RADICULOPATHY: Primary | ICD-10-CM

## 2019-02-27 PROCEDURE — 97112 NEUROMUSCULAR REEDUCATION: CPT

## 2019-02-27 PROCEDURE — 97110 THERAPEUTIC EXERCISES: CPT

## 2019-02-27 NOTE — PROGRESS NOTES
Daily Note     Today's date: 2019  Patient name: Christina Garibay  : 1975  MRN: [id number]  Referring provider: Elizabeth Mendez MD  Dx:   Encounter Diagnosis     ICD-10-CM    1  Lumbar disc herniation with radiculopathy M51 16    2  Neck pain M54 2    3  Acute back pain with sciatica, right M54 41                   Subjective: Pt c/o 5/10 pain in LB with RLE stiffness  She states that she has not seen a change yet since beginning therapy  Objective: See treatment diary below  Precautions: MS, Bilateral carpal tunnel syndrome, Multiple Myeloma      Daily Treatment Diary      Manual                       Prone P/A and Right P/A mob ; right LE LAD      KK                  C-spine lateral glides        DD                                                                                             Exercise Diary           Treadmill    1 0mph 10'  1 0  Mph 10'  1mph 10'  1 0mph 10'  1 0mph 10'  1 0 mph 10'         Iso scap ret     5" 30x 5" 30x   5" 30x  5" 30x   5" 30x  5" 30x         C/S lat flex HEP  20x  3x20"  20" 3x  20" 3x  Done manualy  20" 3x         C/S rot  str   HEP  20x  3x20"  20" 3x  20" 3x   Done Manually  20" 3x         Prone press ups HEP  5" 10x  2x10  2x10  2x10  10x w 10s hold  10" 10x         Supine TA activation    5" 20x  5" 20x  5" 20x  5" 20x  5" 20x  5" 20x         Supine TA with marches    30x  30x  30x  30x 30x  30x         Right side glides against wall       10x      10x  NP                                                                                                                                                                                                                                                                                                               Modalities                    MH prn  10'  10' in sitting  10'in sitting                                                               Assessment: Pt will have a re-evaluation next Friday and at that point has a desire to be put on hold from therapy until after she has injections as they helped to ease her pain last time  She was educated on HEP including postural alignment with scapular retraction to decrease neck pain  Plan: Progress treatment as tolerated

## 2019-03-04 DIAGNOSIS — C90.00 MULTIPLE MYELOMA NOT HAVING ACHIEVED REMISSION (HCC): ICD-10-CM

## 2019-03-04 RX ORDER — LENALIDOMIDE 10 MG/1
10 CAPSULE ORAL DAILY
Qty: 28 CAPSULE | Refills: 0 | Status: SHIPPED | OUTPATIENT
Start: 2019-03-04 | End: 2019-04-01

## 2019-03-06 ENCOUNTER — OFFICE VISIT (OUTPATIENT)
Dept: PHYSICAL THERAPY | Facility: CLINIC | Age: 44
End: 2019-03-06
Payer: COMMERCIAL

## 2019-03-06 ENCOUNTER — APPOINTMENT (OUTPATIENT)
Dept: LAB | Facility: CLINIC | Age: 44
End: 2019-03-06
Payer: COMMERCIAL

## 2019-03-06 DIAGNOSIS — M54.2 NECK PAIN: ICD-10-CM

## 2019-03-06 DIAGNOSIS — M54.41 ACUTE BACK PAIN WITH SCIATICA, RIGHT: ICD-10-CM

## 2019-03-06 DIAGNOSIS — M51.16 LUMBAR DISC HERNIATION WITH RADICULOPATHY: Primary | ICD-10-CM

## 2019-03-06 PROCEDURE — 97110 THERAPEUTIC EXERCISES: CPT

## 2019-03-06 NOTE — PROGRESS NOTES
Daily Note     Today's date: 3/6/2019  Patient name: Celestino Cockayne  : 1975  MRN: [id number]  Referring provider: Dorina Ness MD  Dx:   Encounter Diagnosis     ICD-10-CM    1  Lumbar disc herniation with radiculopathy M51 16    2  Neck pain M54 2    3  Acute back pain with sciatica, right M54 41                   Subjective: Pt c/o pain in the lower back on the R 4/10 pain  Pressure pain  No intense pain  Objective: See treatment diary below  Precautions: MS, Bilateral carpal tunnel syndrome, Multiple Myeloma      Daily Treatment Diary      Manual         3              Prone P/A and Right P/A mob ; right LE LAD      KK    nt              C-spine lateral glides        DD  nt                                                                                           Exercise Diary    36       Treadmill    1 0mph 10'  1 0  Mph 10'  1mph 10'  1 0mph 10'  1 0mph 10'  1 0 mph 10'  1 0 MPH 10'       Iso scap ret     5" 30x 5" 30x   5" 30x  5" 30x   5" 30x  5" 30x  5" 30x       C/S lat flex HEP  20x  3x20"  20" 3x  20" 3x  Done manualy  20" 3x  20" x 3       C/S rot  str   HEP  20x  3x20"  20" 3x  20" 3x   Done Manually  20" 3x  20 " x 3       Prone press ups HEP  5" 10x  2x10  2x10  2x10  10x w 10s hold  10" 10x  10" 10x       Supine TA activation    5" 20x  5" 20x  5" 20x  5" 20x  5" 20x  5" 20x  5' 20x       Supine TA with marches    30x  30x  30x  30x 30x  30x  30x        Right side glides against wall       10x      10x  NP                                                                                                                                                                                                                                                                                                               Modalities     3/6               MH prn  10'  10' in sitting  10'in sitting                                                                        Assessment:  Continued with treatment plan, no increase or decrease in pain noticed t/o today session  Pt reported she does not know if therapy is helping or not but,  wishes she could go and get another injection to decrease LBP  Post treatment session, P! The same no changes  Plan: Progress treatment as tolerated

## 2019-03-07 ENCOUNTER — TRANSCRIBE ORDERS (OUTPATIENT)
Dept: ADMINISTRATIVE | Facility: HOSPITAL | Age: 44
End: 2019-03-07

## 2019-03-08 ENCOUNTER — EVALUATION (OUTPATIENT)
Dept: PHYSICAL THERAPY | Facility: CLINIC | Age: 44
End: 2019-03-08
Payer: COMMERCIAL

## 2019-03-08 ENCOUNTER — OFFICE VISIT (OUTPATIENT)
Dept: FAMILY MEDICINE CLINIC | Facility: CLINIC | Age: 44
End: 2019-03-08
Payer: COMMERCIAL

## 2019-03-08 VITALS
DIASTOLIC BLOOD PRESSURE: 64 MMHG | WEIGHT: 137 LBS | HEIGHT: 65 IN | BODY MASS INDEX: 22.82 KG/M2 | TEMPERATURE: 96.9 F | OXYGEN SATURATION: 99 % | SYSTOLIC BLOOD PRESSURE: 112 MMHG | HEART RATE: 76 BPM

## 2019-03-08 DIAGNOSIS — M54.2 NECK PAIN: ICD-10-CM

## 2019-03-08 DIAGNOSIS — M54.41 ACUTE BACK PAIN WITH SCIATICA, RIGHT: ICD-10-CM

## 2019-03-08 DIAGNOSIS — M51.16 LUMBAR DISC HERNIATION WITH RADICULOPATHY: Primary | ICD-10-CM

## 2019-03-08 DIAGNOSIS — M25.512 LEFT SHOULDER PAIN, UNSPECIFIED CHRONICITY: ICD-10-CM

## 2019-03-08 DIAGNOSIS — R10.10 UPPER ABDOMINAL PAIN: ICD-10-CM

## 2019-03-08 DIAGNOSIS — N91.2 AMENORRHEA: ICD-10-CM

## 2019-03-08 PROCEDURE — 97110 THERAPEUTIC EXERCISES: CPT | Performed by: PHYSICAL THERAPIST

## 2019-03-08 PROCEDURE — 99214 OFFICE O/P EST MOD 30 MIN: CPT | Performed by: FAMILY MEDICINE

## 2019-03-08 PROCEDURE — 1036F TOBACCO NON-USER: CPT | Performed by: FAMILY MEDICINE

## 2019-03-08 PROCEDURE — 97164 PT RE-EVAL EST PLAN CARE: CPT | Performed by: PHYSICAL THERAPIST

## 2019-03-08 PROCEDURE — 3008F BODY MASS INDEX DOCD: CPT | Performed by: FAMILY MEDICINE

## 2019-03-08 NOTE — PROGRESS NOTES
PT Re-Eval     Today's date: 3/8/2019  Patient name: Nikky Xavier  : 1975  MRN: [id number]  Referring provider: Severa Snell, MD  Dx:   Encounter Diagnosis     ICD-10-CM    1  Lumbar disc herniation with radiculopathy M51 16    2  Neck pain M54 2    3  Acute back pain with sciatica, right M54 41                   Assessment  Assessment details: Results of re-eval suggest pt has not made significant improvements with LBP since eval day  Pain levels persist, with no significant change in trunk AROM or strength  Pt does show some improvement in cervical AROM, and reports less discomfort than before, but still is significantly limited by LBP  No significant change in functional ability as of today  Based of patient's lack of progress along with prasanth's report of poor response to PT in the past, is likely not a good candidate for PT at this time  Pt will be following up with PCP today and was advised to discuss further medical intervention  Was also advised to get in contact with her pain management specialist again to discuss possible injections or other medical intervention  Instructed pt to continue neck stretches for cervical ROM, and to stay moving and perform HEP if tolerated  Was also told that PT may be more beneficial in the future if her pain levels become more manageable, and that she may benefit form PT for the L shoulder  Impairments: abnormal or restricted ROM, impaired physical strength and pain with function    Symptom irritability: highBarriers to therapy: Poor response to treatment     Goals  Short term goals - to be achieved in 4 weeks:    Decrease pain 20-50%  NOT MET    Increase strength by 1/2 grade  NOT MET    Improve L/S ROM by 25%  NOT MET    Improve C/S ROM by 25%  Partially Met   Long term goals - to be achieved by discharge:    Lifting is improved to maximal level of function  NOT MET    Performance in related work activities is improved to maximal level of function    NOT MET IADL performance in related activities is improved to maximal level of function  NOT MET    Sitting tolerance is improved to maximal level of function  NOT MET    Standing tolerance is improved to maximal level of function  NOT MET    Driving tolerance is improved to maximal level of function  NOT MET     Plan  Plan details: D/c from skilled PT today  Advised to return to PT if her pain levels become more manageable with possible medical intervention  Pt was in agreement to d/c   Treatment plan discussed with: patient        Subjective Evaluation    History of Present Illness  Mechanism of injury: Pt states needs to leave a few minutes early for doctors appointment  Denies any change with her tolerance to lfting, sitting, standing, and driving  Does not feel she can find a position of comfort and the only thing she can do is wait for the pain to improve  Denies any centralizing of RLE pain which is located  Does report some improvement with her neck mobility and pain  Is not back to work yet due to inability to tolerate lifting objects  Is going to see her family doctor later today for a follow up, not currently seeing an orthopedic or spine specialist currently  Has had injections from pain management specialist for the low back about a year ago for pain which were helpful  Still denies numbness or tingling or weakness in LE's or UE's  Also reports she has been having L shoulder pain but does not wish to work on this at this time  Pain  Current pain ratin  At best pain ratin  At worst pain rating: 10  Pain location: C/S - 5-/10, L/S - 7-10/10  Quality: radiating, sharp, knife-like and tight  Aggravating factors: sitting (Driving, washing and dressing)    Patient Goals  Patient goals for therapy: return to work, decreased pain and increased motion        Objective:    Lumbar AROM: significant limitation still present in flexion, ext, rotation, and side bending   Compensates with hips to perform rotation  Pain in all plains  Trunk MMT: 3+/5 flex and ext (pain)    Cervical AROM  Flex: 40  Ext: 38    Rot R: 62  Rot L: 65    R SB 20  L SB 15    Spurling A test: neg  Cervical compression test: neg      Shoulder MMT  Abd: 3+/5 pain  Flexion 3+/5 pain    Shoulder is not TTP, and pain is worse with resting arm overhead, pain not changed with cervical AROM  No signs of cervical radiculopathy  Flowsheet Rows      Most Recent Value   PT/OT G-Codes   Current Score  32   Projected Score  53          Precautions: MS, Bilateral carpal tunnel syndrome, Multiple Myeloma     Daily Treatment Diary      Manual       2/14  2/25  3/6              Prone P/A and Right P/A mob ; right LE LAD      KK    nt              C-spine lateral glides        DD  nt                                                                                         *Spent 10 min reviewing HEP and pain management strategies at home  Exercise Diary  2/8  2/11  2/14  2/18  2/19  2/25  2/27  3/6  3/8     Treadmill    1 0mph 10'  1 0  Mph 10'  1mph 10'  1 0mph 10'  1 0mph 10'  1 0 mph 10'  1 0 MPH 10'       Iso scap ret     5" 30x 5" 30x   5" 30x  5" 30x   5" 30x  5" 30x  5" 30x       C/S lat flex HEP  20x  3x20"  20" 3x  20" 3x  Done manualy  20" 3x  20" x 3       C/S rot  str   HEP  20x  3x20"  20" 3x  20" 3x   Done Manually  20" 3x  20 " x 3       Prone press ups HEP  5" 10x  2x10  2x10  2x10  10x w 10s hold  10" 10x  10" 10x       Supine TA activation    5" 20x  5" 20x  5" 20x  5" 20x  5" 20x  5" 20x  5' 20x       Supine TA with marches    30x  30x  30x  30x 30x  30x  30x        Right side glides against wall       10x      10x  NP                                                                                                                                                                                                                                                                                                             Modalities   2/11 2/14 2/18  3/6                prn  10'  10' in sitting  10'in sitting
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
## 2019-03-08 NOTE — LETTER
March 8, 2019     Patient: Corrina Hair   YOB: 1975   Date of Visit: 3/8/2019       To Whom it May Concern:    Chalo Tucker is under my professional care  She was seen in my office on 3/8/2019  She may return to work on 3/11/19  No heavy lifting  She should not lift anything heavier than 10 pounds  If you have any questions or concerns, please don't hesitate to call           Sincerely,          Kike May MD        CC: No Recipients

## 2019-03-12 ENCOUNTER — TELEPHONE (OUTPATIENT)
Dept: FAMILY MEDICINE CLINIC | Facility: CLINIC | Age: 44
End: 2019-03-12

## 2019-03-12 NOTE — TELEPHONE ENCOUNTER
Deyanira Vides from [employer] called regarding patient and her RTW letter - they need to why and how long the restrictions are for      We can call them @ [phone] option 1 then 2 or we can fax an updated letter to [phone] with her first and last name as well as her case #

## 2019-03-13 ENCOUNTER — APPOINTMENT (OUTPATIENT)
Dept: PHYSICAL THERAPY | Facility: CLINIC | Age: 44
End: 2019-03-13
Payer: COMMERCIAL

## 2019-03-22 ENCOUNTER — HOSPITAL ENCOUNTER (OUTPATIENT)
Dept: NEUROLOGY | Facility: CLINIC | Age: 44
Discharge: HOME | End: 2019-03-22
Payer: COMMERCIAL

## 2019-03-22 DIAGNOSIS — G56.03 BILATERAL CARPAL TUNNEL SYNDROME: ICD-10-CM

## 2019-03-22 PROCEDURE — 95913 NRV CNDJ TEST 13/> STUDIES: CPT | Performed by: PHYSICAL MEDICINE & REHABILITATION

## 2019-03-22 PROCEDURE — 95886 MUSC TEST DONE W/N TEST COMP: CPT | Performed by: PHYSICAL MEDICINE & REHABILITATION

## 2019-03-22 NOTE — PROCEDURES
[address] [facility] [address] [facility] [address]  [address]   [phone]        Name: Dasie Babinski  Patient ID: [id number]   Age: 37 Years 8 Months  YOB: 1975   Account Number:    Gender: Female   Technologist: Agueda Jose Daniel   Date of Exam: 3/22/2019 08:29   Referring Physician: Trevor Ziegler PA-C  Temperature: 32° C    Examining Physician: Ned Homans M D  Height: 5 feet 5 inch               Patient History:   38 y/o woman is having numbness and tingling in the hands  Patient had previous EMG which found carpal tunnel syndrome  She reports right worse than left hand numbness, affecting all fingers diffusely  Numbness radiates up forearm and may affect whole arm  Also with neck pain, left worse than right, that radiates to shoulders  Patient is being re-evaluated for carpal tunnel syndrome  5/5 bilateral UE throughout  +phalen right         MNC      Nerve / Sites Muscle Latency Ref  Amplitude Ref  Duration Rel Amp Distance Lat Diff Velocity Ref  ms ms mV mV ms % mm ms m/s m/s   R Median - APB      Wrist APB 4 48 ?4 20 9 5 ?4 0 4 69 100 70         Elbow APB 9 11  9 8  4 69 103 250 4 64 54 ?50   L Median - APB      Wrist APB 3 44 ?4 20 8 9 ?4 0 5 05 100 70         Elbow APB 7 55  7 3  5 16 82 6 240 4 11 58 ?50   R Ulnar - ADM      Wrist ADM 1 98 ?3 30 8 1 ?5 0 6 20 100 70         B  Elbow ADM 5 42  7 0  6 56 87 230 3 44 67 ?50      A  Elbow ADM 6 77  7 2  6 20 102 110 1 35 81 ?49            4 79     L Ulnar - ADM      Wrist ADM 2 03 ?3 30 7 4 ?5 0 5 57 100 70         B  Elbow ADM 5 26  7 0  6 15 94 240 3 23 74 ?50      A  Elbow ADM 6 51  6 9  5 73 98 6 100 1 25 80 ?49            4 48         SNC      Nerve / Sites Rec  Site Onset Lat Peak Lat Ref  Amp Ref  Distance Peak Diff Ref  Velocity Ref       ms ms ms µV µV mm ms ms m/s m/s   R Median - Digit II (Antidromic)      Wrist Dig II 3 18 4 06 ?3 50 18 4 ?10 0 130   41 ?50   L Median - Digit II (Antidromic)      Wrist Dig II 2  08 2 86 ?3 50 31 8 ?10 0 130   62 ?50   R Ulnar - Digit V (Antidromic)      Wrist Dig V 1 82 2 50 ?3 10 19 3 ?10 0 110   60 ?50   L Ulnar - Digit V (Antidromic)      Wrist Dig V 1 67 2 29 ?3 10 28 6 ?10 0 110   66 ?50   R Radial - Anatomical snuff box (Forearm)      Forearm Wrist 1 25 1 77 ?2 90 34 0 ?10 0 100   80 ?50   L Radial - Anatomical snuff box (Forearm)      Forearm Wrist 1 41 1 93 ?2 90 33 7 ?10 0 100   71 ?50   R Median, Ulnar - Transcarpal comparison      Median Palm Wrist 1 93 2 66 ?2 20 62 0 ?50 0 80   42       Ulnar Palm Wrist 1 15 1 46 ?2 20 43 0 ?12 0 80   70            1 20 ?0 40     L Median, Ulnar - Transcarpal comparison      Median Palm Wrist 1 46 1 93 ?2 20 100 2 ?50 0 80   55       Ulnar Palm Wrist 1 15 1 61 ?2 20 27 6 ? 12 0 80   70            0 31 ?0 40     R Median, Radial - Thumb comparison      Median Wrist Thumb 2 14 3 28  6 2  100   47       Radial Wrist Thumb 1 77 2 34  9 8  100   56            0 94 ?0 50     L Median, Radial - Thumb comparison      Median Wrist Thumb 2 19 2 81  22 2  100   46       Radial Wrist Thumb 2 19 2 66  6 6  100   46            0 16 ?0 50     L Median, Ulnar - Ring finger comparison      Median Wrist Ring finger 2 86 3 54  9 6  140   49       Ulnar Wrist Ring finger 2 50 3 23  19 6  140   56            0 31 ?0 50         EMG         Needle EMG Examination     Insertional Spontaneous MUAP   Muscle Nerve Roots Activity Fib PSW Fasc Dur  Amp Poly Config Recruitment   R  Deltoid Axillary C5-C6 Normal None None None Normal Normal None Normal Normal   L  Deltoid Axillary C5-C6 Normal None None None Normal Normal None Normal Normal   R  Biceps brachii Musculocutaneous C5-C6 Normal None None None Normal Normal None Normal Normal   R  Triceps brachii Radial C6-C8 Normal None None None Normal Normal None Normal Normal   R  Pronator teres Median C6-C7 Normal None None None Normal Normal None Normal Normal   R   First dorsal interosseous Ulnar C8-T1 Normal None None None Normal Normal None Normal Normal   R  Abductor pollicis brevis Median Y5-D1 Normal None None None Normal Normal None Normal Normal   L  Biceps brachii Musculocutaneous C5-C6 Normal None None None Normal Normal None Normal Normal   L  Triceps brachii Radial C6-C8 Normal None None None Normal Normal None Normal Normal   L  Pronator teres Median C6-C7 Normal None None None Normal Normal None Normal Normal   L  First dorsal interosseous Ulnar C8-T1 Normal None None None Normal Normal None Normal Normal   L  Abductor pollicis brevis Median P8-Y7 Normal None None None Normal Normal None Normal Normal         Findings:   Motor:  Right median compound motor action potential (CMAP) demonstrated prolonged distal latency, normal amplitude, and normal conduction velocity across the forearm  Right ulnar compound motor action potential (CMAP) demonstrated normal distal latency, normal amplitude, and normal conduction velocity across the elbow and forearm  Left median compound motor action potential (CMAP) demonstrated normal distal latency, normal amplitude, and normal conduction velocity across the forearm  Left ulnar compound motor action potential (CMAP) demonstrated normal distal latency, normal amplitude, and normal conduction velocity across the elbow and forearm  Sensory:  Right median sensory nerve action potential (SNAP) demonstrated normal amplitude and prolonged peak latency  Right ulnar sensory nerve action potential (SNAP) demonstrated normal amplitude and normal peak latency  Right radial sensory nerve action potential (SNAP) demonstrated normal amplitude and normal peak latency  Right median and ulnar sensory nerve action potential (SNAP) comparison study to the wrist (transcarpal) demonstrated prolonged latency difference  Right median and radial sensory nerve action potential (SNAP) comparison study to digit 1 demonstrated prolonged latency difference       Left median sensory nerve action potential (SNAP) demonstrated normal amplitude and normal peak latency  Left ulnar sensory nerve action potential (SNAP) demonstrated normal amplitude and normal peak latency  Left radial sensory nerve action potential (SNAP) demonstrated normal amplitude and normal peak latency  Left median and ulnar sensory nerve action potential (SNAP) comparison study to the wrist (transcarpal) demonstrated normal latency difference  Left median and ulnar sensory nerve action potential (SNAP) comparison study to digit 4 demonstrated normal latency difference  Left median and radial sensory nerve action potential (SNAP) comparison study to digit 1 demonstrated normal latency difference  EMG:  A disposable monopolar needle was used to study selected muscles in the left and right upper extremity including the deltoid, biceps, triceps, pronator teres, first dorsal interosseous, and abductor pollicis brevis  All muscles tested demonstrated normal insertional activity, no abnormal spontaneous activity, and normal volitional motor unit action potentials  Impression: Abnormal study  1  There is electrodiagnostic evidence of a right median demyelinating sensorimotor mononeuropathy across the wrist, most consistent with clinical diagnosis of moderate right carpal tunnel syndrome  2  There is no electrodiagnostic evidence of a left median mononeuropathy  The combined sensory index was 0 78    3  There is no electrodiagnostic evidence of a left or right ulnar or radial mononeuropathy  4  There is no electrodiagnostic evidence of a left or right brachial plexopathy or cervical radiculopathy  5  There is no electrodiagnostic evidence of a large fiber peripheral polyneuropathy               ___________________________  Mikki WOODS

## 2019-03-24 ENCOUNTER — HOSPITAL ENCOUNTER (OUTPATIENT)
Dept: CT IMAGING | Facility: HOSPITAL | Age: 44
Discharge: HOME | End: 2019-03-24
Attending: FAMILY MEDICINE
Payer: COMMERCIAL

## 2019-03-24 DIAGNOSIS — R10.10 UPPER ABDOMINAL PAIN: ICD-10-CM

## 2019-03-24 PROCEDURE — 74177 CT ABD & PELVIS W/CONTRAST: CPT

## 2019-03-24 RX ADMIN — IOHEXOL 100 ML: 350 INJECTION, SOLUTION INTRAVENOUS at 11:14

## 2019-03-27 ENCOUNTER — APPOINTMENT (OUTPATIENT)
Dept: LAB | Facility: CLINIC | Age: 44
End: 2019-03-27
Payer: COMMERCIAL

## 2019-03-27 DIAGNOSIS — Z51.81 MEDICATION MONITORING ENCOUNTER: ICD-10-CM

## 2019-04-01 DIAGNOSIS — C90.00 MULTIPLE MYELOMA NOT HAVING ACHIEVED REMISSION (HCC): ICD-10-CM

## 2019-04-01 RX ORDER — LENALIDOMIDE 10 MG/1
10 CAPSULE ORAL DAILY
Qty: 28 CAPSULE | Refills: 0 | Status: SHIPPED | OUTPATIENT
Start: 2019-04-01 | End: 2019-04-26

## 2019-04-03 ENCOUNTER — OFFICE VISIT (OUTPATIENT)
Dept: FAMILY MEDICINE CLINIC | Facility: CLINIC | Age: 44
End: 2019-04-03
Payer: COMMERCIAL

## 2019-04-03 VITALS
DIASTOLIC BLOOD PRESSURE: 78 MMHG | HEIGHT: 65 IN | WEIGHT: 137.8 LBS | HEART RATE: 72 BPM | OXYGEN SATURATION: 98 % | SYSTOLIC BLOOD PRESSURE: 116 MMHG | BODY MASS INDEX: 22.96 KG/M2 | TEMPERATURE: 97.8 F

## 2019-04-03 DIAGNOSIS — N91.2 AMENORRHEA: ICD-10-CM

## 2019-04-03 DIAGNOSIS — M51.16 LUMBAR DISC HERNIATION WITH RADICULOPATHY: ICD-10-CM

## 2019-04-03 DIAGNOSIS — D25.9 UTERINE LEIOMYOMA, UNSPECIFIED LOCATION: Primary | ICD-10-CM

## 2019-04-03 PROBLEM — R10.10 UPPER ABDOMINAL PAIN: Status: RESOLVED | Noted: 2019-03-08 | Resolved: 2019-04-03

## 2019-04-03 PROCEDURE — 99214 OFFICE O/P EST MOD 30 MIN: CPT | Performed by: FAMILY MEDICINE

## 2019-04-03 PROCEDURE — 3008F BODY MASS INDEX DOCD: CPT | Performed by: FAMILY MEDICINE

## 2019-04-03 RX ORDER — ASPIRIN 81 MG/1
81 TABLET ORAL DAILY
COMMUNITY

## 2019-04-10 ENCOUNTER — OFFICE VISIT (OUTPATIENT)
Dept: PAIN MEDICINE | Facility: CLINIC | Age: 44
End: 2019-04-10
Payer: COMMERCIAL

## 2019-04-10 VITALS
TEMPERATURE: 98.8 F | HEART RATE: 72 BPM | WEIGHT: 137 LBS | BODY MASS INDEX: 22.8 KG/M2 | SYSTOLIC BLOOD PRESSURE: 124 MMHG | DIASTOLIC BLOOD PRESSURE: 82 MMHG

## 2019-04-10 DIAGNOSIS — M51.16 LUMBAR DISC HERNIATION WITH RADICULOPATHY: ICD-10-CM

## 2019-04-10 PROCEDURE — 99214 OFFICE O/P EST MOD 30 MIN: CPT | Performed by: ANESTHESIOLOGY

## 2019-04-16 ENCOUNTER — TELEPHONE (OUTPATIENT)
Dept: RADIOLOGY | Facility: CLINIC | Age: 44
End: 2019-04-16

## 2019-04-26 DIAGNOSIS — C90.00 MULTIPLE MYELOMA NOT HAVING ACHIEVED REMISSION (HCC): ICD-10-CM

## 2019-04-26 RX ORDER — LENALIDOMIDE 10 MG/1
10 CAPSULE ORAL DAILY
Qty: 28 CAPSULE | Refills: 0 | Status: SHIPPED | OUTPATIENT
Start: 2019-04-26 | End: 2019-05-22 | Stop reason: SDUPTHER

## 2019-04-27 ENCOUNTER — APPOINTMENT (OUTPATIENT)
Dept: LAB | Facility: HOSPITAL | Age: 44
End: 2019-04-27
Attending: INTERNAL MEDICINE
Payer: COMMERCIAL

## 2019-05-03 ENCOUNTER — HOSPITAL ENCOUNTER (OUTPATIENT)
Dept: RADIOLOGY | Facility: CLINIC | Age: 44
Discharge: HOME | End: 2019-05-03
Attending: ANESTHESIOLOGY | Admitting: ANESTHESIOLOGY
Payer: COMMERCIAL

## 2019-05-03 VITALS
TEMPERATURE: 98.3 F | DIASTOLIC BLOOD PRESSURE: 70 MMHG | HEART RATE: 61 BPM | RESPIRATION RATE: 20 BRPM | SYSTOLIC BLOOD PRESSURE: 107 MMHG | OXYGEN SATURATION: 100 %

## 2019-05-03 DIAGNOSIS — M51.16 LUMBAR DISC HERNIATION WITH RADICULOPATHY: ICD-10-CM

## 2019-05-03 PROCEDURE — 64483 NJX AA&/STRD TFRM EPI L/S 1: CPT | Performed by: ANESTHESIOLOGY

## 2019-05-03 PROCEDURE — 64484 NJX AA&/STRD TFRM EPI L/S EA: CPT | Performed by: ANESTHESIOLOGY

## 2019-05-03 RX ORDER — METHYLPREDNISOLONE ACETATE 80 MG/ML
80 INJECTION, SUSPENSION INTRA-ARTICULAR; INTRALESIONAL; INTRAMUSCULAR; PARENTERAL; SOFT TISSUE ONCE
Status: COMPLETED | OUTPATIENT
Start: 2019-05-03 | End: 2019-05-03

## 2019-05-03 RX ORDER — 0.9 % SODIUM CHLORIDE 0.9 %
10 VIAL (ML) INJECTION ONCE
Status: COMPLETED | OUTPATIENT
Start: 2019-05-03 | End: 2019-05-03

## 2019-05-03 RX ORDER — BUPIVACAINE HCL/PF 2.5 MG/ML
10 VIAL (ML) INJECTION ONCE
Status: COMPLETED | OUTPATIENT
Start: 2019-05-03 | End: 2019-05-03

## 2019-05-03 RX ADMIN — Medication 4 ML: at 08:30

## 2019-05-03 RX ADMIN — METHYLPREDNISOLONE ACETATE 80 MG: 80 INJECTION, SUSPENSION INTRA-ARTICULAR; INTRALESIONAL; INTRAMUSCULAR; PARENTERAL; SOFT TISSUE at 08:33

## 2019-05-03 RX ADMIN — BUPIVACAINE HYDROCHLORIDE 2 ML: 2.5 INJECTION, SOLUTION EPIDURAL; INFILTRATION; INTRACAUDAL at 08:33

## 2019-05-03 RX ADMIN — IOHEXOL 1 ML: 300 INJECTION, SOLUTION INTRAVENOUS at 08:33

## 2019-05-03 RX ADMIN — SODIUM CHLORIDE 4 ML: 9 INJECTION, SOLUTION INTRAMUSCULAR; INTRAVENOUS; SUBCUTANEOUS at 08:30

## 2019-05-10 ENCOUNTER — TELEPHONE (OUTPATIENT)
Dept: PAIN MEDICINE | Facility: CLINIC | Age: 44
End: 2019-05-10

## 2019-05-15 ENCOUNTER — OFFICE VISIT (OUTPATIENT)
Dept: HEMATOLOGY ONCOLOGY | Facility: CLINIC | Age: 44
End: 2019-05-15
Payer: COMMERCIAL

## 2019-05-15 ENCOUNTER — APPOINTMENT (OUTPATIENT)
Dept: LAB | Facility: CLINIC | Age: 44
End: 2019-05-15
Payer: COMMERCIAL

## 2019-05-15 VITALS
WEIGHT: 133 LBS | OXYGEN SATURATION: 98 % | SYSTOLIC BLOOD PRESSURE: 112 MMHG | DIASTOLIC BLOOD PRESSURE: 72 MMHG | HEART RATE: 73 BPM | BODY MASS INDEX: 22.16 KG/M2 | HEIGHT: 65 IN | RESPIRATION RATE: 18 BRPM | TEMPERATURE: 98.2 F

## 2019-05-15 DIAGNOSIS — C90.01 MULTIPLE MYELOMA IN REMISSION (HCC): Primary | ICD-10-CM

## 2019-05-15 DIAGNOSIS — R10.12 LEFT UPPER QUADRANT ABDOMINAL PAIN OF UNKNOWN ETIOLOGY: ICD-10-CM

## 2019-05-15 DIAGNOSIS — D70.9 NEUTROPENIA, UNSPECIFIED TYPE (HCC): ICD-10-CM

## 2019-05-15 LAB
ALBUMIN SERPL BCP-MCNC: 3.9 G/DL (ref 3.5–5)
ALP SERPL-CCNC: 93 U/L (ref 46–116)
ALT SERPL W P-5'-P-CCNC: 39 U/L (ref 12–78)
ANION GAP SERPL CALCULATED.3IONS-SCNC: 7 MMOL/L (ref 4–13)
AST SERPL W P-5'-P-CCNC: 17 U/L (ref 5–45)
BASOPHILS # BLD AUTO: 0.08 THOUSANDS/ΜL (ref 0–0.1)
BASOPHILS NFR BLD AUTO: 2 % (ref 0–1)
BILIRUB SERPL-MCNC: 0.5 MG/DL (ref 0.2–1)
BUN SERPL-MCNC: 20 MG/DL (ref 5–25)
CALCIUM SERPL-MCNC: 9.3 MG/DL (ref 8.3–10.1)
CHLORIDE SERPL-SCNC: 106 MMOL/L (ref 100–108)
CO2 SERPL-SCNC: 28 MMOL/L (ref 21–32)
CREAT SERPL-MCNC: 0.75 MG/DL (ref 0.6–1.3)
EOSINOPHIL # BLD AUTO: 0.23 THOUSAND/ΜL (ref 0–0.61)
EOSINOPHIL NFR BLD AUTO: 6 % (ref 0–6)
ERYTHROCYTE [DISTWIDTH] IN BLOOD BY AUTOMATED COUNT: 13.6 % (ref 11.6–15.1)
GFR SERPL CREATININE-BSD FRML MDRD: 96 ML/MIN/1.73SQ M
GLUCOSE SERPL-MCNC: 83 MG/DL (ref 65–140)
HCT VFR BLD AUTO: 41.2 % (ref 34.8–46.1)
HGB BLD-MCNC: 13.1 G/DL (ref 11.5–15.4)
IMM GRANULOCYTES # BLD AUTO: 0.01 THOUSAND/UL (ref 0–0.2)
IMM GRANULOCYTES NFR BLD AUTO: 0 % (ref 0–2)
LIPASE SERPL-CCNC: 183 U/L (ref 73–393)
LYMPHOCYTES # BLD AUTO: 1.81 THOUSANDS/ΜL (ref 0.6–4.47)
LYMPHOCYTES NFR BLD AUTO: 44 % (ref 14–44)
MCH RBC QN AUTO: 31 PG (ref 26.8–34.3)
MCHC RBC AUTO-ENTMCNC: 31.8 G/DL (ref 31.4–37.4)
MCV RBC AUTO: 98 FL (ref 82–98)
MONOCYTES # BLD AUTO: 0.47 THOUSAND/ΜL (ref 0.17–1.22)
MONOCYTES NFR BLD AUTO: 12 % (ref 4–12)
NEUTROPHILS # BLD AUTO: 1.47 THOUSANDS/ΜL (ref 1.85–7.62)
NEUTS SEG NFR BLD AUTO: 36 % (ref 43–75)
NRBC BLD AUTO-RTO: 0 /100 WBCS
PLATELET # BLD AUTO: 178 THOUSANDS/UL (ref 149–390)
PMV BLD AUTO: 9.1 FL (ref 8.9–12.7)
POTASSIUM SERPL-SCNC: 3.4 MMOL/L (ref 3.5–5.3)
PROT SERPL-MCNC: 7.8 G/DL (ref 6.4–8.2)
RBC # BLD AUTO: 4.22 MILLION/UL (ref 3.81–5.12)
SODIUM SERPL-SCNC: 141 MMOL/L (ref 136–145)
WBC # BLD AUTO: 4.07 THOUSAND/UL (ref 4.31–10.16)

## 2019-05-15 PROCEDURE — 80053 COMPREHEN METABOLIC PANEL: CPT | Performed by: INTERNAL MEDICINE

## 2019-05-15 PROCEDURE — 86334 IMMUNOFIX E-PHORESIS SERUM: CPT | Performed by: PATHOLOGY

## 2019-05-15 PROCEDURE — 83883 ASSAY NEPHELOMETRY NOT SPEC: CPT | Performed by: INTERNAL MEDICINE

## 2019-05-15 PROCEDURE — 84165 PROTEIN E-PHORESIS SERUM: CPT | Performed by: PATHOLOGY

## 2019-05-15 PROCEDURE — 86334 IMMUNOFIX E-PHORESIS SERUM: CPT | Performed by: INTERNAL MEDICINE

## 2019-05-15 PROCEDURE — 36415 COLL VENOUS BLD VENIPUNCTURE: CPT | Performed by: INTERNAL MEDICINE

## 2019-05-15 PROCEDURE — 85025 COMPLETE CBC W/AUTO DIFF WBC: CPT | Performed by: INTERNAL MEDICINE

## 2019-05-15 PROCEDURE — 99214 OFFICE O/P EST MOD 30 MIN: CPT | Performed by: INTERNAL MEDICINE

## 2019-05-15 PROCEDURE — 84165 PROTEIN E-PHORESIS SERUM: CPT | Performed by: INTERNAL MEDICINE

## 2019-05-15 PROCEDURE — 83690 ASSAY OF LIPASE: CPT | Performed by: INTERNAL MEDICINE

## 2019-05-16 LAB
KAPPA LC FREE SER-MCNC: 27 MG/L (ref 3.3–19.4)
KAPPA LC FREE/LAMBDA FREE SER: 1.81 {RATIO} (ref 0.26–1.65)
LAMBDA LC FREE SERPL-MCNC: 14.9 MG/L (ref 5.7–26.3)

## 2019-05-17 LAB
ALBUMIN SERPL ELPH-MCNC: 4.5 G/DL (ref 3.5–5)
ALBUMIN SERPL ELPH-MCNC: 58.4 % (ref 52–65)
ALPHA1 GLOB SERPL ELPH-MCNC: 0.29 G/DL (ref 0.1–0.4)
ALPHA1 GLOB SERPL ELPH-MCNC: 3.8 % (ref 2.5–5)
ALPHA2 GLOB SERPL ELPH-MCNC: 0.65 G/DL (ref 0.4–1.2)
ALPHA2 GLOB SERPL ELPH-MCNC: 8.5 % (ref 7–13)
BETA GLOB ABNORMAL SERPL ELPH-MCNC: 0.46 G/DL (ref 0.4–0.8)
BETA1 GLOB SERPL ELPH-MCNC: 6 % (ref 5–13)
BETA2 GLOB SERPL ELPH-MCNC: 5 % (ref 2–8)
BETA2+GAMMA GLOB SERPL ELPH-MCNC: 0.39 G/DL (ref 0.2–0.5)
GAMMA GLOB ABNORMAL SERPL ELPH-MCNC: 1.41 G/DL (ref 0.5–1.6)
GAMMA GLOB SERPL ELPH-MCNC: 18.3 % (ref 12–22)
IGG/ALB SER: 1.4 {RATIO} (ref 1.1–1.8)
INTERPRETATION UR IFE-IMP: NORMAL
M PROTEIN 1 MFR SERPL ELPH: 3.6 %
M PROTEIN 1 SERPL ELPH-MCNC: 0.28 G/DL
PROT PATTERN SERPL ELPH-IMP: NORMAL
PROT SERPL-MCNC: 7.7 G/DL (ref 6.4–8.2)

## 2019-05-22 DIAGNOSIS — C90.00 MULTIPLE MYELOMA NOT HAVING ACHIEVED REMISSION (HCC): ICD-10-CM

## 2019-05-22 RX ORDER — LENALIDOMIDE 10 MG/1
10 CAPSULE ORAL DAILY
Qty: 28 CAPSULE | Refills: 0 | Status: SHIPPED | OUTPATIENT
Start: 2019-05-22 | End: 2019-06-20 | Stop reason: SDUPTHER

## 2019-05-24 ENCOUNTER — APPOINTMENT (OUTPATIENT)
Dept: LAB | Facility: CLINIC | Age: 44
End: 2019-05-24
Payer: COMMERCIAL

## 2019-05-24 ENCOUNTER — OFFICE VISIT (OUTPATIENT)
Dept: FAMILY MEDICINE CLINIC | Facility: CLINIC | Age: 44
End: 2019-05-24
Payer: COMMERCIAL

## 2019-05-24 VITALS
OXYGEN SATURATION: 98 % | SYSTOLIC BLOOD PRESSURE: 110 MMHG | DIASTOLIC BLOOD PRESSURE: 64 MMHG | RESPIRATION RATE: 16 BRPM | HEIGHT: 65 IN | HEART RATE: 80 BPM | WEIGHT: 129.8 LBS | TEMPERATURE: 97.8 F | BODY MASS INDEX: 21.63 KG/M2

## 2019-05-24 DIAGNOSIS — M62.838 MUSCLE SPASM: ICD-10-CM

## 2019-05-24 DIAGNOSIS — E87.6 HYPOKALEMIA: ICD-10-CM

## 2019-05-24 DIAGNOSIS — R11.0 NAUSEA: ICD-10-CM

## 2019-05-24 DIAGNOSIS — M51.16 LUMBAR DISC HERNIATION WITH RADICULOPATHY: ICD-10-CM

## 2019-05-24 DIAGNOSIS — R79.89 LOW VITAMIN D LEVEL: ICD-10-CM

## 2019-05-24 DIAGNOSIS — R10.32 LLQ PAIN: Primary | ICD-10-CM

## 2019-05-24 DIAGNOSIS — G35 MULTIPLE SCLEROSIS (HCC): ICD-10-CM

## 2019-05-24 LAB
25(OH)D3 SERPL-MCNC: 29.4 NG/ML (ref 30–100)
MAGNESIUM SERPL-MCNC: 2.1 MG/DL (ref 1.6–2.6)
POTASSIUM SERPL-SCNC: 3.7 MMOL/L (ref 3.5–5.3)

## 2019-05-24 PROCEDURE — 82306 VITAMIN D 25 HYDROXY: CPT

## 2019-05-24 PROCEDURE — 84132 ASSAY OF SERUM POTASSIUM: CPT

## 2019-05-24 PROCEDURE — 83735 ASSAY OF MAGNESIUM: CPT

## 2019-05-24 PROCEDURE — 36415 COLL VENOUS BLD VENIPUNCTURE: CPT

## 2019-05-24 PROCEDURE — 99214 OFFICE O/P EST MOD 30 MIN: CPT | Performed by: FAMILY MEDICINE

## 2019-05-24 RX ORDER — PANTOPRAZOLE SODIUM 40 MG/1
40 TABLET, DELAYED RELEASE ORAL
Qty: 30 TABLET | Refills: 1 | Status: SHIPPED | OUTPATIENT
Start: 2019-05-24 | End: 2019-06-17 | Stop reason: ALTCHOICE

## 2019-05-29 ENCOUNTER — OFFICE VISIT (OUTPATIENT)
Dept: NEUROLOGY | Facility: CLINIC | Age: 44
End: 2019-05-29
Payer: COMMERCIAL

## 2019-05-29 VITALS
HEART RATE: 86 BPM | SYSTOLIC BLOOD PRESSURE: 104 MMHG | WEIGHT: 130.4 LBS | DIASTOLIC BLOOD PRESSURE: 60 MMHG | BODY MASS INDEX: 21.7 KG/M2

## 2019-05-29 DIAGNOSIS — G56.01 CARPAL TUNNEL SYNDROME OF RIGHT WRIST: ICD-10-CM

## 2019-05-29 DIAGNOSIS — G35 MULTIPLE SCLEROSIS (HCC): Primary | ICD-10-CM

## 2019-05-29 PROCEDURE — 99214 OFFICE O/P EST MOD 30 MIN: CPT | Performed by: PSYCHIATRY & NEUROLOGY

## 2019-06-04 ENCOUNTER — TELEPHONE (OUTPATIENT)
Dept: NEUROLOGY | Facility: CLINIC | Age: 44
End: 2019-06-04

## 2019-06-17 ENCOUNTER — OFFICE VISIT (OUTPATIENT)
Dept: OBGYN CLINIC | Facility: MEDICAL CENTER | Age: 44
End: 2019-06-17
Payer: COMMERCIAL

## 2019-06-17 VITALS
BODY MASS INDEX: 21.49 KG/M2 | WEIGHT: 129 LBS | DIASTOLIC BLOOD PRESSURE: 72 MMHG | HEIGHT: 65 IN | SYSTOLIC BLOOD PRESSURE: 100 MMHG

## 2019-06-17 DIAGNOSIS — Z11.51 SCREENING FOR HUMAN PAPILLOMAVIRUS (HPV): ICD-10-CM

## 2019-06-17 DIAGNOSIS — Z01.419 ENCNTR FOR GYN EXAM (GENERAL) (ROUTINE) W/O ABN FINDINGS: Primary | ICD-10-CM

## 2019-06-17 DIAGNOSIS — Z12.31 ENCOUNTER FOR SCREENING MAMMOGRAM FOR MALIGNANT NEOPLASM OF BREAST: ICD-10-CM

## 2019-06-17 PROBLEM — Z23 ENCOUNTER FOR IMMUNIZATION: Status: RESOLVED | Noted: 2019-02-04 | Resolved: 2019-06-17

## 2019-06-17 PROCEDURE — S0610 ANNUAL GYNECOLOGICAL EXAMINA: HCPCS | Performed by: OBSTETRICS & GYNECOLOGY

## 2019-06-17 PROCEDURE — 87624 HPV HI-RISK TYP POOLED RSLT: CPT | Performed by: OBSTETRICS & GYNECOLOGY

## 2019-06-17 PROCEDURE — G0145 SCR C/V CYTO,THINLAYER,RESCR: HCPCS | Performed by: OBSTETRICS & GYNECOLOGY

## 2019-06-17 RX ORDER — GABAPENTIN 300 MG/1
CAPSULE ORAL
Refills: 0 | COMMUNITY
Start: 2019-06-07 | End: 2020-05-20 | Stop reason: ALTCHOICE

## 2019-06-17 RX ORDER — CHOLECALCIFEROL (VITAMIN D3) 50 MCG
CAPSULE ORAL
COMMUNITY

## 2019-06-19 ENCOUNTER — APPOINTMENT (OUTPATIENT)
Dept: LAB | Facility: CLINIC | Age: 44
End: 2019-06-19
Payer: COMMERCIAL

## 2019-06-20 ENCOUNTER — OFFICE VISIT (OUTPATIENT)
Dept: GASTROENTEROLOGY | Facility: CLINIC | Age: 44
End: 2019-06-20
Payer: COMMERCIAL

## 2019-06-20 VITALS
BODY MASS INDEX: 21.85 KG/M2 | WEIGHT: 128 LBS | DIASTOLIC BLOOD PRESSURE: 72 MMHG | HEIGHT: 64 IN | RESPIRATION RATE: 16 BRPM | SYSTOLIC BLOOD PRESSURE: 114 MMHG | HEART RATE: 84 BPM

## 2019-06-20 DIAGNOSIS — R10.32 LLQ PAIN: ICD-10-CM

## 2019-06-20 DIAGNOSIS — R19.8 ALTERNATING CONSTIPATION AND DIARRHEA: ICD-10-CM

## 2019-06-20 DIAGNOSIS — R10.12 LEFT UPPER QUADRANT PAIN: Primary | ICD-10-CM

## 2019-06-20 DIAGNOSIS — R19.4 CHANGE IN BOWEL HABITS: ICD-10-CM

## 2019-06-20 DIAGNOSIS — C90.00 MULTIPLE MYELOMA NOT HAVING ACHIEVED REMISSION (HCC): ICD-10-CM

## 2019-06-20 DIAGNOSIS — R11.0 NAUSEA: ICD-10-CM

## 2019-06-20 LAB
HPV HR 12 DNA CVX QL NAA+PROBE: NEGATIVE
HPV16 DNA CVX QL NAA+PROBE: NEGATIVE
HPV18 DNA CVX QL NAA+PROBE: NEGATIVE

## 2019-06-20 PROCEDURE — 99244 OFF/OP CNSLTJ NEW/EST MOD 40: CPT | Performed by: INTERNAL MEDICINE

## 2019-06-21 LAB
LAB AP GYN PRIMARY INTERPRETATION: NORMAL
Lab: NORMAL

## 2019-06-21 RX ORDER — LENALIDOMIDE 10 MG/1
10 CAPSULE ORAL DAILY
Qty: 28 CAPSULE | Refills: 0 | Status: SHIPPED | OUTPATIENT
Start: 2019-06-21 | End: 2019-07-23 | Stop reason: SDUPTHER

## 2019-06-25 ENCOUNTER — TELEPHONE (OUTPATIENT)
Dept: OBGYN CLINIC | Facility: CLINIC | Age: 44
End: 2019-06-25

## 2019-07-16 ENCOUNTER — TELEPHONE (OUTPATIENT)
Dept: HEMATOLOGY ONCOLOGY | Facility: CLINIC | Age: 44
End: 2019-07-16

## 2019-07-23 ENCOUNTER — TELEPHONE (OUTPATIENT)
Dept: HEMATOLOGY ONCOLOGY | Facility: CLINIC | Age: 44
End: 2019-07-23

## 2019-07-23 DIAGNOSIS — C90.00 MULTIPLE MYELOMA NOT HAVING ACHIEVED REMISSION (HCC): ICD-10-CM

## 2019-07-23 RX ORDER — LENALIDOMIDE 10 MG/1
10 CAPSULE ORAL DAILY
Qty: 28 CAPSULE | Refills: 0 | Status: SHIPPED | OUTPATIENT
Start: 2019-07-23 | End: 2019-08-26 | Stop reason: SDUPTHER

## 2019-07-23 NOTE — TELEPHONE ENCOUNTER
Called patients cell phone and left a message for her to go get labs done of which includes a pregnancy test  Explained I am unable to fill the revlimid until the pregnancy test is resulted  Stated to call us back with questions

## 2019-07-23 NOTE — TELEPHONE ENCOUNTER
They need a pregnancy test result for patient's Revlimid script    Please call Cristina Pina at [address] @ [phone] with reference # [id number]

## 2019-07-24 ENCOUNTER — OFFICE VISIT (OUTPATIENT)
Dept: OBGYN CLINIC | Facility: HOSPITAL | Age: 44
End: 2019-07-24
Attending: PSYCHIATRY & NEUROLOGY
Payer: COMMERCIAL

## 2019-07-24 ENCOUNTER — TELEPHONE (OUTPATIENT)
Dept: HEMATOLOGY ONCOLOGY | Facility: CLINIC | Age: 44
End: 2019-07-24

## 2019-07-24 ENCOUNTER — APPOINTMENT (OUTPATIENT)
Dept: LAB | Facility: HOSPITAL | Age: 44
End: 2019-07-24
Attending: INTERNAL MEDICINE
Payer: COMMERCIAL

## 2019-07-24 VITALS
WEIGHT: 128 LBS | SYSTOLIC BLOOD PRESSURE: 130 MMHG | BODY MASS INDEX: 21.85 KG/M2 | HEIGHT: 64 IN | DIASTOLIC BLOOD PRESSURE: 84 MMHG | HEART RATE: 77 BPM

## 2019-07-24 DIAGNOSIS — C90.01 MULTIPLE MYELOMA IN REMISSION (HCC): ICD-10-CM

## 2019-07-24 DIAGNOSIS — G56.01 CARPAL TUNNEL SYNDROME OF RIGHT WRIST: ICD-10-CM

## 2019-07-24 PROCEDURE — 99203 OFFICE O/P NEW LOW 30 MIN: CPT | Performed by: ORTHOPAEDIC SURGERY

## 2019-07-24 PROCEDURE — 83883 ASSAY NEPHELOMETRY NOT SPEC: CPT

## 2019-07-24 PROCEDURE — 84165 PROTEIN E-PHORESIS SERUM: CPT | Performed by: PATHOLOGY

## 2019-07-24 PROCEDURE — 84165 PROTEIN E-PHORESIS SERUM: CPT

## 2019-07-24 NOTE — TELEPHONE ENCOUNTER
Patient called back to speak with nurse  She is requesting a call back today  She works tomorrow and Friday and will not be available   [phone]

## 2019-07-24 NOTE — H&P
ASSESSMENT/PLAN:    Assessment:   Carpal Tunnel Syndrome  right    Plan:   Treatment options were discussed with the patient today which include continuing bracing, steroid injections with about a 50-60% success rate or an endoscopic carpal tunnel release with a 99% success rate  The patient is leaning towards a right endoscopic carpal tunnel release however she would like to go home and speak to her  about this  We did provide her with work restrictions today which include avoiding repetitive gripping activities and vibration all activities for the next 6 weeks  We did discuss with the patient that if she would like to proceed with surgical intervention she may call the office and we will draw labs consent forms and give them to our surgical schedulers who will then contact her at a time to come in and schedule and signed consent  Follow Up:  6  week(s)    To Do Next Visit:       General Discussions:     Carpal Tunnel Syndrome: The anatomy and physiology of carpal tunnel syndrome was discussed with the patient today  Increase pressure localized under the transverse carpal ligament can cause pain, numbness, tingling, or dysesthesias within the median nerve distribution as well as feelings of fatigue, clumsiness, or awkwardness  These symptoms typically occur at night and worse in the morning upon waking  Eventually, untreated carpal tunnel syndrome can result in weakness and permanent loss of muscle within the thenar compartment of the hand  Treatment options were discussed with the patient  Conservative treatment includes nocturnal resting splints to keep the nerve in a neutral position, ergonomic changes within the work or home environment, activity modification, and tendon gliding exercises  Steroid injections within the carpal canal can help a majority of patients, however this is often self-limited in a majority of patients    Surgical intervention to divide the transverse carpal ligament typically results in a long-lasting relief of the patient's complaints, with the recurrence rate of less than 1%  Operative Discussions:         _____________________________________________________  CHIEF COMPLAINT:  Chief Complaint   Patient presents with    Right Wrist - Pain         SUBJECTIVE:  Mohini Cain is a 53 y o  female who presents with Numbness to the bilateral hand right>left  This started  3 year(s) ago as Insidious  She states that she cannot sleep at night due to her numbness and tingling and wakes up every couple minutes  She describes a positive flick sign  She is not dropping items at work  Patient does work as a scanner at [employer], she states her symptoms are worse while working 60+ hours a week  Patient describes difficulties in the morning trying to brush her teeth and apply her makeup  She states that her hands to fall asleep when she is driving  Radiation: Yes to the  hand  Previous Treatments: bracing without relief  She states that the braces do help however she still experiences numbness and tingling while wearing the braces     Associated symptoms: No Complaints    PAST MEDICAL HISTORY:  Past Medical History:   Diagnosis Date    Back pain     pinched nerve in back    CTS (carpal tunnel syndrome)     right wrist    Multiple myeloma (HCC)     chemotherapy    [facility] ([facility] [address] )     Multiple sclerosis ([facility] [address] )     skin lesion     mole removed from face       PAST SURGICAL HISTORY:  Past Surgical History:   Procedure Laterality Date     SECTION      LIMBAL STEM CELL TRANSPLANT  2018       FAMILY HISTORY:  Family History   Problem Relation Age of Onset    No Known Problems Mother     Coronary artery disease Father     No Known Problems Sister     No Known Problems Brother     No Known Problems Daughter     No Known Problems Son     No Known Problems Brother        SOCIAL HISTORY:  Social History     Tobacco Use    Smoking status: Never Smoker    Smokeless tobacco: Never Used   Substance Use Topics    Alcohol use: No    Drug use: No       MEDICATIONS:    Current Outpatient Medications:     aspirin (ECOTRIN LOW STRENGTH) 81 mg EC tablet, Take 81 mg by mouth daily, Disp: , Rfl:     gabapentin (NEURONTIN) 300 mg capsule, , Disp: , Rfl: 0    HM VITAMIN D3 2000 units CAPS, Take by mouth, Disp: , Rfl:     lenalidomide (REVLIMID) 10 MG CAPS, Take 1 capsule (10 mg total) by mouth daily Q 28 days  , Disp: 28 capsule, Rfl: 0    acyclovir (ZOVIRAX) 400 MG tablet, Take 1 tablet (400 mg total) by mouth 2 (two) times a day for 60 days (Patient not taking: Reported on 5/24/2019), Disp: 120 tablet, Rfl: 0    ALLERGIES:  No Known Allergies    REVIEW OF SYSTEMS:  Pertinent items are noted in HPI      LABS:  HgA1c: No results found for: HGBA1C  BMP:   Lab Results   Component Value Date    CALCIUM 9 3 05/15/2019     08/30/2017    K 3 7 05/24/2019    CO2 28 05/15/2019     05/15/2019    BUN 20 05/15/2019    CREATININE 0 75 05/15/2019         _____________________________________________________  PHYSICAL EXAMINATION:  Vital signs: /84   Pulse 77   Ht 5' 4" (1 626 m)   Wt 58 1 kg (128 lb)   BMI 21 97 kg/m²    General: well developed and well nourished, alert, oriented times 3 and appears comfortable  Psychiatric: Normal  HEENT: Trachea Midline, No torticollis  Cardiovascular: No discernable arrhythmia  Pulmonary: No wheezing or stridor  Skin: No masses, erthema, lacerations, fluctation, ulcerations  Neurovascular: Pulses Intact    MUSCULOSKELETAL EXAMINATION:  RIGHT SIDE:  Carpal tunnel:  wrist flexion 5/5, wrist extension 5/5, AIN 5/5, intrinsic 5/5, apb 5/5, minimal positive tinels at the wrist, positive flexion compression test    _____________________________________________________  STUDIES REVIEWED:  EMG: done on 3/22/19 demonstrates right moderate carpal tunnel syndrome with a motor latency of 4 48m/s with normal amplitudes, sensory latency of 4 06m/s again with normal amplitudes         PROCEDURES PERFORMED:  Procedures  No Procedures performed today   Scribe Attestation    I,:   Ratna Cordero am acting as a scribe while in the presence of the attending physician :        I,:   Coco Duggan MD personally performed the services described in this documentation    as scribed in my presence :

## 2019-07-24 NOTE — TELEPHONE ENCOUNTER
Nick Graf from [facility] called regarding the patient Revlimid 10 mg medication  She stated that the order was flagged by the  [facility]   She stated that more information will be needed to called in to them at [phone] to release the order

## 2019-07-24 NOTE — LETTER
July 24, 2019     Patient: Pan Wilkins   YOB: 1975   Date of Visit: 7/24/2019       To Whom it May Concern:    Park Rose is under my professional care  She was seen in my office on 7/24/2019  She may return to work with no repetitive gripping and to avoid vibrationally activities  We will see her back in 6 weeks  If you have any questions or concerns, please don't hesitate to call           Sincerely,          Tj Escobar MD        CC: No Recipients

## 2019-07-24 NOTE — PROGRESS NOTES
ASSESSMENT/PLAN:    Assessment:   Carpal Tunnel Syndrome  right    Plan:   Treatment options were discussed with the patient today which include continuing bracing, steroid injections with about a 50-60% success rate or an endoscopic carpal tunnel release with a 99% success rate  The patient is leaning towards a right endoscopic carpal tunnel release however she would like to go home and speak to her  about this  We did provide her with work restrictions today which include avoiding repetitive gripping activities and vibration all activities for the next 6 weeks  We did discuss with the patient that if she would like to proceed with surgical intervention she may call the office and we will draw labs consent forms and give them to our surgical schedulers who will then contact her at a time to come in and schedule and signed consent  Follow Up:  6  week(s)    To Do Next Visit:       General Discussions:     Carpal Tunnel Syndrome: The anatomy and physiology of carpal tunnel syndrome was discussed with the patient today  Increase pressure localized under the transverse carpal ligament can cause pain, numbness, tingling, or dysesthesias within the median nerve distribution as well as feelings of fatigue, clumsiness, or awkwardness  These symptoms typically occur at night and worse in the morning upon waking  Eventually, untreated carpal tunnel syndrome can result in weakness and permanent loss of muscle within the thenar compartment of the hand  Treatment options were discussed with the patient  Conservative treatment includes nocturnal resting splints to keep the nerve in a neutral position, ergonomic changes within the work or home environment, activity modification, and tendon gliding exercises  Steroid injections within the carpal canal can help a majority of patients, however this is often self-limited in a majority of patients    Surgical intervention to divide the transverse carpal ligament typically results in a long-lasting relief of the patient's complaints, with the recurrence rate of less than 1%  Operative Discussions:         _____________________________________________________  CHIEF COMPLAINT:  Chief Complaint   Patient presents with    Right Wrist - Pain         SUBJECTIVE:  Rebecca Zamora is a 53 y o  female who presents with Numbness to the bilateral hand right>left  This started  3 year(s) ago as Insidious  She states that she cannot sleep at night due to her numbness and tingling and wakes up every couple minutes  She describes a positive flick sign  She is not dropping items at work  Patient does work as a scanner at [employer], she states her symptoms are worse while working 60+ hours a week  Patient describes difficulties in the morning trying to brush her teeth and apply her makeup  She states that her hands to fall asleep when she is driving  Radiation: Yes to the  hand  Previous Treatments: bracing without relief  She states that the braces do help however she still experiences numbness and tingling while wearing the braces     Associated symptoms: No Complaints    PAST MEDICAL HISTORY:  Past Medical History:   Diagnosis Date    Back pain     pinched nerve in back    CTS (carpal tunnel syndrome)     right wrist    Multiple myeloma (HCC)     chemotherapy    [facility] [address] [facility]     Multiple sclerosis [facility] [address] [facility]     skin lesion     mole removed from face       PAST SURGICAL HISTORY:  Past Surgical History:   Procedure Laterality Date     SECTION      LIMBAL STEM CELL TRANSPLANT  2018       FAMILY HISTORY:  Family History   Problem Relation Age of Onset    No Known Problems Mother     Coronary artery disease Father     No Known Problems Sister     No Known Problems Brother     No Known Problems Daughter     No Known Problems Son     No Known Problems Brother        SOCIAL HISTORY:  Social History     Tobacco Use    Smoking status: Never Smoker    Smokeless tobacco: Never Used   Substance Use Topics    Alcohol use: No    Drug use: No       MEDICATIONS:    Current Outpatient Medications:     aspirin (ECOTRIN LOW STRENGTH) 81 mg EC tablet, Take 81 mg by mouth daily, Disp: , Rfl:     gabapentin (NEURONTIN) 300 mg capsule, , Disp: , Rfl: 0    HM VITAMIN D3 2000 units CAPS, Take by mouth, Disp: , Rfl:     lenalidomide (REVLIMID) 10 MG CAPS, Take 1 capsule (10 mg total) by mouth daily Q 28 days  , Disp: 28 capsule, Rfl: 0    acyclovir (ZOVIRAX) 400 MG tablet, Take 1 tablet (400 mg total) by mouth 2 (two) times a day for 60 days (Patient not taking: Reported on 5/24/2019), Disp: 120 tablet, Rfl: 0    ALLERGIES:  No Known Allergies    REVIEW OF SYSTEMS:  Pertinent items are noted in HPI      LABS:  HgA1c: No results found for: HGBA1C  BMP:   Lab Results   Component Value Date    CALCIUM 9 3 05/15/2019     08/30/2017    K 3 7 05/24/2019    CO2 28 05/15/2019     05/15/2019    BUN 20 05/15/2019    CREATININE 0 75 05/15/2019         _____________________________________________________  PHYSICAL EXAMINATION:  Vital signs: /84   Pulse 77   Ht 5' 4" (1 626 m)   Wt 58 1 kg (128 lb)   BMI 21 97 kg/m²   General: well developed and well nourished, alert, oriented times 3 and appears comfortable  Psychiatric: Normal  HEENT: Trachea Midline, No torticollis  Cardiovascular: No discernable arrhythmia  Pulmonary: No wheezing or stridor  Skin: No masses, erthema, lacerations, fluctation, ulcerations  Neurovascular: Pulses Intact    MUSCULOSKELETAL EXAMINATION:  RIGHT SIDE:  Carpal tunnel:  wrist flexion 5/5, wrist extension 5/5, AIN 5/5, intrinsic 5/5, apb 5/5, minimal positive tinels at the wrist, positive flexion compression test    _____________________________________________________  STUDIES REVIEWED:  EMG: done on 3/22/19 demonstrates right moderate carpal tunnel syndrome with a motor latency of 4 48m/s with normal amplitudes, sensory latency of 4 06m/s again with normal amplitudes         PROCEDURES PERFORMED:  Procedures  No Procedures performed today   Scribe Attestation    I,:   Khoa Bruner am acting as a scribe while in the presence of the attending physician :        I,:   Kelby Michele MD personally performed the services described in this documentation    as scribed in my presence :

## 2019-07-24 NOTE — PATIENT INSTRUCTIONS
What is it Carpal Tunnel Syndrome? Carpal tunnel syndrome (CTS) is a condition brought on by increased pressure on the median nerve at the wrist  In effect, it is a pinched nerve at the wrist  Symptoms may include numbness, tingling, and pain in the arm, hand, and fingers  There is a space in the wrist called the carpal tunnel where the median nerve and nine tendons pass from the forearm into the hand (see Figure 1)  Carpal tunnel syndrome happens when pressure builds up from swelling in this tunnel and puts pressure on the nerve  When the pressure from the swelling becomes great enough to disturb the way the nerve works, numbness, tingling, and pain may be felt in the hand and fingers (see Figure 2)  What causes it? Usually the cause is unknown  Pressure on the nerve can happen several ways: swelling of the lining of the flexor tendons, called tenosynovitis; joint dislocations, fractures, and arthritis can narrow the tunnel; and keeping the wrist bent for long periods of time  Fluid retention during pregnancy can cause swelling in the tunnel and symptoms of carpal tunnel syndrome, which often go away after delivery  Thyroid conditions, rheumatoid arthritis, and diabetes also can be associated with carpal tunnel syndrome  There may be a combination of causes  Signs and symptoms  Carpal tunnel syndrome symptoms usually include pain, numbness, tingling, or a combination of the three  The numbness or tingling most often takes place in the thumb, index, middle, and ring fingers  The symptoms usually are felt during the night but also may be noticed during daily activities such as driving or reading a newspaper  Patients may sometimes notice a weaker , occasional clumsiness, and a tendency to drop things  In severe cases, sensation may be permanently lost and the muscles at the base of the thumb slowly shrink (thenar atrophy), causing difficulty with pinch      Diagnosis  A detailed history including medical conditions, how the hands have been used, and whether there were any prior injuries is important  An x-ray may be taken to check for the other causes of the complaints such as arthritis or a fracture  In some cases, laboratory tests may be done if there is a suspected medical condition that is associated with CTS  Electrodiagnostic studies (NCV-nerve conduction velocities and EMG-electromyogram) may be done to confirm the diagnosis of carpal tunnel syndrome as well as to check for other possible nerve problems  Treatment  Symptoms may often be relieved without surgery  Identifying and treating medical conditions, changing the patterns of hand use, or keeping the wrist splinted in a straight position may help reduce pressure on the nerve  Wearing wrist splints at night may relieve the symptoms that interfere with sleep  A steroid injection into the carpal tunnel may help relieve the symptoms by reducing swelling around the nerve  When symptoms are severe or do not improve, surgery may be needed to make more room for the nerve  Pressure on the nerve is decreased by cutting the ligament that forms the roof (top) of the tunnel on the palm side of the hand (see Figure 3)  Incisions for this surgery may vary, but the goal is the same: to enlarge the tunnel and decrease pressure on the nerve  Following surgery, soreness around the incision may last for several weeks or months  The numbness and tingling may disappear quickly or slowly  It may take several months for strength in the hand and wrist to return to normal  Carpal tunnel symptoms may not completely go away after surgery, especially in severe cases  © 2012 [employer]  www handcare  org

## 2019-07-24 NOTE — TELEPHONE ENCOUNTER
Alessandro Gan called patient again today to assure she was aware that patient needs to get her labs done before a refill on the revlimid is able to be filled  Patient immediatly said she was aware she had to get her labs done  Asked if she had time to speak with the nurse  In the time of call transfer, patient hung up

## 2019-07-25 LAB
ALBUMIN SERPL ELPH-MCNC: 4.15 G/DL (ref 3.5–5)
ALBUMIN SERPL ELPH-MCNC: 58.5 % (ref 52–65)
ALPHA1 GLOB SERPL ELPH-MCNC: 0.26 G/DL (ref 0.1–0.4)
ALPHA1 GLOB SERPL ELPH-MCNC: 3.7 % (ref 2.5–5)
ALPHA2 GLOB SERPL ELPH-MCNC: 0.61 G/DL (ref 0.4–1.2)
ALPHA2 GLOB SERPL ELPH-MCNC: 8.6 % (ref 7–13)
BETA GLOB ABNORMAL SERPL ELPH-MCNC: 0.41 G/DL (ref 0.4–0.8)
BETA1 GLOB SERPL ELPH-MCNC: 5.8 % (ref 5–13)
BETA2 GLOB SERPL ELPH-MCNC: 5.2 % (ref 2–8)
BETA2+GAMMA GLOB SERPL ELPH-MCNC: 0.37 G/DL (ref 0.2–0.5)
GAMMA GLOB ABNORMAL SERPL ELPH-MCNC: 1.29 G/DL (ref 0.5–1.6)
GAMMA GLOB SERPL ELPH-MCNC: 18.2 % (ref 12–22)
IGG/ALB SER: 1.41 {RATIO} (ref 1.1–1.8)
KAPPA LC FREE SER-MCNC: 26.9 MG/L (ref 3.3–19.4)
KAPPA LC FREE/LAMBDA FREE SER: 1.89 {RATIO} (ref 0.26–1.65)
LAMBDA LC FREE SERPL-MCNC: 14.2 MG/L (ref 5.7–26.3)
M PROTEIN 1 MFR SERPL ELPH: 6 %
M PROTEIN 1 SERPL ELPH-MCNC: 0.43 G/DL
PROT PATTERN SERPL ELPH-IMP: NORMAL
PROT SERPL-MCNC: 7.1 G/DL (ref 6.4–8.2)

## 2019-07-25 NOTE — TELEPHONE ENCOUNTER
Called Monty with patients pregnancy test result from yesterday being negative  Monty stated they would now release the medication from hold for the pharmacy to be able to dispense  [employer] also notified me that patient will need to do her patient survey on her end, as it is due before the medication will be shipped out  Called patient and explained she will need to do her survey in order to receive her next refill of revlimid  Patient verbalized understanding that she would do on her next day off

## 2019-07-31 ENCOUNTER — TELEPHONE (OUTPATIENT)
Dept: OBGYN CLINIC | Facility: HOSPITAL | Age: 44
End: 2019-07-31

## 2019-07-31 NOTE — TELEPHONE ENCOUNTER
Patient sees Dr Oc Rowell  She is wanting to schedule surgery, She was told to call the office and then she will be contacted to schedule  She states she did not have to come back in to the office

## 2019-07-31 NOTE — TELEPHONE ENCOUNTER
Looks like she's good to schedule  The note says you guys should have consents  Is that true? And it looks like it's under sedation since they talked about getting labs, can we please confirm?

## 2019-08-02 ENCOUNTER — PREP FOR PROCEDURE (OUTPATIENT)
Dept: OBGYN CLINIC | Facility: HOSPITAL | Age: 44
End: 2019-08-02

## 2019-08-05 NOTE — TELEPHONE ENCOUNTER
Edgard Urban is putting in request under sedation  She is getting a colonoscopy and EGD done in the near future  When scheduling this surgery- she should have 4 weeks in between these  Procedures and her ECTR  I will give you her consent on Wednesday

## 2019-08-05 NOTE — TELEPHONE ENCOUNTER
Veronica left the patient messages regarding her CTR  I cannot confirm whether surgery was to be done under [facility] other then the mention in her last appointment that consent and lab orders would be provided to schedulers  Can you add the case under [facility] and provide the consents I will then try to reach pt again to confirm

## 2019-08-19 ENCOUNTER — TELEPHONE (OUTPATIENT)
Dept: GASTROENTEROLOGY | Facility: CLINIC | Age: 44
End: 2019-08-19

## 2019-08-21 ENCOUNTER — TELEPHONE (OUTPATIENT)
Dept: GASTROENTEROLOGY | Facility: CLINIC | Age: 44
End: 2019-08-21

## 2019-08-24 ENCOUNTER — APPOINTMENT (OUTPATIENT)
Dept: LAB | Facility: CLINIC | Age: 44
End: 2019-08-24
Payer: COMMERCIAL

## 2019-08-24 DIAGNOSIS — C90.01 MULTIPLE MYELOMA IN REMISSION (HCC): ICD-10-CM

## 2019-08-24 LAB
ALBUMIN SERPL BCP-MCNC: 4 G/DL (ref 3.5–5)
ALP SERPL-CCNC: 95 U/L (ref 46–116)
ALT SERPL W P-5'-P-CCNC: 44 U/L (ref 12–78)
ANION GAP SERPL CALCULATED.3IONS-SCNC: 8 MMOL/L (ref 4–13)
AST SERPL W P-5'-P-CCNC: 26 U/L (ref 5–45)
BASOPHILS # BLD AUTO: 0.07 THOUSANDS/ΜL (ref 0–0.1)
BASOPHILS NFR BLD AUTO: 2 % (ref 0–1)
BILIRUB SERPL-MCNC: 0.54 MG/DL (ref 0.2–1)
BUN SERPL-MCNC: 15 MG/DL (ref 5–25)
CALCIUM SERPL-MCNC: 8.9 MG/DL (ref 8.3–10.1)
CHLORIDE SERPL-SCNC: 110 MMOL/L (ref 100–108)
CO2 SERPL-SCNC: 26 MMOL/L (ref 21–32)
CREAT SERPL-MCNC: 0.74 MG/DL (ref 0.6–1.3)
EOSINOPHIL # BLD AUTO: 0.19 THOUSAND/ΜL (ref 0–0.61)
EOSINOPHIL NFR BLD AUTO: 6 % (ref 0–6)
ERYTHROCYTE [DISTWIDTH] IN BLOOD BY AUTOMATED COUNT: 13.2 % (ref 11.6–15.1)
GFR SERPL CREATININE-BSD FRML MDRD: 97 ML/MIN/1.73SQ M
GLUCOSE P FAST SERPL-MCNC: 86 MG/DL (ref 65–99)
HCT VFR BLD AUTO: 37.8 % (ref 34.8–46.1)
HGB BLD-MCNC: 11.7 G/DL (ref 11.5–15.4)
IMM GRANULOCYTES # BLD AUTO: 0 THOUSAND/UL (ref 0–0.2)
IMM GRANULOCYTES NFR BLD AUTO: 0 % (ref 0–2)
LYMPHOCYTES # BLD AUTO: 1.6 THOUSANDS/ΜL (ref 0.6–4.47)
LYMPHOCYTES NFR BLD AUTO: 47 % (ref 14–44)
MCH RBC QN AUTO: 31.4 PG (ref 26.8–34.3)
MCHC RBC AUTO-ENTMCNC: 31 G/DL (ref 31.4–37.4)
MCV RBC AUTO: 101 FL (ref 82–98)
MONOCYTES # BLD AUTO: 0.33 THOUSAND/ΜL (ref 0.17–1.22)
MONOCYTES NFR BLD AUTO: 10 % (ref 4–12)
NEUTROPHILS # BLD AUTO: 1.19 THOUSANDS/ΜL (ref 1.85–7.62)
NEUTS SEG NFR BLD AUTO: 35 % (ref 43–75)
NRBC BLD AUTO-RTO: 0 /100 WBCS
PLATELET # BLD AUTO: 181 THOUSANDS/UL (ref 149–390)
PMV BLD AUTO: 9.8 FL (ref 8.9–12.7)
POTASSIUM SERPL-SCNC: 4.1 MMOL/L (ref 3.5–5.3)
PROT SERPL-MCNC: 7.2 G/DL (ref 6.4–8.2)
RBC # BLD AUTO: 3.73 MILLION/UL (ref 3.81–5.12)
SODIUM SERPL-SCNC: 144 MMOL/L (ref 136–145)
WBC # BLD AUTO: 3.38 THOUSAND/UL (ref 4.31–10.16)

## 2019-08-24 PROCEDURE — 36415 COLL VENOUS BLD VENIPUNCTURE: CPT

## 2019-08-24 PROCEDURE — 80053 COMPREHEN METABOLIC PANEL: CPT

## 2019-08-24 PROCEDURE — 84165 PROTEIN E-PHORESIS SERUM: CPT | Performed by: PATHOLOGY

## 2019-08-24 PROCEDURE — 83883 ASSAY NEPHELOMETRY NOT SPEC: CPT

## 2019-08-24 PROCEDURE — 84165 PROTEIN E-PHORESIS SERUM: CPT

## 2019-08-24 PROCEDURE — 85025 COMPLETE CBC W/AUTO DIFF WBC: CPT

## 2019-08-26 DIAGNOSIS — C90.00 MULTIPLE MYELOMA NOT HAVING ACHIEVED REMISSION (HCC): ICD-10-CM

## 2019-08-27 LAB
ALBUMIN SERPL ELPH-MCNC: 4.28 G/DL (ref 3.5–5)
ALBUMIN SERPL ELPH-MCNC: 60.3 % (ref 52–65)
ALPHA1 GLOB SERPL ELPH-MCNC: 0.26 G/DL (ref 0.1–0.4)
ALPHA1 GLOB SERPL ELPH-MCNC: 3.6 % (ref 2.5–5)
ALPHA2 GLOB SERPL ELPH-MCNC: 0.55 G/DL (ref 0.4–1.2)
ALPHA2 GLOB SERPL ELPH-MCNC: 7.8 % (ref 7–13)
BETA GLOB ABNORMAL SERPL ELPH-MCNC: 0.39 G/DL (ref 0.4–0.8)
BETA1 GLOB SERPL ELPH-MCNC: 5.5 % (ref 5–13)
BETA2 GLOB SERPL ELPH-MCNC: 4.9 % (ref 2–8)
BETA2+GAMMA GLOB SERPL ELPH-MCNC: 0.35 G/DL (ref 0.2–0.5)
GAMMA GLOB ABNORMAL SERPL ELPH-MCNC: 1.27 G/DL (ref 0.5–1.6)
GAMMA GLOB SERPL ELPH-MCNC: 17.9 % (ref 12–22)
IGG/ALB SER: 1.52 {RATIO} (ref 1.1–1.8)
KAPPA LC FREE SER-MCNC: 23.8 MG/L (ref 3.3–19.4)
KAPPA LC FREE/LAMBDA FREE SER: 1.6 {RATIO} (ref 0.26–1.65)
LAMBDA LC FREE SERPL-MCNC: 14.9 MG/L (ref 5.7–26.3)
M PROTEIN 1 MFR SERPL ELPH: 5.8 %
M PROTEIN 1 SERPL ELPH-MCNC: 0.41 G/DL
PROT PATTERN SERPL ELPH-IMP: ABNORMAL
PROT SERPL-MCNC: 7.1 G/DL (ref 6.4–8.2)

## 2019-08-27 RX ORDER — LENALIDOMIDE 10 MG/1
10 CAPSULE ORAL DAILY
Qty: 28 CAPSULE | Refills: 0 | Status: SHIPPED | OUTPATIENT
Start: 2019-08-27 | End: 2019-10-08 | Stop reason: SDUPTHER

## 2019-08-28 ENCOUNTER — OFFICE VISIT (OUTPATIENT)
Dept: HEMATOLOGY ONCOLOGY | Facility: CLINIC | Age: 44
End: 2019-08-28
Payer: COMMERCIAL

## 2019-08-28 VITALS
BODY MASS INDEX: 21.43 KG/M2 | SYSTOLIC BLOOD PRESSURE: 106 MMHG | WEIGHT: 125.5 LBS | HEIGHT: 64 IN | DIASTOLIC BLOOD PRESSURE: 64 MMHG | TEMPERATURE: 98.8 F | OXYGEN SATURATION: 98 % | HEART RATE: 69 BPM | RESPIRATION RATE: 18 BRPM

## 2019-08-28 DIAGNOSIS — C90.00 MULTIPLE MYELOMA, REMISSION STATUS UNSPECIFIED (HCC): Primary | ICD-10-CM

## 2019-08-28 DIAGNOSIS — Z79.899 OTHER LONG TERM (CURRENT) DRUG THERAPY: ICD-10-CM

## 2019-08-28 PROCEDURE — 99214 OFFICE O/P EST MOD 30 MIN: CPT | Performed by: PHYSICIAN ASSISTANT

## 2019-08-28 NOTE — PROGRESS NOTES
HEMATOLOGY / ONCOLOGY CLINIC NOTE    Primary Care Provider: Meño Mabry MD  Referring Provider: Jose Vinson  MRN: [id number]  : 1975    DATE:  2019  Reason for Encounter:  Follow up for Multiple Myeloma      Hematology / Oncology History:     Pan Wilkins is a 53 y o  female who came in for follow up  High risk Smoldering MM: normal cytogenetics = standard risk myeloma per ISS      Had a good response to induction, s/p maphlan induction and stem cell  Transplant in 2018,   Now on Revlimid maintenance       - BM : 2017 : 25% plasma cell     - M spike : igG lambda, 2 5 g; IgG > 3000  2000 mg in 2016     - normal cytogenetics   - NO CRAB : bone survey in  and cervical and thoracic spine MRI in May of 2017, with no bone lesions identified        overall prognosis discussed with pt; high risk SMM; need MM therapy  1) induction by VRd; 2) stem cell / auto transplant 3) maintenance therapy        induction : VRd:    - Velcade : 1 3 mg / m2 SC injection D 1, 8, 15 / 28d   - Revlimid : 25 mg po daily D1-d   - Dex 40 mg po D1, 8, 15 / 28d   - supportive care : allopurinol 100 mg po daily x 1 m then stop ; AS A 81 mg po daily once on Revlimid  ; Acyclovir 400 mg po bid           C # 1 : start Vd on  ; Revlimid for 12 days : stop on  ( end of week 3 )  C # 2:  start on  - 3/7 ( week off from 3/8-)  C # 3: 3/14, 3/21, 3/28,   revlimid : D1-21: 3/14-4/3   off -4/10  C# 4:  , ,          revlimid : D1-21: -  : Autologous stem cell transplant in [facility] with melphalan  No major complications  Hospitalized for 3 weeks    2018:  Start maintenance Revlimid 10 mg daily         2018:  Received proper posttransplant vaccination by PCP per patient   Jm Vo  M spike 0 3g/dL (2018) , un-detectable  ( 2019 )    Current Hematologic/Oncologic Treatment:    Revlimid 10 mg po Daily        Interval History:   : reported developed diffuse skin rash x 2 w, initially on her chest and itchy, now more diffuse and not itchiness anymore  No LAD, no infection, no abx recently  2/28: doing well  Still having fatigue, numbness tingling of hands / lower ext is better ( from Ms)     4/11: Came in for follow-up  Has worsening of chronic back pain  Receiving oxycodone  With partial release  Has appointment with spine specialist at of this month       11/14:  Came in for follow-up after stem cell transplant  Reported doing well, no major side effect complications from transplant  Overall recovered to baseline  Body weight is stable, still have some nonspecific bilateral upper extremity numbness and tingling      12/12 :   Came in for follow-up  Reported doing well  Has been about 6 months after transplant   -  Reported there is a small eyelid lump  -  Reported for the past 3 months developed right hand numbness and tingling  This is a chronic issue, patient has been having this for years, for the 1st half of this year symptom has been better, for the past 3 months getting worse      2/6/2019:  Came in for follow-up  Reported doing well, no major issues  Periodically feels nausea, no vomiting   - neuropathy remains the same   - Has significant mental stress while taking Revlimid, reported that reminds her about the history of cancer  She has been off Revlimid for 2 weeks now      5/15/2019:  Patient came in for follow-up  - reported for the past 2 weeks has gradually developed left upper quadrant abdominal pain, and associated with nausea, has no correlation with food intake or bowel movements  Symptoms usually last for days and then will gradually subside not completely resolve  No skin color change, no vomiting, no diarrhea, afebrile  No prior similar episodes  -  very fatigued, requesting to limit work hours  - no frequent infection  Body weight stable, no skin rash      8/28/19:  Continues to follow with Transplant team at [facility]    Appt on 9/18/19 with MRI then Dr Gely Sawyer  Appt to see Ortho on 9/11/19 for CTS, R>L  Repetitive activity at work aggravates  Left foot discomfort, without injury but not taking any meds  Last Skeletal survey was 9/19/18 at [place]  Scheduled for GI Dr Wilbert Ramos for EGD/Colonoscopy  Waiting on Shingrix vaccination since 1/19  She will further discuss with [facility] team at upcoming visit to see if they can provide  Assessment / Plan:     1  Multiple myeloma, remission status unspecified (HCC)  On maintenance Revlimid at this time  She had stem cell transplant in 8/18, and is following with [facility], with follow up visit scheduled with Dr Gely Sawyer on 9/18/19  We will continue to follow with patient with BW including CBC, CMP, SPEP, free light chains in 2 months then visit  She will continue with Revlimid in the interim  She will follow at [place] and will discuss vaccination at that visit as unable to obtain all vaccinations locally  - CBC and differential; Future  - Comprehensive metabolic panel; Future  - Protein electrophoresis, serum; Future  - Immunoglobulin free LT chains blood; Future    2  Other long term (current) drug therapy  On Revlimid 10 mg po daily  ECOG PS 0    Problem list:       Patient Active Problem List   Diagnosis    Multiple myeloma not having achieved remission (HCC)    Carpal tunnel syndrome, bilateral    Lumbar disc herniation with radiculopathy    Spasm of lumbar paraspinous muscle    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HCC)    Multiple sclerosis (HCC)    Insomnia    Left shoulder pain    Uterine leiomyoma    LLQ pain    Muscle spasm    Nausea    Hypokalemia     PHYSICIAL EXAMINATION:   Vital Signs:   /64 (BP Location: Left arm)   Pulse 69   Temp 98 8 °F (37 1 °C) (Tympanic Core)   Resp 18   Ht 5' 4" (1 626 m)   Wt 56 9 kg (125 lb 8 oz)   SpO2 98%   BMI 21 54 kg/m²    Body mass index is 21 54 kg/m²  Body surface area is 1 6 meters squared    No significant change compared to previous visit  Stable  Physical Exam   Constitutional: She is oriented to person, place, and time  She appears well-developed and well-nourished  No distress  HENT:   Head: Normocephalic and atraumatic  Mouth/Throat: Oropharynx is clear and moist  No oropharyngeal exudate  Eyes: Pupils are equal, round, and reactive to light  Conjunctivae and EOM are normal  Right eye exhibits no discharge  Left eye exhibits no discharge  No scleral icterus  Neck: Normal range of motion  Neck supple  No tracheal deviation present  No thyromegaly present  Cardiovascular: Normal rate, regular rhythm and normal heart sounds  Exam reveals no gallop and no friction rub  No murmur heard  Pulmonary/Chest: Effort normal and breath sounds normal  No stridor  No respiratory distress  She has no wheezes  She has no rales  Abdominal: Soft  Bowel sounds are normal  She exhibits no distension and no mass  There is no tenderness  There is no guarding  Genitourinary:   Genitourinary Comments: Deferred   Musculoskeletal: Normal range of motion  She exhibits no edema  Lymphadenopathy:     She has no cervical adenopathy  Neurological: She is alert and oriented to person, place, and time  Skin: Skin is warm and dry  No rash noted  She is not diaphoretic  No erythema  No pallor  Psychiatric: She has a normal mood and affect  Her behavior is normal      HISTORY/MEDS/ALLERGIES/ROS/Results   PAST MEDICAL HISTORY:   has a past medical history of Back pain, CTS (carpal tunnel syndrome), Multiple myeloma ([facility] [address] Multiple myeloma ([facility] [address] Multiple sclerosis ([facility] [address] ), and skin lesion  PAST SURGICAL HISTORY:   has a past surgical history that includes  section and Limbal stem cell transplant (2018)      CURRENT MEDICATIONS:     Current Outpatient Medications   Medication Sig Dispense Refill    aspirin (ECOTRIN LOW STRENGTH) 81 mg EC tablet Take 81 mg by mouth daily      gabapentin (NEURONTIN) 300 mg capsule   0  HM VITAMIN D3 2000 units CAPS Take by mouth      lenalidomide (REVLIMID) 10 MG CAPS Take 1 capsule (10 mg total) by mouth daily Q 28 days  28 capsule 0    acyclovir (ZOVIRAX) 400 MG tablet Take 1 tablet (400 mg total) by mouth 2 (two) times a day for 60 days (Patient not taking: Reported on 5/24/2019) 120 tablet 0     No current facility-administered medications for this visit  SOCIAL HISTORY:   reports that she has never smoked  She has never used smokeless tobacco  She reports that she does not drink alcohol or use drugs  FAMILY HISTORY:  family history includes Coronary artery disease in her father; No Known Problems in her brother, brother, daughter, mother, sister, and son  ALLERGIES:  has No Known Allergies  REVIEW OF SYSTEMS:  Review of Systems   Constitutional: Positive for fatigue  Negative for appetite change, fever and unexpected weight change  HENT: Negative for nosebleeds, sore throat and trouble swallowing  Eyes: Negative for visual disturbance  Respiratory: Negative for cough, chest tightness, shortness of breath and wheezing  Cardiovascular: Negative for chest pain, palpitations and leg swelling  Gastrointestinal: Positive for abdominal pain (Occasionally pain on Left abdomen  )  Negative for abdominal distention, blood in stool, constipation, diarrhea, nausea and vomiting  Genitourinary: Negative for difficulty urinating, dysuria, hematuria, pelvic pain and vaginal bleeding  Musculoskeletal: Negative for arthralgias, back pain and myalgias  Skin: Negative for pallor and rash  Neurological: Positive for numbness (CTS R>L   )  Negative for dizziness, weakness and headaches  Hematological: Negative for adenopathy  Does not bruise/bleed easily  Psychiatric/Behavioral: Negative for confusion and sleep disturbance  The patient is not nervous/anxious        Labs:   Lab Results   Component Value Date    WBC 3 38 (L) 08/24/2019    HGB 11 7 08/24/2019    HCT 37 8 08/24/2019     (H) 08/24/2019     08/24/2019   Mild decrease in WBC level  [address]  Lab Results   Component Value Date     08/30/2017    K 4 1 08/24/2019     (H) 08/24/2019    CO2 26 08/24/2019    BUN 15 08/24/2019    CREATININE 0 74 08/24/2019    GLUF 86 08/24/2019    CALCIUM 8 9 08/24/2019    AST 26 08/24/2019    ALT 44 08/24/2019    ALKPHOS 95 08/24/2019    PROT 8 3 (H) 08/30/2017    BILITOT 0 4 08/30/2017    EGFR 97 08/24/2019   Na 144, Cl 110, Albumin 4 0  Lab Results   Component Value Date    SPEP  08/24/2019     The SPEP shows a monoclonal peak in the gamma region  Previous immunofixation on 5/15/19 identified a monoclonal band as free lambda light chains  Reviewed by:  Margarita Watt MD **Electronic Signature**   M spike = 0 41 g/dl, with free light chains ratio = 1 60  Prior on 7/24/19 with SPEP 0 43 g/dl  Free light chains 1 81  Stable  IMAGING:  No results found

## 2019-08-29 PROBLEM — Z79.899 OTHER LONG TERM (CURRENT) DRUG THERAPY: Status: ACTIVE | Noted: 2019-08-29

## 2019-09-11 ENCOUNTER — OFFICE VISIT (OUTPATIENT)
Dept: OBGYN CLINIC | Facility: HOSPITAL | Age: 44
End: 2019-09-11
Attending: PSYCHIATRY & NEUROLOGY
Payer: COMMERCIAL

## 2019-09-11 VITALS
WEIGHT: 125 LBS | SYSTOLIC BLOOD PRESSURE: 121 MMHG | BODY MASS INDEX: 21.34 KG/M2 | DIASTOLIC BLOOD PRESSURE: 75 MMHG | HEIGHT: 64 IN | HEART RATE: 67 BPM

## 2019-09-11 DIAGNOSIS — G56.01 CARPAL TUNNEL SYNDROME OF RIGHT WRIST: Primary | ICD-10-CM

## 2019-09-11 PROCEDURE — 99213 OFFICE O/P EST LOW 20 MIN: CPT | Performed by: ORTHOPAEDIC SURGERY

## 2019-09-11 NOTE — PROGRESS NOTES
ASSESSMENT/PLAN:    Assessment:   Carpal Tunnel Syndrome  right    Plan:   Resume activities as tolerated, bracing and Gabapentin 300mg with work restrictions  Follow Up:  6  week(s)      General Discussions:     Carpal Tunnel Syndrome: The anatomy and physiology of carpal tunnel syndrome was discussed with the patient today  Increase pressure localized under the transverse carpal ligament can cause pain, numbness, tingling, or dysesthesias within the median nerve distribution as well as feelings of fatigue, clumsiness, or awkwardness  These symptoms typically occur at night and worse in the morning upon waking  Eventually, untreated carpal tunnel syndrome can result in weakness and permanent loss of muscle within the thenar compartment of the hand  Treatment options were discussed with the patient  Conservative treatment includes nocturnal resting splints to keep the nerve in a neutral position, ergonomic changes within the work or home environment, activity modification, and tendon gliding exercises  Steroid injections within the carpal canal can help a majority of patients, however this is often self-limited in a majority of patients  Surgical intervention to divide the transverse carpal ligament typically results in a long-lasting relief of the patient's complaints, with the recurrence rate of less than 1%        _____________________________________________________  CHIEF COMPLAINT:  Chief Complaint   Patient presents with    Right Wrist - Follow-up         SUBJECTIVE:  Mohini Cain is a 53 y o  female who presents for follow up regarding Carpal Tunnel Syndrome  right  Since last visit, Mohini Cain has tried bracing with only partial relief  Today there is Numbness to the right wrist  She has been taking the Gabapentin 300mg 1x/daily that does give mild relief from the intensity of the numbness and tingling  At the last visit, an RIGHT endoscopic carpal tunnel release was discussed   She wanted to discuss it with her family  She states that she tried to call in to schedule but never could connect with anyone  She took a 6 week leave of absence from her [employer] job, to see if the symptoms would improve  Radiation: None  Associated symptoms: Numbness and cold sensation and throbbing especially at night  PAST MEDICAL HISTORY:  Past Medical History:   Diagnosis Date    Back pain     pinched nerve in back    CTS (carpal tunnel syndrome)     right wrist    Multiple myeloma (HCC)     chemotherapy    [facility]     Multiple sclerosis [facility]     skin lesion     mole removed from face       PAST SURGICAL HISTORY:  Past Surgical History:   Procedure Laterality Date     SECTION      LIMBAL STEM CELL TRANSPLANT  2018       FAMILY HISTORY:  Family History   Problem Relation Age of Onset    No Known Problems Mother     Coronary artery disease Father     No Known Problems Sister     No Known Problems Brother     No Known Problems Daughter     No Known Problems Son     No Known Problems Brother        SOCIAL HISTORY:  Social History     Tobacco Use    Smoking status: Never Smoker    Smokeless tobacco: Never Used   Substance Use Topics    Alcohol use: No    Drug use: No       MEDICATIONS:    Current Outpatient Medications:     aspirin (ECOTRIN LOW STRENGTH) 81 mg EC tablet, Take 81 mg by mouth daily, Disp: , Rfl:     gabapentin (NEURONTIN) 300 mg capsule, , Disp: , Rfl: 0    HM VITAMIN D3 2000 units CAPS, Take by mouth, Disp: , Rfl:     lenalidomide (REVLIMID) 10 MG CAPS, Take 1 capsule (10 mg total) by mouth daily Q 28 days  , Disp: 28 capsule, Rfl: 0    acyclovir (ZOVIRAX) 400 MG tablet, Take 1 tablet (400 mg total) by mouth 2 (two) times a day for 60 days (Patient not taking: Reported on 2019), Disp: 120 tablet, Rfl: 0    ALLERGIES:  No Known Allergies    REVIEW OF SYSTEMS:  Pertinent items are noted in HPI    A comprehensive review of systems was negative      LABS:  HgA1c: No results found for: HGBA1C  BMP:   Lab Results   Component Value Date    CALCIUM 8 9 08/24/2019     08/30/2017    K 4 1 08/24/2019    CO2 26 08/24/2019     (H) 08/24/2019    BUN 15 08/24/2019    CREATININE 0 74 08/24/2019           _____________________________________________________  PHYSICAL EXAMINATION:  Vital signs: /75   Pulse 67   Ht 5' 4" (1 626 m)   Wt 56 7 kg (125 lb)   BMI 21 46 kg/m²   General: well developed and well nourished, alert, oriented times 3 and appears comfortable  Psychiatric: Normal  HEENT: Trachea Midline, No torticollis  Cardiovascular: No discernable arrhythmia  Pulmonary: No wheezing or stridor  Skin: No masses, erthema, lacerations, fluctation, ulcerations  Neurovascular: Pulses Intact, numbness and decreased sensation in all fingers    MUSCULOSKELETAL EXAMINATION:  RIGHT SIDE:  Carpal tunnel:  Postive Tinel's and Positive Phalan's Test, 4+ APB, wrist 5/5 all planes    _____________________________________________________  STUDIES REVIEWED:  No Studies to review      PROCEDURES PERFORMED:  Procedures  No Procedures performed today   Scribe Attestation    I,:   Kingsley Montesinos am acting as a scribe while in the presence of the attending physician :        I,:   John Castrejon MD personally performed the services described in this documentation    as scribed in my presence :

## 2019-09-11 NOTE — LETTER
September 11, 2019     Patient: Christi Garay   YOB: 1975   Date of Visit: 9/11/2019       To Whom it May Concern:    Glenny Curtis is under my professional care  She was seen in my office on 9/11/2019  She may return to work with limitations limited gripping with 5 minute breaks as of today  If you have any questions or concerns, please don't hesitate to call           Sincerely,          Kelby Michele MD        CC: No Recipients

## 2019-10-05 ENCOUNTER — APPOINTMENT (OUTPATIENT)
Dept: LAB | Facility: HOSPITAL | Age: 44
End: 2019-10-05
Attending: INTERNAL MEDICINE
Payer: COMMERCIAL

## 2019-10-08 ENCOUNTER — TELEPHONE (OUTPATIENT)
Dept: HEMATOLOGY ONCOLOGY | Facility: CLINIC | Age: 44
End: 2019-10-08

## 2019-10-08 DIAGNOSIS — C90.00 MULTIPLE MYELOMA NOT HAVING ACHIEVED REMISSION (HCC): ICD-10-CM

## 2019-10-08 RX ORDER — LENALIDOMIDE 10 MG/1
10 CAPSULE ORAL DAILY
Qty: 28 CAPSULE | Refills: 0 | Status: SHIPPED | OUTPATIENT
Start: 2019-10-08 | End: 2019-11-07 | Stop reason: SDUPTHER

## 2019-10-08 NOTE — TELEPHONE ENCOUNTER
Was awaiting pregnancy test results  Refill sent to Dr Emily Higgins to sign, to be sent to [facility]

## 2019-10-08 NOTE — TELEPHONE ENCOUNTER
Sam Hernandez called and was waiting for her prescription Revlimid to be delivered  She hasn't heard anything back and called twice our office  Please refill her prescription she has to be home to sign for the medication  Her Pharmacy is [facility]

## 2019-10-21 ENCOUNTER — TELEPHONE (OUTPATIENT)
Dept: GASTROENTEROLOGY | Facility: CLINIC | Age: 44
End: 2019-10-21

## 2019-10-23 ENCOUNTER — APPOINTMENT (OUTPATIENT)
Dept: LAB | Facility: CLINIC | Age: 44
End: 2019-10-23
Payer: COMMERCIAL

## 2019-10-23 ENCOUNTER — TRANSCRIBE ORDERS (OUTPATIENT)
Dept: LAB | Facility: CLINIC | Age: 44
End: 2019-10-23

## 2019-10-23 DIAGNOSIS — C90.01 MULTIPLE MYELOMA IN REMISSION (HCC): ICD-10-CM

## 2019-10-23 DIAGNOSIS — C90.00 MULTIPLE MYELOMA, REMISSION STATUS UNSPECIFIED (HCC): ICD-10-CM

## 2019-10-23 PROCEDURE — 83883 ASSAY NEPHELOMETRY NOT SPEC: CPT

## 2019-10-23 PROCEDURE — 84165 PROTEIN E-PHORESIS SERUM: CPT

## 2019-10-24 LAB
KAPPA LC FREE SER-MCNC: 25.4 MG/L (ref 3.3–19.4)
KAPPA LC FREE/LAMBDA FREE SER: 1.52 {RATIO} (ref 0.26–1.65)
LAMBDA LC FREE SERPL-MCNC: 16.7 MG/L (ref 5.7–26.3)

## 2019-10-25 LAB
ALBUMIN SERPL ELPH-MCNC: 4.22 G/DL (ref 3.5–5)
ALBUMIN SERPL ELPH-MCNC: 57.8 % (ref 52–65)
ALPHA1 GLOB SERPL ELPH-MCNC: 0.27 G/DL (ref 0.1–0.4)
ALPHA1 GLOB SERPL ELPH-MCNC: 3.7 % (ref 2.5–5)
ALPHA2 GLOB SERPL ELPH-MCNC: 0.63 G/DL (ref 0.4–1.2)
ALPHA2 GLOB SERPL ELPH-MCNC: 8.6 % (ref 7–13)
BETA GLOB ABNORMAL SERPL ELPH-MCNC: 0.41 G/DL (ref 0.4–0.8)
BETA1 GLOB SERPL ELPH-MCNC: 5.6 % (ref 5–13)
BETA2 GLOB SERPL ELPH-MCNC: 5.6 % (ref 2–8)
BETA2+GAMMA GLOB SERPL ELPH-MCNC: 0.41 G/DL (ref 0.2–0.5)
GAMMA GLOB ABNORMAL SERPL ELPH-MCNC: 1.37 G/DL (ref 0.5–1.6)
GAMMA GLOB SERPL ELPH-MCNC: 18.7 % (ref 12–22)
IGG/ALB SER: 1.37 {RATIO} (ref 1.1–1.8)
M PROTEIN 1 MFR SERPL ELPH: 6.1 %
M PROTEIN 1 SERPL ELPH-MCNC: 0.45 G/DL
PROT PATTERN SERPL ELPH-IMP: NORMAL
PROT SERPL-MCNC: 7.3 G/DL (ref 6.4–8.2)

## 2019-10-28 ENCOUNTER — LAB REQUISITION (OUTPATIENT)
Dept: LAB | Facility: HOSPITAL | Age: 44
End: 2019-10-28
Payer: COMMERCIAL

## 2019-10-28 ENCOUNTER — OFFICE VISIT (OUTPATIENT)
Dept: URGENT CARE | Facility: CLINIC | Age: 44
End: 2019-10-28
Payer: COMMERCIAL

## 2019-10-28 VITALS
OXYGEN SATURATION: 98 % | HEART RATE: 79 BPM | HEIGHT: 65 IN | RESPIRATION RATE: 16 BRPM | TEMPERATURE: 98.3 F | BODY MASS INDEX: 20.66 KG/M2 | WEIGHT: 124 LBS | SYSTOLIC BLOOD PRESSURE: 130 MMHG | DIASTOLIC BLOOD PRESSURE: 80 MMHG

## 2019-10-28 DIAGNOSIS — S30.860A TICK BITE OF BACK, INITIAL ENCOUNTER: Primary | ICD-10-CM

## 2019-10-28 DIAGNOSIS — R10.12 LEFT UPPER QUADRANT PAIN: ICD-10-CM
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
## 2019-10-28 DIAGNOSIS — K62.1 RECTAL POLYP: ICD-10-CM

## 2019-10-28 PROCEDURE — 99212 OFFICE O/P EST SF 10 MIN: CPT | Performed by: PHYSICIAN ASSISTANT

## 2019-10-28 PROCEDURE — 88305 TISSUE EXAM BY PATHOLOGIST: CPT | Performed by: PATHOLOGY

## 2019-10-28 PROCEDURE — 88342 IMHCHEM/IMCYTCHM 1ST ANTB: CPT | Performed by: PATHOLOGY

## 2019-10-28 RX ORDER — DOXYCYCLINE 100 MG/1
CAPSULE ORAL
Qty: 2 CAPSULE | Refills: 0 | Status: SHIPPED | COMMUNITY
Start: 2019-10-28 | End: 2019-10-28

## 2019-10-28 NOTE — PROGRESS NOTES
[facility]        NAME: Blank Teresa is a 53 y o  female  : 1975    MRN: [id number]  DATE: 2019  TIME: 10:31 AM    Assessment and Plan   Tick bite of back, initial encounter [S30 860A, W57  XXXA]  1  Tick bite of back, initial encounter  doxycycline monohydrate (MONODOX) 100 mg capsule         Patient Instructions     Patient Instructions     Tick removed without incision  We will treat with doxycycline for prophylaxis  Monitor for fever start rash or myalgias  If so follow-up with PCP  Follow up with PCP in 3-5 days  Proceed to  ER if symptoms worsen  Chief Complaint     Chief Complaint   Patient presents with    Tick Removal     Pt has tick on L shouder/upper back area  Noticed it yesterday,  tried to remove but thinks the head of tick is still attached         History of Present Illness         76-year-old female presents with a tick bite on her left upper back  She notes that this morning  She was working outside all weekend  No fever chills  No other complaints  Her  tried to get out but did not get the entire tick out  Review of Systems   Review of Systems   Constitutional: Negative for fatigue and fever  HENT: Negative  Respiratory: Negative  Cardiovascular: Negative  Gastrointestinal: Negative  Musculoskeletal: Negative for myalgias  Skin: Positive for wound  Negative for rash  Neurological: Negative  Current Medications       Current Outpatient Medications:     aspirin (ECOTRIN LOW STRENGTH) 81 mg EC tablet, Take 81 mg by mouth daily, Disp: , Rfl:     Cyanocobalamin (VITAMIN B 12 PO), Take 1 tablet by mouth daily, Disp: , Rfl:     HM VITAMIN D3 2000 units CAPS, Take by mouth, Disp: , Rfl:     lenalidomide (REVLIMID) 10 MG CAPS, Take 1 capsule (10 mg total) by mouth daily Q 28 days  , Disp: 28 capsule, Rfl: 0    acyclovir (ZOVIRAX) 400 MG tablet, Take 1 tablet (400 mg total) by mouth 2 (two) times a day for 60 days (Patient not taking: Reported on 2019), Disp: 120 tablet, Rfl: 0    doxycycline monohydrate (MONODOX) 100 mg capsule, Take 2 pills now, Disp: 2 capsule, Rfl: 0    gabapentin (NEURONTIN) 300 mg capsule, , Disp: , Rfl: 0    Current Allergies     Allergies as of 10/28/2019    (No Known Allergies)            The following portions of the patient's history were reviewed and updated as appropriate: allergies, current medications, past family history, past medical history, past social history, past surgical history and problem list      Past Medical History:   Diagnosis Date    Back pain     pinched nerve in back    CTS (carpal tunnel syndrome)     right wrist    [facility] myel[facility] ([facility] [address] [facility]     chemotherapy    [facility] myeloma ([facility] [address] [facility]     Multiple sclerosis ([facility] [address] [facility]     skin lesion     mole removed from face       Past Surgical History:   Procedure Laterality Date     SECTION      LIMBAL STEM CELL TRANSPLANT  2018       Family History   Problem Relation Age of Onset    No Known Problems Mother     Coronary artery disease Father     No Known Problems Sister     No Known Problems Brother     No Known Problems Daughter     No Known Problems Son     No Known Problems Brother          Medications have been verified  Objective   /80   Pulse 79   Temp 98 3 °F (36 8 °C) (Temporal)   Resp 16   Ht 5' 5" (1 651 m)   Wt 56 2 kg (124 lb)   SpO2 98%   BMI 20 63 kg/m²        Physical Exam     Physical Exam   Constitutional: She is oriented to person, place, and time  She appears well-developed and well-nourished  No distress  Neurological: She is alert and oriented to person, place, and time  No cranial nerve deficit  Skin:     Left upper back small tick bite with retained part  These were easily removed with tweezers  No incision was made  No surrounding erythema or target rash

## 2019-10-28 NOTE — PATIENT INSTRUCTIONS
Tick removed without incision  We will treat with doxycycline for prophylaxis  Monitor for fever start rash or myalgias  If so follow-up with PCP

## 2019-10-30 ENCOUNTER — DOCUMENTATION (OUTPATIENT)
Dept: HEMATOLOGY ONCOLOGY | Facility: CLINIC | Age: 44
End: 2019-10-30

## 2019-10-30 ENCOUNTER — OFFICE VISIT (OUTPATIENT)
Dept: HEMATOLOGY ONCOLOGY | Facility: CLINIC | Age: 44
End: 2019-10-30
Payer: COMMERCIAL

## 2019-10-30 VITALS
WEIGHT: 128 LBS | HEART RATE: 74 BPM | OXYGEN SATURATION: 100 % | SYSTOLIC BLOOD PRESSURE: 110 MMHG | BODY MASS INDEX: 21.33 KG/M2 | DIASTOLIC BLOOD PRESSURE: 60 MMHG | HEIGHT: 65 IN | RESPIRATION RATE: 16 BRPM | TEMPERATURE: 98.5 F

## 2019-10-30 DIAGNOSIS — Z79.899 OTHER LONG TERM (CURRENT) DRUG THERAPY: ICD-10-CM

## 2019-10-30 DIAGNOSIS — C90.00 MULTIPLE MYELOMA NOT HAVING ACHIEVED REMISSION (HCC): Primary | ICD-10-CM

## 2019-10-30 PROCEDURE — 99214 OFFICE O/P EST MOD 30 MIN: CPT | Performed by: PHYSICIAN ASSISTANT

## 2019-10-30 NOTE — LETTER
October 30, 2019     Patient: Harriett Dean   YOB: 1975   Date of Visit: 10/30/2019       To Whom it May Concern:    Harriett Dean is under my professional care  She was seen in my office on 10/30/2019  At this time, Gilberto Green should not work more than 40 hours per week  through December 31, 2019  If you have any questions or concerns, please don't hesitate to call           Sincerely,          Dr Saeed Sams MD, PhD        CC: No Recipients

## 2019-10-30 NOTE — PROGRESS NOTES
HEMATOLOGY / [address] NOTE    Primary Care Provider: Yamila Flores MD  Referring Provider: Gio Friend  MRN: [id number]  : 1975    DATE: 10/30/2019  Reason for Encounter:  Follow up for Multiple Myeloma      Hematology / Oncology History:     Pedro Valverde is a 53 y o  female who came in for follow up  High risk Smoldering MM: normal cytogenetics = standard risk myeloma per ISS      Had a good response to induction, s/p maphlan induction and stem cell  Transplant in 2018,   Now on Revlimid maintenance       - BM : 2017 : 25% plasma cell     - M spike : igG lambda, 2 5 g; IgG > 3000  2000 mg in 2016     - normal cytogenetics   - NO CRAB : bone survey in  and cervical and thoracic spine MRI in May of 2017, with no bone lesions identified        overall prognosis discussed with pt; high risk SMM; need MM therapy  1) induction by VRd; 2) stem cell / auto transplant 3) maintenance therapy        induction : VRd:    - Velcade : 1 3 mg / m2 SC injection D 1, 8, 15 / 28d   - Revlimid : 25 mg po daily D1-d   - Dex 40 mg po D1, 8, 15 / 28d   - supportive care : allopurinol 100 mg po daily x 1 m then stop ; AS A 81 mg po daily once on Revlimid  ; Acyclovir 400 mg po bid           C # 1 : start Vd on  ; Revlimid for 12 days : stop on  ( end of week 3 )  C # 2:  start on  - 3/7 ( week off from 3/8-)  C # 3: 3/14, 3/21, 3/28,   revlimid : D1-21: 3/14-4/3   off -4/10  C# 4:  , ,          revlimid : D1-21: -  : Autologous stem cell transplant in [facility] with melphalan   No major complications   Hospitalized for 3 weeks    2018:  Start maintenance Revlimid 10 mg daily         2018:  Received proper posttransplant vaccination by PCP per patient   Blinda Jonnathan  M spike 0 3g/dL (2018) , un-detectable  ( 2019 )     Current Hematologic/Oncologic Treatment:    Revlimid 10 mg po Daily        Interval History:   : reported developed diffuse skin rash x 2 w, initially on her chest and itchy, now more diffuse and not itchiness anymore  No LAD, no infection, no abx recently       2/28: doing well  Still having fatigue, numbness tingling of hands / lower ext is better ( from Ms)     4/11: Came in for follow-up   Has worsening of chronic back pain     Receiving oxycodone  With partial release    Has appointment with spine specialist at of this month       11/14:  Came in for follow-up after stem cell transplant   Reported doing well, no major side effect complications from transplant  Donney Puff recovered to baseline   Body weight is stable, still have some nonspecific bilateral upper extremity numbness and tingling      12/12 :   Came in for follow-up   Reported doing well   Has been about 6 months after transplant   -  Reported there is a small eyelid lump  -  Reported for the past 3 months developed right hand numbness and tingling   This is a chronic issue, patient has been having this for years, for the 1st half of this year symptom has been better, for the past 3 months getting worse      2/6/2019:  Came in for follow-up   Reported doing well, no major issues   Periodically feels nausea, no vomiting   - neuropathy remains the same   - Has significant mental stress while taking Revlimid, reported that reminds her about the history of cancer   She has been off Revlimid for 2 weeks now      5/15/2019:  Patient came in for follow-up  - reported for the past 2 weeks has gradually developed left upper quadrant abdominal pain, and associated with nausea, has no correlation with food intake or bowel movements   Symptoms usually last for days and then will gradually subside not completely resolve   No skin color change, no vomiting, no diarrhea, afebrile   No prior similar episodes  -  very fatigued, requesting to limit work hours  - no frequent infection   Body weight stable, no skin rash      8/28/19:  Continues to follow with Transplant team at [facility]    Appt on 9/18/19 with MRI then Dr Juanis Davis  Appt to see Ortho on 9/11/19 for CTS, R>L  Repetitive activity at work aggravates  Left foot discomfort, without injury but not taking any meds  Last Skeletal survey was 9/19/18 at [facility]  Scheduled for GI Dr Ramon Griffiths for EGD/Colonoscopy  Waiting on Shingrix vaccination since 1/19  She will further discuss with [facility] team at upcoming visit to see if they can provide        10/30/19:  Tick bite and went to urgent care for retained tick extraction on 10/28/19  No target rash noted on Left shoulder/upper back  Placed on Doxycycline ABX x2 tablets  Little discomfort in abdominal area S/P Colonoscopy on 10/28/19  No CRAB criteria  Assessment / Plan:   1  Multiple myeloma not having achieved remission ([facility] [address] )  SCT in 8/2018, follows with Dr Juanis Davis at [facility]  Last visit was on 9/25/19  Recommendation is to continue with Revlimid at this time  She indicated she is fine to continue on 10 mg dose, and feels able to tolerate it and does not want dose decrease to 5 mg  Continue CBC, CMP, SPEP, Free light chains repeat prior to next visit with Dr Adela Koehler  Will continue to follow regularly until 2 years post transplant  She will be following with [facility] in 1/2020  She indicated she will not obtain Flu shot  She discussed with them obtaining other immunizations, and they did not have any available at time of visit  She also checked into [facility] and is awaiting call back when available  Advised to continue ASA daily  Not taking Acyclovir, and advised to restart  She is concerned about M spike, which is 0 45 g/dl  We discussed that it appears stable  [facility] if Mspike >1, then consider Daratumumab  - CBC and differential; Future  - Comprehensive metabolic panel; Future  - Immunoglobulin free LT chains blood; Future  - Protein electrophoresis, serum; Future  - Pregnancy Test (HCG Qualitative); Future    2   Other long term (current) drug therapy  On Revlimid 10 mg po Daily  Has 1 week left, needs to obtain pregnancy test prior to refill     - Pregnancy Test (HCG Qualitative); Future    3  Fatigue  On treatment  Wants to continue current dose  Requested note for work to limit to 40 hours weekly, due to increased fatigue on this medication  Will follow next month to ensure tolerating ok and able to continue on current dose  ECOG PS 0    Problem list:       Patient Active Problem List   Diagnosis    Multiple myeloma not having achieved remission ([address] )    Carpal tunnel syndrome, bilateral    Lumbar disc herniation with radiculopathy    Spasm of lumbar paraspinous muscle    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HCC)    Multiple sclerosis (HCC)    Insomnia    Left shoulder pain    Uterine leiomyoma    LLQ pain    Muscle spasm    Nausea    Hypokalemia    Other long term (current) drug therapy     PHYSICIAL EXAMINATION:   Vital Signs:   /60 (BP Location: Right arm, Cuff Size: Adult)   Pulse 74   Temp 98 5 °F (36 9 °C) (Oral)   Resp 16   Ht 5' 5" (1 651 m)   Wt 58 1 kg (128 lb)   SpO2 100%   BMI 21 30 kg/m²    Body mass index is 21 3 kg/m²  Body surface area is 1 64 meters squared  No significant change compared to previous visit  Stable  Physical Exam   Constitutional: She is oriented to person, place, and time  She appears well-developed and well-nourished  No distress  HENT:   Head: Normocephalic and atraumatic  Mouth/Throat: Oropharynx is clear and moist  No oropharyngeal exudate  Eyes: Pupils are equal, round, and reactive to light  Conjunctivae and EOM are normal  Right eye exhibits no discharge  Left eye exhibits no discharge  No scleral icterus  Neck: Normal range of motion  Neck supple  No tracheal deviation present  No thyromegaly present  Cardiovascular: Normal rate, regular rhythm and normal heart sounds  Exam reveals no gallop and no friction rub  No murmur heard    Pulmonary/Chest: Effort normal and breath sounds normal  No stridor  No respiratory distress  She has no wheezes  She has no rales  Abdominal: Soft  Bowel sounds are normal  She exhibits no distension and no mass  There is no tenderness  There is no guarding  Genitourinary:   Genitourinary Comments: Deferred   Musculoskeletal: Normal range of motion  She exhibits no edema  Lymphadenopathy:     She has no cervical adenopathy  Neurological: She is alert and oriented to person, place, and time  No sensory deficit  Skin: Skin is warm and dry  No rash noted  She is not diaphoretic  No erythema  Psychiatric: She has a normal mood and affect  Her behavior is normal      HISTORY/MEDS/ALLERGIES/ROS/Results   PAST MEDICAL HISTORY:   has a past medical history of Back pain, CTS (carpal tunnel syndrome), Multiple myeloma ([facility] [address] ), Multiple myeloma ([facility] [address] ), [facility] and skin lesion  PAST SURGICAL HISTORY:   has a past surgical history that includes  section and Limbal stem cell transplant (2018)  CURRENT MEDICATIONS:     Current Outpatient Medications   Medication Sig Dispense Refill    aspirin (ECOTRIN LOW STRENGTH) 81 mg EC tablet Take 81 mg by mouth daily      Cyanocobalamin (VITAMIN B 12 PO) Take 1 tablet by mouth daily      gabapentin (NEURONTIN) 300 mg capsule   0    HM VITAMIN D3 2000 units CAPS Take by mouth      lenalidomide (REVLIMID) 10 MG CAPS Take 1 capsule (10 mg total) by mouth daily Q 28 days  28 capsule 0    acyclovir (ZOVIRAX) 400 MG tablet Take 1 tablet (400 mg total) by mouth 2 (two) times a day for 60 days (Patient not taking: Reported on 2019) 120 tablet 0     No current facility-administered medications for this visit  SOCIAL HISTORY:   reports that she has never smoked  She has never used smokeless tobacco  She reports that she does not drink alcohol or use drugs       FAMILY HISTORY:  family history includes Coronary artery disease in her father; No Known Problems in her brother, brother, daughter, mother, sister, and son  ALLERGIES:  has No Known Allergies  REVIEW OF SYSTEMS:  Review of Systems   Constitutional: Positive for activity change (Decreased energy ) and fatigue (Back to work as had taken leave  Notes very tired on treatment  )  Negative for appetite change, fever and unexpected weight change  HENT: Negative for nosebleeds, sore throat and trouble swallowing  Eyes: Negative for visual disturbance  Respiratory: Negative for cough, chest tightness, shortness of breath and wheezing  Cardiovascular: Negative for chest pain, palpitations and leg swelling  Gastrointestinal: Positive for abdominal pain (Sore in abdomen S/P Colonoscopy on 10/28/19 )  Negative for abdominal distention, blood in stool, constipation, diarrhea, nausea and vomiting  Genitourinary: Negative for difficulty urinating, dysuria, hematuria, pelvic pain and vaginal bleeding  Musculoskeletal: Negative for arthralgias, back pain and myalgias  Skin: Negative for pallor and rash  Neurological: Negative for dizziness, weakness, numbness and headaches  Hematological: Negative for adenopathy  Does not bruise/bleed easily  Psychiatric/Behavioral: Negative for confusion and sleep disturbance  The patient is not nervous/anxious  Labs:   Lab Results   Component Value Date    WBC 3 91 (L) 10/23/2019    HGB 12 4 10/23/2019    HCT 39 3 10/23/2019     (H) 10/23/2019     10/23/2019    Mild decreased WBC count 3910  MCV mildly elevated 100  Lab Results   Component Value Date     08/30/2017    K 3 6 10/23/2019     10/23/2019    CO2 29 10/23/2019    BUN 14 10/23/2019    CREATININE 0 71 10/23/2019    GLUF 86 08/24/2019    CALCIUM 9 2 10/23/2019    AST 28 10/23/2019    ALT 56 10/23/2019    ALKPHOS 89 10/23/2019    PROT 8 3 (H) 08/30/2017    BILITOT 0 4 08/30/2017    EGFR 102 10/23/2019    Sodium 142, albumin 4 3  Stable  LDH 09/25/2019 = 164   Stable     Lab Results   Component Value Date SPEP  10/23/2019     The SPEP shows a monoclonal peak in the gamma region  Previous immunofixation on 5/15/19 identified a monoclonal band as free lambda light chains  Reviewed by: Garrett Muñoz MD **Electronic Signature**    M spike = 0 45 grams/deciliter  Free light chain ratio 1 52  IMAGING:  No results found

## 2019-11-06 ENCOUNTER — APPOINTMENT (OUTPATIENT)
Dept: LAB | Facility: CLINIC | Age: 44
End: 2019-11-06
Payer: COMMERCIAL

## 2019-11-06 DIAGNOSIS — C90.00 MULTIPLE MYELOMA NOT HAVING ACHIEVED REMISSION (HCC): ICD-10-CM

## 2019-11-06 DIAGNOSIS — Z79.899 OTHER LONG TERM (CURRENT) DRUG THERAPY: ICD-10-CM

## 2019-11-06 RX ORDER — LENALIDOMIDE 10 MG/1
10 CAPSULE ORAL DAILY
Qty: 28 CAPSULE | Refills: 0 | Status: CANCELLED | OUTPATIENT
Start: 2019-11-06

## 2019-11-07 DIAGNOSIS — C90.00 MULTIPLE MYELOMA NOT HAVING ACHIEVED REMISSION (HCC): ICD-10-CM

## 2019-11-07 RX ORDER — LENALIDOMIDE 10 MG/1
10 CAPSULE ORAL DAILY
Qty: 28 CAPSULE | Refills: 0 | Status: SHIPPED | OUTPATIENT
Start: 2019-11-07 | End: 2019-12-17

## 2019-11-13 ENCOUNTER — OFFICE VISIT (OUTPATIENT)
Dept: OBGYN CLINIC | Facility: HOSPITAL | Age: 44
End: 2019-11-13
Attending: PSYCHIATRY & NEUROLOGY
Payer: COMMERCIAL

## 2019-11-13 VITALS
SYSTOLIC BLOOD PRESSURE: 116 MMHG | HEART RATE: 69 BPM | WEIGHT: 130 LBS | BODY MASS INDEX: 22.2 KG/M2 | DIASTOLIC BLOOD PRESSURE: 74 MMHG | HEIGHT: 64 IN

## 2019-11-13 DIAGNOSIS — G56.01 CARPAL TUNNEL SYNDROME OF RIGHT WRIST: Primary | ICD-10-CM

## 2019-11-13 PROCEDURE — 99213 OFFICE O/P EST LOW 20 MIN: CPT | Performed by: ORTHOPAEDIC SURGERY

## 2019-11-13 NOTE — LETTER
November 13, 2019     Patient: Rose Mary Rivera   YOB: 1975   Date of Visit: 11/13/2019       To Whom it May Concern:    Rose Mary Rivera is under my professional care  Within a reasonable degree of medical certainty It is felt that her carpal tunnel symptoms are a direct result of her job  She had a leave of absence from [employer] for 6 weeks which resolved her symptoms  Upon returning to work her symptoms returned  If you have any questions or concerns, please don't hesitate to call           Sincerely,          Kelsi Leger MD        CC: No Recipients

## 2019-11-13 NOTE — PROGRESS NOTES
ASSESSMENT/PLAN:    Assessment:   Right carpal tunnel syndrome    Plan: Within a reasonable degree of medical certainty It is felt that her carpal tunnel symptoms are a direct result of her job  She had a leave of absence from work for 6 weeks which resolved her symptoms  Upon returning to work her symptoms returned  Follow Up:  2  month(s)    To Do Next Visit:   recheck    General Discussions:  Carpal Tunnel Syndrome: The anatomy and physiology of carpal tunnel syndrome was discussed with the patient today  Increase pressure localized under the transverse carpal ligament can cause pain, numbness, tingling, or dysesthesias within the median nerve distribution as well as feelings of fatigue, clumsiness, or awkwardness  These symptoms typically occur at night and worse in the morning upon waking  Eventually, untreated carpal tunnel syndrome can result in weakness and permanent loss of muscle within the thenar compartment of the hand  Treatment options were discussed with the patient  Conservative treatment includes nocturnal resting splints to keep the nerve in a neutral position, ergonomic changes within the work or home environment, activity modification, and tendon gliding exercises  Steroid injections within the carpal canal can help a majority of patients, however this is often self-limited in a majority of patients  Surgical intervention to divide the transverse carpal ligament typically results in a long-lasting relief of the patient's complaints, with the recurrence rate of less than 1%        _____________________________________________________  CHIEF COMPLAINT:  Chief Complaint   Patient presents with    Right Wrist - Follow-up         SUBJECTIVE:  Nathalia Mccabe is a 53y o  year old female who presents for follow up regarding Carpal Tunnel Syndrome  right  Since last visit, Nathalia Mccabe has tried bracing with only partial relief   She states she took a 6 week leave of absence from work at [employer] which did help to resolve her symptoms  Since returning to work 2 weeks ago her symptoms are returning  Pain, numbness, and tingling wake her from sleep at night  PAST MEDICAL HISTORY:  Past Medical History:   Diagnosis Date    Back pain     pinched nerve in back    CTS (carpal tunnel syndrome)     right wrist    Multiple myeloma (HCC)     chemotherapy    [facility] [address] [facility]     Multiple sclerosis [facility] [address] [facility]     skin lesion     mole removed from face       PAST SURGICAL HISTORY:  Past Surgical History:   Procedure Laterality Date     SECTION      LIMBAL STEM CELL TRANSPLANT  2018       FAMILY HISTORY:  Family History   Problem Relation Age of Onset    No Known Problems Mother     Coronary artery disease Father     No Known Problems Sister     No Known Problems Brother     No Known Problems Daughter     No Known Problems Son     No Known Problems Brother        SOCIAL HISTORY:  Social History     Tobacco Use    Smoking status: Never Smoker    Smokeless tobacco: Never Used   Substance Use Topics    Alcohol use: No    Drug use: No       MEDICATIONS:    Current Outpatient Medications:     aspirin (ECOTRIN LOW STRENGTH) 81 mg EC tablet, Take 81 mg by mouth daily, Disp: , Rfl:     Cyanocobalamin (VITAMIN B 12 PO), Take 1 tablet by mouth daily, Disp: , Rfl:     gabapentin (NEURONTIN) 300 mg capsule, , Disp: , Rfl: 0    HM VITAMIN D3 2000 units CAPS, Take by mouth, Disp: , Rfl:     lenalidomide (REVLIMID) 10 MG CAPS, Take 1 capsule (10 mg total) by mouth daily Q 28 days  , Disp: 28 capsule, Rfl: 0    acyclovir (ZOVIRAX) 400 MG tablet, Take 1 tablet (400 mg total) by mouth 2 (two) times a day for 60 days (Patient not taking: Reported on 2019), Disp: 120 tablet, Rfl: 0    ALLERGIES:  No Known Allergies    REVIEW OF SYSTEMS:  Pertinent items are noted in HPI  A comprehensive review of systems was negative      LABS:  HgA1c: No results found for: HGBA1C  BMP:   Lab Results   Component Value Date    CALCIUM 9 2 10/23/2019     08/30/2017    K 3 6 10/23/2019    CO2 29 10/23/2019     10/23/2019    BUN 14 10/23/2019    CREATININE 0 71 10/23/2019           _____________________________________________________    PHYSICAL EXAMINATION:  /74   Pulse 69   Ht 5' 4" (1 626 m)   Wt 59 kg (130 lb)   BMI 22 31 kg/m²   General: well developed and well nourished, alert, oriented times 3 and appears comfortable  Psychiatric: Normal  HEENT: Trachea Midline, No torticollis  Cardiovascular: No discernable arrhythmia  Pulmonary: No wheezing or stridor  Skin: No masses, erythema, lacerations, fluctation, ulcerations  Neurovascular: Pulses Intact    MUSCULOSKELETAL EXAMINATION:  RIGHT SIDE:  Carpal tunnel:  Postive Tinel's and Positive Phalan's Test, 4+ APB, wrist 5/5 all planes    _____________________________________________________  STUDIES REVIEWED:  No Studies to review      PROCEDURES PERFORMED:  Procedures  No Procedures performed today      Scribe Attestation    I,:   Jeanie Poole am acting as a scribe while in the presence of the attending physician :        I,:   Itzel Guan MD personally performed the services described in this documentation    as scribed in my presence :

## 2019-11-13 NOTE — LETTER
November 13, 2019     Patient: Margarete Barthel   YOB: 1975   Date of Visit: 11/13/2019       To Whom it May Concern:    Margarete Barthel is under my professional care  She was seen in my office on 11/13/2019  She may return to work with the following restrictions: No repetitive scanning, no lifting greater than 20 lbs  If you have any questions or concerns, please don't hesitate to call           Sincerely,          Shauna Tillman MD        CC: No Recipients

## 2019-11-20 DIAGNOSIS — R10.9 ABDOMINAL PAIN, UNSPECIFIED ABDOMINAL LOCATION: Primary | ICD-10-CM

## 2019-11-20 DIAGNOSIS — R10.12 LEFT UPPER QUADRANT PAIN: Primary | ICD-10-CM

## 2019-11-20 DIAGNOSIS — R11.0 NAUSEA: ICD-10-CM

## 2019-11-20 RX ORDER — DICYCLOMINE HCL 20 MG
20 TABLET ORAL 4 TIMES DAILY
Qty: 60 TABLET | Refills: 3 | Status: CANCELLED | OUTPATIENT
Start: 2019-11-20

## 2019-11-20 RX ORDER — DICYCLOMINE HCL 20 MG
20 TABLET ORAL 4 TIMES DAILY
Qty: 60 TABLET | Refills: 3 | Status: SHIPPED | OUTPATIENT
Start: 2019-11-20 | End: 2020-05-20 | Stop reason: ALTCHOICE

## 2019-11-20 NOTE — TELEPHONE ENCOUNTER
Viky, this came through as a note, not an approval for a prescription  Can you please see what it is you are doing that keeps this happening?

## 2019-11-20 NOTE — TELEPHONE ENCOUNTER
Please call her in Bentyl 20 mg p o  Q i d  P r n , dispense 60, 3 refills for abdominal pain    She will call me in a week with her progress

## 2019-11-27 ENCOUNTER — APPOINTMENT (OUTPATIENT)
Dept: LAB | Facility: CLINIC | Age: 44
End: 2019-11-27
Payer: COMMERCIAL

## 2019-11-27 DIAGNOSIS — C90.00 MULTIPLE MYELOMA NOT HAVING ACHIEVED REMISSION (HCC): ICD-10-CM

## 2019-11-27 LAB
ALBUMIN SERPL BCP-MCNC: 4 G/DL (ref 3.5–5)
ALP SERPL-CCNC: 86 U/L (ref 46–116)
ALT SERPL W P-5'-P-CCNC: 35 U/L (ref 12–78)
ANION GAP SERPL CALCULATED.3IONS-SCNC: 7 MMOL/L (ref 4–13)
AST SERPL W P-5'-P-CCNC: 22 U/L (ref 5–45)
BASOPHILS # BLD AUTO: 0.06 THOUSANDS/ΜL (ref 0–0.1)
BASOPHILS NFR BLD AUTO: 2 % (ref 0–1)
BILIRUB SERPL-MCNC: 0.74 MG/DL (ref 0.2–1)
BUN SERPL-MCNC: 19 MG/DL (ref 5–25)
CALCIUM SERPL-MCNC: 8.7 MG/DL (ref 8.3–10.1)
CHLORIDE SERPL-SCNC: 111 MMOL/L (ref 100–108)
CO2 SERPL-SCNC: 26 MMOL/L (ref 21–32)
CREAT SERPL-MCNC: 0.79 MG/DL (ref 0.6–1.3)
EOSINOPHIL # BLD AUTO: 0.18 THOUSAND/ΜL (ref 0–0.61)
EOSINOPHIL NFR BLD AUTO: 7 % (ref 0–6)
ERYTHROCYTE [DISTWIDTH] IN BLOOD BY AUTOMATED COUNT: 13.1 % (ref 11.6–15.1)
GFR SERPL CREATININE-BSD FRML MDRD: 90 ML/MIN/1.73SQ M
GLUCOSE SERPL-MCNC: 100 MG/DL (ref 65–140)
HCT VFR BLD AUTO: 37 % (ref 34.8–46.1)
HGB BLD-MCNC: 11.8 G/DL (ref 11.5–15.4)
IMM GRANULOCYTES # BLD AUTO: 0 THOUSAND/UL (ref 0–0.2)
IMM GRANULOCYTES NFR BLD AUTO: 0 % (ref 0–2)
LYMPHOCYTES # BLD AUTO: 1.4 THOUSANDS/ΜL (ref 0.6–4.47)
LYMPHOCYTES NFR BLD AUTO: 53 % (ref 14–44)
MCH RBC QN AUTO: 31.6 PG (ref 26.8–34.3)
MCHC RBC AUTO-ENTMCNC: 31.9 G/DL (ref 31.4–37.4)
MCV RBC AUTO: 99 FL (ref 82–98)
MONOCYTES # BLD AUTO: 0.23 THOUSAND/ΜL (ref 0.17–1.22)
MONOCYTES NFR BLD AUTO: 9 % (ref 4–12)
NEUTROPHILS # BLD AUTO: 0.76 THOUSANDS/ΜL (ref 1.85–7.62)
NEUTS SEG NFR BLD AUTO: 29 % (ref 43–75)
NRBC BLD AUTO-RTO: 0 /100 WBCS
PLATELET # BLD AUTO: 171 THOUSANDS/UL (ref 149–390)
PMV BLD AUTO: 9.8 FL (ref 8.9–12.7)
POTASSIUM SERPL-SCNC: 4.1 MMOL/L (ref 3.5–5.3)
PROT SERPL-MCNC: 7.4 G/DL (ref 6.4–8.2)
RBC # BLD AUTO: 3.73 MILLION/UL (ref 3.81–5.12)
SODIUM SERPL-SCNC: 144 MMOL/L (ref 136–145)
WBC # BLD AUTO: 2.63 THOUSAND/UL (ref 4.31–10.16)

## 2019-11-27 PROCEDURE — 85025 COMPLETE CBC W/AUTO DIFF WBC: CPT

## 2019-11-27 PROCEDURE — 84165 PROTEIN E-PHORESIS SERUM: CPT

## 2019-11-27 PROCEDURE — 80053 COMPREHEN METABOLIC PANEL: CPT

## 2019-11-27 PROCEDURE — 83883 ASSAY NEPHELOMETRY NOT SPEC: CPT

## 2019-11-27 PROCEDURE — 36415 COLL VENOUS BLD VENIPUNCTURE: CPT

## 2019-11-27 PROCEDURE — 84165 PROTEIN E-PHORESIS SERUM: CPT | Performed by: PATHOLOGY

## 2019-11-29 LAB
KAPPA LC FREE SER-MCNC: 21.7 MG/L (ref 3.3–19.4)
KAPPA LC FREE/LAMBDA FREE SER: 1.43 {RATIO} (ref 0.26–1.65)
LAMBDA LC FREE SERPL-MCNC: 15.2 MG/L (ref 5.7–26.3)

## 2019-11-30 LAB
ALBUMIN SERPL ELPH-MCNC: 4.19 G/DL (ref 3.5–5)
ALBUMIN SERPL ELPH-MCNC: 58.2 % (ref 52–65)
ALPHA1 GLOB SERPL ELPH-MCNC: 0.27 G/DL (ref 0.1–0.4)
ALPHA1 GLOB SERPL ELPH-MCNC: 3.8 % (ref 2.5–5)
ALPHA2 GLOB SERPL ELPH-MCNC: 0.62 G/DL (ref 0.4–1.2)
ALPHA2 GLOB SERPL ELPH-MCNC: 8.6 % (ref 7–13)
BETA GLOB ABNORMAL SERPL ELPH-MCNC: 0.41 G/DL (ref 0.4–0.8)
BETA1 GLOB SERPL ELPH-MCNC: 5.7 % (ref 5–13)
BETA2 GLOB SERPL ELPH-MCNC: 5.1 % (ref 2–8)
BETA2+GAMMA GLOB SERPL ELPH-MCNC: 0.37 G/DL (ref 0.2–0.5)
GAMMA GLOB ABNORMAL SERPL ELPH-MCNC: 1.34 G/DL (ref 0.5–1.6)
GAMMA GLOB SERPL ELPH-MCNC: 18.6 % (ref 12–22)
IGG/ALB SER: 1.39 {RATIO} (ref 1.1–1.8)
M PROTEIN 1 MFR SERPL ELPH: 5.9 %
M PROTEIN 1 SERPL ELPH-MCNC: 0.42 G/DL
PROT PATTERN SERPL ELPH-IMP: NORMAL
PROT SERPL-MCNC: 7.2 G/DL (ref 6.4–8.2)

## 2019-12-04 ENCOUNTER — OFFICE VISIT (OUTPATIENT)
Dept: HEMATOLOGY ONCOLOGY | Facility: CLINIC | Age: 44
End: 2019-12-04
Payer: COMMERCIAL

## 2019-12-04 VITALS
DIASTOLIC BLOOD PRESSURE: 64 MMHG | HEIGHT: 64 IN | SYSTOLIC BLOOD PRESSURE: 116 MMHG | TEMPERATURE: 98 F | WEIGHT: 131 LBS | BODY MASS INDEX: 22.36 KG/M2 | OXYGEN SATURATION: 98 % | RESPIRATION RATE: 16 BRPM

## 2019-12-04 DIAGNOSIS — C90.01 MULTIPLE MYELOMA IN REMISSION (HCC): Primary | ICD-10-CM

## 2019-12-04 DIAGNOSIS — D70.9 NEUTROPENIA, UNSPECIFIED TYPE (HCC): ICD-10-CM

## 2019-12-04 PROCEDURE — 99214 OFFICE O/P EST MOD 30 MIN: CPT | Performed by: INTERNAL MEDICINE
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
## 2019-12-14 ENCOUNTER — APPOINTMENT (OUTPATIENT)
Dept: LAB | Facility: HOSPITAL | Age: 44
End: 2019-12-14
Attending: INTERNAL MEDICINE
Payer: COMMERCIAL

## 2019-12-14 PROCEDURE — 36415 COLL VENOUS BLD VENIPUNCTURE: CPT | Performed by: INTERNAL MEDICINE

## 2019-12-17 DIAGNOSIS — C90.01 MULTIPLE MYELOMA IN REMISSION (HCC): Primary | ICD-10-CM

## 2019-12-17 RX ORDER — LENALIDOMIDE 5 MG/1
5 CAPSULE ORAL DAILY
Qty: 28 CAPSULE | Refills: 0 | Status: SHIPPED | OUTPATIENT
Start: 2019-12-17 | End: 2020-01-17 | Stop reason: SDUPTHER

## 2019-12-18 ENCOUNTER — TELEPHONE (OUTPATIENT)
Dept: OBGYN CLINIC | Facility: HOSPITAL | Age: 44
End: 2019-12-18

## 2019-12-18 NOTE — TELEPHONE ENCOUNTER
Caller:  Monica Larry  Call back:  [phone]    Dianne Lopez is calling to request that her work note cover her restrictions through her follow up on 01/22/20  The patient's employer advised her that without this date in the note, they can only consider the restrictions for 30 days  Dianne Lopez would like to stop in to the office to  the amended note early this afternoon  Please advise

## 2020-01-16 ENCOUNTER — APPOINTMENT (OUTPATIENT)
Dept: LAB | Facility: CLINIC | Age: 45
End: 2020-01-16
Payer: COMMERCIAL

## 2020-01-16 ENCOUNTER — TELEPHONE (OUTPATIENT)
Dept: HEMATOLOGY ONCOLOGY | Facility: CLINIC | Age: 45
End: 2020-01-16

## 2020-01-16 DIAGNOSIS — C90.00 MULTIPLE MYELOMA, REMISSION STATUS UNSPECIFIED (HCC): ICD-10-CM

## 2020-01-16 DIAGNOSIS — Z51.11 ENCOUNTER FOR CHEMOTHERAPY MANAGEMENT: ICD-10-CM

## 2020-01-16 DIAGNOSIS — Z51.11 ENCOUNTER FOR CHEMOTHERAPY MANAGEMENT: Primary | ICD-10-CM

## 2020-01-16 NOTE — TELEPHONE ENCOUNTER
Called and spoke with patient notifying her that she needs to get her pregnancy test completed before I can refill her Revlimid  She stated she was going to go today to have it completed  She said she would give me a return call once she has it completed

## 2020-01-17 DIAGNOSIS — C90.01 MULTIPLE MYELOMA IN REMISSION (HCC): ICD-10-CM

## 2020-01-17 RX ORDER — LENALIDOMIDE 5 MG/1
5 CAPSULE ORAL DAILY
Qty: 28 CAPSULE | Refills: 0 | Status: SHIPPED | OUTPATIENT
Start: 2020-01-17 | End: 2020-02-17 | Stop reason: SDUPTHER

## 2020-01-22 ENCOUNTER — TELEPHONE (OUTPATIENT)
Dept: FAMILY MEDICINE CLINIC | Facility: CLINIC | Age: 45
End: 2020-01-22

## 2020-01-22 ENCOUNTER — OFFICE VISIT (OUTPATIENT)
Dept: OBGYN CLINIC | Facility: HOSPITAL | Age: 45
End: 2020-01-22
Payer: COMMERCIAL

## 2020-01-22 ENCOUNTER — OFFICE VISIT (OUTPATIENT)
Dept: FAMILY MEDICINE CLINIC | Facility: CLINIC | Age: 45
End: 2020-01-22
Payer: COMMERCIAL

## 2020-01-22 VITALS
HEIGHT: 64 IN | DIASTOLIC BLOOD PRESSURE: 72 MMHG | RESPIRATION RATE: 16 BRPM | BODY MASS INDEX: 22.23 KG/M2 | OXYGEN SATURATION: 100 % | TEMPERATURE: 98.5 F | SYSTOLIC BLOOD PRESSURE: 106 MMHG | HEART RATE: 54 BPM | WEIGHT: 130.2 LBS

## 2020-01-22 VITALS — HEIGHT: 64 IN | BODY MASS INDEX: 22.2 KG/M2 | WEIGHT: 130 LBS

## 2020-01-22 DIAGNOSIS — M51.16 LUMBAR DISC HERNIATION WITH RADICULOPATHY: Primary | ICD-10-CM

## 2020-01-22 DIAGNOSIS — E53.8 VITAMIN B 12 DEFICIENCY: ICD-10-CM

## 2020-01-22 DIAGNOSIS — M62.838 MUSCLE SPASM: ICD-10-CM

## 2020-01-22 DIAGNOSIS — E55.9 VITAMIN D DEFICIENCY: ICD-10-CM

## 2020-01-22 DIAGNOSIS — G56.01 CARPAL TUNNEL SYNDROME OF RIGHT WRIST: Primary | ICD-10-CM

## 2020-01-22 PROBLEM — M25.512 LEFT SHOULDER PAIN: Status: RESOLVED | Noted: 2019-03-08 | Resolved: 2020-01-22

## 2020-01-22 PROBLEM — R11.0 NAUSEA: Status: RESOLVED | Noted: 2019-05-24 | Resolved: 2020-01-22

## 2020-01-22 PROBLEM — R10.32 LLQ PAIN: Status: RESOLVED | Noted: 2019-05-24 | Resolved: 2020-01-22

## 2020-01-22 PROCEDURE — 99213 OFFICE O/P EST LOW 20 MIN: CPT | Performed by: ORTHOPAEDIC SURGERY

## 2020-01-22 PROCEDURE — 3008F BODY MASS INDEX DOCD: CPT | Performed by: FAMILY MEDICINE

## 2020-01-22 PROCEDURE — 99214 OFFICE O/P EST MOD 30 MIN: CPT | Performed by: FAMILY MEDICINE

## 2020-01-22 PROCEDURE — 1036F TOBACCO NON-USER: CPT | Performed by: FAMILY MEDICINE

## 2020-01-22 RX ORDER — CYCLOBENZAPRINE HCL 5 MG
5 TABLET ORAL 3 TIMES DAILY PRN
Qty: 30 TABLET | Refills: 0 | Status: SHIPPED | OUTPATIENT
Start: 2020-01-22 | End: 2020-05-20 | Stop reason: ALTCHOICE

## 2020-01-22 NOTE — LETTER
January 22, 2020     Patient: Catherine Gonzalez   YOB: 1975   Date of Visit: 1/22/2020       To Whom it May Concern:    Catherine Gonzalez is under my professional care  She was seen in my office on 1/22/2020  She may return to work with the following restrictions: No repetitive scanning, no lifting greater than 20 lbs  This will take effect indefinitely to attempt to avoid surgical intervention  If you have any questions or concerns, please don't hesitate to call           Sincerely,          Marcello Hanson MD        CC: No Recipients

## 2020-01-22 NOTE — PROGRESS NOTES
Assessment/Plan:       Problem List Items Addressed This Visit        Nervous and Auditory    Lumbar disc herniation with radiculopathy - Primary    Relevant Orders    EMG 1 Limb    Ambulatory referral to Orthopedic Surgery       Other    Muscle spasm     Flexeril PRN - discussed common side effects  Check EMG  Referral to Dr Silviano Ayers         Relevant Medications    cyclobenzaprine (FLEXERIL) 5 mg tablet    Other Relevant Orders    EMG 1 Limb    Ambulatory referral to Orthopedic Surgery    Potassium      Other Visit Diagnoses     Vitamin D deficiency        Relevant Orders    Vitamin D 25 hydroxy    Vitamin B 12 deficiency        Relevant Orders    Vitamin B12            Subjective:      Patient ID: Ankit Bobo is a 53 y o  female  43-year-old female with a history of multiple sclerosis is here for right leg pain  We have discussed this in the past  She describes "pressure" in her lower leg and feels spasms  Sometimes her leg turns inwards involuntarily  She denies any back pain  She does have a history of lumbar radiculopathy  She has been to pain management (Dr Amelia Slaughter) and had injections in the past, most recently in May  She is unsure if this helped  She was on Gabapentin but is not sure if it helped - she was taking this for carpal tunnel syndrome  She did PT in the past but says she does not have time now  She cannot tolerate NSAIDs (GI upset)  MRI of lumbar spine was done in 2018 showed:  Central and slightly right paramedian protrusion type disc herniation superimposed on annular bulging L4-5 results in mild central and moderate right lateral recess stenosis  Correlate for right L5 radiculopathy    Mild degenerative changes elsewhere as described  She also wants labs done  Has a h/o low Vit B12, D, and potassium  Wants to have these levels checked  Currently not on Vit B12, had injections in the past   Says her level was as low as 100  On Vitamin D OTc    Not taking potassium  The following portions of the patient's history were reviewed and updated as appropriate:   She  has a past medical history of Back pain, CTS (carpal tunnel syndrome), Multiple myeloma ([facility] [address] ), Multiple myeloma ([facility] [address] ), Multiple sclerosis ([facility] [address] ), and skin lesion  She   Patient Active Problem List    Diagnosis Date Noted    Other long term (current) drug therapy 2019    Muscle spasm 2019    Hypokalemia 2019    Uterine leiomyoma 2019    Insomnia 2019    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HCC)     Lumbar disc herniation with radiculopathy 2018    Spasm of lumbar paraspinous muscle 2018    Carpal tunnel syndrome, bilateral     Multiple myeloma not having achieved remission ([facility] [address] ) 2018    Multiple sclerosis ([facility] [address] ) 2016     She  has a past surgical history that includes  section and Limbal stem cell transplant (2018)  Her family history includes Coronary artery disease in her father; No Known Problems in her brother, brother, daughter, mother, sister, and son  She  reports that she has never smoked  She has never used smokeless tobacco  She reports that she does not drink alcohol or use drugs    Current Outpatient Medications   Medication Sig Dispense Refill    aspirin (ECOTRIN LOW STRENGTH) 81 mg EC tablet Take 81 mg by mouth daily      dicyclomine (BENTYL) 20 mg tablet Take 1 tablet (20 mg total) by mouth 4 (four) times a day AS NEEDED 60 tablet 3    HM VITAMIN D3 2000 units CAPS Take by mouth      lenalidomide (REVLIMID) 5 MG CAPS Take 1 capsule (5 mg total) by mouth daily For 28 days 28 capsule 0    acyclovir (ZOVIRAX) 400 MG tablet Take 1 tablet (400 mg total) by mouth 2 (two) times a day for 60 days (Patient not taking: Reported on 2019) 120 tablet 0    Cyanocobalamin (VITAMIN B 12 PO) Take 1 tablet by mouth daily      cyclobenzaprine (FLEXERIL) 5 mg tablet Take 1 tablet (5 mg total) by mouth 3 (three) times a day as needed for muscle spasms 30 tablet 0    gabapentin (NEURONTIN) 300 mg capsule   0     No current facility-administered medications for this visit  Current Outpatient Medications on File Prior to Visit   Medication Sig    aspirin (ECOTRIN LOW STRENGTH) 81 mg EC tablet Take 81 mg by mouth daily    dicyclomine (BENTYL) 20 mg tablet Take 1 tablet (20 mg total) by mouth 4 (four) times a day AS NEEDED    HM VITAMIN D3 2000 units CAPS Take by mouth    lenalidomide (REVLIMID) 5 MG CAPS Take 1 capsule (5 mg total) by mouth daily For 28 days    acyclovir (ZOVIRAX) 400 MG tablet Take 1 tablet (400 mg total) by mouth 2 (two) times a day for 60 days (Patient not taking: Reported on 5/24/2019)    Cyanocobalamin (VITAMIN B 12 PO) Take 1 tablet by mouth daily    gabapentin (NEURONTIN) 300 mg capsule      No current facility-administered medications on file prior to visit  She has No Known Allergies       Review of Systems   Constitutional: Negative  HENT: Negative  Respiratory: Negative  Cardiovascular: Negative  Musculoskeletal: Negative for back pain  Muscle spasms   Neurological: Negative  Objective:      /72 (BP Location: Left arm, Patient Position: Sitting, Cuff Size: Standard)   Pulse (!) 54   Temp 98 5 °F (36 9 °C) (Tympanic)   Resp 16   Ht 5' 4" (1 626 m)   Wt 59 1 kg (130 lb 3 2 oz)   SpO2 100%   BMI 22 35 kg/m²          Physical Exam   Constitutional: She is oriented to person, place, and time  She appears well-developed and well-nourished  No distress  HENT:   Head: Normocephalic and atraumatic  Pulmonary/Chest: Effort normal  No respiratory distress  Musculoskeletal: She exhibits no edema  Lumbar back: She exhibits decreased range of motion  She exhibits no tenderness, no edema, no deformity, no laceration, no pain and no spasm  Neurological: She is alert and oriented to person, place, and time  She has normal strength   She displays no atrophy, no tremor and normal reflexes  No sensory deficit  She exhibits normal muscle tone  Reflex Scores:       Patellar reflexes are 2+ on the right side and 2+ on the left side  Psychiatric: She has a normal mood and affect   Her behavior is normal  Judgment and thought content normal

## 2020-01-22 NOTE — TELEPHONE ENCOUNTER
Patient called Dr Laurie Ramirez to schedule an appointment and was told that their requirements are to have a MRI within 18 months  Can you order this for her?

## 2020-01-22 NOTE — PROGRESS NOTES
ASSESSMENT/PLAN:    Assessment:   Right carpal tunnel syndrome     Plan: At this time her workers comp was denied and we will continue her current restrictions of light duty indefinitely to avoid surgical intervention  Continue bracing as needed  Follow Up:  2  month(s)    To Do Next Visit:       General Discussions:     Carpal Tunnel Syndrome: The anatomy and physiology of carpal tunnel syndrome was discussed with the patient today  Increase pressure localized under the transverse carpal ligament can cause pain, numbness, tingling, or dysesthesias within the median nerve distribution as well as feelings of fatigue, clumsiness, or awkwardness  These symptoms typically occur at night and worse in the morning upon waking  Eventually, untreated carpal tunnel syndrome can result in weakness and permanent loss of muscle within the thenar compartment of the hand  Treatment options were discussed with the patient  Conservative treatment includes nocturnal resting splints to keep the nerve in a neutral position, ergonomic changes within the work or home environment, activity modification, and tendon gliding exercises  Steroid injections within the carpal canal can help a majority of patients, however this is often self-limited in a majority of patients  Surgical intervention to divide the transverse carpal ligament typically results in a long-lasting relief of the patient's complaints, with the recurrence rate of less than 1%      _____________________________________________________  CHIEF COMPLAINT:  Chief Complaint   Patient presents with    Right Wrist - Follow-up         SUBJECTIVE:  Tiffanie Strickland is a 53 y o  female who presents for follow up regarding Carpal Tunnel Syndrome  right  Since last visit, Tiffanie Strickland has tried activity modification and bracing with only partial relief  Patient states that she is about 50% improved at this time and states that she is "much better"   Today there is minimal tingling to the right hand  Patient only wears the braces at night time on occasion  She is not waking up from sleep as frequently     Radiation: Yes to the  hand  Associated symptoms: No Complaints    PAST MEDICAL HISTORY:  Past Medical History:   Diagnosis Date    Back pain     pinched nerve in back    CTS (carpal tunnel syndrome)     right wrist    Multiple myeloma (HCC)     chemotherapy    Multiple myeloma ([address] )     Multiple sclerosis ([facility] [address] )     skin lesion     mole removed from face       PAST SURGICAL HISTORY:  Past Surgical History:   Procedure Laterality Date     SECTION      LIMBAL STEM CELL TRANSPLANT  2018       FAMILY HISTORY:  Family History   Problem Relation Age of Onset    No Known Problems Mother     Coronary artery disease Father     No Known Problems Sister     No Known Problems Brother     No Known Problems Daughter     No Known Problems Son     No Known Problems Brother        SOCIAL HISTORY:  Social History     Tobacco Use    Smoking status: Never Smoker    Smokeless tobacco: Never Used   Substance Use Topics    Alcohol use: No    Drug use: No       MEDICATIONS:    Current Outpatient Medications:     aspirin (ECOTRIN LOW STRENGTH) 81 mg EC tablet, Take 81 mg by mouth daily, Disp: , Rfl:     cyclobenzaprine (FLEXERIL) 5 mg tablet, Take 1 tablet (5 mg total) by mouth 3 (three) times a day as needed for muscle spasms, Disp: 30 tablet, Rfl: 0    dicyclomine (BENTYL) 20 mg tablet, Take 1 tablet (20 mg total) by mouth 4 (four) times a day AS NEEDED, Disp: 60 tablet, Rfl: 3    gabapentin (NEURONTIN) 300 mg capsule, , Disp: , Rfl: 0    HM VITAMIN D3 2000 units CAPS, Take by mouth, Disp: , Rfl:     lenalidomide (REVLIMID) 5 MG CAPS, Take 1 capsule (5 mg total) by mouth daily For 28 days, Disp: 28 capsule, Rfl: 0    acyclovir (ZOVIRAX) 400 MG tablet, Take 1 tablet (400 mg total) by mouth 2 (two) times a day for 60 days (Patient not taking: Reported on 5/24/2019), Disp: 120 tablet, Rfl: 0    Cyanocobalamin (VITAMIN B 12 PO), Take 1 tablet by mouth daily, Disp: , Rfl:     ALLERGIES:  No Known Allergies    REVIEW OF SYSTEMS:  Pertinent items are noted in HPI      LABS:  HgA1c: No results found for: HGBA1C  BMP:   Lab Results   Component Value Date    CALCIUM 8 7 11/27/2019     08/30/2017    K 4 1 11/27/2019    CO2 26 11/27/2019     (H) 11/27/2019    BUN 19 11/27/2019    CREATININE 0 79 11/27/2019           _____________________________________________________  PHYSICAL EXAMINATION:  Vital signs: Ht 5' 4" (1 626 m)   Wt 59 kg (130 lb)   BMI 22 31 kg/m²   General: well developed and well nourished, alert, oriented times 3 and appears comfortable  Psychiatric: Normal  HEENT: Trachea Midline, No torticollis  Cardiovascular: No discernable arrhythmia  Pulmonary: No wheezing or stridor  Skin: No masses, erythema, lacerations, fluctation, ulcerations  Neurovascular: Pulses Intact    MUSCULOSKELETAL EXAMINATION:  RIGHT SIDE:  Carpal tunnel:  full composite fist, no triggering, no tenderness thumb cmc, negative tinels at carpal tunnel, apb 4+/5    _____________________________________________________  STUDIES REVIEWED:  No Studies to review      PROCEDURES PERFORMED:  Procedures  No Procedures performed today   Scribe Attestation    I,:    am acting as a scribe while in the presence of the attending physician :        I,:    personally performed the services described in this documentation    as scribed in my presence :

## 2020-01-31 ENCOUNTER — LAB (OUTPATIENT)
Dept: LAB | Age: 45
End: 2020-01-31
Payer: COMMERCIAL

## 2020-01-31 ENCOUNTER — APPOINTMENT (OUTPATIENT)
Dept: LAB | Age: 45
End: 2020-01-31
Payer: COMMERCIAL

## 2020-01-31 DIAGNOSIS — M62.838 MUSCLE SPASM: ICD-10-CM

## 2020-01-31 DIAGNOSIS — E55.9 VITAMIN D DEFICIENCY: ICD-10-CM

## 2020-01-31 DIAGNOSIS — E53.8 VITAMIN B 12 DEFICIENCY: ICD-10-CM

## 2020-01-31 LAB
25(OH)D3 SERPL-MCNC: 46.1 NG/ML (ref 30–100)
ALBUMIN SERPL BCP-MCNC: 3.5 G/DL (ref 3.5–5)
ALP SERPL-CCNC: 81 U/L (ref 46–116)
ALT SERPL W P-5'-P-CCNC: 37 U/L (ref 12–78)
ANION GAP SERPL CALCULATED.3IONS-SCNC: 4 MMOL/L (ref 4–13)
AST SERPL W P-5'-P-CCNC: 13 U/L (ref 5–45)
BASOPHILS # BLD AUTO: 0.04 THOUSANDS/ΜL (ref 0–0.1)
BASOPHILS NFR BLD AUTO: 1 % (ref 0–1)
BILIRUB SERPL-MCNC: 0.88 MG/DL (ref 0.2–1)
BUN SERPL-MCNC: 15 MG/DL (ref 5–25)
CALCIUM SERPL-MCNC: 8.6 MG/DL (ref 8.3–10.1)
CHLORIDE SERPL-SCNC: 108 MMOL/L (ref 100–108)
CO2 SERPL-SCNC: 29 MMOL/L (ref 21–32)
CREAT SERPL-MCNC: 0.73 MG/DL (ref 0.6–1.3)
EOSINOPHIL # BLD AUTO: 0.18 THOUSAND/ΜL (ref 0–0.61)
EOSINOPHIL NFR BLD AUTO: 6 % (ref 0–6)
ERYTHROCYTE [DISTWIDTH] IN BLOOD BY AUTOMATED COUNT: 12.9 % (ref 11.6–15.1)
GFR SERPL CREATININE-BSD FRML MDRD: 99 ML/MIN/1.73SQ M
GLUCOSE SERPL-MCNC: 98 MG/DL (ref 65–140)
HCT VFR BLD AUTO: 37.4 % (ref 34.8–46.1)
HGB BLD-MCNC: 11.8 G/DL (ref 11.5–15.4)
IMM GRANULOCYTES # BLD AUTO: 0.01 THOUSAND/UL (ref 0–0.2)
IMM GRANULOCYTES NFR BLD AUTO: 0 % (ref 0–2)
LYMPHOCYTES # BLD AUTO: 1.51 THOUSANDS/ΜL (ref 0.6–4.47)
LYMPHOCYTES NFR BLD AUTO: 46 % (ref 14–44)
MCH RBC QN AUTO: 31.6 PG (ref 26.8–34.3)
MCHC RBC AUTO-ENTMCNC: 31.6 G/DL (ref 31.4–37.4)
MCV RBC AUTO: 100 FL (ref 82–98)
MONOCYTES # BLD AUTO: 0.44 THOUSAND/ΜL (ref 0.17–1.22)
MONOCYTES NFR BLD AUTO: 13 % (ref 4–12)
NEUTROPHILS # BLD AUTO: 1.11 THOUSANDS/ΜL (ref 1.85–7.62)
NEUTS SEG NFR BLD AUTO: 34 % (ref 43–75)
NRBC BLD AUTO-RTO: 0 /100 WBCS
PLATELET # BLD AUTO: 179 THOUSANDS/UL (ref 149–390)
PMV BLD AUTO: 9.5 FL (ref 8.9–12.7)
POTASSIUM SERPL-SCNC: 3.5 MMOL/L (ref 3.5–5.3)
PROT SERPL-MCNC: 7.1 G/DL (ref 6.4–8.2)
RBC # BLD AUTO: 3.73 MILLION/UL (ref 3.81–5.12)
SODIUM SERPL-SCNC: 141 MMOL/L (ref 136–145)
VIT B12 SERPL-MCNC: 461 PG/ML (ref 100–900)
WBC # BLD AUTO: 3.29 THOUSAND/UL (ref 4.31–10.16)

## 2020-01-31 PROCEDURE — 82306 VITAMIN D 25 HYDROXY: CPT

## 2020-01-31 PROCEDURE — 36415 COLL VENOUS BLD VENIPUNCTURE: CPT | Performed by: INTERNAL MEDICINE

## 2020-01-31 PROCEDURE — 83883 ASSAY NEPHELOMETRY NOT SPEC: CPT | Performed by: INTERNAL MEDICINE

## 2020-01-31 PROCEDURE — 80053 COMPREHEN METABOLIC PANEL: CPT | Performed by: INTERNAL MEDICINE

## 2020-01-31 PROCEDURE — 84165 PROTEIN E-PHORESIS SERUM: CPT | Performed by: INTERNAL MEDICINE

## 2020-01-31 PROCEDURE — 84165 PROTEIN E-PHORESIS SERUM: CPT | Performed by: PATHOLOGY

## 2020-01-31 PROCEDURE — 85025 COMPLETE CBC W/AUTO DIFF WBC: CPT | Performed by: INTERNAL MEDICINE

## 2020-01-31 PROCEDURE — 82607 VITAMIN B-12: CPT

## 2020-02-01 LAB
KAPPA LC FREE SER-MCNC: 15.1 MG/L (ref 3.3–19.4)
KAPPA LC FREE/LAMBDA FREE SER: 1 {RATIO} (ref 0.26–1.65)
LAMBDA LC FREE SERPL-MCNC: 15.1 MG/L (ref 5.7–26.3)

## 2020-02-03 LAB
ALBUMIN SERPL ELPH-MCNC: 4.25 G/DL (ref 3.5–5)
ALBUMIN SERPL ELPH-MCNC: 60.7 % (ref 52–65)
ALPHA1 GLOB SERPL ELPH-MCNC: 0.25 G/DL (ref 0.1–0.4)
ALPHA1 GLOB SERPL ELPH-MCNC: 3.5 % (ref 2.5–5)
ALPHA2 GLOB SERPL ELPH-MCNC: 0.57 G/DL (ref 0.4–1.2)
ALPHA2 GLOB SERPL ELPH-MCNC: 8.1 % (ref 7–13)
BETA GLOB ABNORMAL SERPL ELPH-MCNC: 0.4 G/DL (ref 0.4–0.8)
BETA1 GLOB SERPL ELPH-MCNC: 5.7 % (ref 5–13)
BETA2 GLOB SERPL ELPH-MCNC: 4.9 % (ref 2–8)
BETA2+GAMMA GLOB SERPL ELPH-MCNC: 0.34 G/DL (ref 0.2–0.5)
GAMMA GLOB ABNORMAL SERPL ELPH-MCNC: 1.2 G/DL (ref 0.5–1.6)
GAMMA GLOB SERPL ELPH-MCNC: 17.1 % (ref 12–22)
IGG/ALB SER: 1.54 {RATIO} (ref 1.1–1.8)
M PROTEIN 1 MFR SERPL ELPH: 7 %
M PROTEIN 1 SERPL ELPH-MCNC: 0.49 G/DL
PROT PATTERN SERPL ELPH-IMP: NORMAL
PROT SERPL-MCNC: 7 G/DL (ref 6.4–8.2)

## 2020-02-05 ENCOUNTER — OFFICE VISIT (OUTPATIENT)
Dept: HEMATOLOGY ONCOLOGY | Facility: CLINIC | Age: 45
End: 2020-02-05
Payer: COMMERCIAL

## 2020-02-05 VITALS
TEMPERATURE: 98 F | HEIGHT: 64 IN | WEIGHT: 128 LBS | RESPIRATION RATE: 18 BRPM | HEART RATE: 85 BPM | OXYGEN SATURATION: 98 % | BODY MASS INDEX: 21.85 KG/M2 | DIASTOLIC BLOOD PRESSURE: 56 MMHG | SYSTOLIC BLOOD PRESSURE: 104 MMHG

## 2020-02-05 DIAGNOSIS — C90.00 MULTIPLE MYELOMA NOT HAVING ACHIEVED REMISSION (HCC): Primary | ICD-10-CM

## 2020-02-05 PROCEDURE — 3008F BODY MASS INDEX DOCD: CPT | Performed by: INTERNAL MEDICINE

## 2020-02-05 PROCEDURE — 1036F TOBACCO NON-USER: CPT | Performed by: INTERNAL MEDICINE

## 2020-02-05 PROCEDURE — 99214 OFFICE O/P EST MOD 30 MIN: CPT | Performed by: INTERNAL MEDICINE
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
## 2020-02-10 ENCOUNTER — HOSPITAL ENCOUNTER (OUTPATIENT)
Dept: MRI IMAGING | Facility: HOSPITAL | Age: 45
Discharge: HOME | End: 2020-02-10
Attending: FAMILY MEDICINE
Payer: COMMERCIAL

## 2020-02-10 DIAGNOSIS — M51.16 LUMBAR DISC HERNIATION WITH RADICULOPATHY: ICD-10-CM

## 2020-02-10 PROCEDURE — 72148 MRI LUMBAR SPINE W/O DYE: CPT

## 2020-02-12 ENCOUNTER — OFFICE VISIT (OUTPATIENT)
Dept: OBGYN CLINIC | Facility: CLINIC | Age: 45
End: 2020-02-12
Payer: COMMERCIAL

## 2020-02-12 ENCOUNTER — APPOINTMENT (OUTPATIENT)
Dept: LAB | Facility: CLINIC | Age: 45
End: 2020-02-12
Payer: COMMERCIAL

## 2020-02-12 ENCOUNTER — TELEPHONE (OUTPATIENT)
Dept: INFUSION CENTER | Facility: HOSPITAL | Age: 45
End: 2020-02-12

## 2020-02-12 VITALS
HEART RATE: 80 BPM | BODY MASS INDEX: 22.2 KG/M2 | DIASTOLIC BLOOD PRESSURE: 76 MMHG | WEIGHT: 130 LBS | SYSTOLIC BLOOD PRESSURE: 119 MMHG | HEIGHT: 64 IN

## 2020-02-12 DIAGNOSIS — C90.00 MULTIPLE MYELOMA NOT HAVING ACHIEVED REMISSION (HCC): ICD-10-CM

## 2020-02-12 DIAGNOSIS — C90.00 MULTIPLE MYELOMA NOT HAVING ACHIEVED REMISSION (HCC): Primary | ICD-10-CM

## 2020-02-12 DIAGNOSIS — M51.16 LUMBAR DISC HERNIATION WITH RADICULOPATHY: Primary | ICD-10-CM

## 2020-02-12 DIAGNOSIS — M51.36 DDD (DEGENERATIVE DISC DISEASE), LUMBAR: ICD-10-CM

## 2020-02-12 PROBLEM — M51.369 DDD (DEGENERATIVE DISC DISEASE), LUMBAR: Status: ACTIVE | Noted: 2020-02-12

## 2020-02-12 LAB — HCG SERPL QL: ABNORMAL

## 2020-02-12 PROCEDURE — 84703 CHORIONIC GONADOTROPIN ASSAY: CPT

## 2020-02-12 PROCEDURE — 36415 COLL VENOUS BLD VENIPUNCTURE: CPT

## 2020-02-12 PROCEDURE — 1036F TOBACCO NON-USER: CPT | Performed by: ORTHOPAEDIC SURGERY

## 2020-02-12 PROCEDURE — 99213 OFFICE O/P EST LOW 20 MIN: CPT | Performed by: ORTHOPAEDIC SURGERY

## 2020-02-12 PROCEDURE — 3008F BODY MASS INDEX DOCD: CPT | Performed by: ORTHOPAEDIC SURGERY
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
## 2020-02-14 ENCOUNTER — TELEPHONE (OUTPATIENT)
Dept: HEMATOLOGY ONCOLOGY | Facility: CLINIC | Age: 45
End: 2020-02-14

## 2020-02-14 DIAGNOSIS — Z51.81 MEDICATION MONITORING ENCOUNTER: Primary | ICD-10-CM

## 2020-02-14 NOTE — TELEPHONE ENCOUNTER
Called and left a message for the patient to return my call regarding her Revlimid refill  Left call back number to office  If the patient calls and you please send her directly over to [facility] office  Pt called back and I notified her that her pregnancy test she had done came back borderline  I stated it could have been a malfunction in the test but I unfortunately need for her to get another test done so I can verify it is truly a negative test before I refill her Revlimid  I stated that I placed two different types of pregnancy tests to be done so we can verify for sure  She stated that she will get it done tomorrow  I stated that is fine I will refill her medication for her on Monday then because I am out of the office for the weekend  She verbalized understanding

## 2020-02-15 ENCOUNTER — LAB (OUTPATIENT)
Dept: LAB | Facility: HOSPITAL | Age: 45
End: 2020-02-15
Attending: INTERNAL MEDICINE
Payer: COMMERCIAL

## 2020-02-15 DIAGNOSIS — Z51.81 MEDICATION MONITORING ENCOUNTER: ICD-10-CM

## 2020-02-15 LAB — B-HCG SERPL-ACNC: 2 MIU/ML

## 2020-02-15 PROCEDURE — 36415 COLL VENOUS BLD VENIPUNCTURE: CPT

## 2020-02-15 PROCEDURE — 84702 CHORIONIC GONADOTROPIN TEST: CPT

## 2020-02-17 DIAGNOSIS — C90.01 MULTIPLE MYELOMA IN REMISSION (HCC): ICD-10-CM

## 2020-02-17 RX ORDER — LENALIDOMIDE 5 MG/1
5 CAPSULE ORAL DAILY
Qty: 28 CAPSULE | Refills: 0 | Status: SHIPPED | OUTPATIENT
Start: 2020-02-17 | End: 2020-03-19 | Stop reason: SDUPTHER

## 2020-02-17 NOTE — TELEPHONE ENCOUNTER
Patient had a Qualitative pregnacy test done on 2/12/2020 which came back borderline  Pt then had a Quantitative pregnancy test done on 2/15/2020 which came back normal (negative) at 2  Discussed this with Dr Carroll Fowler he stated we can proceed to re order Revlimid at this time

## 2020-02-18 ENCOUNTER — TELEPHONE (OUTPATIENT)
Dept: HEMATOLOGY ONCOLOGY | Facility: CLINIC | Age: 45
End: 2020-02-18

## 2020-02-18 NOTE — TELEPHONE ENCOUNTER
Called patient back and left her a message that I sent the refill for her Revlimid over to the pharmacy yesterday so they should be reaching out to her  Advised her to call back the office if she has any further questions

## 2020-03-11 ENCOUNTER — EVALUATION (OUTPATIENT)
Dept: PHYSICAL THERAPY | Age: 45
End: 2020-03-11
Payer: COMMERCIAL

## 2020-03-11 DIAGNOSIS — M51.16 LUMBAR DISC HERNIATION WITH RADICULOPATHY: Primary | ICD-10-CM

## 2020-03-11 DIAGNOSIS — M51.36 DDD (DEGENERATIVE DISC DISEASE), LUMBAR: ICD-10-CM

## 2020-03-11 PROCEDURE — 97140 MANUAL THERAPY 1/> REGIONS: CPT | Performed by: PHYSICAL THERAPIST

## 2020-03-11 PROCEDURE — 97162 PT EVAL MOD COMPLEX 30 MIN: CPT | Performed by: PHYSICAL THERAPIST

## 2020-03-11 PROCEDURE — 97110 THERAPEUTIC EXERCISES: CPT | Performed by: PHYSICAL THERAPIST
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
## 2020-03-16 ENCOUNTER — OFFICE VISIT (OUTPATIENT)
Dept: PHYSICAL THERAPY | Age: 45
End: 2020-03-16
Payer: COMMERCIAL

## 2020-03-16 DIAGNOSIS — M51.36 DDD (DEGENERATIVE DISC DISEASE), LUMBAR: ICD-10-CM

## 2020-03-16 DIAGNOSIS — M51.16 LUMBAR DISC HERNIATION WITH RADICULOPATHY: Primary | ICD-10-CM

## 2020-03-16 PROCEDURE — 97110 THERAPEUTIC EXERCISES: CPT | Performed by: PHYSICAL THERAPIST

## 2020-03-16 PROCEDURE — 97140 MANUAL THERAPY 1/> REGIONS: CPT | Performed by: PHYSICAL THERAPIST

## 2020-03-16 NOTE — PROGRESS NOTES
Daily Note     Today's date: 3/16/2020  Patient name: Lorena Johnson  : 1975  MRN: [id number]  Referring provider: Gevena Cooks, Juanna Minister, MD  Dx:   Encounter Diagnosis     ICD-10-CM    1  Lumbar disc herniation with radiculopathy M51 16    2  DDD (degenerative disc disease), lumbar M51 36        Start Time:   Stop Time:   Total time in clinic (min): 45 minutes    Subjective: Patient reports she felt pretty good today but not good day  Objective: See treatment diary below      Assessment: Tolerated treatment well  Patient would benefit from continued PT Tighter right hip flexion and hamstrings  Pain with rotations also  Patient given red TB for HEP core exercises  Plan: Continue per plan of care        Precautions: MS, Bilateral carpal tunnel syndrome, Multiple Myeloma      Manual  3/11 3/16           LB/LE's 10' 10            leg pulls nv 5'                                                      Exercise Diary  3/11 3/16                        B heel slides 30x 30x           Hip add-ball 30x 30x           Hip abd-band 30x 30x           Bridge w/ abd nv 30x           clamshell  nt/nv           Core ball press nv 5" 20x           Core TB stand nv 5" 10x                                                               Slant board nv 30" 4x           nustep             Calf press nv 35# 30x

## 2020-03-17 ENCOUNTER — TELEPHONE (OUTPATIENT)
Dept: HEMATOLOGY ONCOLOGY | Facility: CLINIC | Age: 45
End: 2020-03-17

## 2020-03-17 DIAGNOSIS — C90.01 MULTIPLE MYELOMA IN REMISSION (HCC): Primary | ICD-10-CM

## 2020-03-17 NOTE — TELEPHONE ENCOUNTER
Called patient and left a voice mail reminding her to get her pregnancy test done so I can refill her Revlimid prescription   Standing order placed for test

## 2020-03-18 ENCOUNTER — APPOINTMENT (OUTPATIENT)
Dept: LAB | Facility: CLINIC | Age: 45
End: 2020-03-18
Payer: COMMERCIAL

## 2020-03-18 ENCOUNTER — APPOINTMENT (OUTPATIENT)
Dept: PHYSICAL THERAPY | Age: 45
End: 2020-03-18
Payer: COMMERCIAL

## 2020-03-18 ENCOUNTER — OFFICE VISIT (OUTPATIENT)
Dept: PHYSICAL THERAPY | Age: 45
End: 2020-03-18
Payer: COMMERCIAL

## 2020-03-18 DIAGNOSIS — M51.16 LUMBAR DISC HERNIATION WITH RADICULOPATHY: Primary | ICD-10-CM

## 2020-03-18 DIAGNOSIS — M51.36 DDD (DEGENERATIVE DISC DISEASE), LUMBAR: ICD-10-CM

## 2020-03-18 LAB — HCG SERPL QL: NEGATIVE

## 2020-03-18 PROCEDURE — 36415 COLL VENOUS BLD VENIPUNCTURE: CPT

## 2020-03-18 PROCEDURE — 97110 THERAPEUTIC EXERCISES: CPT

## 2020-03-18 PROCEDURE — 97140 MANUAL THERAPY 1/> REGIONS: CPT

## 2020-03-18 PROCEDURE — 84703 CHORIONIC GONADOTROPIN ASSAY: CPT

## 2020-03-18 NOTE — PROGRESS NOTES
Daily Note     Today's date: 3/18/2020  Patient name: Braden Gee  : 1975  MRN: [id number]  Referring provider: Aidee Crenshaw MD  Dx:   Encounter Diagnosis     ICD-10-CM    1  Lumbar disc herniation with radiculopathy M51 16    2  DDD (degenerative disc disease), lumbar M51 36        Start Time:   Stop Time:   Total time in clinic (min): 50 minutes    Subjective: patient reports 2/10 LBP  Objective: See treatment diary below      Assessment: Tolerated treatment well, increased tightness and guarding bl LE'S R>L  Added clamshell and nustep with good tolerance  Patient exhibited good technique with therapeutic exercises and would benefit from continued PT      Plan: Continue per plan of care        Precautions: MS, Bilateral carpal tunnel syndrome, Multiple Myeloma      Manual  3/11 3/16 3/18          LB/LE's 10' 10 10           leg pulls nv 5' 5                                                     Exercise Diary  3/11 3/16 3/18                       B heel slides 30x 30x 30x          Hip add-ball 30x 30x 30x          Hip abd-band 30x 30x 30x          Bridge w/ abd nv 30x 30x          clamshell  nt/nv 3"x10          Core ball press nv 5" 20x 5"x20          Core TB stand nv 5" 10x 5"x20                                                              Slant board nv 30" 4x 30"x4          nustep   5'          Calf press nv 35# 30x 35/30

## 2020-03-19 DIAGNOSIS — C90.01 MULTIPLE MYELOMA IN REMISSION (HCC): ICD-10-CM

## 2020-03-19 RX ORDER — LENALIDOMIDE 5 MG/1
5 CAPSULE ORAL DAILY
Qty: 28 CAPSULE | Refills: 0 | Status: SHIPPED | OUTPATIENT
Start: 2020-03-19 | End: 2020-04-14 | Stop reason: SDUPTHER

## 2020-03-23 ENCOUNTER — APPOINTMENT (OUTPATIENT)
Dept: PHYSICAL THERAPY | Age: 45
End: 2020-03-23
Payer: COMMERCIAL

## 2020-03-25 ENCOUNTER — APPOINTMENT (OUTPATIENT)
Dept: PHYSICAL THERAPY | Age: 45
End: 2020-03-25
Payer: COMMERCIAL

## 2020-03-30 ENCOUNTER — APPOINTMENT (OUTPATIENT)
Dept: PHYSICAL THERAPY | Age: 45
End: 2020-03-30
Payer: COMMERCIAL

## 2020-04-13 ENCOUNTER — APPOINTMENT (OUTPATIENT)
Dept: LAB | Facility: CLINIC | Age: 45
End: 2020-04-13
Payer: COMMERCIAL

## 2020-04-13 ENCOUNTER — TELEPHONE (OUTPATIENT)
Dept: SURGICAL ONCOLOGY | Facility: CLINIC | Age: 45
End: 2020-04-13

## 2020-04-14 DIAGNOSIS — C90.01 MULTIPLE MYELOMA IN REMISSION (HCC): ICD-10-CM

## 2020-04-14 RX ORDER — LENALIDOMIDE 5 MG/1
5 CAPSULE ORAL DAILY
Qty: 28 CAPSULE | Refills: 0 | Status: SHIPPED | OUTPATIENT
Start: 2020-04-14 | End: 2020-05-11 | Stop reason: SDUPTHER

## 2020-04-20 ENCOUNTER — TELEPHONE (OUTPATIENT)
Dept: NEUROLOGY | Facility: CLINIC | Age: 45
End: 2020-04-20

## 2020-05-06 ENCOUNTER — HOSPITAL ENCOUNTER (OUTPATIENT)
Dept: NEUROLOGY | Facility: CLINIC | Age: 45
Discharge: HOME | End: 2020-05-06
Payer: COMMERCIAL

## 2020-05-06 DIAGNOSIS — M51.16 LUMBAR DISC HERNIATION WITH RADICULOPATHY: ICD-10-CM

## 2020-05-06 DIAGNOSIS — M62.838 MUSCLE SPASM: ICD-10-CM

## 2020-05-06 PROCEDURE — 95909 NRV CNDJ TST 5-6 STUDIES: CPT | Performed by: PSYCHIATRY & NEUROLOGY

## 2020-05-06 PROCEDURE — 95886 MUSC TEST DONE W/N TEST COMP: CPT | Performed by: PSYCHIATRY & NEUROLOGY

## 2020-05-08 ENCOUNTER — TELEPHONE (OUTPATIENT)
Dept: HEMATOLOGY ONCOLOGY | Facility: CLINIC | Age: 45
End: 2020-05-08

## 2020-05-09 ENCOUNTER — APPOINTMENT (OUTPATIENT)
Dept: LAB | Facility: HOSPITAL | Age: 45
End: 2020-05-09
Attending: INTERNAL MEDICINE
Payer: COMMERCIAL

## 2020-05-09 PROCEDURE — 86334 IMMUNOFIX E-PHORESIS SERUM: CPT | Performed by: PATHOLOGY

## 2020-05-09 PROCEDURE — 84165 PROTEIN E-PHORESIS SERUM: CPT | Performed by: PATHOLOGY

## 2020-05-11 DIAGNOSIS — C90.01 MULTIPLE MYELOMA IN REMISSION (HCC): ICD-10-CM

## 2020-05-11 RX ORDER — LENALIDOMIDE 5 MG/1
5 CAPSULE ORAL DAILY
Qty: 28 CAPSULE | Refills: 0 | Status: SHIPPED | OUTPATIENT
Start: 2020-05-11 | End: 2020-06-15 | Stop reason: SDUPTHER

## 2020-05-12 ENCOUNTER — TELEPHONE (OUTPATIENT)
Dept: HEMATOLOGY ONCOLOGY | Facility: CLINIC | Age: 45
End: 2020-05-12

## 2020-05-13 ENCOUNTER — OFFICE VISIT (OUTPATIENT)
Dept: HEMATOLOGY ONCOLOGY | Facility: CLINIC | Age: 45
End: 2020-05-13
Payer: COMMERCIAL

## 2020-05-13 ENCOUNTER — TELEPHONE (OUTPATIENT)
Dept: HEMATOLOGY ONCOLOGY | Facility: CLINIC | Age: 45
End: 2020-05-13

## 2020-05-13 VITALS
BODY MASS INDEX: 22.11 KG/M2 | WEIGHT: 129.5 LBS | HEIGHT: 64 IN | HEART RATE: 89 BPM | RESPIRATION RATE: 16 BRPM | TEMPERATURE: 98.4 F | OXYGEN SATURATION: 98 %

## 2020-05-13 DIAGNOSIS — C90.00 MULTIPLE MYELOMA NOT HAVING ACHIEVED REMISSION (HCC): Primary | ICD-10-CM

## 2020-05-13 PROCEDURE — 1036F TOBACCO NON-USER: CPT | Performed by: INTERNAL MEDICINE

## 2020-05-13 PROCEDURE — 99214 OFFICE O/P EST MOD 30 MIN: CPT | Performed by: INTERNAL MEDICINE

## 2020-05-13 PROCEDURE — 3008F BODY MASS INDEX DOCD: CPT | Performed by: INTERNAL MEDICINE

## 2020-05-20 ENCOUNTER — OFFICE VISIT (OUTPATIENT)
Dept: FAMILY MEDICINE CLINIC | Facility: CLINIC | Age: 45
End: 2020-05-20
Payer: COMMERCIAL

## 2020-05-20 ENCOUNTER — TELEPHONE (OUTPATIENT)
Dept: ADMINISTRATIVE | Facility: OTHER | Age: 45
End: 2020-05-20

## 2020-05-20 VITALS
SYSTOLIC BLOOD PRESSURE: 106 MMHG | DIASTOLIC BLOOD PRESSURE: 64 MMHG | HEIGHT: 64 IN | RESPIRATION RATE: 16 BRPM | HEART RATE: 78 BPM | WEIGHT: 130 LBS | TEMPERATURE: 98.7 F | BODY MASS INDEX: 22.2 KG/M2 | OXYGEN SATURATION: 98 %

## 2020-05-20 DIAGNOSIS — M51.36 DDD (DEGENERATIVE DISC DISEASE), LUMBAR: ICD-10-CM

## 2020-05-20 DIAGNOSIS — G62.9 NEUROPATHY: ICD-10-CM

## 2020-05-20 DIAGNOSIS — M62.838 MUSCLE SPASM: Primary | ICD-10-CM

## 2020-05-20 DIAGNOSIS — G35 MULTIPLE SCLEROSIS (HCC): ICD-10-CM

## 2020-05-20 PROBLEM — E87.6 HYPOKALEMIA: Status: RESOLVED | Noted: 2019-05-24 | Resolved: 2020-05-20

## 2020-05-20 PROCEDURE — 3008F BODY MASS INDEX DOCD: CPT | Performed by: FAMILY MEDICINE

## 2020-05-20 PROCEDURE — 1036F TOBACCO NON-USER: CPT | Performed by: FAMILY MEDICINE

## 2020-05-20 PROCEDURE — 99214 OFFICE O/P EST MOD 30 MIN: CPT | Performed by: FAMILY MEDICINE

## 2020-05-20 RX ORDER — DEXAMETHASONE 4 MG/1
20 TABLET ORAL WEEKLY
COMMUNITY
End: 2020-07-06 | Stop reason: SDUPTHER

## 2020-06-09 ENCOUNTER — TELEPHONE (OUTPATIENT)
Dept: HEMATOLOGY ONCOLOGY | Facility: CLINIC | Age: 45
End: 2020-06-09

## 2020-06-09 DIAGNOSIS — C90.00 MULTIPLE MYELOMA NOT HAVING ACHIEVED REMISSION (HCC): ICD-10-CM

## 2020-06-09 DIAGNOSIS — Z51.81 MEDICATION MONITORING ENCOUNTER: Primary | ICD-10-CM

## 2020-06-10 ENCOUNTER — APPOINTMENT (OUTPATIENT)
Dept: LAB | Age: 45
End: 2020-06-10
Payer: COMMERCIAL

## 2020-06-10 DIAGNOSIS — C90.01 MULTIPLE MYELOMA IN REMISSION (HCC): ICD-10-CM

## 2020-06-10 DIAGNOSIS — G62.9 NEUROPATHY: ICD-10-CM

## 2020-06-10 DIAGNOSIS — Z51.81 MEDICATION MONITORING ENCOUNTER: ICD-10-CM

## 2020-06-10 DIAGNOSIS — C90.00 MULTIPLE MYELOMA NOT HAVING ACHIEVED REMISSION (HCC): ICD-10-CM

## 2020-06-10 DIAGNOSIS — M62.838 MUSCLE SPASM: ICD-10-CM

## 2020-06-10 LAB
25(OH)D3 SERPL-MCNC: 30.4 NG/ML (ref 30–100)
MAGNESIUM SERPL-MCNC: 2.3 MG/DL (ref 1.6–2.6)
VIT B12 SERPL-MCNC: 295 PG/ML (ref 100–900)

## 2020-06-10 PROCEDURE — 82607 VITAMIN B-12: CPT

## 2020-06-10 PROCEDURE — 82306 VITAMIN D 25 HYDROXY: CPT

## 2020-06-10 PROCEDURE — 83735 ASSAY OF MAGNESIUM: CPT

## 2020-06-10 PROCEDURE — 36415 COLL VENOUS BLD VENIPUNCTURE: CPT | Performed by: INTERNAL MEDICINE

## 2020-06-11 DIAGNOSIS — C90.01 MULTIPLE MYELOMA IN REMISSION (HCC): ICD-10-CM

## 2020-06-11 RX ORDER — LENALIDOMIDE 5 MG/1
5 CAPSULE ORAL DAILY
Qty: 28 CAPSULE | Refills: 0 | Status: CANCELLED | OUTPATIENT
Start: 2020-06-11

## 2020-06-15 DIAGNOSIS — C90.01 MULTIPLE MYELOMA IN REMISSION (HCC): ICD-10-CM

## 2020-06-15 RX ORDER — LENALIDOMIDE 5 MG/1
5 CAPSULE ORAL DAILY
Qty: 28 CAPSULE | Refills: 0 | Status: SHIPPED | OUTPATIENT
Start: 2020-06-15 | End: 2020-07-08

## 2020-06-23 ENCOUNTER — EVALUATION (OUTPATIENT)
Dept: PHYSICAL THERAPY | Age: 45
End: 2020-06-23
Payer: COMMERCIAL

## 2020-06-23 DIAGNOSIS — M51.36 DDD (DEGENERATIVE DISC DISEASE), LUMBAR: ICD-10-CM

## 2020-06-23 DIAGNOSIS — M51.16 LUMBAR DISC HERNIATION WITH RADICULOPATHY: Primary | ICD-10-CM

## 2020-06-23 PROCEDURE — 97140 MANUAL THERAPY 1/> REGIONS: CPT | Performed by: PHYSICAL THERAPIST

## 2020-06-23 PROCEDURE — 97161 PT EVAL LOW COMPLEX 20 MIN: CPT | Performed by: PHYSICAL THERAPIST

## 2020-06-23 PROCEDURE — 97110 THERAPEUTIC EXERCISES: CPT | Performed by: PHYSICAL THERAPIST

## 2020-06-24 ENCOUNTER — OFFICE VISIT (OUTPATIENT)
Dept: OBGYN CLINIC | Facility: HOSPITAL | Age: 45
End: 2020-06-24
Payer: COMMERCIAL

## 2020-06-24 VITALS
BODY MASS INDEX: 22.36 KG/M2 | HEIGHT: 64 IN | SYSTOLIC BLOOD PRESSURE: 116 MMHG | HEART RATE: 85 BPM | DIASTOLIC BLOOD PRESSURE: 75 MMHG | WEIGHT: 131 LBS

## 2020-06-24 DIAGNOSIS — G56.01 CARPAL TUNNEL SYNDROME OF RIGHT WRIST: Primary | ICD-10-CM

## 2020-06-24 PROCEDURE — 1036F TOBACCO NON-USER: CPT | Performed by: ORTHOPAEDIC SURGERY

## 2020-06-24 PROCEDURE — 3008F BODY MASS INDEX DOCD: CPT | Performed by: ORTHOPAEDIC SURGERY

## 2020-06-24 PROCEDURE — 99213 OFFICE O/P EST LOW 20 MIN: CPT | Performed by: ORTHOPAEDIC SURGERY

## 2020-06-29 ENCOUNTER — OFFICE VISIT (OUTPATIENT)
Dept: PHYSICAL THERAPY | Age: 45
End: 2020-06-29
Payer: COMMERCIAL

## 2020-06-29 DIAGNOSIS — M51.16 LUMBAR DISC HERNIATION WITH RADICULOPATHY: Primary | ICD-10-CM

## 2020-06-29 DIAGNOSIS — M51.36 DDD (DEGENERATIVE DISC DISEASE), LUMBAR: ICD-10-CM

## 2020-06-29 PROCEDURE — 97110 THERAPEUTIC EXERCISES: CPT

## 2020-07-01 ENCOUNTER — OFFICE VISIT (OUTPATIENT)
Dept: PHYSICAL THERAPY | Age: 45
End: 2020-07-01
Payer: COMMERCIAL

## 2020-07-01 DIAGNOSIS — M51.36 DDD (DEGENERATIVE DISC DISEASE), LUMBAR: ICD-10-CM

## 2020-07-01 DIAGNOSIS — M51.16 LUMBAR DISC HERNIATION WITH RADICULOPATHY: Primary | ICD-10-CM

## 2020-07-01 PROCEDURE — 97140 MANUAL THERAPY 1/> REGIONS: CPT

## 2020-07-01 PROCEDURE — 97110 THERAPEUTIC EXERCISES: CPT

## 2020-07-06 ENCOUNTER — TELEPHONE (OUTPATIENT)
Dept: NEUROLOGY | Facility: CLINIC | Age: 45
End: 2020-07-06

## 2020-07-06 ENCOUNTER — OFFICE VISIT (OUTPATIENT)
Dept: NEUROLOGY | Facility: CLINIC | Age: 45
End: 2020-07-06
Payer: COMMERCIAL

## 2020-07-06 ENCOUNTER — TELEPHONE (OUTPATIENT)
Dept: HEMATOLOGY ONCOLOGY | Facility: CLINIC | Age: 45
End: 2020-07-06

## 2020-07-06 ENCOUNTER — OFFICE VISIT (OUTPATIENT)
Dept: PHYSICAL THERAPY | Age: 45
End: 2020-07-06
Payer: COMMERCIAL

## 2020-07-06 VITALS
TEMPERATURE: 98 F | SYSTOLIC BLOOD PRESSURE: 105 MMHG | WEIGHT: 133.2 LBS | HEART RATE: 69 BPM | BODY MASS INDEX: 22.86 KG/M2 | DIASTOLIC BLOOD PRESSURE: 55 MMHG

## 2020-07-06 DIAGNOSIS — C90.00 MULTIPLE MYELOMA NOT HAVING ACHIEVED REMISSION (HCC): Primary | ICD-10-CM

## 2020-07-06 DIAGNOSIS — E27.9 ADRENAL ABNORMALITY (HCC): Primary | ICD-10-CM

## 2020-07-06 DIAGNOSIS — G35 MULTIPLE SCLEROSIS (HCC): Primary | ICD-10-CM

## 2020-07-06 DIAGNOSIS — M51.16 LUMBAR DISC HERNIATION WITH RADICULOPATHY: Primary | ICD-10-CM

## 2020-07-06 DIAGNOSIS — M51.36 DDD (DEGENERATIVE DISC DISEASE), LUMBAR: ICD-10-CM

## 2020-07-06 PROCEDURE — 99214 OFFICE O/P EST MOD 30 MIN: CPT | Performed by: PSYCHIATRY & NEUROLOGY

## 2020-07-06 PROCEDURE — 1036F TOBACCO NON-USER: CPT | Performed by: PSYCHIATRY & NEUROLOGY

## 2020-07-06 PROCEDURE — 97110 THERAPEUTIC EXERCISES: CPT

## 2020-07-06 RX ORDER — DEXAMETHASONE 4 MG/1
20 TABLET ORAL WEEKLY
Qty: 20 TABLET | Refills: 11 | Status: SHIPPED | OUTPATIENT
Start: 2020-07-06 | End: 2020-07-10

## 2020-07-06 NOTE — PROGRESS NOTES
Daily Note     Today's date: 2020  Patient name: Claudean Qua  : 1975  MRN: [id number]  Referring provider: Sue Gamboa MD  Dx:   Encounter Diagnosis     ICD-10-CM    1  Lumbar disc herniation with radiculopathy M51 16    2  DDD (degenerative disc disease), lumbar M51 36        Start Time: 1300  Stop Time: 1400  Total time in clinic (min): 60 minutes    Subjective: Minimal pain today, 1/10      Objective: See treatment diary below      Assessment: Tolerated treatment well  Increased weights today without issues  Also introduced SLR flexion and abduction without adverse effects, although she did c/o some fatigue noted on R side > L  No pain post session  Patient would benefit from continued PT      Plan: Continue per plan of care        Precautions: MS, Bilateral carpal tunnel syndrome, Multiple Myeloma      Manual           LB/LE's 10' 10' 10' 10'          leg pulls 5' 5' 5' 5'                                                    Exercise Diary                        B heel slides 30x 30x 30x 30x         Hip add-ball 30x 30x 30x 30x         Hip abd-band 30x Blue 30x 30x 30x         Bridge w/ abd 20x 20x 20x 20x         clamshell  20x 20x 30x         SLR     20x         SLR abduction    20x         Core ball press 5" 10x 5''x10 5"x10 5''x20         Core TB stand Red 5" 5x 5''x10 5"x10 5''x10                                                             Slant board 30" 4x L3 x4 x4 x4         nustep 7' L3 8' 8' 8'         Calf press 30# 20x 30# 30x 35/30 40# 30x         Row deltoid  20# 30x 20/30 25# 30x                      HEP  *5'                                       HEP 2020: B heelslides on ball, hip add, hip abd, bridge with abd, clamshell, core ball press, core tband press, row with tband

## 2020-07-06 NOTE — ASSESSMENT & PLAN NOTE
Pt here for neuro follow up  Last seen over one  Yr ago  Pt cont with follow up at [facility] from oncology as well as neurology  Pt had updated imaging by  or in sept 2019 as well as march 2020  Overall pt doing very well  No new meds  Pt remains off imd meds as per [facility] recommendations at this time  Pt with some int cramping of the right right lower ext  Very short lived, hold on muscle relaxers

## 2020-07-06 NOTE — TELEPHONE ENCOUNTER
Patient called requesting a refill on: dexamethasone (DECADRON) 4 mg tablet   Preferred pharmacy: [facility] [address] [place] [id number]       Patient's call back # [phone]

## 2020-07-06 NOTE — TELEPHONE ENCOUNTER
Pt seen in office today  Please see full notes  Incidentally found on mri sept from [facility], prominence of the left adrenal gland  rec pt to follow up with pcp  Overall from [place] [place] [address] standpoint doing well  Imaging stable from MS standpoint

## 2020-07-08 ENCOUNTER — OFFICE VISIT (OUTPATIENT)
Dept: PHYSICAL THERAPY | Age: 45
End: 2020-07-08
Payer: COMMERCIAL

## 2020-07-08 DIAGNOSIS — M51.36 DDD (DEGENERATIVE DISC DISEASE), LUMBAR: ICD-10-CM

## 2020-07-08 DIAGNOSIS — C90.01 MULTIPLE MYELOMA IN REMISSION (HCC): ICD-10-CM

## 2020-07-08 DIAGNOSIS — M51.16 LUMBAR DISC HERNIATION WITH RADICULOPATHY: Primary | ICD-10-CM

## 2020-07-08 PROCEDURE — 97140 MANUAL THERAPY 1/> REGIONS: CPT

## 2020-07-08 PROCEDURE — 97110 THERAPEUTIC EXERCISES: CPT

## 2020-07-08 RX ORDER — LENALIDOMIDE 5 MG/1
CAPSULE ORAL
Qty: 28 CAPSULE | Refills: 0 | Status: SHIPPED | OUTPATIENT
Start: 2020-07-08 | End: 2020-07-16 | Stop reason: SDUPTHER

## 2020-07-08 NOTE — PROGRESS NOTES
Daily Note     Today's date: 2020  Patient name: Ivanna Torres  : 1975  MRN: [id number]  Referring provider: Debbie Salazar, Kwesi Briggs MD  Dx:   Encounter Diagnosis     ICD-10-CM    1  Lumbar disc herniation with radiculopathy M51 16    2  DDD (degenerative disc disease), lumbar M51 36        Start Time: 0800  Stop Time: 0900  Total time in clinic (min): 60 minutes    Subjective: Patient reports 3-4/10 lbp today  Objective: See treatment diary below      Assessment: Tolerated treatment well, guarded during manual therapy, gets relief with leg pulls    Patient demonstrated fatigue post treatment and would benefit from continued PT      Plan: Continue per plan of care        Precautions: MS, Bilateral carpal tunnel syndrome, Multiple Myeloma      Manual          LB/LE's 10' 10' 10' 10' 10'         leg pulls 5' 5' 5' 5' 5'                                                   Exercise Diary                       B heel slides 30x 30x 30x 30x 30x        Hip add-ball 30x 30x 30x 30x 30x        Hip abd-band 30x Blue 30x 30x 30x 30x        Bridge w/ abd 20x 20x 20x 20x 25x        clamshell  20x 20x 30x 30x        SLR     20x 20x        SLR abduction    20x 20x        Core ball press 5" 10x 5''x10 5"x10 5''x20 10"x10        Core TB stand Red 5" 5x 5''x10 5"x10 5''x10 5"x10                                                            Slant board 30" 4x L3 x4 x4 x4 4x        nustep 7' L3 8' 8' 8' 10'        Calf press 30# 20x 30# 30x 35/30 40# 30x 40/30        Row deltoid  20# 30x 20/30 25# 30x 25/30                     HEP  *5'                                       HEP 2020: B heelslides on ball, hip add, hip abd, bridge with abd, clamshell, core ball press, core tband press, row with tband

## 2020-07-13 ENCOUNTER — OFFICE VISIT (OUTPATIENT)
Dept: PHYSICAL THERAPY | Age: 45
End: 2020-07-13
Payer: COMMERCIAL

## 2020-07-13 ENCOUNTER — TELEPHONE (OUTPATIENT)
Dept: HEMATOLOGY ONCOLOGY | Facility: CLINIC | Age: 45
End: 2020-07-13

## 2020-07-13 ENCOUNTER — TELEPHONE (OUTPATIENT)
Dept: FAMILY MEDICINE CLINIC | Facility: CLINIC | Age: 45
End: 2020-07-13

## 2020-07-13 DIAGNOSIS — M51.36 DDD (DEGENERATIVE DISC DISEASE), LUMBAR: ICD-10-CM

## 2020-07-13 DIAGNOSIS — M51.16 LUMBAR DISC HERNIATION WITH RADICULOPATHY: Primary | ICD-10-CM

## 2020-07-13 PROCEDURE — 97110 THERAPEUTIC EXERCISES: CPT

## 2020-07-13 PROCEDURE — 97140 MANUAL THERAPY 1/> REGIONS: CPT

## 2020-07-13 NOTE — PROGRESS NOTES
Daily Note     Today's date: 2020  Patient name: Waynard Leventhal  : 1975  MRN: [id number]  Referring provider: Patito Caro MD  Dx:   Encounter Diagnosis     ICD-10-CM    1  Lumbar disc herniation with radiculopathy M51 16    2  DDD (degenerative disc disease), lumbar M51 36        Start Time: 1300  Stop Time: 1400  Total time in clinic (min): 60 minutes    Subjective: Patient reports 2-3/10 lower back pain today, notes she was having sharp pain over the weekend but not sure why  Objective: See treatment diary below      Assessment: Tolerated treatment fairy well, guarded movements with mat exercises  Fatigues quickly with SLR, bridges and clamshells  Patient demonstrated fatigue post treatment, exhibited good technique with therapeutic exercises and would benefit from continued PT      Plan: Continue per plan of care        Precautions: MS, Bilateral carpal tunnel syndrome, Multiple Myeloma      Manual         LB/LE's 10' 10' 10' 10' 10' 10        leg pulls 5' 5' 5' 5' 5' 5                                                  Exercise Diary                      B heel slides 30x 30x 30x 30x 30x 30x       Hip add-ball 30x 30x 30x 30x 30x 30x       Hip abd-band 30x Blue 30x 30x 30x 30x 30x       Bridge w/ abd 20x 20x 20x 20x 25x 25x       clamshell  20x 20x 30x 30x 30x       SLR     20x 20x 20x       SLR abduction    20x 20x 20x       Core ball press 5" 10x 5''x10 5"x10 5''x20 10"x10 10"x10       Core TB stand Red 5" 5x 5''x10 5"x10 5''x10 5"x10 5"x10                                                           Slant board 30" 4x L3 x4 x4 x4 4x 4x       nustep 7' L3 8' 8' 8' 10' 10'       Calf press 30# 20x 30# 30x 35/30 40# 30x 40/30 50/30       Row deltoid  20# 30x 20/30 25# 30x 25/30 30/20                    HEP  *5'                                       HEP 2020: B heelslides on ball, hip add, hip abd, bridge with abd, clamshell, core ball press, core tband press, row with tband

## 2020-07-13 NOTE — TELEPHONE ENCOUNTER
It's in care everywhere - Thoracic spine MRI 2019 about half-way down    "Slightly prominent appearing left adrenal gland, unchanged, see image #42 of series 18 "

## 2020-07-13 NOTE — TELEPHONE ENCOUNTER
Patient called stating that she would like a letter from Dr Adwoa London her from work 1 month she states that she is very tried  Best call back [phone]

## 2020-07-13 NOTE — TELEPHONE ENCOUNTER
Kamlesh Ngo called about the MRI, she will not be able to get this approved as we do not have proper documentation regarding her adrenal gland abnormalities or the original result  Is there anything we can do to provide this information?

## 2020-07-14 NOTE — TELEPHONE ENCOUNTER
Spoke with Patient  Stated Dr Tasha Pastrana is out of office until Friday and I will discuss this with him then  Patient was agreeable

## 2020-07-15 ENCOUNTER — OFFICE VISIT (OUTPATIENT)
Dept: PHYSICAL THERAPY | Age: 45
End: 2020-07-15
Payer: COMMERCIAL

## 2020-07-15 ENCOUNTER — APPOINTMENT (OUTPATIENT)
Dept: LAB | Facility: CLINIC | Age: 45
End: 2020-07-15
Payer: COMMERCIAL

## 2020-07-15 DIAGNOSIS — M51.36 DDD (DEGENERATIVE DISC DISEASE), LUMBAR: ICD-10-CM

## 2020-07-15 DIAGNOSIS — M51.16 LUMBAR DISC HERNIATION WITH RADICULOPATHY: Primary | ICD-10-CM

## 2020-07-15 DIAGNOSIS — C90.01 MULTIPLE MYELOMA IN REMISSION (HCC): ICD-10-CM

## 2020-07-15 LAB — HCG SERPL QL: NEGATIVE

## 2020-07-15 PROCEDURE — 97110 THERAPEUTIC EXERCISES: CPT

## 2020-07-15 PROCEDURE — 97140 MANUAL THERAPY 1/> REGIONS: CPT

## 2020-07-15 PROCEDURE — 36415 COLL VENOUS BLD VENIPUNCTURE: CPT

## 2020-07-15 PROCEDURE — 84703 CHORIONIC GONADOTROPIN ASSAY: CPT

## 2020-07-15 NOTE — PROGRESS NOTES
Daily Note     Today's date: 7/15/2020  Patient name: Yaneth Molina  : 1975  MRN: [id number]  Referring provider: Jossie Reese MD  Dx:   Encounter Diagnosis     ICD-10-CM    1  Lumbar disc herniation with radiculopathy M51 16    2  DDD (degenerative disc disease), lumbar M51 36        Start Time: 1100  Stop Time: 1200  Total time in clinic (min): 60 minutes    Subjective: patient reports that she felt better after last visit, notes today her pain is 3/10 in LB      Objective: See treatment diary below      Assessment: Tolerated treatment well, continues to have tightness bl hamstrings, guarded during manual therapy  Doing well with exercises no additional pain  Patient exhibited good technique with therapeutic exercises and would benefit from continued PT      Plan: Continue per plan of care        Precautions: MS, Bilateral carpal tunnel syndrome, Multiple Myeloma      Manual  6/23 6/29 7/01 7/6 7/8 7/13 7/15      LB/LE's 10' 10' 10' 10' 10' 10 10       leg pulls 5' 5' 5' 5' 5' 5 5                                                 Exercise Diary  6/23 6/29 7/01 7/6 7/8 7/13 7/15                   B heel slides 30x 30x 30x 30x 30x 30x 30x      Hip add-ball 30x 30x 30x 30x 30x 30x 30x      Hip abd-band 30x Blue 30x 30x 30x 30x 30x 30x      Bridge w/ abd 20x 20x 20x 20x 25x 25x 25x      clamshell  20x 20x 30x 30x 30x 30x      SLR     20x 20x 20x 30x      SLR abduction    20x 20x 20x 20x      Core ball press 5" 10x 5''x10 5"x10 5''x20 10"x10 10"x10 10"x10      Core TB stand Red 5" 5x 5''x10 5"x10 5''x10 5"x10 5"x10 5"x10                                                          Slant board 30" 4x L3 x4 x4 x4 4x 4x 4x      nustep 7' L3 8' 8' 8' 10' 10' 10'      Calf press 30# 20x 30# 30x 35/30 40# 30x 40/30 50/30 50/30      Row deltoid  20# 30x 20/30 25# 30x 25/30 30/20 30/25                   HEP  *5'                                       HEP 2020: B heelslides on ball, hip add, hip abd, bridge with abd, clamshell, core ball press, core tband press, row with tband

## 2020-07-16 ENCOUNTER — TELEPHONE (OUTPATIENT)
Dept: HEMATOLOGY ONCOLOGY | Facility: CLINIC | Age: 45
End: 2020-07-16

## 2020-07-16 DIAGNOSIS — C90.01 MULTIPLE MYELOMA IN REMISSION (HCC): ICD-10-CM

## 2020-07-16 RX ORDER — LENALIDOMIDE 5 MG/1
5 CAPSULE ORAL DAILY
Qty: 28 CAPSULE | Refills: 0 | Status: SHIPPED | OUTPATIENT
Start: 2020-07-16 | End: 2020-08-13 | Stop reason: SDUPTHER

## 2020-07-16 NOTE — TELEPHONE ENCOUNTER
Patient has not received revlimid, has " a few pills left"  Epic shows revlimid was escribed on 7/8/2020 but [facility] does not have an auth #  Please call [phone]  Will pass on to Dr Axel Davenport RN to provide

## 2020-07-16 NOTE — TELEPHONE ENCOUNTER
Script pended to Dr Jameel Eckert to sign with [employer] auth number attached  To be send to [facility] [address]

## 2020-07-21 ENCOUNTER — TELEPHONE (OUTPATIENT)
Dept: FAMILY MEDICINE CLINIC | Facility: CLINIC | Age: 45
End: 2020-07-21

## 2020-07-21 ENCOUNTER — TELEPHONE (OUTPATIENT)
Dept: HEMATOLOGY ONCOLOGY | Facility: CLINIC | Age: 45
End: 2020-07-21

## 2020-07-21 NOTE — TELEPHONE ENCOUNTER
Patient called in and said she was notified that her MRI was denied and her MRI is canceled for tomorrow  She also wanted to know if a letter could be written for a leave of absence for work to complete her physical therapy

## 2020-07-21 NOTE — TELEPHONE ENCOUNTER
Called patient and notified her that Dr Ronak Solares is unable to write her a note giving her a leave of absence from work  Stated that if her fatigue is that severe she needs to be seen in the office sooner to review/motify her current treatment  Patient stated that's okay if he unable she will use her personal time/vaction to get time off work  Offered her a sooner apt to discuss her current symptoms with Dr Ronak Solares  Patient declined sooner office visit and stated she was fine  Advised her to call back if her fatigue was to worsen

## 2020-07-21 NOTE — TELEPHONE ENCOUNTER
Patient is calling in inquiring whether the letter she is requesting has been completed   Please return her call at [phone]

## 2020-07-21 NOTE — TELEPHONE ENCOUNTER
Spoke to patient  Explained Dr Oakley Do write this letter for her to put her out of work for 1 month  Dr Halina Verma would be willing to write a note for 1 week to give her body time to re-cooperate, but this is all we can do  Patient then discussed how she is applying for [facility] and it was denied and approved  She then got another letter today that stated it was denied and thought if Dr Halina Verma would write a note that maybe it would get approved  Patient stated she would touch base with the people working on her MIRZA and find out if it was actually denied or approved and if she wants the letter for 1 week out of work

## 2020-07-22 ENCOUNTER — OFFICE VISIT (OUTPATIENT)
Dept: PHYSICAL THERAPY | Age: 45
End: 2020-07-22
Payer: COMMERCIAL

## 2020-07-22 DIAGNOSIS — M51.36 DDD (DEGENERATIVE DISC DISEASE), LUMBAR: ICD-10-CM

## 2020-07-22 DIAGNOSIS — M51.16 LUMBAR DISC HERNIATION WITH RADICULOPATHY: Primary | ICD-10-CM

## 2020-07-22 PROCEDURE — 97140 MANUAL THERAPY 1/> REGIONS: CPT | Performed by: PHYSICAL THERAPIST

## 2020-07-22 PROCEDURE — 97110 THERAPEUTIC EXERCISES: CPT | Performed by: PHYSICAL THERAPIST

## 2020-07-22 NOTE — PROGRESS NOTES
Daily Note     Today's date: 2020  Patient name: Gladys Charles  : 1975  MRN: [id number]  Referring provider: Pauly Oreilly MD  Dx:   Encounter Diagnosis     ICD-10-CM    1  Lumbar disc herniation with radiculopathy M51 16    2  DDD (degenerative disc disease), lumbar M51 36        Start Time: 1700  Stop Time: 1800  Total time in clinic (min): 60 minutes    Subjective: Patient reports back pain is 3/10 today      Objective: See treatment diary below      Assessment: Tolerated treatment well  Patient demonstrated fatigue post treatment, exhibited good technique with therapeutic exercises and would benefit from continued PT      Plan: Progress treatment as tolerated         Precautions: MS, Bilateral carpal tunnel syndrome, Multiple Myeloma      Manual  6/23 6/29 7/01 7/6 7/8 7/13 7/15 7/22     LB/LE's 10' 10' 10' 10' 10' 10 10 10      leg pulls 5' 5' 5' 5' 5' 5 5 5                                                Exercise Diary  6/23 6/29 7/01 7/6 7/8 7/13 7/15 7/22                  B heel slides 30x 30x 30x 30x 30x 30x 30x 30x     Hip add-ball 30x 30x 30x 30x 30x 30x 30x 30x     Hip abd-band 30x Blue 30x 30x 30x 30x 30x 30x 30x     Bridge w/ abd 20x 20x 20x 20x 25x 25x 25x 30x     clamshell  20x 20x 30x 30x 30x 30x 30x     SLR     20x 20x 20x 30x 30x     SLR abduction    20x 20x 20x 20x 30x     Core ball press 5" 10x 5''x10 5"x10 5''x20 10"x10 10"x10 10"x10 10x     Core TB stand Red 5" 5x 5''x10 5"x10 5''x10 5"x10 5"x10 5"x10 10x                                                         Slant board 30" 4x L3 x4 x4 x4 4x 4x 4x 4x     nustep 7' L3 8' 8' 8' 10' 10' 10' 10     Calf press 30# 20x 30# 30x 35/30 40# 30x 40/30 50/30 50/30 50/30     Row deltoid  20# 30x 20/30 25# 30x 25/30 30/20 30/ 30/30                  HEP  *5'                                       HEP 2020: B heelslides on ball, hip add, hip abd, bridge with abd, clamshell, core ball press, core tband press, row with tband

## 2020-07-23 ENCOUNTER — OFFICE VISIT (OUTPATIENT)
Dept: PHYSICAL THERAPY | Age: 45
End: 2020-07-23
Payer: COMMERCIAL

## 2020-07-23 DIAGNOSIS — M51.16 LUMBAR DISC HERNIATION WITH RADICULOPATHY: Primary | ICD-10-CM

## 2020-07-23 DIAGNOSIS — M51.36 DDD (DEGENERATIVE DISC DISEASE), LUMBAR: ICD-10-CM

## 2020-07-23 PROCEDURE — 97110 THERAPEUTIC EXERCISES: CPT

## 2020-07-23 PROCEDURE — 97140 MANUAL THERAPY 1/> REGIONS: CPT

## 2020-07-29 ENCOUNTER — OFFICE VISIT (OUTPATIENT)
Dept: PHYSICAL THERAPY | Age: 45
End: 2020-07-29
Payer: COMMERCIAL

## 2020-07-29 DIAGNOSIS — M51.36 DDD (DEGENERATIVE DISC DISEASE), LUMBAR: ICD-10-CM

## 2020-07-29 DIAGNOSIS — M51.16 LUMBAR DISC HERNIATION WITH RADICULOPATHY: Primary | ICD-10-CM

## 2020-07-29 PROCEDURE — 97140 MANUAL THERAPY 1/> REGIONS: CPT

## 2020-07-29 PROCEDURE — 97110 THERAPEUTIC EXERCISES: CPT

## 2020-07-29 NOTE — PROGRESS NOTES
Daily Note     Today's date: 2020  Patient name: Shanique Cardoza  : 1975  MRN: [id number]  Referring provider: Daya Sherwood MD  Dx:   Encounter Diagnosis     ICD-10-CM    1  Lumbar disc herniation with radiculopathy M51 16    2  DDD (degenerative disc disease), lumbar M51 36        Start Time: 805  Stop Time: 905  Total time in clinic (min): 60 minutes    Subjective: Pt reports LB feeling good today  Notes having some increased pain, "more than usual," following LV  Objective: See treatment diary below  Demonstrates improvement with increased FOTO score of 58/59 today  Assessment: Tolerated treatment well  Continues to present with greater stiffness RLE > LLE with manual HS/piriformis stretch  Most challenged with SLR, SLR abd, and bridging today  Slight pain reported in LB following completion of rear deltoid row  Pt noted post completion of this intervention that this exercise frequently causes slight pain in LB  Pain began to resolve with additional rest and additional exercise  Patient would benefit from continued PT to further improve strength and reduce frequency of pain  Plan: Continue per plan of care        Precautions: MS, Bilateral carpal tunnel syndrome, Multiple Myeloma      Manual  6/23 6/29 7/01 7/6 7/8 7/13 7/15 7/22 7/23 7/29   LB/LE's 10' 10' 10' 10' 10' 10 10 10 10 10    leg pulls 5' 5' 5' 5' 5' 5 5 5 5 5                                              Exercise Diary  6/23 6/29 7/01 7/6 7/8 7/13 7/15 7/22 7/23 7/29                B heel slides 30x 30x 30x 30x 30x 30x 30x 30x 30x 30x   Hip add-ball 30x 30x 30x 30x 30x 30x 30x 30x 30x 30x   Hip abd-band 30x Blue 30x 30x 30x 30x 30x 30x 30x 30x Blue  30x   Bridge w/ abd 20x 20x 20x 20x 25x 25x 25x 30x 30x Blue  x30   clamshell  20x 20x 30x 30x 30x 30x 30x 30x 30x   SLR     20x 20x 20x 30x 30x 30x 30x   SLR abduction    20x 20x 20x 20x 30x 30x 30x   Core ball press 5" 10x 5''x10 5"x10 5''x20 10"x10 10"x10 10"x10 10x 10x 10"  x10   Core TB stand Red 5" 5x 5''x10 5"x10 5''x10 5"x10 5"x10 5"x10 10x 10x red  5"x10ea                                                       Slant board 30" 4x L3 x4 x4 x4 4x 4x 4x 4x 4x 30"x4   nustep 7' L3 8' 8' 8' 10' 10' 10' 10 10' 10'   Calf press 30# 20x 30# 30x 35/30 40# 30x 40/30 50/30 50/30 50/30 50/30 55   Row deltoid  20# 30x 20/30 25# 30x 25/30 30/20 30/25 30/30 30/30 30                HEP  *5'                                       HEP 6/29/2020: B heelslides on ball, hip add, hip abd, bridge with abd, clamshell, core ball press, core tband press, row with tband

## 2020-07-29 NOTE — TELEPHONE ENCOUNTER
Just got authorization for MRI abdomen  Please let her know   Will also send to referral team  Valid through Jan 2021  [address] # [phone]

## 2020-07-30 ENCOUNTER — OFFICE VISIT (OUTPATIENT)
Dept: PHYSICAL THERAPY | Age: 45
End: 2020-07-30
Payer: COMMERCIAL

## 2020-07-30 ENCOUNTER — HOSPITAL ENCOUNTER (OUTPATIENT)
Dept: MRI IMAGING | Facility: HOSPITAL | Age: 45
Discharge: HOME | End: 2020-07-30
Attending: FAMILY MEDICINE
Payer: COMMERCIAL

## 2020-07-30 DIAGNOSIS — M51.16 LUMBAR DISC HERNIATION WITH RADICULOPATHY: Primary | ICD-10-CM

## 2020-07-30 DIAGNOSIS — M51.36 DDD (DEGENERATIVE DISC DISEASE), LUMBAR: ICD-10-CM

## 2020-07-30 DIAGNOSIS — E27.9 ADRENAL ABNORMALITY (HCC): ICD-10-CM

## 2020-07-30 PROCEDURE — 74181 MRI ABDOMEN W/O CONTRAST: CPT

## 2020-07-30 PROCEDURE — 97140 MANUAL THERAPY 1/> REGIONS: CPT

## 2020-07-30 PROCEDURE — 97110 THERAPEUTIC EXERCISES: CPT

## 2020-07-30 NOTE — PROGRESS NOTES
Daily Note     Today's date: 2020  Patient name: Mitchell Anguiano  : 1975  MRN: [id number]  Referring provider: Jakub Mcintosh MD  Dx:   Encounter Diagnosis     ICD-10-CM    1  Lumbar disc herniation with radiculopathy M51 16    2  DDD (degenerative disc disease), lumbar M51 36          Subjective: Patient noted back pain 4/10 today  Objective: See treatment diary below      Assessment: Tolerated treatment well  VC to decrease TFL compensation with s/l abd  Patient noted slight pain in back no change with performing rows  Patient would benefit from continued PT  Plan: Continue per plan of care        Precautions: MS, Bilateral carpal tunnel syndrome, Multiple Myeloma      Manual  7/30  7/01 7/6 7/8 7/13 7/15 7/22 7/23 7/29   LB/LE's 10  10' 10' 10' 10 10 10 10 10    leg pulls 5  5' 5' 5' 5 5 5 5 5                                              Exercise Diary  7/30  7/01 7/6 7/8 7/13 7/15 7/22 7/23 7/29                B heel slides 30x  30x 30x 30x 30x 30x 30x 30x 30x   Hip add-ball 30x  30x 30x 30x 30x 30x 30x 30x 30x   Hip abd-band 30x  30x 30x 30x 30x 30x 30x 30x Blue  30x   Bridge w/ abd VINAY  30x  20x 20x 25x 25x 25x 30x 30x Blue  x30   clamshell 30x  20x 30x 30x 30x 30x 30x 30x 30x   SLR  30x   20x 20x 20x 30x 30x 30x 30x   SLR abduction 30x   20x 20x 20x 20x 30x 30x 30x   Core ball press 30x  5"x10 5''x20 10"x10 10"x10 10"x10 10x 10x 10"  x10   Core TB stand   5"x10 5''x10 5"x10 5"x10 5"x10 10x 10x red  5"x10ea                                                       Slant board x4  x4 x4 4x 4x 4x 4x 4x 30"x4   nustep x10'  8' 8' 10' 10' 10' 10 10' 10'   Calf press 55/30  35/30 40# 30x 40/30 50/30 50/30 50/30 50/30 55   Row deltoid 30  20/30 25# 30x 25/30 30/20 30/25 30/30 30/30 30                HEP                                         HEP 2020: B heelslides on ball, hip add, hip abd, bridge with abd, clamshell, core ball press, core tband press, row with tband

## 2020-08-04 ENCOUNTER — OFFICE VISIT (OUTPATIENT)
Dept: PAIN MEDICINE | Facility: CLINIC | Age: 45
End: 2020-08-04
Payer: COMMERCIAL

## 2020-08-04 VITALS
RESPIRATION RATE: 16 BRPM | HEART RATE: 88 BPM | BODY MASS INDEX: 23.05 KG/M2 | DIASTOLIC BLOOD PRESSURE: 72 MMHG | HEIGHT: 64 IN | TEMPERATURE: 97.7 F | WEIGHT: 135 LBS | SYSTOLIC BLOOD PRESSURE: 116 MMHG

## 2020-08-04 DIAGNOSIS — M51.36 DDD (DEGENERATIVE DISC DISEASE), LUMBAR: ICD-10-CM

## 2020-08-04 DIAGNOSIS — M51.16 LUMBAR DISC HERNIATION WITH RADICULOPATHY: ICD-10-CM

## 2020-08-04 DIAGNOSIS — G89.4 CHRONIC PAIN SYNDROME: Primary | ICD-10-CM

## 2020-08-04 PROCEDURE — 99214 OFFICE O/P EST MOD 30 MIN: CPT | Performed by: NURSE PRACTITIONER

## 2020-08-04 PROCEDURE — 3008F BODY MASS INDEX DOCD: CPT | Performed by: NURSE PRACTITIONER

## 2020-08-04 PROCEDURE — 1036F TOBACCO NON-USER: CPT | Performed by: NURSE PRACTITIONER

## 2020-08-04 NOTE — PATIENT INSTRUCTIONS
Epidural Steroid Injection   AMBULATORY CARE:   What you need to know about an epidural steroid injection (CR):  An CR is a procedure to inject steroid medicine into the epidural space  The epidural space is between your spinal cord and vertebrae  Steroids reduce inflammation and fluid buildup in your spine that may be causing pain  You may be given pain medicine along with the steroids  How to prepare for an CR:  Your healthcare provider will talk to you about how to prepare for your procedure  He will tell you what medicines to take or not take on the day of your procedure  You may need to stop taking blood thinners or other medicines several days before your procedure  You may need to adjust any diabetes medicine you take on the day of your procedure  Steroid medicine can increase your blood sugar level  What will happen during an CR:   · You will be given medicine to numb the procedure area  You will be awake for the procedure, but you will not feel pain  You may also be given medicine to help you relax during the procedure  Contrast liquid will be used to help your healthcare provider see the area better  Tell the healthcare provider if you have ever had an allergic reaction to contrast liquid  · Your healthcare provider may place the needle into your neck area, middle of your back, or tailbone area  He may inject the medicine next to the nerves that are causing your pain  He may instead inject the medicine into a larger area of the epidural space  This helps the medicine spread to more nerves  Your healthcare provider will use a fluoroscope to help guide the needle to the right place  A fluoroscope is a type of x-ray  After the procedure, a bandage will be placed over the injection site to prevent infection  Risks of an CR:  You may have temporary or permanent nerve damage or paralysis  You may have bleeding or develop a serious infection, such as meningitis (swelling of the brain coverings)  An abscess may also develop  You may need surgery to fix the abscess  You may have a seizure, anxiety, or trouble sleeping  If you are a man, you may have temporary erectile dysfunction (not able to have an erection)  Care for your wound as directed: You may remove the bandage before you go to bed the day of your procedure  You may take a shower, but do not take a bath for at least 24 hours  Self-care:   · Do not drive,  use machines, or do strenuous activity for 24 hours after your procedure or as directed  · Continue other treatments  as directed  Steroid injections alone will not control your pain  The injections are meant to be used with other treatments, such as physical therapy  Seek care immediately if:   · Blood soaks through your bandage  · Your wound is red, swollen, or draining pus  · You have a fever or chills, severe back pain, and the procedure area is sensitive to the touch  · You have a seizure  · You have trouble moving your legs  · You have weakness or numbness in your legs  · You cannot control when you urinate or have a bowel movement  Contact your healthcare provider if:   · You have nausea or are vomiting  · Your face or neck is red for a few days and you feel warm  · You have more pain than you had before the procedure  · You have swelling in your hands or feet  · You have questions or concerns about your condition or care  Follow up with your healthcare provider as directed:  Write down your questions so you remember to ask them during your visits  © 2017 [address] [employer] Information is for End User's use only and may not be sold, redistributed or otherwise used for commercial purposes  All illustrations and images included in CareNotes® are the copyrighted property of [employer] , [employer]  or [employer]  The above information is an  only  It is not intended as medical advice for individual conditions or treatments  Talk to your doctor, nurse or pharmacist before following any medical regimen to see if it is safe and effective for you

## 2020-08-04 NOTE — PROGRESS NOTES
Assessment:  1  Chronic pain syndrome    2  Lumbar disc herniation with radiculopathy    3  DDD (degenerative disc disease), lumbar        Plan:  Mary Beth Plaza is a 50 y o  female who was last seen 5/3/2019 presents for a follow up office visit in regards to chronic pain syndrome secondary to lumbar disc herniation with radiculopathy, lumbar degenerative disc disease  I discussed with the patient repeating the right L4-L5 transforaminal epidural steroid injection is provided significant relief for her pain symptoms in the past   The most common risk of the procedure were reviewed with the patient would like to proceed  An order was placed for right L4-L5 transforaminal epidural steroid injection  Complete risks and benefits including bleeding, infection, tissue reaction, nerve injury and allergic reaction were discussed  The approach was demonstrated using models and literature was provided  Verbal and written consent was obtained  The patient will continue with physical therapy  The patient will follow-up after right L4-L5 transforaminal epidural steroid injection or sooner if symptoms worsen in the interim  The patient was agreeable and verbalized understanding  My impressions and treatment recommendations were discussed in detail with the patient who verbalized understanding and had no further questions  Discharge instructions were provided  I personally saw and examined the patient and I agree with the above discussed plan of care  Orders Placed This Encounter   Procedures    FL spine and pain procedure     Standing Status:   Future     Standing Expiration Date:   8/4/2024     Order Specific Question:   Reason for Exam:     Answer:   right L4-L5 TFESI     Order Specific Question:   Is the patient pregnant? Answer:   No     Order Specific Question:   Anticoagulant hold needed? Answer:   no     No orders of the defined types were placed in this encounter      History of Present Illness:  Annamaria Lee is a 50 y o  female who was last seen 5/3/2019 presents for a follow up office visit in regards to chronic pain syndrome secondary to lumbar disc herniation with radiculopathy, lumbar degenerative disc disease  The patients current symptoms include Back Pain  The patient reports ongoing low back pain is unchanged since her last office visit  Last office visit the patient underwent right L4-L5 transforaminal epidural steroid injection which provided significant relief of her pain symptoms  The patient feels as if the pain symptoms have returned  The patient describes the pain as intermittent, dull aching, sharp, cramping, pressure-like, shooting pain that radiates from her right lower back into the anterior aspect of her right leg  The patient denies muscle weakness or bowel or bladder dysfunction  The patient currently rates her pain as a 3/10 on the numeric rating scale  The patient is currently attending physical therapy which she reports provides mild to moderate relief of her pain symptoms  The patient reports she takes Advil as needed to help with her low back pain symptoms which provides moderate relief  I have personally reviewed and/or updated the patient's past medical history, past surgical history, family history, social history, current medications, allergies, and vital signs today  Review of Systems   Constitutional: Negative for fever and unexpected weight change  HENT: Negative for trouble swallowing  Eyes: Negative for visual disturbance  Respiratory: Negative for shortness of breath and wheezing  Cardiovascular: Negative for chest pain and palpitations  Gastrointestinal: Negative for constipation, diarrhea, nausea and vomiting  Endocrine: Negative for cold intolerance, heat intolerance and polydipsia  Genitourinary: Negative for difficulty urinating and frequency  Musculoskeletal: Positive for back pain   Negative for arthralgias, gait problem, joint swelling and myalgias  Skin: Negative for rash  Neurological: Negative for dizziness, seizures, syncope, weakness and headaches  Hematological: Does not bruise/bleed easily  Psychiatric/Behavioral: Negative for dysphoric mood  All other systems reviewed and are negative        Patient Active Problem List   Diagnosis    Multiple myeloma not having achieved remission ([facility] [address] )    Carpal tunnel syndrome, bilateral    Lumbar disc herniation with radiculopathy    Spasm of lumbar paraspinous muscle    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HCC)    Multiple sclerosis (HCC)    Insomnia    Uterine leiomyoma    Muscle spasm    Other long term (current) drug therapy    DDD (degenerative disc disease), lumbar    Neuropathy       Past Medical History:   Diagnosis Date    Back pain     pinched nerve in back    CTS (carpal tunnel syndrome)     right wrist    Multiple myeloma (HCC)     chemotherapy    Multiple myeloma ([facility] [place] [address] [facility]     Multiple sclerosis ([facility] [place] [address] [facility]     skin lesion     mole removed from face       Past Surgical History:   Procedure Laterality Date     SECTION      LIMBAL STEM CELL TRANSPLANT  2018       Family History   Problem Relation Age of Onset    No Known Problems Mother     Coronary artery disease Father     No Known Problems Sister     No Known Problems Brother     No Known Problems Daughter     No Known Problems Son     No Known Problems Brother        Social History     Occupational History    Not on file   Tobacco Use    Smoking status: Never Smoker    Smokeless tobacco: Never Used   Substance and Sexual Activity    Alcohol use: No    Drug use: No    Sexual activity: Yes     Partners: Male     Birth control/protection: Post-menopausal       Current Outpatient Medications on File Prior to Visit   Medication Sig    aspirin (ECOTRIN LOW STRENGTH) 81 mg EC tablet Take 81 mg by mouth daily    Cyanocobalamin (VITAMIN B 12 PO) Take 1 tablet by mouth daily    HM VITAMIN D3 2000 units CAPS Take by mouth    lenalidomide (REVLIMID) 5 MG CAPS Take 1 capsule (5 mg total) by mouth daily     No current facility-administered medications on file prior to visit  No Known Allergies    Physical Exam:    /72   Pulse 88   Temp 97 7 °F (36 5 °C) (Oral)   Resp 16   Ht 5' 4"   Wt 61 2 kg (135 lb)   BMI 23 17 kg/m²     Constitutional:normal, well developed, well nourished, alert, in no distress and non-toxic and no overt pain behavior    Eyes:anicteric  HEENT:grossly intact  Neck:supple, symmetric, trachea midline and no masses   Pulmonary:even and unlabored  Cardiovascular:No edema or pitting edema present  Skin:Normal without rashes or lesions and well hydrated  Psychiatric:Mood and affect appropriate  Neurologic:Cranial Nerves II-XII grossly intact  Musculoskeletal:normal     Lumbar Spine Exam    Appearance:  Normal lordosis  Palpation/Tenderness:  no tenderness or spasm  Sensory:  no sensory deficits noted  Range of Motion:  Flexion:  Minimally limited  with pain  Extension:  Minimally limited  with pain  Lateral Flexion - Left:  No limitation  without pain  Lateral Flexion - Right:  Minimally limited  with pain  Rotation - Left:  No limitation  without pain  Rotation - Right:  No limitation  without pain  Motor Strength:  Left hip flexion:  5/5  Left hip extension:  5/5  Right hip flexion:  5/5  Right hip extension:  5/5  Left knee flexion:  5/5  Left knee extension:  5/5  Right knee flexion:  5/5  Right knee extension:  5/5  Left foot dorsiflexion:  5/5  Left foot plantar flexion:  5/5  Right foot dorsiflexion:  5/5  Right foot plantar flexion:  5/5  Special Tests:  Left Straight Leg Test:  negative  Right Straight Leg Test:  positive    Imaging  MRI LUMBAR SPINE WITHOUT CONTRAST (1/24/2018)     INDICATION:  Pain in the right lower leg      COMPARISON:  None      TECHNIQUE:  Sagittal T1, sagittal T2, sagittal inversion recovery, axial T1 and axial T2, coronal T2        IMAGE QUALITY:  Diagnostic     FINDINGS:     ALIGNMENT:  Minimal retrolisthesis C3-4 with mild retrolisthesis C4-5 measuring 4 mm      MARROW SIGNAL:  Normal marrow signal is identified within the visualized bony structures   No discrete marrow lesion      DISTAL CORD AND CONUS:  Normal size and signal within the distal cord and conus   The conus ends at the T12-L1 level      PARASPINAL SOFT TISSUES:  Paraspinal soft tissues are unremarkable      SACRUM:  Normal signal within the sacrum   No evidence of insufficiency or stress fracture      LOWER THORACIC DISC SPACES:  Normal disc height and signal   No disc herniation, canal stenosis or foraminal narrowing      LUMBAR DISC SPACES:          L1-L2:  Normal      L2-L3:  Minimal annular bulge without significant central or foraminal narrowing      L3-L4:  Mild annular bulging with mild facet hypertrophy and ligamentous infolding   Small marginal osteophytes are noted   There is minimal left foraminal narrowing      L4-L5:  Mild diffuse annular bulge identified with a superimposed central and slightly right paramedian protrusion type disc herniation   Moderate facet hypertrophy   There is mild central and moderate right lateral recess stenosis   Correlate for right   L5 radiculopathy      L5-S1:  Mild facet hypertrophy   No significant central or foraminal narrowing

## 2020-08-05 ENCOUNTER — OFFICE VISIT (OUTPATIENT)
Dept: PHYSICAL THERAPY | Age: 45
End: 2020-08-05
Payer: COMMERCIAL

## 2020-08-05 ENCOUNTER — OFFICE VISIT (OUTPATIENT)
Dept: FAMILY MEDICINE CLINIC | Facility: CLINIC | Age: 45
End: 2020-08-05
Payer: COMMERCIAL

## 2020-08-05 VITALS
BODY MASS INDEX: 22.47 KG/M2 | SYSTOLIC BLOOD PRESSURE: 112 MMHG | DIASTOLIC BLOOD PRESSURE: 66 MMHG | WEIGHT: 131.6 LBS | TEMPERATURE: 98.8 F | OXYGEN SATURATION: 99 % | RESPIRATION RATE: 16 BRPM | HEIGHT: 64 IN | HEART RATE: 88 BPM

## 2020-08-05 DIAGNOSIS — E27.8 ADRENAL HYPERPLASIA (HCC): Primary | ICD-10-CM

## 2020-08-05 DIAGNOSIS — M51.36 DDD (DEGENERATIVE DISC DISEASE), LUMBAR: ICD-10-CM

## 2020-08-05 DIAGNOSIS — M51.16 LUMBAR DISC HERNIATION WITH RADICULOPATHY: Primary | ICD-10-CM

## 2020-08-05 PROBLEM — M62.838 MUSCLE SPASM: Status: RESOLVED | Noted: 2019-05-24 | Resolved: 2020-08-05

## 2020-08-05 PROCEDURE — 97140 MANUAL THERAPY 1/> REGIONS: CPT

## 2020-08-05 PROCEDURE — 1036F TOBACCO NON-USER: CPT | Performed by: FAMILY MEDICINE

## 2020-08-05 PROCEDURE — 99214 OFFICE O/P EST MOD 30 MIN: CPT | Performed by: FAMILY MEDICINE

## 2020-08-05 PROCEDURE — 97110 THERAPEUTIC EXERCISES: CPT

## 2020-08-05 PROCEDURE — 3008F BODY MASS INDEX DOCD: CPT | Performed by: FAMILY MEDICINE

## 2020-08-05 NOTE — PROGRESS NOTES
Daily Note     Today's date: 2020  Patient name: Claritza Gayle  : 1975  MRN: [id number]  Referring provider: Bao Aguilar MD  Dx:   Encounter Diagnosis     ICD-10-CM    1  Lumbar disc herniation with radiculopathy  M51 16    2  DDD (degenerative disc disease), lumbar  M51 36        Start Time: 0800  Stop Time: 9837  Total time in clinic (min): 55 minutes    Subjective: Patient reports about 2/10 lower back pain today  Objective: See treatment diary below      Assessment: Tolerated treatment well, rom in the le/lb improving and strength  Patient exhibited good technique with therapeutic exercises and would benefit from continued PT      Plan: Continue per plan of care  Precautions: MS, Bilateral carpal tunnel syndrome, Multiple Myeloma      Manual  7/30 8/5  7/6 7/8 7/13 7/15 7/22 7/23 7/29   LB/LE's 10 10 10' 10' 10' 10 10 10 10 10    leg pulls 5 5 5' 5' 5' 5 5 5 5 5                                              Exercise Diary  7/30 8/5  7/6 7/8 7/13 7/15 7/22 7/23 7/29                B heel slides 30x 30x 30x 30x 30x 30x 30x 30x 30x 30x   Hip add-ball 30x 30x 30x 30x 30x 30x 30x 30x 30x 30x   Hip abd-band 30x 30x 30x 30x 30x 30x 30x 30x 30x Blue  30x   Bridge w/ abd VINAY  30x 30x 20x 20x 25x 25x 25x 30x 30x Blue  x30   clamshell 30x 30x 20x 30x 30x 30x 30x 30x 30x 30x   SLR  30x 30x  20x 20x 20x 30x 30x 30x 30x   SLR abduction 30x 30x  20x 20x 20x 20x 30x 30x 30x   Core ball press 30x 30x 5"x10 5''x20 10"x10 10"x10 10"x10 10x 10x 10"  x10   Core TB stand  5"x10 5"x10 5''x10 5"x10 5"x10 5"x10 10x 10x red  5"x10ea                                                       Slant board x4 x4 x4 x4 4x 4x 4x 4x 4x 30"x4   nustep x10' 10' 8' 8' 10' 10' 10' 10 10' 10'   Calf press 55/30 55/30 35/30 40# 30x 40/30 50/30 50/30 50/30 50/30 55   Row deltoid 30 30/30 20/30 25# 30x 25/30 30/20 30/25 30/30 30/30 30                HEP                                         HEP 2020:  B heelslides on ball, hip add, hip abd, bridge with abd, clamshell, core ball press, core tband press, row with tband

## 2020-08-05 NOTE — PROGRESS NOTES
Assessment/Plan:       Problem List Items Addressed This Visit        Endocrine    Adrenal hyperplasia ([facility] ) - Primary    Relevant Orders    SALIVARY CORTISOL, TWO SPECIMENS          Discussed adrenal gland slight hyperplasia seen on MRI  Her pain complaints unlikely to be related  Will check cortisol to r/o [place] to be complete  Subjective:      Patient ID: Shereen Del Angel is a 50 y o  female  50year old had MRI done to follow up on adrenal enlargement seen on Thoracic spine MRI  The abdominal MRI showed     IMPRESSION:     1  No adrenal mass  Slight left adrenal gland thickening secondary to adenomatous hyperplasia      2  Fibroid  She says she has L sided flank pain - comes and goes  Is seeing gastro about this and also doing PT for her back  She worried the pain may be related to the adrenal issue  Recent blood work reviewed  Glucose 99  The following portions of the patient's history were reviewed and updated as appropriate:   She  has a past medical history of Back pain, CTS (carpal tunnel syndrome), Multiple myeloma ([address] ), Multiple myeloma ([address] ), Multiple sclerosis ([facility] [address] [facility] ), and skin lesion  She   Patient Active Problem List    Diagnosis Date Noted    Adrenal hyperplasia ([facility] ) 2020    Neuropathy     DDD (degenerative disc disease), lumbar 2020    Other long term (current) drug therapy 2019    Uterine leiomyoma 2019    Insomnia 2019    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HCC)     Lumbar disc herniation with radiculopathy 2018    Spasm of lumbar paraspinous muscle 2018    Carpal tunnel syndrome, bilateral     Multiple myeloma not having achieved remission ([address] ) 2018    Multiple sclerosis ([facility] ) 2016     She  has a past surgical history that includes  section and Limbal stem cell transplant (2018)    Her family history includes Coronary artery disease in her father; No Known Problems in her brother, brother, daughter, mother, sister, and son  She  reports that she has never smoked  She has never used smokeless tobacco  She reports that she does not drink alcohol or use drugs  Current Outpatient Medications   Medication Sig Dispense Refill    aspirin (ECOTRIN LOW STRENGTH) 81 mg EC tablet Take 81 mg by mouth daily      Cyanocobalamin (VITAMIN B 12 PO) Take 1 tablet by mouth daily      HM VITAMIN D3 2000 units CAPS Take by mouth      lenalidomide (REVLIMID) 5 MG CAPS Take 1 capsule (5 mg total) by mouth daily 28 capsule 0     No current facility-administered medications for this visit  Current Outpatient Medications on File Prior to Visit   Medication Sig    aspirin (ECOTRIN LOW STRENGTH) 81 mg EC tablet Take 81 mg by mouth daily    Cyanocobalamin (VITAMIN B 12 PO) Take 1 tablet by mouth daily    HM VITAMIN D3 2000 units CAPS Take by mouth    lenalidomide (REVLIMID) 5 MG CAPS Take 1 capsule (5 mg total) by mouth daily     No current facility-administered medications on file prior to visit  She has No Known Allergies       Review of Systems   Constitutional: Negative for chills, fever and unexpected weight change  HENT: Negative  Respiratory: Negative  Endocrine: Negative  Musculoskeletal: Positive for back pain  L flank pain   Neurological: Negative  Objective:      /66 (BP Location: Left arm, Patient Position: Sitting, Cuff Size: Standard)   Pulse 88   Temp 98 8 °F (37 1 °C)   Resp 16   Ht 5' 4"   Wt 59 7 kg (131 lb 9 6 oz)   SpO2 99%   BMI 22 59 kg/m²          Physical Exam   Constitutional:  Non-toxic appearance  She does not appear ill  No distress  HENT:   Head: Normocephalic and atraumatic  Right Ear: External ear normal    Left Ear: External ear normal    Cardiovascular: Normal rate and regular rhythm  Exam reveals no friction rub  No murmur heard  Pulmonary/Chest: Effort normal and breath sounds normal  No stridor  No respiratory distress   She has no wheezes  She has no rhonchi  She has no rales  Abdominal: Soft  Normal appearance  She exhibits no distension and no mass  There is no abdominal tenderness  There is no rebound and no guarding  Musculoskeletal:         General: No swelling  Neurological: She is alert  Skin: She is not diaphoretic  Psychiatric: Her speech is normal and behavior is normal  Mood, judgment and thought content normal    Nursing note and vitals reviewed

## 2020-08-07 ENCOUNTER — OFFICE VISIT (OUTPATIENT)
Dept: PHYSICAL THERAPY | Age: 45
End: 2020-08-07
Payer: COMMERCIAL

## 2020-08-07 DIAGNOSIS — M51.16 LUMBAR DISC HERNIATION WITH RADICULOPATHY: Primary | ICD-10-CM

## 2020-08-07 DIAGNOSIS — M51.36 DDD (DEGENERATIVE DISC DISEASE), LUMBAR: ICD-10-CM

## 2020-08-07 PROCEDURE — 97110 THERAPEUTIC EXERCISES: CPT | Performed by: PHYSICAL THERAPIST

## 2020-08-07 PROCEDURE — 97140 MANUAL THERAPY 1/> REGIONS: CPT | Performed by: PHYSICAL THERAPIST
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
## 2020-08-10 ENCOUNTER — OFFICE VISIT (OUTPATIENT)
Dept: PHYSICAL THERAPY | Age: 45
End: 2020-08-10
Payer: COMMERCIAL

## 2020-08-10 DIAGNOSIS — M51.36 DDD (DEGENERATIVE DISC DISEASE), LUMBAR: ICD-10-CM

## 2020-08-10 DIAGNOSIS — M51.16 LUMBAR DISC HERNIATION WITH RADICULOPATHY: Primary | ICD-10-CM

## 2020-08-10 PROCEDURE — 97110 THERAPEUTIC EXERCISES: CPT | Performed by: PHYSICAL THERAPIST

## 2020-08-10 PROCEDURE — 97140 MANUAL THERAPY 1/> REGIONS: CPT | Performed by: PHYSICAL THERAPIST

## 2020-08-10 NOTE — PROGRESS NOTES
Daily Note     Today's date: 8/10/2020  Patient name: Yomaira Carroll  : 1975  MRN: [id number]  Referring provider: Juliano Wesley MD  Dx:   Encounter Diagnosis     ICD-10-CM    1  Lumbar disc herniation with radiculopathy  M51 16    2  DDD (degenerative disc disease), lumbar  M51 36        Start Time: 0700  Stop Time: 0800  Total time in clinic (min): 60 minutes    Subjective: Patient reports she is doing good  No soreness from last session  Objective: See treatment diary below      Assessment: Tolerated treatment well  Patient would benefit from continued PT      Plan: Continue per plan of care        Precautions: MS, Bilateral carpal tunnel syndrome, Multiple Myeloma    Manual  7/30 8/5 8/7 8/10   7/15 7/22 7/23 7/29   LB/LE's 10 10 10' 10' 10' 10 10 10 10 10    leg pulls 5 5 5' 5' 5' 5 5 5 5 5                                              Exercise Diary   8/10   7/15 7/22 7/23 7/29                B heel slides 30x 30x 30x 30x   30x 30x 30x 30x   Hip add 30x 30x 30# 30x 30# 30x   30x 30x 30x 30x   Hip abd 30x 30x 30# 30x 30# 30x   30x 30x 30x Blue  30x   Bridge with ball squeeze   30x 30x         Bridge w/ abd VINAY  30x 30x 30x 30x   25x 30x 30x Blue  x30   clamshell 30x 30x 2# 30x 2#30x   30x 30x 30x 30x   SLR  30x 30x 30x 30x   30x 30x 30x 30x   SLR abduction 30x 30x 1# 30x 1# 30x   20x 30x 30x 30x   Core ball press 30x 30x 5"x20 5''x20   10"x10 10x 10x 10"  x10   Core TB stand  5"x10 5"x10 5''x10   5"x10 10x 10x red  5"x10ea   Leg press   nv                                                 Slant board x4 x4 x4 x4   4x 4x 4x 30"x4   nustep x10' 10' 10 10'   10' 10 10' 10'   Calf press 55/30 55/30 55/30 55# 30x   50/30 50/30 50/30 55   Row deltoid 30  30# 30x   30/25 30/30 30/30 30                HEP                                         HEP 2020: B heelslides on ball, hip add, hip abd, bridge with abd, clamshell, core ball press, core tband press, row with tband

## 2020-08-12 ENCOUNTER — TRANSCRIBE ORDERS (OUTPATIENT)
Dept: ADMINISTRATIVE | Facility: HOSPITAL | Age: 45
End: 2020-08-12

## 2020-08-12 ENCOUNTER — APPOINTMENT (OUTPATIENT)
Dept: LAB | Facility: CLINIC | Age: 45
End: 2020-08-12
Payer: COMMERCIAL

## 2020-08-12 DIAGNOSIS — C90.01 MULTIPLE MYELOMA IN REMISSION (HCC): ICD-10-CM

## 2020-08-12 DIAGNOSIS — C90.01 MULTIPLE MYELOMA IN REMISSION (HCC): Primary | ICD-10-CM

## 2020-08-12 LAB — B-HCG SERPL-ACNC: 3 MIU/ML

## 2020-08-12 PROCEDURE — 84702 CHORIONIC GONADOTROPIN TEST: CPT

## 2020-08-12 PROCEDURE — 36415 COLL VENOUS BLD VENIPUNCTURE: CPT

## 2020-08-13 ENCOUNTER — TELEPHONE (OUTPATIENT)
Dept: HEMATOLOGY ONCOLOGY | Facility: MEDICAL CENTER | Age: 45
End: 2020-08-13

## 2020-08-13 ENCOUNTER — OFFICE VISIT (OUTPATIENT)
Dept: PHYSICAL THERAPY | Age: 45
End: 2020-08-13
Payer: COMMERCIAL

## 2020-08-13 DIAGNOSIS — M51.36 DDD (DEGENERATIVE DISC DISEASE), LUMBAR: ICD-10-CM

## 2020-08-13 DIAGNOSIS — C90.01 MULTIPLE MYELOMA IN REMISSION (HCC): ICD-10-CM

## 2020-08-13 DIAGNOSIS — M51.16 LUMBAR DISC HERNIATION WITH RADICULOPATHY: Primary | ICD-10-CM

## 2020-08-13 PROCEDURE — 97140 MANUAL THERAPY 1/> REGIONS: CPT

## 2020-08-13 PROCEDURE — 97110 THERAPEUTIC EXERCISES: CPT

## 2020-08-13 RX ORDER — LENALIDOMIDE 5 MG/1
5 CAPSULE ORAL DAILY
Qty: 28 CAPSULE | Refills: 0 | Status: SHIPPED | OUTPATIENT
Start: 2020-08-13 | End: 2020-09-04

## 2020-08-13 NOTE — PROGRESS NOTES
Daily Note     Today's date: 2020  Patient name: William Kirkland  : 1975  MRN: [id number]  Referring provider: Mahi Holland MD  Dx:   Encounter Diagnosis     ICD-10-CM    1  Lumbar disc herniation with radiculopathy  M51 16    2  DDD (degenerative disc disease), lumbar  M51 36        Start Time: 1000  Stop Time: 1100  Total time in clinic (min): 60 minutes    Subjective: Patient reports feeling better, less pain and tenderness to her lower back  Objective: See treatment diary below      Assessment: Tolerated treatment well, 2# weights with SLR today without pain, patient dong well with program , needs vc's for ex technique and posture  Patient exhibited good technique with therapeutic exercises and would benefit from continued PT      Plan: Continue per plan of care        Precautions: MS, Bilateral carpal tunnel syndrome, Multiple Myeloma    Manual  7/30 8/5 8/7 8/10 8/13  7/15 7/22 7/23 7/29   LB/LE's 10 10 10' 10' 10' 10 10 10 10 10    leg pulls 5 5 5' 5' 5' 5 5 5 5 5                                              Exercise Diary  7/30 8/5 8/7 8/10 8/13  7/15 7/22 7/23 7/29                B heel slides 30x 30x 30x 30x 30x  30x 30x 30x 30x   Hip add 30x 30x 30# 30x 30# 30x 35/30  30x 30x 30x 30x   Hip abd 30x 30x 30# 30x 30# 30x 35/30  30x 30x 30x Blue  30x   Bridge with ball squeeze   30x 30x 30x        Bridge w/ abd VINAY  30x 30x 30x 30x 30x  25x 30x 30x Blue  x30   clamshell 30x 30x 2# 30x 2#30x 2#/30  30x 30x 30x 30x   SLR  30x 30x 30x 30x 2#/30  30x 30x 30x 30x   SLR abduction 30x 30x 1# 30x 1# 30x 2#/30  20x 30x 30x 30x   Core ball press 30x 30x 5"x20 5''x20 5"x20  10"x10 10x 10x 10"  x10   Core TB stand  5"x10 5"x10 5''x10 5"x10  5"x10 10x 10x red  5"x10ea   Leg press   nv  80/30                                               Slant board x4 x4 x4 x4 4x  4x 4x 4x 30"x4   nustep x10' 10' 10 10' 10'  10' 10 10' 10'   Calf press 55/30 55/30 55/30 55# 30x 60/30  50/30 50/30 50/30 55 Row deltoid 30 30/30 30/30 30# 30x 30/30  30/25 30/30 30/30 30                HEP                                         HEP 6/29/2020: B heelslides on ball, hip add, hip abd, bridge with abd, clamshell, core ball press, core tband press, row with tband

## 2020-08-13 NOTE — TELEPHONE ENCOUNTER
Medication Refill     Who is Calling  patient    Medication  lenalidomide (REVLIMID) 5 MG CAPS        How many pills left     Preferred Pharmacy  [facility]    Call back number  [phone]   Relevant Information

## 2020-08-14 ENCOUNTER — TRANSCRIBE ORDERS (OUTPATIENT)
Dept: LAB | Facility: CLINIC | Age: 45
End: 2020-08-14

## 2020-08-14 ENCOUNTER — APPOINTMENT (OUTPATIENT)
Dept: LAB | Facility: CLINIC | Age: 45
End: 2020-08-14
Payer: COMMERCIAL

## 2020-08-14 DIAGNOSIS — E27.8 ADRENAL HYPERPLASIA (HCC): ICD-10-CM

## 2020-08-14 PROCEDURE — 82533 TOTAL CORTISOL: CPT

## 2020-08-17 ENCOUNTER — OFFICE VISIT (OUTPATIENT)
Dept: PHYSICAL THERAPY | Age: 45
End: 2020-08-17
Payer: COMMERCIAL

## 2020-08-17 DIAGNOSIS — M51.36 DDD (DEGENERATIVE DISC DISEASE), LUMBAR: ICD-10-CM

## 2020-08-17 DIAGNOSIS — M51.16 LUMBAR DISC HERNIATION WITH RADICULOPATHY: Primary | ICD-10-CM

## 2020-08-17 PROCEDURE — 97110 THERAPEUTIC EXERCISES: CPT

## 2020-08-17 NOTE — PROGRESS NOTES
Daily Note     Today's date: 2020  Patient name: Shereen Del Angel  : 1975  MRN: [id number]  Referring provider: Zeyad Kan, Sujit Almazan MD  Dx:   Encounter Diagnosis     ICD-10-CM    1  Lumbar disc herniation with radiculopathy  M51 16    2  DDD (degenerative disc disease), lumbar  M51 36        Start Time: 1800  Stop Time: 1900  Total time in clinic (min): 60 minutes    Subjective: Reports no pain today, doing well       Objective: See treatment diary below      Assessment: Tolerated treatment well  No c/o pain post session  Some muscle fatigue noted with SLR flex/abd and clamshells  Good recall of exercises, cues for carryover  Patient exhibited good technique with therapeutic exercises and would benefit from continued PT      Plan: Continue per plan of care        Precautions: MS, Bilateral carpal tunnel syndrome, Multiple Myeloma    Manual  7/30 8/5 8/7 8/10 8/13 8/17  7/22 7/23 7/29   LB/LE's 10 10 10' 10' 10' 10  10 10 10    leg pulls 5 5 5' 5' 5'   5 5 5                                              Exercise Diary  7/30 8/5 8/7 8/10 8/13 8/17  7/22 7/23 7/29                B heel slides 30x 30x 30x 30x 30x 30x  30x 30x 30x   Hip add 30x 30x 30# 30x 30# 30x 35/30 35/30  30x 30x 30x   Hip abd 30x 30x 30# 30x 30# 30x 35/30 35/30  30x 30x Blue  30x   Bridge with ball squeeze   30x 30x 30x 30x       Bridge w/ abd VINAY  30x 30x 30x 30x 30x 30x  30x 30x Blue  x30   clamshell 30x 30x 2# 30x 2#30x 2#/30 2# 30x  30x 30x 30x   SLR  30x 30x 30x 30x 2#/30 2# 30x  30x 30x 30x   SLR abduction 30x 30x 1# 30x 1# 30x 2#/30 2# 30x  30x 30x 30x   Core ball press 30x 30x 5"x20 5''x20 5"x20 5''x20  10x 10x 10"  x10   Core TB stand  5"x10 5"x10 5''x10 5"x10 5''x10  10x 10x red  5"x10ea   Leg press   nv  80/30 80/30                                              Slant board x4 x4 x4 x4 4x 4x  4x 4x 30"x4   nustep x10' 10' 10 10' 10' 10'  10 10' 10'   Calf press 55/30 55/30 55/30 55# 30x 60/30 60/30  50/30 50/30 55   Row deltoid 30 30/30 30/30 30# 30x 30/30 35/30 30/30 30/30 30                HEP                                         HEP 6/29/2020: B heelslides on ball, hip add, hip abd, bridge with abd, clamshell, core ball press, core tband press, row with tband

## 2020-08-19 ENCOUNTER — OFFICE VISIT (OUTPATIENT)
Dept: PHYSICAL THERAPY | Age: 45
End: 2020-08-19
Payer: COMMERCIAL

## 2020-08-19 DIAGNOSIS — M51.16 LUMBAR DISC HERNIATION WITH RADICULOPATHY: Primary | ICD-10-CM

## 2020-08-19 DIAGNOSIS — M51.36 DDD (DEGENERATIVE DISC DISEASE), LUMBAR: ICD-10-CM

## 2020-08-19 PROCEDURE — 97110 THERAPEUTIC EXERCISES: CPT

## 2020-08-19 NOTE — PROGRESS NOTES
Daily Note     Today's date: 2020  Patient name: Amanda Dias  : 1975  MRN: [id number]  Referring provider: Roxana Spear MD  Dx:   Encounter Diagnosis     ICD-10-CM    1  Lumbar disc herniation with radiculopathy  M51 16    2  DDD (degenerative disc disease), lumbar  M51 36        Start Time: 1100  Stop Time: 1200  Total time in clinic (min): 60 minutes    Subjective: No c/o pain today just fatigue  Objective: See treatment diary     Assessment: Tolerated treatment well, ROM and strength getting better  patient doing well with program   Patient exhibited good technique with therapeutic exercises and would benefit from continued PT      Plan: Continue per plan of care  prepare to d/c in 4 visits        Precautions: MS, Bilateral carpal tunnel syndrome, Multiple Myeloma    Manual  7/30 8/5 8/7 8/10 8/13 8/17 8/19  7/23 7/29   LB/LE's 10 10 10' 10' 10' 10 10 10 10 10    leg pulls 5 5 5' 5' 5'   5 5 5                                              Exercise Diary  7/30 8/5 8/7 8/10 8/13 8/17 8/19  7/23 7/29                B heel slides 30x 30x 30x 30x 30x 30x 30x 30x 30x 30x   Hip add 30x 30x 30# 30x 30# 30x 35/30 35/30 35/30x 30x 30x 30x   Hip abd 30x 30x 30# 30x 30# 30x 35/30 35/30 35/30 30x 30x Blue  30x   Bridge with ball squeeze   30x 30x 30x 30x 30x      Bridge w/ abd VINAY  30x 30x 30x 30x 30x 30x 30x 30x 30x Blue  x30   clamshell 30x 30x 2# 30x 2#30x 2#/30 2# 30x 2/30 30x 30x 30x   SLR  30x 30x 30x 30x 2#/30 2# 30x 2/30 30x 30x 30x   SLR abduction 30x 30x 1# 30x 1# 30x 2#/30 2# 30x 2/30 30x 30x 30x   Core ball press 30x 30x 5"x20 5''x20 5"x20 5''x20 5"x20 10x 10x 10"  x10   Core TB stand  5"x10 5"x10 5''x10 5"x10 5''x10 5"x10 10x 10x red  5"x10ea   Leg press   nv  80/30 80/30 85/30                                             Slant board x4 x4 x4 x4 4x 4x 4x 4x 4x 30"x4   nustep x10' 10' 10 10' 10' 10' 10' 10 10' 10'   Calf press 55/30 55/30 55/30 55# 30x 60/30 60/30 60/30 50/30 50/30 55   Row deltoid 30 30/30 30/30 30# 30x 30/30 35/30 35/30 30/30 30/30 30                HEP                                         HEP 6/29/2020: B heelslides on ball, hip add, hip abd, bridge with abd, clamshell, core ball press, core tband press, row with tband

## 2020-08-21 ENCOUNTER — APPOINTMENT (OUTPATIENT)
Dept: PHYSICAL THERAPY | Age: 45
End: 2020-08-21
Payer: COMMERCIAL

## 2020-08-24 ENCOUNTER — OFFICE VISIT (OUTPATIENT)
Dept: PHYSICAL THERAPY | Age: 45
End: 2020-08-24
Payer: COMMERCIAL

## 2020-08-24 DIAGNOSIS — M51.16 LUMBAR DISC HERNIATION WITH RADICULOPATHY: Primary | ICD-10-CM

## 2020-08-24 DIAGNOSIS — M51.36 DDD (DEGENERATIVE DISC DISEASE), LUMBAR: ICD-10-CM

## 2020-08-24 PROCEDURE — 97110 THERAPEUTIC EXERCISES: CPT

## 2020-08-24 NOTE — PROGRESS NOTES
Daily Note     Today's date: 2020  Patient name: William Kirkland  : 1975  MRN: [id number]  Referring provider: Mahi Holland MD  Dx:   Encounter Diagnosis     ICD-10-CM    1  Lumbar disc herniation with radiculopathy  M51 16    2  DDD (degenerative disc disease), lumbar  M51 36        Start Time: 6656  Stop Time: 1843  Total time in clinic (min): 33 minutes    Subjective: Patient reports minimal pain today, requests to leave early today  Objective: See treatment diary below  Patient arrived x10' late secondary to traffic and weather    Assessment: Tolerated treatment well  Modified program today secondary to patient time constraint  No c/o pain t/o session  Patient will perform mat exercises at home today  Patient would benefit from continued PT      Plan: Continue per plan of care        Precautions: MS, Bilateral carpal tunnel syndrome, Multiple Myeloma    Manual  7/30 8/5 8/7 8/10 8/13 8/17 8/19 8/24  7/29   LB/LE's 10 10 10' 10' 10' 10 10 10  10    leg pulls 5 5 5' 5' 5'     5                                              Exercise Diary  7/30 8/5 8/7 8/10 8/13 8/17 8/19 8/24  7/29                B heel slides 30x 30x 30x 30x 30x 30x 30x nt  30x   Hip add 30x 30x 30# 30x 30# 30x 35/30 35/30 35/30x 35/30  30x   Hip abd 30x 30x 30# 30x 30# 30x 35/30 35/30 35/30 35/30  Blue  30x   Bridge with ball squeeze   30x 30x 30x 30x 30x nt     Bridge w/ abd VINAY  30x 30x 30x 30x 30x 30x 30x nt  Blue  x30   clamshell 30x 30x 2# 30x 2#30x 2#/30 2# 30x 2/30 nt  30x   SLR  30x 30x 30x 30x 2#/30 2# 30x 2/30 nt  30x   SLR abduction 30x 30x 1# 30x 1# 30x 2#/30 2# 30x 2/30 nt  30x   Core ball press 30x 30x 5"x20 5''x20 5"x20 5''x20 5"x20 5''x20  10"  x10   Core TB stand  5"x10 5"x10 5''x10 5"x10 5''x10 5"x10 Green 5''x10  red  5"x10ea   Leg press   nv  80/30 80/30 85/30 85/30                                            Slant board x4 x4 x4 x4 4x 4x 4x 4x  30"x4   nustep x10' 10' 10 10' 10' 10' 10' 10'  10' Calf press 55/30 55/30 55/30 55# 30x 60/30 60/30 60/30 60/30  55   Row deltoid 30 30/30 30/30 30# 30x 30/30 35/30 35/30 35/30  30                HEP                                         HEP 6/29/2020: B heelslides on ball, hip add, hip abd, bridge with abd, clamshell, core ball press, core tband press, row with tband

## 2020-08-25 LAB
CORTIS BS SAL-MCNC: 0.34 UG/DL
CORTIS SP1 P CHAL SAL-MCNC: 0.03 UG/DL

## 2020-08-26 ENCOUNTER — OFFICE VISIT (OUTPATIENT)
Dept: PHYSICAL THERAPY | Age: 45
End: 2020-08-26
Payer: COMMERCIAL

## 2020-08-26 DIAGNOSIS — M51.36 DDD (DEGENERATIVE DISC DISEASE), LUMBAR: ICD-10-CM

## 2020-08-26 DIAGNOSIS — M51.16 LUMBAR DISC HERNIATION WITH RADICULOPATHY: Primary | ICD-10-CM

## 2020-08-26 PROCEDURE — 97110 THERAPEUTIC EXERCISES: CPT

## 2020-08-26 PROCEDURE — 97140 MANUAL THERAPY 1/> REGIONS: CPT

## 2020-08-26 NOTE — PROGRESS NOTES
Daily Note     Today's date: 2020  Patient name: Ivanna Torres  : 1975  MRN: [id number]  Referring provider: Debbie Salazar, Kwesi Briggs MD  Dx:   Encounter Diagnosis     ICD-10-CM    1  Lumbar disc herniation with radiculopathy  M51 16    2  DDD (degenerative disc disease), lumbar  M51 36        Start Time: 0845  Stop Time: 0950  Total time in clinic (min): 65 minutes    Subjective: no c/o pain today, notes getting better  Objective: See treatment diary below      Assessment: Tolerated treatment well, increased weights with some of gym equipment without pain   Patient exhibited good technique with therapeutic exercises and would benefit from continued PT      Plan: Continue per plan of care        Precautions: MS, Bilateral carpal tunnel syndrome, Multiple Myeloma    Manual  7/30 8/5 8/7 8/10 8/13 8/17 8/19 8/24 8/26    LB/LE's 10 10 10' 10' 10' 10 10 10 10 10    leg pulls 5 5 5' 5' 5'     5                                              Exercise Diary  7/30 8/5 8/7 8/10 8/13 8/17 8/19 8/24 8/26                 B heel slides 30x 30x 30x 30x 30x 30x 30x nt 30x 30x   Hip add 30x 30x 30# 30x 30# 30x 35/30 35/30 35/30x 35/30 35/30 30x   Hip abd 30x 30x 30# 30x 30# 30x 35/30 35/30 35/30 35/30 35/30 Blue  30x   Bridge with ball squeeze   30x 30x 30x 30x 30x nt 30x    Bridge w/ abd VINAY  30x 30x 30x 30x 30x 30x 30x nt 30x Blue  x30   clamshell 30x 30x 2# 30x 2#30x 2#/30 2# 30x 2/30 nt 2/30 30x   SLR  30x 30x 30x 30x 2#/30 2# 30x 2/30 nt 2/30 30x   SLR abduction 30x 30x 1# 30x 1# 30x 2#/30 2# 30x 2/30 nt 2/30 30x   Core ball press 30x 30x 5"x20 5''x20 5"x20 5''x20 5"x20 5''x20 5"x20 10"  x10   Core TB stand  5"x10 5"x10 5''x10 5"x10 5''x10 5"x10 Green 5''x10 5"x10 red  5"x10ea   Leg press   nv  80/30 80/30 85/30 85/30 85/30                                           Slant board x4 x4 x4 x4 4x 4x 4x 4x 4x 30"x4   nustep x10' 10' 10 10' 10' 10' 10' 10' 10' 10'   Calf press 55/30 55/30 55/30 55# 30x 60/30 60/30 60/30 60/30 60/30 55   Row deltoid 30 30/30 30/30 30# 30x 30/30 35/30 35/30 35/30 40/30 30                HEP                                         HEP 6/29/2020: B heelslides on ball, hip add, hip abd, bridge with abd, clamshell, core ball press, core tband press, row with tband

## 2020-08-28 ENCOUNTER — HOSPITAL ENCOUNTER (OUTPATIENT)
Dept: RADIOLOGY | Facility: CLINIC | Age: 45
Discharge: HOME | End: 2020-08-28
Attending: ANESTHESIOLOGY | Admitting: ANESTHESIOLOGY
Payer: COMMERCIAL

## 2020-08-28 VITALS
DIASTOLIC BLOOD PRESSURE: 63 MMHG | TEMPERATURE: 99.2 F | SYSTOLIC BLOOD PRESSURE: 109 MMHG | RESPIRATION RATE: 18 BRPM | OXYGEN SATURATION: 94 % | HEART RATE: 81 BPM

## 2020-08-28 DIAGNOSIS — M51.16 LUMBAR DISC HERNIATION WITH RADICULOPATHY: ICD-10-CM

## 2020-08-28 PROCEDURE — 64484 NJX AA&/STRD TFRM EPI L/S EA: CPT | Performed by: ANESTHESIOLOGY

## 2020-08-28 PROCEDURE — 64483 NJX AA&/STRD TFRM EPI L/S 1: CPT | Performed by: ANESTHESIOLOGY

## 2020-08-28 RX ORDER — METHYLPREDNISOLONE ACETATE 80 MG/ML
80 INJECTION, SUSPENSION INTRA-ARTICULAR; INTRALESIONAL; INTRAMUSCULAR; PARENTERAL; SOFT TISSUE ONCE
Status: COMPLETED | OUTPATIENT
Start: 2020-08-28 | End: 2020-08-28

## 2020-08-28 RX ORDER — BUPIVACAINE HCL/PF 2.5 MG/ML
10 VIAL (ML) INJECTION ONCE
Status: COMPLETED | OUTPATIENT
Start: 2020-08-28 | End: 2020-08-28

## 2020-08-28 RX ORDER — 0.9 % SODIUM CHLORIDE 0.9 %
10 VIAL (ML) INJECTION ONCE
Status: COMPLETED | OUTPATIENT
Start: 2020-08-28 | End: 2020-08-28

## 2020-08-28 RX ADMIN — IOHEXOL 1 ML: 300 INJECTION, SOLUTION INTRAVENOUS at 11:38

## 2020-08-28 RX ADMIN — BUPIVACAINE HYDROCHLORIDE 2 ML: 2.5 INJECTION, SOLUTION EPIDURAL; INFILTRATION; INTRACAUDAL at 11:38

## 2020-08-28 RX ADMIN — METHYLPREDNISOLONE ACETATE 80 MG: 80 INJECTION, SUSPENSION INTRA-ARTICULAR; INTRALESIONAL; INTRAMUSCULAR; PARENTERAL; SOFT TISSUE at 11:38

## 2020-08-28 RX ADMIN — SODIUM CHLORIDE 4 ML: 9 INJECTION, SOLUTION INTRAMUSCULAR; INTRAVENOUS; SUBCUTANEOUS at 11:36

## 2020-08-28 RX ADMIN — Medication 4 ML: at 11:36

## 2020-08-28 NOTE — DISCHARGE INSTR - LAB
Epidural Steroid Injection   WHAT YOU NEED TO KNOW:   An epidural steroid injection (CR) is a procedure to inject steroid medicine into the epidural space  The epidural space is between your spinal cord and vertebrae  Steroids reduce inflammation and fluid buildup in your spine that may be causing pain  You may be given pain medicine along with the steroids  ACTIVITY  · Do not drive or operate machinery today  · No strenuous activity today - bending, lifting, etc   · You may resume normal activites starting tomorrow - start slowly and as tolerated  · You may shower today, but no tub baths or hot tubs  · You may have numbness for several hours from the local anesthetic  Please use caution and common sense, especially with weight-bearing activities  CARE OF THE INJECTION SITE  · If you have soreness or pain, apply ice to the area today (20 minutes on/20 minutes off)  · Starting tomorrow, you may use warm, moist heat or ice if needed  · You may have an increase or change in your discomfort for 36-48 hours after your treatment  · Apply ice and continue with any pain medication you have been prescribed  · Notify [facility] if you have any of the following: redness, drainage, swelling, headache, stiff neck or fever above 100°F     SPECIAL INSTRUCTIONS  · Our office will contact you in approximately 7 days for a progress report  MEDICATIONS  · Continue to take all routine medications  · Our office may have instructed you to hold some medications  If you have a problem specifically related to your procedure, please call our office at [phone]  Problems not related to your procedure should be directed to your primary care physician

## 2020-08-31 ENCOUNTER — APPOINTMENT (OUTPATIENT)
Dept: PHYSICAL THERAPY | Age: 45
End: 2020-08-31
Payer: COMMERCIAL

## 2020-09-02 ENCOUNTER — OFFICE VISIT (OUTPATIENT)
Dept: PHYSICAL THERAPY | Age: 45
End: 2020-09-02
Payer: COMMERCIAL

## 2020-09-02 DIAGNOSIS — M51.16 LUMBAR DISC HERNIATION WITH RADICULOPATHY: Primary | ICD-10-CM

## 2020-09-02 DIAGNOSIS — M51.36 DDD (DEGENERATIVE DISC DISEASE), LUMBAR: ICD-10-CM

## 2020-09-02 PROCEDURE — 97140 MANUAL THERAPY 1/> REGIONS: CPT | Performed by: PHYSICAL THERAPIST

## 2020-09-02 PROCEDURE — 97110 THERAPEUTIC EXERCISES: CPT | Performed by: PHYSICAL THERAPIST

## 2020-09-02 NOTE — PROGRESS NOTES
Daily Note/Discharge    Today's date: 2020  Patient name: Claritza Gayle  : 1975  MRN: [id number]  Referring provider: Bao Aguilar MD  Dx:   Encounter Diagnosis     ICD-10-CM    1  Lumbar disc herniation with radiculopathy  M51 16    2  DDD (degenerative disc disease), lumbar  M51 36        Start Time: 1700  Stop Time: 1800  Total time in clinic (min): 60 minutes    Subjective: Patient reports she had her injection last Friday and doing well  Pain is 1/10  Objective: See treatment diary below      Assessment: Tolerated treatment well  Patient made good gains in PT  All PT goals met  Patient in knowledgeable with HEP and compliant  Plan: Discharge PT to HEP        Precautions: MS, Bilateral carpal tunnel syndrome, Multiple Myeloma    Manual  7/30 8/5 8/7 8/10 8/13 8/17 8/19 8/24 8/26 9/2   LB/LE's 10 10 10' 10' 10' 10 10 10 10 10    leg pulls 5 5 5' 5' 5'                                                   Exercise Diary  7/30 8/5 8/7 8/10 8/13 8/17 8/19 8/24 8/26 9/2                B heel slides 30x 30x 30x 30x 30x 30x 30x nt 30x 30x   Hip add 30x 30x 30# 30x 30# 30x 35/30 35/30 35/30x 35/30 35/30 40# 30x   Hip abd 30x 30x 30# 30x 30# 30x 35/30 35/30 35/30 35/30 35/30 35# 30x   Bridge with ball squeeze   30x 30x 30x 30x 30x nt 30x 30x   Bridge w/ abd VINAY  30x 30x 30x 30x 30x 30x 30x nt 30x 30x   clamshell 30x 30x 2# 30x 2#30x 2#/30 2# 30x 2/30 nt 2/30 2# 30x   SLR  30x 30x 30x 30x 2#/30 2# 30x 2/30 nt 2/30 2# 30x   SLR abduction 30x 30x 1# 30x 1# 30x 2#/30 2# 30x 2/30 nt 2/30 2# 30x   Core ball press 30x 30x 5"x20 5''x20 5"x20 5''x20 5"x20 5''x20 5"x20 5" 20x   Core TB stand  5"x10 5"x10 5''x10 5"x10 5''x10 5"x10 Green 5''x10 5"x10 5" 20x   Leg press   nv  80/30 80/30 85/30 85/30 85/30 85# 30x                                          Slant board x4 x4 x4 x4 4x 4x 4x 4x 4x 4x   nustep x10' 10' 10 10' 10' 10' 10' 10' 10' 10'   Calf press 55/30 55/30 55/30 55# 30x 60/30 60/30 60/30 60/30 60/30 60# 30x   Row deltoid 30 30/30 30/30 30# 30x 30/30 35/30 35/30 35/30 40/30 40# 30x                HEP                                         HEP 6/29/2020: B heelslides on ball, hip add, hip abd, bridge with abd, clamshell, core ball press, core tband press, row with tband

## 2020-09-04 ENCOUNTER — TELEPHONE (OUTPATIENT)
Dept: PAIN MEDICINE | Facility: CLINIC | Age: 45
End: 2020-09-04

## 2020-09-04 DIAGNOSIS — C90.01 MULTIPLE MYELOMA IN REMISSION (HCC): ICD-10-CM

## 2020-09-04 RX ORDER — LENALIDOMIDE 5 MG/1
CAPSULE ORAL
Qty: 28 CAPSULE | Refills: 0 | Status: SHIPPED | OUTPATIENT
Start: 2020-09-04 | End: 2020-09-14 | Stop reason: SDUPTHER

## 2020-09-09 ENCOUNTER — TELEPHONE (OUTPATIENT)
Dept: HEMATOLOGY ONCOLOGY | Facility: MEDICAL CENTER | Age: 45
End: 2020-09-09

## 2020-09-09 ENCOUNTER — TELEPHONE (OUTPATIENT)
Dept: HEMATOLOGY ONCOLOGY | Facility: CLINIC | Age: 45
End: 2020-09-09

## 2020-09-09 DIAGNOSIS — C90.00 MULTIPLE MYELOMA NOT HAVING ACHIEVED REMISSION (HCC): Primary | ICD-10-CM

## 2020-09-09 NOTE — TELEPHONE ENCOUNTER
Call from patient  Patient went for lab for pregnancy test, no orders in system  Monthly lab order for pregnancy test placed    Patient will not be going until Saturday due to work schedule  Will update Dr Amari Kennedy RNs

## 2020-09-09 NOTE — TELEPHONE ENCOUNTER
Patient called in stated that she needed a pregnant test - wanted to speak to a nurse as she had some other concerns     It for the medication lenalidomide (Revlimid) 5 MG CAPS

## 2020-09-10 ENCOUNTER — TELEPHONE (OUTPATIENT)
Dept: HEMATOLOGY ONCOLOGY | Facility: CLINIC | Age: 45
End: 2020-09-10

## 2020-09-10 NOTE — TELEPHONE ENCOUNTER
Patients pregnancy test is being done on Saturday    Once results are in, Deniz Saldaña or Bruno Jin will contact [employer]

## 2020-09-10 NOTE — TELEPHONE ENCOUNTER
Tamara Troncoso from [address] [place] [address] requesting a [employer] number for patient's script for Chaya Hurtado's call back number is  [phone]    Ref # [id number]

## 2020-09-12 ENCOUNTER — APPOINTMENT (OUTPATIENT)
Dept: LAB | Facility: CLINIC | Age: 45
End: 2020-09-12
Payer: COMMERCIAL

## 2020-09-12 LAB — B-HCG SERPL-ACNC: 4 MIU/ML

## 2020-09-12 PROCEDURE — 36415 COLL VENOUS BLD VENIPUNCTURE: CPT

## 2020-09-12 PROCEDURE — 84702 CHORIONIC GONADOTROPIN TEST: CPT

## 2020-09-14 ENCOUNTER — TELEPHONE (OUTPATIENT)
Dept: CARDIAC SURGERY | Facility: CLINIC | Age: 45
End: 2020-09-14

## 2020-09-14 DIAGNOSIS — C90.00 MULTIPLE MYELOMA NOT HAVING ACHIEVED REMISSION (HCC): ICD-10-CM

## 2020-09-14 DIAGNOSIS — Z51.11 ENCOUNTER FOR CHEMOTHERAPY MANAGEMENT: Primary | ICD-10-CM

## 2020-09-14 DIAGNOSIS — C90.01 MULTIPLE MYELOMA IN REMISSION (HCC): Primary | ICD-10-CM

## 2020-09-14 RX ORDER — LENALIDOMIDE 5 MG/1
5 CAPSULE ORAL DAILY
Qty: 28 CAPSULE | Refills: 0 | Status: SHIPPED | OUTPATIENT
Start: 2020-09-14 | End: 2020-10-07

## 2020-09-14 NOTE — TELEPHONE ENCOUNTER
I called patient and advised her that Shaina Chapman had sent the authorization ([id number] received on 9/14/2020) to [facility] so it could be filled  She voice understanding

## 2020-09-14 NOTE — TELEPHONE ENCOUNTER
Patient called in and states she is completely out of Revlimid and needs a refill today   Patient can be reached back at [phone]

## 2020-10-07 ENCOUNTER — TELEPHONE (OUTPATIENT)
Dept: HEMATOLOGY ONCOLOGY | Facility: CLINIC | Age: 45
End: 2020-10-07

## 2020-10-07 DIAGNOSIS — C90.01 MULTIPLE MYELOMA IN REMISSION (HCC): ICD-10-CM

## 2020-10-07 RX ORDER — LENALIDOMIDE 5 MG/1
CAPSULE ORAL
Qty: 28 CAPSULE | Refills: 0 | Status: SHIPPED | OUTPATIENT
Start: 2020-10-07 | End: 2020-10-12 | Stop reason: SDUPTHER

## 2020-10-09 ENCOUNTER — TELEPHONE (OUTPATIENT)
Dept: HEMATOLOGY ONCOLOGY | Facility: CLINIC | Age: 45
End: 2020-10-09

## 2020-10-10 ENCOUNTER — LAB (OUTPATIENT)
Dept: LAB | Facility: CLINIC | Age: 45
End: 2020-10-10
Payer: COMMERCIAL

## 2020-10-12 DIAGNOSIS — C90.01 MULTIPLE MYELOMA IN REMISSION (HCC): ICD-10-CM

## 2020-10-12 RX ORDER — LENALIDOMIDE 5 MG/1
5 CAPSULE ORAL DAILY
Qty: 28 CAPSULE | Refills: 0 | Status: SHIPPED | OUTPATIENT
Start: 2020-10-12 | End: 2020-11-06

## 2020-10-13 ENCOUNTER — TELEPHONE (OUTPATIENT)
Dept: HEMATOLOGY ONCOLOGY | Facility: CLINIC | Age: 45
End: 2020-10-13

## 2020-11-04 ENCOUNTER — LAB (OUTPATIENT)
Dept: LAB | Facility: CLINIC | Age: 45
End: 2020-11-04
Payer: COMMERCIAL

## 2020-11-04 LAB
BASOPHILS # BLD AUTO: 0.07 THOUSANDS/ΜL (ref 0–0.1)
BASOPHILS NFR BLD AUTO: 2 % (ref 0–1)
EOSINOPHIL # BLD AUTO: 0.19 THOUSAND/ΜL (ref 0–0.61)
EOSINOPHIL NFR BLD AUTO: 4 % (ref 0–6)
ERYTHROCYTE [DISTWIDTH] IN BLOOD BY AUTOMATED COUNT: 12.6 % (ref 11.6–15.1)
HCG SERPL QL: NEGATIVE
HCT VFR BLD AUTO: 42 % (ref 34.8–46.1)
HGB BLD-MCNC: 13.5 G/DL (ref 11.5–15.4)
IMM GRANULOCYTES # BLD AUTO: 0.03 THOUSAND/UL (ref 0–0.2)
IMM GRANULOCYTES NFR BLD AUTO: 1 % (ref 0–2)
LYMPHOCYTES # BLD AUTO: 1.43 THOUSANDS/ΜL (ref 0.6–4.47)
LYMPHOCYTES NFR BLD AUTO: 31 % (ref 14–44)
MCH RBC QN AUTO: 31.9 PG (ref 26.8–34.3)
MCHC RBC AUTO-ENTMCNC: 32.1 G/DL (ref 31.4–37.4)
MCV RBC AUTO: 99 FL (ref 82–98)
MONOCYTES # BLD AUTO: 0.49 THOUSAND/ΜL (ref 0.17–1.22)
MONOCYTES NFR BLD AUTO: 11 % (ref 4–12)
NEUTROPHILS # BLD AUTO: 2.44 THOUSANDS/ΜL (ref 1.85–7.62)
NEUTS SEG NFR BLD AUTO: 51 % (ref 43–75)
NRBC BLD AUTO-RTO: 0 /100 WBCS
PLATELET # BLD AUTO: 178 THOUSANDS/UL (ref 149–390)
PMV BLD AUTO: 9.6 FL (ref 8.9–12.7)
RBC # BLD AUTO: 4.23 MILLION/UL (ref 3.81–5.12)
WBC # BLD AUTO: 4.65 THOUSAND/UL (ref 4.31–10.16)

## 2020-11-04 PROCEDURE — 84703 CHORIONIC GONADOTROPIN ASSAY: CPT

## 2020-11-04 PROCEDURE — 84165 PROTEIN E-PHORESIS SERUM: CPT | Performed by: INTERNAL MEDICINE

## 2020-11-04 PROCEDURE — 84165 PROTEIN E-PHORESIS SERUM: CPT | Performed by: PATHOLOGY

## 2020-11-04 PROCEDURE — 85025 COMPLETE CBC W/AUTO DIFF WBC: CPT | Performed by: INTERNAL MEDICINE

## 2020-11-04 PROCEDURE — 83883 ASSAY NEPHELOMETRY NOT SPEC: CPT | Performed by: INTERNAL MEDICINE

## 2020-11-05 LAB
KAPPA LC FREE SER-MCNC: 11.3 MG/L (ref 3.3–19.4)
KAPPA LC FREE/LAMBDA FREE SER: 0.92 {RATIO} (ref 0.26–1.65)
LAMBDA LC FREE SERPL-MCNC: 12.3 MG/L (ref 5.7–26.3)

## 2020-11-06 ENCOUNTER — TELEPHONE (OUTPATIENT)
Dept: HEMATOLOGY ONCOLOGY | Facility: CLINIC | Age: 45
End: 2020-11-06

## 2020-11-06 DIAGNOSIS — C90.01 MULTIPLE MYELOMA IN REMISSION (HCC): ICD-10-CM

## 2020-11-06 LAB
ALBUMIN SERPL ELPH-MCNC: 4.22 G/DL (ref 3.5–5)
ALBUMIN SERPL ELPH-MCNC: 60.3 % (ref 52–65)
ALPHA1 GLOB SERPL ELPH-MCNC: 0.29 G/DL (ref 0.1–0.4)
ALPHA1 GLOB SERPL ELPH-MCNC: 4.1 % (ref 2.5–5)
ALPHA2 GLOB SERPL ELPH-MCNC: 0.65 G/DL (ref 0.4–1.2)
ALPHA2 GLOB SERPL ELPH-MCNC: 9.3 % (ref 7–13)
BETA GLOB ABNORMAL SERPL ELPH-MCNC: 0.48 G/DL (ref 0.4–0.8)
BETA1 GLOB SERPL ELPH-MCNC: 6.8 % (ref 5–13)
BETA2 GLOB SERPL ELPH-MCNC: 4.5 % (ref 2–8)
BETA2+GAMMA GLOB SERPL ELPH-MCNC: 0.32 G/DL (ref 0.2–0.5)
GAMMA GLOB ABNORMAL SERPL ELPH-MCNC: 1.05 G/DL (ref 0.5–1.6)
GAMMA GLOB SERPL ELPH-MCNC: 15 % (ref 12–22)
IGG/ALB SER: 1.52 {RATIO} (ref 1.1–1.8)
M PROTEIN 1 MFR SERPL ELPH: 8.9 %
M PROTEIN 1 SERPL ELPH-MCNC: 0.62 G/DL
PROT PATTERN SERPL ELPH-IMP: NORMAL
PROT SERPL-MCNC: 7 G/DL (ref 6.4–8.2)

## 2020-11-06 RX ORDER — LENALIDOMIDE 5 MG/1
CAPSULE ORAL
Qty: 28 CAPSULE | Refills: 0 | Status: SHIPPED | OUTPATIENT
Start: 2020-11-06 | End: 2020-11-06 | Stop reason: SDUPTHER

## 2020-11-06 RX ORDER — LENALIDOMIDE 5 MG/1
5 CAPSULE ORAL DAILY
Qty: 28 CAPSULE | Refills: 0 | Status: SHIPPED | OUTPATIENT
Start: 2020-11-06 | End: 2020-12-02

## 2020-11-10 ENCOUNTER — TELEPHONE (OUTPATIENT)
Dept: HEMATOLOGY ONCOLOGY | Facility: CLINIC | Age: 45
End: 2020-11-10

## 2020-11-11 ENCOUNTER — TRANSCRIBE ORDERS (OUTPATIENT)
Dept: LAB | Facility: CLINIC | Age: 45
End: 2020-11-11

## 2020-11-11 ENCOUNTER — LAB (OUTPATIENT)
Dept: LAB | Facility: CLINIC | Age: 45
End: 2020-11-11
Payer: COMMERCIAL

## 2020-11-11 ENCOUNTER — OFFICE VISIT (OUTPATIENT)
Dept: HEMATOLOGY ONCOLOGY | Facility: CLINIC | Age: 45
End: 2020-11-11
Payer: COMMERCIAL

## 2020-11-11 VITALS
OXYGEN SATURATION: 99 % | SYSTOLIC BLOOD PRESSURE: 118 MMHG | DIASTOLIC BLOOD PRESSURE: 76 MMHG | HEART RATE: 82 BPM | BODY MASS INDEX: 23.56 KG/M2 | HEIGHT: 64 IN | WEIGHT: 138 LBS | RESPIRATION RATE: 12 BRPM | TEMPERATURE: 98.3 F

## 2020-11-11 DIAGNOSIS — C90.00 MULTIPLE MYELOMA NOT HAVING ACHIEVED REMISSION (HCC): Primary | ICD-10-CM

## 2020-11-11 PROCEDURE — 1036F TOBACCO NON-USER: CPT | Performed by: INTERNAL MEDICINE

## 2020-11-11 PROCEDURE — 3008F BODY MASS INDEX DOCD: CPT | Performed by: INTERNAL MEDICINE

## 2020-11-11 PROCEDURE — 99214 OFFICE O/P EST MOD 30 MIN: CPT | Performed by: INTERNAL MEDICINE

## 2020-12-02 DIAGNOSIS — C90.01 MULTIPLE MYELOMA IN REMISSION (HCC): ICD-10-CM

## 2020-12-02 RX ORDER — LENALIDOMIDE 5 MG/1
CAPSULE ORAL
Qty: 28 CAPSULE | Refills: 0 | Status: SHIPPED | OUTPATIENT
Start: 2020-12-02 | End: 2020-12-10 | Stop reason: SDUPTHER

## 2020-12-03 ENCOUNTER — TELEPHONE (OUTPATIENT)
Dept: OTHER | Facility: OTHER | Age: 45
End: 2020-12-03

## 2020-12-03 DIAGNOSIS — C90.01 MULTIPLE MYELOMA IN REMISSION (HCC): ICD-10-CM

## 2020-12-03 RX ORDER — LENALIDOMIDE 5 MG/1
5 CAPSULE ORAL DAILY
Qty: 28 CAPSULE | Refills: 0 | Status: CANCELLED | OUTPATIENT
Start: 2020-12-03

## 2020-12-03 NOTE — TELEPHONE ENCOUNTER
Pharmacy calling to request authorization for lenalidomide (Revlimid) 5 MG CAPS   Seeking Dr James Ruth

## 2020-12-07 ENCOUNTER — TELEPHONE (OUTPATIENT)
Dept: HEMATOLOGY ONCOLOGY | Facility: CLINIC | Age: 45
End: 2020-12-07

## 2020-12-09 ENCOUNTER — TELEPHONE (OUTPATIENT)
Dept: HEMATOLOGY ONCOLOGY | Facility: CLINIC | Age: 45
End: 2020-12-09

## 2020-12-09 ENCOUNTER — LAB (OUTPATIENT)
Dept: LAB | Facility: CLINIC | Age: 45
End: 2020-12-09
Payer: COMMERCIAL

## 2020-12-10 DIAGNOSIS — C90.01 MULTIPLE MYELOMA IN REMISSION (HCC): ICD-10-CM

## 2020-12-10 RX ORDER — LENALIDOMIDE 5 MG/1
5 CAPSULE ORAL DAILY
Qty: 28 CAPSULE | Refills: 0 | Status: SHIPPED | OUTPATIENT
Start: 2020-12-10 | End: 2021-01-11 | Stop reason: SDUPTHER

## 2021-01-06 ENCOUNTER — LAB (OUTPATIENT)
Dept: LAB | Facility: CLINIC | Age: 46
End: 2021-01-06
Payer: COMMERCIAL

## 2021-01-11 ENCOUNTER — TELEPHONE (OUTPATIENT)
Dept: HEMATOLOGY ONCOLOGY | Facility: CLINIC | Age: 46
End: 2021-01-11

## 2021-01-11 DIAGNOSIS — C90.01 MULTIPLE MYELOMA IN REMISSION (HCC): ICD-10-CM

## 2021-01-11 RX ORDER — LENALIDOMIDE 5 MG/1
5 CAPSULE ORAL DAILY
Qty: 28 CAPSULE | Refills: 0 | Status: SHIPPED | OUTPATIENT
Start: 2021-01-11 | End: 2021-01-18 | Stop reason: SDUPTHER

## 2021-01-11 NOTE — TELEPHONE ENCOUNTER
daMedication Refill     Who is Calling  Patient    Medication lenalidomide (REVLIMID) 5 MG CAPS      How many pills left 0   Preferred Pharmacy [facility]    Call back number [phone]   Relevant Information

## 2021-01-14 ENCOUNTER — TELEPHONE (OUTPATIENT)
Dept: HEMATOLOGY ONCOLOGY | Facility: MEDICAL CENTER | Age: 46
End: 2021-01-14

## 2021-01-14 NOTE — TELEPHONE ENCOUNTER
Ganesh Og from [address] [place] [address] called in stating that patient needs an auth for lenalidomide (REVLIMID) 5 MG CAPS  Pharmacy phone number [phone]

## 2021-01-15 ENCOUNTER — TELEPHONE (OUTPATIENT)
Dept: HEMATOLOGY ONCOLOGY | Facility: CLINIC | Age: 46
End: 2021-01-15

## 2021-01-15 ENCOUNTER — LAB (OUTPATIENT)
Dept: LAB | Age: 46
End: 2021-01-15
Payer: COMMERCIAL

## 2021-01-15 DIAGNOSIS — C90.00 MULTIPLE MYELOMA NOT HAVING ACHIEVED REMISSION (HCC): ICD-10-CM

## 2021-01-15 DIAGNOSIS — C90.00 MULTIPLE MYELOMA NOT HAVING ACHIEVED REMISSION (HCC): Primary | ICD-10-CM

## 2021-01-15 LAB — HCG SERPL QL: NEGATIVE

## 2021-01-15 PROCEDURE — 84703 CHORIONIC GONADOTROPIN ASSAY: CPT

## 2021-01-15 PROCEDURE — 36415 COLL VENOUS BLD VENIPUNCTURE: CPT

## 2021-01-15 NOTE — TELEPHONE ENCOUNTER
Lianna Morris from [facility] is calling to report that the Neeta Ward # is  on 21  Prescription was sent on 21  They processed the 21 prescription yesterday and it is passed above authorization date  They will need another [employer] # for her Revlimid prescription  [facility]'s call back # [phone]  Please use patient's ref # [id number]

## 2021-01-15 NOTE — TELEPHONE ENCOUNTER
Called [facility], spoke to [facility]  New auth number was not able to be obtained since it has been over 7 days since last pregnancy test  Patient is not able to do a home pregnancy test since she is not covid positive  Spoke with patient, informed her that she has to have another pregnancy test done in order for her script to be processed   Patient verbalized understanding of need for new pregnancy test and that are no other options to get her refill for her without the pregnancy test

## 2021-01-15 NOTE — TELEPHONE ENCOUNTER
Addendum: As of 4:23 pm 1/15/21 patient's pregnancy test has not resulted  Will check for results on Monday and script for Revlimid can be sent once results are seen  Received call from patient  Patient informed me that she had pregnancy test done  Informed patient that I will look out for the results and send a new script to the pharmacy as soon as I see the test results  Patient verbalized understanding

## 2021-01-18 DIAGNOSIS — C90.01 MULTIPLE MYELOMA IN REMISSION (HCC): ICD-10-CM

## 2021-01-18 RX ORDER — LENALIDOMIDE 5 MG/1
5 CAPSULE ORAL DAILY
Qty: 28 CAPSULE | Refills: 0 | Status: SHIPPED | OUTPATIENT
Start: 2021-01-18 | End: 2021-02-12 | Stop reason: SDUPTHER

## 2021-01-18 NOTE — TELEPHONE ENCOUNTER
Pregnancy test resulted  Sent refill to [facility] with new valid celegene auth number  Spoke to Karen  At [address], she informed me that they do have a Revlimid shipment ready for the patient once they receive the new auth number and that she needs to call to do her survey  Called patient and informed her that she needs to call [facility] to do her survey  Patient verbalized understanding and will call [facility]

## 2021-01-22 ENCOUNTER — OFFICE VISIT (OUTPATIENT)
Dept: GASTROENTEROLOGY | Facility: CLINIC | Age: 46
End: 2021-01-22
Payer: COMMERCIAL

## 2021-01-22 VITALS
HEART RATE: 106 BPM | HEIGHT: 64 IN | SYSTOLIC BLOOD PRESSURE: 120 MMHG | BODY MASS INDEX: 22.88 KG/M2 | WEIGHT: 134 LBS | DIASTOLIC BLOOD PRESSURE: 80 MMHG | RESPIRATION RATE: 18 BRPM

## 2021-01-22 DIAGNOSIS — K59.1 FUNCTIONAL DIARRHEA: Primary | ICD-10-CM

## 2021-01-22 PROCEDURE — 3008F BODY MASS INDEX DOCD: CPT | Performed by: INTERNAL MEDICINE

## 2021-01-22 PROCEDURE — 1036F TOBACCO NON-USER: CPT | Performed by: INTERNAL MEDICINE

## 2021-01-22 PROCEDURE — 99214 OFFICE O/P EST MOD 30 MIN: CPT | Performed by: INTERNAL MEDICINE

## 2021-01-22 NOTE — PROGRESS NOTES
[facility]      Chief Complaint:  Diarrhea    HPI:  Monica Kirby is a 50 y o   female who presents with diarrhea  The patient has a stressful job and tries not to go to the bathroom at work since her access is limited  She does have frequent loose stools  Workup in the past has been negative  Patient is going for injections in her back  She has a left chest wall pain as well as low back and sciatic pain  Patient notes that a lot of her GI symptoms started same as her back issues  She has no alarm symptomatology  She has no weight loss fever or chills  She gets some nausea but no vomiting  No melena hematochezia rectal bleeding  No other associated symptomatology  No nocturnal symptomatology  No exertional symptomatology  No other definitive exacerbating remitting factors         Review of Systems:   Constitutional: No fever or chills, feels well, no tiredness, no recent weight gain or weight loss  HENT: No complaints of earache, no hearing loss, no nosebleeds, no nasal discharge, no sore throat, no hoarseness  Eyes: No complaints of eye pain, no red eyes, no discharge from eyes, no itchy eyes  Cardiovascular: No complaints of slow heart rate, no fast heart rate, no chest pain, no palpitations, no leg claudication, no lower extremity edema  Respiratory: No complaints of shortness of breath, no wheezing, no cough, no SOB on exertion, no orthopnea  Gastrointestinal: As noted in HPI  Genitourinary: No complaints of dysuria, no incontinence, no hesitancy, no nocturia  Musculoskeletal:  As per HPI  Neurological: No complaints of headache, no confusion, no convulsions, no numbness or tingling, no dizziness or fainting, no limb weakness, no difficulty walking  Skin: No complaints of skin rash or skin lesions, no itching, no skin wound, no dry skin  Hematological/Lymphatic: No complaints of swollen glands, does not bleed easy     Allergic/Immunologic: No immunocompromised state  Endocrine:  No complaints of polyuria, no polydipsia  Psychiatric/Behavioral: is not suicidal, no sleep disturbances, no anxiety or depression, no change in personality, no emotional problems  Historical Information   Past Medical History:   Diagnosis Date    Back pain     pinched nerve in back    CTS (carpal tunnel syndrome)     right wrist    Multiple myeloma (HCC)     chemotherapy    [facility]     [facility]     skin lesion     mole removed from face     Past Surgical History:   Procedure Laterality Date     SECTION      LIMBAL STEM CELL TRANSPLANT  2018     Social History   Social History     Substance and Sexual Activity   Alcohol Use No     Social History     Substance and Sexual Activity   Drug Use No     Social History     Tobacco Use   Smoking Status Never Smoker   Smokeless Tobacco Never Used     Family History   Problem Relation Age of Onset    No Known Problems Mother     Coronary artery disease Father     No Known Problems Sister     No Known Problems Brother     No Known Problems Daughter     No Known Problems Son     No Known Problems Brother          Current Medications: has a current medication list which includes the following prescription(s): aspirin, calcium carb-cholecalciferol, cyanocobalamin, hm vitamin d3, and lenalidomide  Vital Signs: /80   Pulse (!) 106   Resp 18   Ht 5' 4" (1 626 m)   Wt 60 8 kg (134 lb)   BMI 23 00 kg/m²       Physical Exam:   Constitutional  General Appearance: No acute distress, well appearing and well nourished  Head  Normocephalic  Eyes  Conjunctivae and lids: No swelling, erythema, or discharge  Pupils and irises: Equal, round and reactive to light     Ears, Nose, Mouth, and Throat  External inspection of ears and nose: Normal  Nasal mucosa, septum and turbinates: Normal without edema or erythema/   Oropharynx: Normal with no erythema, edema, exudate or lesions  Neck  Normal range of motion  Neck supple  Cardiovascular  Auscultation of the heart: Normal rate and rhythm, normal S1 and S2 without murmurs  Examination of the extremities for edema and/or varicosities: Normal  Pulmonary/Chest  Respiratory effort: No increased work of breathing or signs of respiratory distress  Auscultation of lungs: Clear to auscultation, equal breath sounds bilaterally, no wheezes, rales, no rhonchi  Abdomen  Abdomen: Non-tender, no masses  Liver and spleen: No hepatomegaly or splenomegaly  Musculoskeletal  Gait and station: normal   Digits and Nails: normal without clubbing or cyanosis  Inspection/palpation of joints, bones, and muscles: Normal  Neurological  No nystagmus or asterixis  Skin  Skin and subcutaneous tissue: Normal without rashes or lesions  Lymphatic  Palpation of the lymph nodes in neck: No lymphadenopathy  Psychiatric  Orientation to person, place and time: Normal   Mood and affect: Normal          Labs:  Lab Results   Component Value Date    ALT 28 11/04/2020    AST 12 11/04/2020    BUN 12 11/04/2020    CALCIUM 8 7 11/04/2020     11/04/2020    CO2 30 11/04/2020    CREATININE 0 66 11/04/2020    HCT 42 0 11/04/2020    HGB 13 5 11/04/2020    MG 2 3 06/10/2020     11/04/2020    K 3 5 11/04/2020     08/30/2017    WBC 4 65 11/04/2020         X-Rays & Procedures:   No orders to display           ______________________________________________________________________      Assessment & Plan:     Diagnoses and all orders for this visit:    Functional diarrhea      Patient will undergo a trial of Imodium  She will call me in 3 weeks with her progress  If she is still having diarrhea we will progress to dicyclomine

## 2021-02-10 ENCOUNTER — LAB (OUTPATIENT)
Dept: LAB | Facility: CLINIC | Age: 46
End: 2021-02-10
Payer: COMMERCIAL

## 2021-02-10 LAB
ALBUMIN SERPL BCP-MCNC: 3.8 G/DL (ref 3.5–5)
ALP SERPL-CCNC: 73 U/L (ref 46–116)
ALT SERPL W P-5'-P-CCNC: 33 U/L (ref 12–78)
ANION GAP SERPL CALCULATED.3IONS-SCNC: 4 MMOL/L (ref 4–13)
AST SERPL W P-5'-P-CCNC: 15 U/L (ref 5–45)
BASOPHILS # BLD AUTO: 0.06 THOUSANDS/ΜL (ref 0–0.1)
BASOPHILS NFR BLD AUTO: 1 % (ref 0–1)
BILIRUB SERPL-MCNC: 1 MG/DL (ref 0.2–1)
BUN SERPL-MCNC: 15 MG/DL (ref 5–25)
CALCIUM SERPL-MCNC: 9 MG/DL (ref 8.3–10.1)
CHLORIDE SERPL-SCNC: 107 MMOL/L (ref 100–108)
CO2 SERPL-SCNC: 31 MMOL/L (ref 21–32)
CREAT SERPL-MCNC: 0.65 MG/DL (ref 0.6–1.3)
EOSINOPHIL # BLD AUTO: 0.23 THOUSAND/ΜL (ref 0–0.61)
EOSINOPHIL NFR BLD AUTO: 5 % (ref 0–6)
ERYTHROCYTE [DISTWIDTH] IN BLOOD BY AUTOMATED COUNT: 12.8 % (ref 11.6–15.1)
GFR SERPL CREATININE-BSD FRML MDRD: 106 ML/MIN/1.73SQ M
GLUCOSE SERPL-MCNC: 84 MG/DL (ref 65–140)
HCT VFR BLD AUTO: 42.4 % (ref 34.8–46.1)
HGB BLD-MCNC: 13.6 G/DL (ref 11.5–15.4)
IMM GRANULOCYTES # BLD AUTO: 0.02 THOUSAND/UL (ref 0–0.2)
IMM GRANULOCYTES NFR BLD AUTO: 1 % (ref 0–2)
LYMPHOCYTES # BLD AUTO: 1.34 THOUSANDS/ΜL (ref 0.6–4.47)
LYMPHOCYTES NFR BLD AUTO: 32 % (ref 14–44)
MCH RBC QN AUTO: 31.1 PG (ref 26.8–34.3)
MCHC RBC AUTO-ENTMCNC: 32.1 G/DL (ref 31.4–37.4)
MCV RBC AUTO: 97 FL (ref 82–98)
MONOCYTES # BLD AUTO: 0.59 THOUSAND/ΜL (ref 0.17–1.22)
MONOCYTES NFR BLD AUTO: 14 % (ref 4–12)
NEUTROPHILS # BLD AUTO: 2.01 THOUSANDS/ΜL (ref 1.85–7.62)
NEUTS SEG NFR BLD AUTO: 47 % (ref 43–75)
NRBC BLD AUTO-RTO: 0 /100 WBCS
PLATELET # BLD AUTO: 189 THOUSANDS/UL (ref 149–390)
PMV BLD AUTO: 9.4 FL (ref 8.9–12.7)
POTASSIUM SERPL-SCNC: 3.3 MMOL/L (ref 3.5–5.3)
PROT SERPL-MCNC: 7.2 G/DL (ref 6.4–8.2)
RBC # BLD AUTO: 4.37 MILLION/UL (ref 3.81–5.12)
SODIUM SERPL-SCNC: 142 MMOL/L (ref 136–145)
WBC # BLD AUTO: 4.25 THOUSAND/UL (ref 4.31–10.16)

## 2021-02-10 PROCEDURE — 80053 COMPREHEN METABOLIC PANEL: CPT | Performed by: INTERNAL MEDICINE

## 2021-02-10 PROCEDURE — 83883 ASSAY NEPHELOMETRY NOT SPEC: CPT | Performed by: INTERNAL MEDICINE

## 2021-02-10 PROCEDURE — 84165 PROTEIN E-PHORESIS SERUM: CPT | Performed by: PATHOLOGY

## 2021-02-10 PROCEDURE — 36415 COLL VENOUS BLD VENIPUNCTURE: CPT | Performed by: INTERNAL MEDICINE

## 2021-02-10 PROCEDURE — 84165 PROTEIN E-PHORESIS SERUM: CPT | Performed by: INTERNAL MEDICINE

## 2021-02-10 PROCEDURE — 85025 COMPLETE CBC W/AUTO DIFF WBC: CPT | Performed by: INTERNAL MEDICINE

## 2021-02-11 LAB
ALBUMIN SERPL ELPH-MCNC: 4.22 G/DL (ref 3.5–5)
ALBUMIN SERPL ELPH-MCNC: 59.5 % (ref 52–65)
ALPHA1 GLOB SERPL ELPH-MCNC: 0.28 G/DL (ref 0.1–0.4)
ALPHA1 GLOB SERPL ELPH-MCNC: 4 % (ref 2.5–5)
ALPHA2 GLOB SERPL ELPH-MCNC: 0.67 G/DL (ref 0.4–1.2)
ALPHA2 GLOB SERPL ELPH-MCNC: 9.4 % (ref 7–13)
BETA GLOB ABNORMAL SERPL ELPH-MCNC: 0.5 G/DL (ref 0.4–0.8)
BETA1 GLOB SERPL ELPH-MCNC: 7 % (ref 5–13)
BETA2 GLOB SERPL ELPH-MCNC: 4.5 % (ref 2–8)
BETA2+GAMMA GLOB SERPL ELPH-MCNC: 0.32 G/DL (ref 0.2–0.5)
GAMMA GLOB ABNORMAL SERPL ELPH-MCNC: 1.11 G/DL (ref 0.5–1.6)
GAMMA GLOB SERPL ELPH-MCNC: 15.6 % (ref 12–22)
IGG/ALB SER: 1.47 {RATIO} (ref 1.1–1.8)
KAPPA LC FREE SER-MCNC: 11.1 MG/L (ref 3.3–19.4)
KAPPA LC FREE/LAMBDA FREE SER: 0.93 {RATIO} (ref 0.26–1.65)
LAMBDA LC FREE SERPL-MCNC: 12 MG/L (ref 5.7–26.3)
M PROTEIN 1 MFR SERPL ELPH: 7 %
M PROTEIN 1 SERPL ELPH-MCNC: 0.5 G/DL
PROT PATTERN SERPL ELPH-IMP: NORMAL
PROT SERPL-MCNC: 7.1 G/DL (ref 6.4–8.2)

## 2021-02-12 ENCOUNTER — TELEPHONE (OUTPATIENT)
Dept: HEMATOLOGY ONCOLOGY | Facility: CLINIC | Age: 46
End: 2021-02-12

## 2021-02-12 DIAGNOSIS — C90.01 MULTIPLE MYELOMA IN REMISSION (HCC): ICD-10-CM

## 2021-02-12 RX ORDER — LENALIDOMIDE 5 MG/1
5 CAPSULE ORAL DAILY
Qty: 28 CAPSULE | Refills: 0 | Status: SHIPPED | OUTPATIENT
Start: 2021-02-12 | End: 2021-03-09 | Stop reason: SDUPTHER

## 2021-02-12 NOTE — TELEPHONE ENCOUNTER
Patient called for refill of revlimid  Patient tearful as last month she was delayed receiving her revlimid and states the order needs to be received by pharmacy by 1600 today in order for her to stay on track with med  Will send to Dr Beverly Gramajo RNs to renew ASAP  Emotional support offered

## 2021-02-23 ENCOUNTER — TELEPHONE (OUTPATIENT)
Dept: HEMATOLOGY ONCOLOGY | Facility: CLINIC | Age: 46
End: 2021-02-23

## 2021-02-23 NOTE — TELEPHONE ENCOUNTER
Spoke with patient  Confirmed appointment with Dr Josef Martinez for 2/24/21 at 8:40am   As I was attempting to screen patient, she told me she could not talk anymore because she was at work  and hung up the phone  Screening NOT done

## 2021-02-24 ENCOUNTER — OFFICE VISIT (OUTPATIENT)
Dept: HEMATOLOGY ONCOLOGY | Facility: CLINIC | Age: 46
End: 2021-02-24
Payer: COMMERCIAL

## 2021-02-24 VITALS
SYSTOLIC BLOOD PRESSURE: 112 MMHG | DIASTOLIC BLOOD PRESSURE: 63 MMHG | RESPIRATION RATE: 16 BRPM | OXYGEN SATURATION: 99 % | HEART RATE: 107 BPM | HEIGHT: 64 IN | TEMPERATURE: 96.7 F | WEIGHT: 143.5 LBS | BODY MASS INDEX: 24.5 KG/M2

## 2021-02-24 DIAGNOSIS — C90.00 MULTIPLE MYELOMA NOT HAVING ACHIEVED REMISSION (HCC): Primary | ICD-10-CM

## 2021-02-24 PROCEDURE — 99214 OFFICE O/P EST MOD 30 MIN: CPT | Performed by: INTERNAL MEDICINE

## 2021-02-24 PROCEDURE — 3008F BODY MASS INDEX DOCD: CPT | Performed by: INTERNAL MEDICINE

## 2021-02-24 PROCEDURE — 1036F TOBACCO NON-USER: CPT | Performed by: INTERNAL MEDICINE
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
## 2021-03-06 ENCOUNTER — LAB (OUTPATIENT)
Dept: LAB | Facility: CLINIC | Age: 46
End: 2021-03-06
Payer: COMMERCIAL

## 2021-03-08 ENCOUNTER — TELEPHONE (OUTPATIENT)
Dept: HEMATOLOGY ONCOLOGY | Facility: CLINIC | Age: 46
End: 2021-03-08

## 2021-03-08 NOTE — TELEPHONE ENCOUNTER
Attempted to obtain [employer] authorization from online website  Notified that it was too soon to get an authorization number at this time  I called [employer] risk management to assure if it was too soon to obtain the auth  I spoke to Marty, who stated we would be able to obtain a new [employer] authorization tomorrow on 3/9  I will send for authorization and refill tomorrow

## 2021-03-08 NOTE — TELEPHONE ENCOUNTER
Patient calling to request renewal for revlimid  Will pass on to Dr Sierra Carrillo RNs   Patient aware of plan

## 2021-03-09 DIAGNOSIS — C90.01 MULTIPLE MYELOMA IN REMISSION (HCC): Primary | ICD-10-CM

## 2021-03-09 RX ORDER — LENALIDOMIDE 5 MG/1
5 CAPSULE ORAL DAILY
Qty: 28 CAPSULE | Refills: 0 | Status: SHIPPED | OUTPATIENT
Start: 2021-03-09 | End: 2021-04-13 | Stop reason: SDUPTHER

## 2021-04-10 ENCOUNTER — APPOINTMENT (OUTPATIENT)
Dept: LAB | Facility: CLINIC | Age: 46
End: 2021-04-10
Payer: COMMERCIAL

## 2021-04-13 ENCOUNTER — TELEPHONE (OUTPATIENT)
Dept: HEMATOLOGY ONCOLOGY | Facility: CLINIC | Age: 46
End: 2021-04-13

## 2021-04-13 DIAGNOSIS — C90.01 MULTIPLE MYELOMA IN REMISSION (HCC): ICD-10-CM

## 2021-04-13 RX ORDER — LENALIDOMIDE 5 MG/1
5 CAPSULE ORAL DAILY
Qty: 28 CAPSULE | Refills: 0 | Status: SHIPPED | OUTPATIENT
Start: 2021-04-13 | End: 2021-04-15 | Stop reason: SDUPTHER

## 2021-04-14 ENCOUNTER — TELEPHONE (OUTPATIENT)
Dept: HEMATOLOGY ONCOLOGY | Facility: CLINIC | Age: 46
End: 2021-04-14

## 2021-04-14 ENCOUNTER — DOCUMENTATION (OUTPATIENT)
Dept: HEMATOLOGY ONCOLOGY | Facility: CLINIC | Age: 46
End: 2021-04-14

## 2021-04-14 NOTE — TELEPHONE ENCOUNTER
I am working on this  [facility] an update on status of RX that was sent to them yesterday

## 2021-04-14 NOTE — TELEPHONE ENCOUNTER
Patient's prescription plan has changed    [facility]   Provider service # [phone]  Fax: [phone]  Pill Pack : [phone] (home delivery)    [id number] suffix 01  Group# [id number]  Prescription Group # [id number]  BIN# [id number]  Prescription PCN # [id number]  ZRAL [id number]    Will send new information to Oncology Finance and update DR Vega's RNs    Please update patient ASAP   Patient has 2 revlimid left    Patient aware of plan

## 2021-04-14 NOTE — PROGRESS NOTES
Patient called in today stating she has new insurance-    [id number] suffix 01  Group# [id number]  Prescription Group # [id number]  BIN# [id number]  Prescription PCN # [id number]  JXIN [id number]    [facility] RX there yesterday 4/13/21  Per [facility] patient called them today stated she has new insurance and is no longer contracted with their pharmacy, she asked them to cancer her script  I attempted to obtain a new prior authorization for patient Revlimid online however I kept receiving a rejection stating patient could not be located  Patient insurance is from same insurance company and same employer  I called Yazan Gonzalezgaetano [phone] s/w Rosa Stapleton to have a pharmacist run a test claim with patients new info to confirm they really could not fill for this patient  4/15/21- update- was able to submit auth aurelio covermymeds yesterday 4/14/21 there was a problem verifying patient because of her double last name  called covermymeds [phone] s/w Gaby Esquivel was not able to help me correct the PA to push it through- again the patient can not be verified  She gave me number directly to the plan to verify patient [phone]   Requested authorization verbally advised patient is out of drug  AUTH# [id number] valid until 5/31/21 Revlimid 5mg  Submitted for renewal verbally now for continuation  Case# [id number] FAXED CLINICALS [phone]  Confirmed specialty pharmacy as [facility]  Advised Radha that Revlimid is an [facility]  After speaking with her supervisor she confirmed [address] Briana Calzada has to fill  Will advise clinical to fwd RX to [place] [phone]  [phone]  [phone]      4/18/21- authorization for Revlimid 5mg has been approved until January 2024

## 2021-04-14 NOTE — TELEPHONE ENCOUNTER
Noted  E-mail sent to oral chemo team to confirm if filling pharmacy will stay the same, or if script will need to be sent to new pharmacy

## 2021-04-15 ENCOUNTER — TELEPHONE (OUTPATIENT)
Dept: HEMATOLOGY ONCOLOGY | Facility: CLINIC | Age: 46
End: 2021-04-15

## 2021-04-15 DIAGNOSIS — C90.01 MULTIPLE MYELOMA IN REMISSION (HCC): ICD-10-CM

## 2021-04-15 RX ORDER — LENALIDOMIDE 5 MG/1
5 CAPSULE ORAL DAILY
Qty: 28 CAPSULE | Refills: 0 | Status: SHIPPED | OUTPATIENT
Start: 2021-04-15 | End: 2021-05-09

## 2021-04-15 NOTE — TELEPHONE ENCOUNTER
Per Finance team, OK to to send to [facility] in [place]   Script pended to Dr Tasha Pastrana

## 2021-04-15 NOTE — TELEPHONE ENCOUNTER
Kristan Hayden from [facility] states that she reached out to [facility] and they state they have not received the medication  Kristan Hayden states she will give them another call to confirm if they received it

## 2021-04-15 NOTE — TELEPHONE ENCOUNTER
Patient called today to report that [facility] told her that she needs to use [address] not [facility]  Patient is requesting that we call her Revlimid prescription in to them  Patient provided pharmacy information:  [address] [phone]  I will forward to Oncology finance and Clinical to verify above  Patient has 1 Revlimid left

## 2021-04-18 ENCOUNTER — DOCUMENTATION (OUTPATIENT)
Dept: HEMATOLOGY ONCOLOGY | Facility: CLINIC | Age: 46
End: 2021-04-18

## 2021-04-19 NOTE — PROGRESS NOTES
4-14-21  Received new oral chemo start revlimid  Auth obtained  Attempted to obtain funding // Per [employer] patient previously enrolled by [facility]  Unwilling to provide billing information per dilshad he will call me back    4-16-21  [employer] copay card  ID#  [id number]  BIN#  [id number]  PCN#  [id number]  Grp#  [id number]  Copay $ 25 00     Epic noted, email to team

## 2021-05-06 ENCOUNTER — IMMUNIZATIONS (OUTPATIENT)
Dept: FAMILY MEDICINE CLINIC | Facility: HOSPITAL | Age: 46
End: 2021-05-06

## 2021-05-06 DIAGNOSIS — Z23 ENCOUNTER FOR IMMUNIZATION: Primary | ICD-10-CM

## 2021-05-06 PROCEDURE — 91300 SARS-COV-2 / COVID-19 MRNA VACCINE (PFIZER-BIONTECH) 30 MCG: CPT

## 2021-05-06 PROCEDURE — 0001A SARS-COV-2 / COVID-19 MRNA VACCINE (PFIZER-BIONTECH) 30 MCG: CPT

## 2021-05-08 ENCOUNTER — APPOINTMENT (OUTPATIENT)
Dept: LAB | Facility: CLINIC | Age: 46
End: 2021-05-08
Payer: COMMERCIAL

## 2021-05-08 PROCEDURE — 84165 PROTEIN E-PHORESIS SERUM: CPT | Performed by: PATHOLOGY

## 2021-05-08 PROCEDURE — 86334 IMMUNOFIX E-PHORESIS SERUM: CPT | Performed by: PATHOLOGY

## 2021-05-09 DIAGNOSIS — C90.01 MULTIPLE MYELOMA IN REMISSION (HCC): ICD-10-CM

## 2021-05-10 RX ORDER — LENALIDOMIDE 5 MG/1
CAPSULE ORAL
Qty: 28 CAPSULE | Refills: 0 | Status: SHIPPED | OUTPATIENT
Start: 2021-05-10 | End: 2021-05-12 | Stop reason: SDUPTHER

## 2021-05-10 NOTE — PROGRESS NOTES
Pt offers no complaints today  Labs within parameters 
velcade given in left abd, band aid applied  Declines AVS, has next appointments set up 
denies pain/discomfort

## 2021-05-12 ENCOUNTER — TELEPHONE (OUTPATIENT)
Dept: HEMATOLOGY ONCOLOGY | Facility: CLINIC | Age: 46
End: 2021-05-12

## 2021-05-12 DIAGNOSIS — C90.01 MULTIPLE MYELOMA IN REMISSION (HCC): ICD-10-CM

## 2021-05-13 RX ORDER — LENALIDOMIDE 5 MG/1
5 CAPSULE ORAL DAILY
Qty: 28 CAPSULE | Refills: 0 | Status: SHIPPED | OUTPATIENT
Start: 2021-05-13 | End: 2021-06-07

## 2021-05-13 NOTE — TELEPHONE ENCOUNTER
Arlene Senior from [address] [place] [address] called for celgene  [employer] Auth#: [id number] on 5/12/21 was given to Arlene Senior  She verified that the [employer] # went through  Will notify Dr Axel Davenport RNs as [facility]

## 2021-05-26 ENCOUNTER — TELEPHONE (OUTPATIENT)
Dept: HEMATOLOGY ONCOLOGY | Facility: CLINIC | Age: 46
End: 2021-05-26

## 2021-05-26 ENCOUNTER — OFFICE VISIT (OUTPATIENT)
Dept: HEMATOLOGY ONCOLOGY | Facility: CLINIC | Age: 46
End: 2021-05-26
Payer: COMMERCIAL

## 2021-05-26 ENCOUNTER — PATIENT OUTREACH (OUTPATIENT)
Dept: CASE MANAGEMENT | Facility: HOSPITAL | Age: 46
End: 2021-05-26

## 2021-05-26 VITALS
RESPIRATION RATE: 16 BRPM | WEIGHT: 144 LBS | OXYGEN SATURATION: 98 % | HEART RATE: 78 BPM | SYSTOLIC BLOOD PRESSURE: 106 MMHG | DIASTOLIC BLOOD PRESSURE: 68 MMHG | HEIGHT: 64 IN | BODY MASS INDEX: 24.59 KG/M2

## 2021-05-26 DIAGNOSIS — C90.00 MULTIPLE MYELOMA, REMISSION STATUS UNSPECIFIED (HCC): Primary | ICD-10-CM

## 2021-05-26 DIAGNOSIS — D70.9 NEUTROPENIA, UNSPECIFIED TYPE (HCC): ICD-10-CM

## 2021-05-26 DIAGNOSIS — Z51.81 MEDICATION MONITORING ENCOUNTER: ICD-10-CM

## 2021-05-26 PROCEDURE — 99214 OFFICE O/P EST MOD 30 MIN: CPT | Performed by: INTERNAL MEDICINE

## 2021-05-26 PROCEDURE — 1036F TOBACCO NON-USER: CPT | Performed by: INTERNAL MEDICINE

## 2021-05-26 PROCEDURE — 3008F BODY MASS INDEX DOCD: CPT | Performed by: INTERNAL MEDICINE

## 2021-05-26 RX ORDER — DEXAMETHASONE 4 MG/1
4 TABLET ORAL
Qty: 20 TABLET | Refills: 4 | Status: SHIPPED | OUTPATIENT
Start: 2021-05-26 | End: 2021-05-26

## 2021-05-26 RX ORDER — DEXAMETHASONE 4 MG/1
4 TABLET ORAL
Qty: 20 TABLET | Refills: 4 | Status: SHIPPED | OUTPATIENT
Start: 2021-05-26 | End: 2021-10-24

## 2021-05-26 NOTE — PROGRESS NOTES
LSW received DT and problem list via referral process  Pt self scored 7/10 and noted concerns with work, fears, nervousness, sadness, worry, loss of interest in normal activities and 3/22 physical concerns,     LSW reached out to pt to review DT and offer support  Pt stated she is doing well and the day she completed the DT she was stressed with work '    LSW encouraged pt to reach out if needs arise, pt agreed

## 2021-05-26 NOTE — TELEPHONE ENCOUNTER
Call from pharmacist at [facility] script received with 2 sets of directions  Clarified prescription to state  Take 5 tablets by mouth (20mg total) once a week  Dispense: 20 tablet;  Refill: 4    FYI to Dr Nikky Salinas RNs

## 2021-05-26 NOTE — PROGRESS NOTES
HEMATOLOGY / ONCOLOGY CLINIC NOTE    Primary Care Provider: Levi Angel MD  Referring Provider:    MRN: [id number]  : 1975    Assessment / Plan:     1  Multiple myeloma, remission status unspecified ([facility] [address] )  2  Medication monitoring encounter    Patient tolerating Revlimid 5 mg well  She is also taking dexamethasone 20 mg once a week  M spike as of 2021, is 0 58  As previously stated, NCCN guidelines suggest relapse is a 0 5 g increase from the lowest M spike which for this patient is 0 28 from 2019  CMP, CBC stable  No bone pain  No constitutional symptoms  I will refill her dexamethasone  Due to her chronic steroid usage, we will order a DEXA scan to ensure no osteoporosis seen  Patient follows [facility] doctor at the beginning of September  Due to patient preference, she would like to follow us beginning of October since her visit with Jammie Nascimento is beginning of September  She will continue having lab work every 3 months as ordered by Dr Zaida Bryan     - Ambulatory referral to social work care management program; Future  - DXA bone density spine hip and pelvis; Future  - dexamethasone (DECADRON) 4 mg tablet; Take 1 tablet (4 mg total) by mouth daily with breakfast Take 5 tablets by mouth (20mg total) once a week  Dispense: 20 tablet; Refill: 4    3  Neutropenia, unspecified type ([facility] [address] )  - resolved    Patient voiced understanding and agreement to the above  The patient knows to call the office with any questions or concerns regarding the above  I have spent 30 minutes with Patient  today in which greater than 50% of this time was spent in counseling/coordination of care regarding Diagnostic results, Intructions for management, Patient and family education, Importance of tx compliance, Risk factor reductions and Impressions  Reason for visit:       Chief Complaint   Patient presents with    Follow-up       History of Hematology / Oncology Illness:     Mikaela Ramos is a 48 y o  female who came in to follow up  High risk Smoldering MM: normal cytogenetics = standard risk myeloma per ISS      Had a good response to induction, s/p maphlan induction and stem cell  Transplant in 2018,   Now on Revlimid maintenance         - BM : 2017 : 25% plasma cell     - M spike : igG lambda, 2 5 g; IgG > 3000  2000 mg in 2016     - normal cytogenetics   - NO CRAB : bone survey in 2016 and cervical and thoracic spine MRI in May of 2017, with no bone lesions identified        overall prognosis discussed with pt; high risk SMM; need MM therapy  1) induction by VRd; 2) stem cell / auto transplant 3) maintenance therapy        induction : VRd:    - Velcade : 1 3 mg / m2 SC injection D 1, 8, 15 / 28d   - Revlimid : 25 mg po daily D1-d   - Dex 40 mg po D1, 8, 15 / 28d   - supportive care : allopurinol 100 mg po daily x 1 m then stop ; AS A 81 mg po daily once on Revlimid  ; Acyclovir 400 mg po bid           C # 1 : start Vd on  ; Revlimid for 12 days : stop on  ( end of week 3 )  C # 2:  start on  - 3/7 ( week off from 3/8-)  C # 3: 3/14, 3/21, 3/28,   revlimid : D1-21: 3/14-4/3   off -4/10  C# 4:  , ,          revlimid : D1-21: -  : Autologous stem cell transplant in [facility] with melphalan  No major complications  Hospitalized for 3 weeks    2018:  Start maintenance Revlimid 5  mg daily         2018:  Received proper posttransplant vaccination by PCP per patient      M spike 0 3g/dL (2018) , un-detectable  ( 2019 ),   0 4 ( 2019)  0 49 ( 2020) 0 39 (2020) 0 62 ( 2020) 0 5 ( 2021) 0 58 (2021)    ECO - Symptomatic but completely ambulatory    Interval History:   ": reported developed diffuse skin rash x 2 w, initially on her chest and itchy, now more diffuse and not itchiness anymore  No LAD, no infection, no abx recently  : doing well   Still having fatigue, numbness tingling of hands / lower ext is better ( from Ms)     4/11: Came in for follow-up  Has worsening of chronic back pain  Receiving oxycodone  With partial release  Has appointment with spine specialist at of this month         11/14:  Came in for follow-up after stem cell transplant  Reported doing well, no major side effect complications from transplant  Overall recovered to baseline  Body weight is stable, still have some nonspecific bilateral upper extremity numbness and tingling      12/12 :   Came in for follow-up  Reported doing well  Has been about 6 months after transplant   -   Reported there is a small eyelid lump  -  Reported for the past 3 months developed right hand numbness and tingling  This is a chronic issue, patient has been having this for years, for the 1st half of this year symptom has been better, for the past 3 months getting worse         2/6/2019:  Came in for follow-up  Reported doing well, no major issues  Periodically feels nausea, no vomiting   - neuropathy remains the same   - Has significant mental stress while taking Revlimid, reported that reminds her about the history of cancer  She has been off Revlimid for 2 weeks now      5/15/2019:  Patient came in for follow-up  - reported for the past 2 weeks has gradually developed left upper quadrant abdominal pain, and associated with nausea, has no correlation with food intake or bowel movements  Symptoms usually last for days and then will gradually subside not completely resolve  No skin color change, no vomiting, no diarrhea, afebrile  No prior similar episodes  -  very fatigued, requesting to limit work hours  - no frequent infection  Body weight stable, no skin rash      12/4/2019 :  Came for follow-up  Reported doing well, no infection  No constitutional symptoms       2/5/2020 :   Came in for follow-up, doing well  No constitutional symptoms    Recently evaluated by transplant team in [facility], patient was advised to start dexamethasone        5/13/2020 : Came in  follow-up  Overall doing okay  No constitutional symptoms  Chronic joint pain  Biggest concern is the poor sleep after taking steroid  No infections      11/11/2020: Came in follow-up, overall patient doing okay  In August or September patient feeling extremely fatigued, patient have to take 4 weeks of, she is not feeling much better  She has been compliant with Revlimid 5 mg  Patient was followed by transplant team in [facility] ;  Per patient her MS is under control  She is due MRI every 6 months      2/24/2021:  Patient came in follow-up subjectively doing okay body weight stable no other constitutional symptoms  Patient to following transplant doctor in [facility]"    5/26/2021:  Patient came in for follow-up  Denies any constitutional symptoms  Has complete of mild fatigue, still able to carry out ADLs  She is following the bone marrow transplant doctor in [facility]  Problem list:       Patient Active Problem List   Diagnosis    Multiple myeloma not having achieved remission ([facility] [address] )    Carpal tunnel syndrome, bilateral    Lumbar disc herniation with radiculopathy    Spasm of lumbar paraspinous muscle    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HCC)    Multiple sclerosis (HCC)    Insomnia    Uterine leiomyoma    Other long term (current) drug therapy    DDD (degenerative disc disease), lumbar    Neuropathy    Adrenal hyperplasia ([facility] [address] )       REVIEW OF SYMPTOMS:   Review of Systems   Constitutional: Positive for fatigue (mild)  Negative for activity change, appetite change, chills, diaphoresis, fever and unexpected weight change  HENT: Negative for mouth sores and nosebleeds  Eyes: Negative for visual disturbance  Respiratory: Negative for apnea, cough, choking, chest tightness and shortness of breath  Cardiovascular: Negative for chest pain, palpitations and leg swelling     Gastrointestinal: Negative for abdominal pain, anal bleeding, blood in stool, constipation, diarrhea, nausea and vomiting  Endocrine: Negative for cold intolerance  Genitourinary: Negative for hematuria, menstrual problem and vaginal bleeding  Musculoskeletal: Negative for arthralgias  Skin: Negative for color change, pallor and rash  Neurological: Negative for dizziness, syncope, light-headedness and headaches  Hematological: Negative for adenopathy  Does not bruise/bleed easily  Psychiatric/Behavioral: Negative for sleep disturbance  PHYSICAL EXAMINATION:     Vital Signs:   /68 (BP Location: Left arm, Patient Position: Sitting, Cuff Size: Adult)   Pulse 78   Resp 16   Ht 5' 4" (1 626 m)   Wt 65 3 kg (144 lb)   SpO2 98%   BMI 24 72 kg/m²   Body surface area is 1 7 meters squared  Ht Readings from Last 3 Encounters:   05/26/21 5' 4" (1 626 m)   02/24/21 5' 4" (1 626 m)   01/22/21 5' 4" (1 626 m)       Wt Readings from Last 3 Encounters:   05/26/21 65 3 kg (144 lb)   02/24/21 65 1 kg (143 lb 8 oz)   01/22/21 60 8 kg (134 lb)          Physical Exam  Constitutional:       General: She is not in acute distress  Appearance: Normal appearance  She is normal weight  She is not ill-appearing, toxic-appearing or diaphoretic  HENT:      Head: Normocephalic and atraumatic  Eyes:      General: No scleral icterus  Extraocular Movements: Extraocular movements intact  Conjunctiva/sclera: Conjunctivae normal       Pupils: Pupils are equal, round, and reactive to light  Neck:      Musculoskeletal: Normal range of motion and neck supple  Cardiovascular:      Rate and Rhythm: Normal rate and regular rhythm  Heart sounds: Normal heart sounds  No murmur  Pulmonary:      Effort: Pulmonary effort is normal  No respiratory distress  Breath sounds: Normal breath sounds  No stridor  No wheezing, rhonchi or rales  Abdominal:      Palpations: Abdomen is soft  Tenderness: There is no abdominal tenderness  Musculoskeletal: Normal range of motion  General: No tenderness  Right lower leg: No edema  Left lower leg: No edema  Lymphadenopathy:      Cervical: No cervical adenopathy  Skin:     General: Skin is warm and dry  Coloration: Skin is not jaundiced or pale  Findings: No erythema or rash  Neurological:      General: No focal deficit present  Mental Status: She is alert and oriented to person, place, and time  Mental status is at baseline  Cranial Nerves: No cranial nerve deficit  Motor: No weakness  Psychiatric:         Mood and Affect: Mood normal          Behavior: Behavior normal          Thought Content: Thought content normal          Judgment: Judgment normal        Reviewed historical information          PAST MEDICAL HISTORY:    Past Medical History:   Diagnosis Date    Back pain     pinched nerve in back    CTS (carpal tunnel syndrome)     right wrist    Multiple myeloma (HCC)     chemotherapy    [facility] myeloma ([facility] [address] [facility]     Multiple sclerosis ([facility] [address] )     skin lesion     mole removed from face       PAST SURGICAL HISTORY:    Past Surgical History:   Procedure Laterality Date     SECTION      LIMBAL STEM CELL TRANSPLANT  2018         CURRENT MEDICATIONS:     Current Outpatient Medications:     aspirin (ECOTRIN LOW STRENGTH) 81 mg EC tablet, Take 81 mg by mouth daily, Disp: , Rfl:     Calcium Carb-Cholecalciferol (CALCIUM 1000 + D PO), Take by mouth, Disp: , Rfl:     Cyanocobalamin (VITAMIN B 12 PO), Take 1 tablet by mouth daily, Disp: , Rfl:     HM VITAMIN D3 2000 units CAPS, Take by mouth, Disp: , Rfl:     lenalidomide (REVLIMID) 5 MG CAPS, Take 1 capsule (5 mg total) by mouth daily, Disp: 28 capsule, Rfl: 0    SOCIAL HISTORY:    Social History     Tobacco Use    Smoking status: Never Smoker    Smokeless tobacco: Never Used   Substance Use Topics    Alcohol use: No    Drug use: No       FAMILY HISTORY:    Family History   Problem Relation Age of Onset    No Known Problems Mother     Coronary artery disease Father     No Known Problems Sister     No Known Problems Brother     No Known Problems Daughter     No Known Problems Son     No Known Problems Brother        ALLERGIES:  No Known Allergies      LAB:    Lab Results   Component Value Date    WBC 5 62 05/08/2021    HGB 12 4 05/08/2021    HCT 39 0 05/08/2021    MCV 97 05/08/2021     05/08/2021       Lab Results   Component Value Date     08/30/2017    SODIUM 142 05/08/2021    K 3 4 (L) 05/08/2021     (H) 05/08/2021    CO2 27 05/08/2021    AGAP 3 (L) 05/08/2021    BUN 14 05/08/2021    CREATININE 0 61 05/08/2021    GLUC 84 02/10/2021    GLUF 85 05/08/2021    CALCIUM 8 6 05/08/2021    AST 12 05/08/2021    ALT 31 05/08/2021    ALKPHOS 88 05/08/2021    PROT 8 3 (H) 08/30/2017    TP 6 8 05/08/2021    TP 6 7 05/08/2021    BILITOT 0 4 08/30/2017    TBILI 0 75 05/08/2021    EGFR 108 05/08/2021       IMAGING:  FL spine and pain procedure  Pre-procedure Diagnosis:   1  Lumbar disc herniation with radiculopathy      Post-procedure Diagnosis:   1  Lumbar disc herniation with radiculopathy      Procedure Title(s):  1  Right L4 transforaminal epidural steroid   injection 2  Right L5 transforaminal epidural steroid injection  Attending Surgeon:   Yael Santizo MD  Anesthesia:   Local     Indications: The patient is a 50y o  year-old female with a diagnosis of   1  Lumbar disc herniation with radiculopathy     The patient's history and physical exam were reviewed  The risks,   benefits and alternatives to the procedure were discussed, and all   questions were answered to the patient's satisfaction  The patient agreed   to proceed, and written informed consent was obtained  Procedure in Detail: The patient was brought into the procedure room and   placed in the prone position on the fluoroscopy table  The area of the   lumbar spine was prepped with chloraprep solution  then draped in a   sterile manner      The L4 vertebral body was identified with AP fluoroscopy  An oblique view   to the right was obtained to better visualize the inferior junction of the   pedicle and transverse process  The 6 o'clock position of the pedicle was   marked and identified  The skin and subcutaneous tissues in the area were   anesthetized with 1% lidocaine  A 22-gauge, 3 5 inch needle was directed   toward the targeted point under fluoroscopy until bone was contacted  The   needle was then walked inferiorly until the neural foramen was entered  A   lateral fluoroscopic view was then used to place the needle tip at the 10   o'clock position of the foramen  The same procedure was repeated for the right L5 level  Negative aspiration was confirmed, and 1 ml Omnipaque 300 was injected at   each level  Appropriate neurograms were observed under AP fluoroscopy  Digital subtraction angiography was performed showing no vascular uptake   and appropriate spread in the epidural space  Then, after negative   aspiration, a solution consisting of 1 mL 0 25% bupivacaine and 0 5 mL   depo-medrol (80mg/mL) was easily injected at each level  The needles were   removed with a 1% lidocaine flush  The patient's back was cleaned and a   bandage was placed over the needle insertion points  Disposition: The patient tolerated the procedure well, and there were no   apparent complications  The patient was taken to the recovery area where   written discharge instructions for the procedure were given       Estimated Blood Loss: None  Specimens Obtained: N/A

## 2021-05-27 ENCOUNTER — IMMUNIZATIONS (OUTPATIENT)
Dept: FAMILY MEDICINE CLINIC | Facility: HOSPITAL | Age: 46
End: 2021-05-27

## 2021-05-27 DIAGNOSIS — Z23 ENCOUNTER FOR IMMUNIZATION: Primary | ICD-10-CM

## 2021-05-27 PROCEDURE — 91300 SARS-COV-2 / COVID-19 MRNA VACCINE (PFIZER-BIONTECH) 30 MCG: CPT

## 2021-05-27 PROCEDURE — 0002A SARS-COV-2 / COVID-19 MRNA VACCINE (PFIZER-BIONTECH) 30 MCG: CPT

## 2021-06-02 ENCOUNTER — HOSPITAL ENCOUNTER (OUTPATIENT)
Dept: BONE DENSITY | Facility: HOSPITAL | Age: 46
Discharge: HOME | End: 2021-06-02
Payer: COMMERCIAL

## 2021-06-02 DIAGNOSIS — Z51.81 MEDICATION MONITORING ENCOUNTER: ICD-10-CM

## 2021-06-02 DIAGNOSIS — C90.00 MULTIPLE MYELOMA, REMISSION STATUS UNSPECIFIED (HCC): ICD-10-CM

## 2021-06-02 PROCEDURE — 77080 DXA BONE DENSITY AXIAL: CPT

## 2021-06-06 DIAGNOSIS — C90.01 MULTIPLE MYELOMA IN REMISSION (HCC): ICD-10-CM

## 2021-06-07 ENCOUNTER — APPOINTMENT (OUTPATIENT)
Dept: LAB | Facility: CLINIC | Age: 46
End: 2021-06-07
Payer: COMMERCIAL

## 2021-06-07 ENCOUNTER — TELEPHONE (OUTPATIENT)
Dept: HEMATOLOGY ONCOLOGY | Facility: CLINIC | Age: 46
End: 2021-06-07

## 2021-06-07 RX ORDER — LENALIDOMIDE 5 MG/1
CAPSULE ORAL
Qty: 28 CAPSULE | Refills: 0 | Status: SHIPPED | OUTPATIENT
Start: 2021-06-07 | End: 2021-06-08 | Stop reason: SDUPTHER

## 2021-06-07 NOTE — TELEPHONE ENCOUNTER
Ty from the lab was calling to verify that there is a standing HCG lab order in epic  Reviewed with her that lab is q 4 weeks x 12 entered on 5/14/21  Ty verified  Dr Candie Ndiaye RNs notified as Caron Elias

## 2021-06-08 ENCOUNTER — TELEPHONE (OUTPATIENT)
Dept: HEMATOLOGY ONCOLOGY | Facility: CLINIC | Age: 46
End: 2021-06-08

## 2021-06-08 DIAGNOSIS — C90.01 MULTIPLE MYELOMA IN REMISSION (HCC): ICD-10-CM

## 2021-06-08 RX ORDER — LENALIDOMIDE 5 MG/1
5 CAPSULE ORAL DAILY
Qty: 28 CAPSULE | Refills: 0 | Status: SHIPPED | OUTPATIENT
Start: 2021-06-08 | End: 2021-06-10

## 2021-06-08 NOTE — TELEPHONE ENCOUNTER
Saad calling for [facility] number  Rx was sent yesterday    Will forward to RN to obtain auth number and re send Rx  Call back number [phone]

## 2021-06-09 DIAGNOSIS — C90.01 MULTIPLE MYELOMA IN REMISSION (HCC): ICD-10-CM

## 2021-06-10 RX ORDER — LENALIDOMIDE 5 MG/1
CAPSULE ORAL
Qty: 28 CAPSULE | Refills: 0 | Status: SHIPPED | OUTPATIENT
Start: 2021-06-10 | End: 2021-07-08 | Stop reason: SDUPTHER

## 2021-06-23 ENCOUNTER — TELEPHONE (OUTPATIENT)
Dept: HEMATOLOGY ONCOLOGY | Facility: CLINIC | Age: 46
End: 2021-06-23

## 2021-06-23 ENCOUNTER — OFFICE VISIT (OUTPATIENT)
Dept: NEUROLOGY | Facility: CLINIC | Age: 46
End: 2021-06-23
Payer: COMMERCIAL

## 2021-06-23 VITALS — DIASTOLIC BLOOD PRESSURE: 62 MMHG | SYSTOLIC BLOOD PRESSURE: 108 MMHG | HEART RATE: 64 BPM

## 2021-06-23 DIAGNOSIS — G47.00 INSOMNIA, UNSPECIFIED TYPE: Primary | ICD-10-CM

## 2021-06-23 PROCEDURE — 99213 OFFICE O/P EST LOW 20 MIN: CPT | Performed by: PSYCHIATRY & NEUROLOGY

## 2021-06-23 NOTE — TELEPHONE ENCOUNTER
Called patient  Mild Osteopenia T score -1 2  Patient is on chronic steroids prescribed by [facility] bone marrow transplant specialist  I informed patient to ask physician there if she can come off of steroid treatment regarding her MM  From our standpoint, she does not need to take this  I also encouraged her to consistently take Vitamin D + Calcium in the meantime  Patient understands & agrees with plan

## 2021-06-23 NOTE — ASSESSMENT & PLAN NOTE
Pt remains under ms surviellance  Pt last seen at [place] in march  Updated mri head and c spine imaging stable  No new lesions  Pt with prior HSCT therapy which may have also helped with subclinical MS treatment  Pt remains clinically stable  Exam stable  Next due in sept for mri series  Pt to follow up in oct  Pt to call for any new sxs  Fatigue biggest issue  Poor sleep  eds and freq nocturnal awakneings  rec sleep consult

## 2021-06-23 NOTE — PROGRESS NOTES
Patient ID: Amanda Dias is a 48 y o  female  Assessment/Plan:    Multiple sclerosis (HCC)  Pt remains under ms surviellance  Pt last seen at [place] in march  Updated mri head and c spine imaging stable  No new lesions  Pt with prior HSCT therapy which may have also helped with subclinical MS treatment  Pt remains clinically stable  Exam stable  Next due in sept for mri series  Pt to follow up in oct  Pt to call for any new sxs  Fatigue biggest issue  Poor sleep  eds and freq nocturnal awakneings  rec sleep consult       Diagnoses and all orders for this visit:    Insomnia, unspecified type  -     Ambulatory referral to Sleep Medicine; Future           Subjective:    HPI      HPI: Patient is a 50 year old female with unremarkable PMH who presented in November 2016 for MS eval pt last seen here 7/6/20 by me    Per my last visit " Patient initially seen by hematology, Dr Marquis Mcgrath, after being found to have elevated globulin and abnormal SPEP on routine labs  She was diagnosed with IgG lambda MGUS  Due to mentioning paresthesias of her chin and tongue, she was referred to neurology  Patient has seen Dr Nadeen Martino at [facility] for her prior neurologic care  Patient states she did not return to that practice due to complications after a LP and not getting a leave of absence note from their office  She had a post-LP headache and not able to work for about a week after the LP  Patient has noted paresthesias in the arms and legs at times as well  LP completed July 2016  Glucose 64, 0 cells, 1 RBC, Lyme PCR neg, IgG synthesis -5 8, MBP <2, 1 paired band in serum and CSF  Brain W/WO 7/11/16: There are multiple scattered foci of nonenhancing T2 signal hyperintensity at the pericallosal and deep subcortical white matter, having a parallel orientation towards the midline  reminiscent of Schwab's fingers and are in keeping with patients stated history of Multiple Sclerosis  re rev MRI Cervical W/Wo 7/11/16:  The cervical cord caliber and signal intensity are normally maintained  There is no abnormal focus of enhancement within the cord  No manifestation of multiple sclerosis are demonstrated at the cervical cord  Moderate multilevel degenerative changes are demonstrated  At C4/C5 there is mild narrowing of the central canal  At C5-C6, C6-C7 there is moderate narrowing of the central canal impinging upon the ventral surface of the descending cervical cord  re rev MRI Thoracic W/WO 7/11/16: Unremarkable evaluation of the thoracic spine and thoracic cord  Patient was diagnosed with MS by previous neurologist but chose not to start on treatment and get a second opinion  Jennifer Celestin above records due to importance of pmh and work up done to date    Patient was started on Copaxone by our office in December 2016    Pt had done well with medication as brain necessary   pt also with history of mild right cts and borderline left cts on prior emg eval from march 2018  Pt also followed for acute right L5 radiculopathy as well and sent to pain mx  At timing of prior visit, pt also dx with multiple  Myeloma   Pt is following both at [facility] as well as [facility] for her care  Pt is following with dr Sera Rios from [facility] from heme / onc and also went to [facility] for stem cell transplantion   Pt had compeltion of chemo in may 2018  Pt had stem cell transplant in June and was treated with cytoxan and also mesna  Pt now currently on revlimid  Pt had clearance re her ms meds with [facility] dr parisi and copaxone was stopped prior to stem cell   Pt remains off imd med due to benign course to date" and last HSCT also helpful for MS  Re rev all above with pt   kept above paragraph due to complexities of pt case  overall neurologically pt doing well  No new sxs  Pt denies any new sxs  No falls or trips  No change in bowel or bladder  No LOC, no seizure  No change in speech or swallowing  No change in vision  No loss of vision  No diplopia    No loss of vision  No headache, no nausea or vomiting  No recent hospitalizations  No recent infections  No fever or chills  Pt has completed her pfizer covid vaccination and did well  Pt remains off imd meds  No new ms pathology or sxs noted today  Pt had updated mri head and c spine imaging done in march at [place]  Studies reviewed and no new lesions identified  Also rev note from consultation at [facility]  Pt to continue under demylinating surviellance by clinical and rads evals  Pt next due for imaging in sept and follow up at our office in oct and follow up at [facility] in march 2022  Pt notes fatigue as biggest issue  rec discussion with oncology team as well  Pt with poor sleep, eds and freq nocturnal awakenings  rec sleep eval for any underlying structural abn  Pt is in agreement and referral placed  No new radicular or myelopathic sxs today  Pt well aware of covid precautions shara with being on steroid therapy for past 2 yrs            The following portions of the patient's history were reviewed and updated as appropriate: allergies, current medications, past family history, past medical history, past social history, past surgical history and problem list and med rec and ros rev  Objective:    Blood pressure 108/62, pulse 64, not currently breastfeeding  Physical Exam  Constitutional:       General: She is not in acute distress  Appearance: She is not ill-appearing  Eyes:      General: Lids are normal       Extraocular Movements: Extraocular movements intact  Pupils: Pupils are equal, round, and reactive to light  Musculoskeletal:      Right lower leg: No edema  Left lower leg: No edema  Neurological:      Mental Status: She is alert  Deep Tendon Reflexes: Strength normal and reflexes are normal and symmetric  Psychiatric:         Speech: Speech normal          Neurological Exam  Mental Status  Alert  Recent and remote memory are intact   Speech is normal  Language is fluent with no aphasia  Attention and concentration are normal  Fund of knowledge is appropriate for level of education  Cranial Nerves  CN II: Visual acuity is normal  Visual fields full to confrontation  Right funduscopic exam: disc intact  Left funduscopic exam: disc intact  CN III, IV, VI: Extraocular movements intact bilaterally  Normal lids and orbits bilaterally  Pupils equal round and reactive to light bilaterally  CN V: Facial sensation is normal   CN VII: Full and symmetric facial movement  CN VIII: Hearing is normal   CN IX, X: Palate elevates symmetrically  Normal gag reflex  CN XI: Shoulder shrug strength is normal   CN XII: Tongue midline without atrophy or fasciculations  Motor  Normal muscle bulk throughout  Normal muscle tone  No abnormal involuntary movements  Strength is 5/5 throughout all four extremities  Sensory  Sensation is intact to light touch, pinprick, vibration and proprioception in all four extremities  Reflexes  Deep tendon reflexes are 2+ and symmetric in all four extremities with downgoing toes bilaterally  Coordination  Right: Finger-to-nose normal  Rapid alternating movement normal   Left: Finger-to-nose normal  Heel-to-shin normal     Gait  Casual gait is normal including stance, stride, and arm swing  ROS:    Review of Systems   Constitutional: Positive for fatigue  Negative for appetite change and fever  HENT: Negative  Negative for hearing loss, tinnitus, trouble swallowing and voice change  Eyes: Negative  Negative for photophobia and pain  Respiratory: Negative  Negative for shortness of breath  Cardiovascular: Negative  Negative for palpitations  Gastrointestinal: Negative  Negative for nausea and vomiting  Endocrine: Negative  Negative for cold intolerance  Genitourinary: Negative  Negative for dysuria, frequency and urgency  Musculoskeletal: Negative  Negative for myalgias and neck pain  Skin: Negative  Negative for rash  Neurological: Negative  Negative for dizziness, tremors, seizures, syncope, facial asymmetry, speech difficulty, weakness, light-headedness, numbness and headaches  Hematological: Negative  Does not bruise/bleed easily  Psychiatric/Behavioral: Positive for sleep disturbance  Negative for confusion and hallucinations

## 2021-07-06 ENCOUNTER — APPOINTMENT (OUTPATIENT)
Dept: LAB | Age: 46
End: 2021-07-06
Payer: COMMERCIAL

## 2021-07-08 ENCOUNTER — TELEPHONE (OUTPATIENT)
Dept: HEMATOLOGY ONCOLOGY | Facility: CLINIC | Age: 46
End: 2021-07-08

## 2021-07-08 ENCOUNTER — OFFICE VISIT (OUTPATIENT)
Dept: FAMILY MEDICINE CLINIC | Facility: CLINIC | Age: 46
End: 2021-07-08
Payer: COMMERCIAL

## 2021-07-08 VITALS
WEIGHT: 143 LBS | TEMPERATURE: 98 F | HEIGHT: 64 IN | BODY MASS INDEX: 24.41 KG/M2 | SYSTOLIC BLOOD PRESSURE: 120 MMHG | HEART RATE: 73 BPM | OXYGEN SATURATION: 99 % | DIASTOLIC BLOOD PRESSURE: 80 MMHG

## 2021-07-08 DIAGNOSIS — Z00.00 ANNUAL PHYSICAL EXAM: Primary | ICD-10-CM

## 2021-07-08 DIAGNOSIS — F51.01 PRIMARY INSOMNIA: ICD-10-CM

## 2021-07-08 DIAGNOSIS — E53.8 B12 DEFICIENCY: ICD-10-CM

## 2021-07-08 DIAGNOSIS — Z11.59 NEED FOR HEPATITIS C SCREENING TEST: ICD-10-CM

## 2021-07-08 DIAGNOSIS — C90.01 MULTIPLE MYELOMA IN REMISSION (HCC): Primary | ICD-10-CM

## 2021-07-08 DIAGNOSIS — Z13.220 LIPID SCREENING: ICD-10-CM

## 2021-07-08 DIAGNOSIS — R63.5 WEIGHT GAIN: ICD-10-CM

## 2021-07-08 DIAGNOSIS — Z12.31 ENCOUNTER FOR SCREENING MAMMOGRAM FOR BREAST CANCER: ICD-10-CM

## 2021-07-08 DIAGNOSIS — E55.9 VITAMIN D DEFICIENCY: ICD-10-CM

## 2021-07-08 PROCEDURE — 1036F TOBACCO NON-USER: CPT | Performed by: FAMILY MEDICINE

## 2021-07-08 PROCEDURE — 99396 PREV VISIT EST AGE 40-64: CPT | Performed by: FAMILY MEDICINE

## 2021-07-08 PROCEDURE — 3725F SCREEN DEPRESSION PERFORMED: CPT | Performed by: FAMILY MEDICINE

## 2021-07-08 PROCEDURE — 3008F BODY MASS INDEX DOCD: CPT | Performed by: FAMILY MEDICINE

## 2021-07-08 RX ORDER — ZOLPIDEM TARTRATE 5 MG/1
5 TABLET ORAL
Qty: 30 TABLET | Refills: 0 | Status: SHIPPED | OUTPATIENT
Start: 2021-07-08 | End: 2022-04-11

## 2021-07-08 RX ORDER — LENALIDOMIDE 5 MG/1
5 CAPSULE ORAL DAILY
Qty: 28 CAPSULE | Refills: 0 | Status: SHIPPED | OUTPATIENT
Start: 2021-07-08 | End: 2021-08-01

## 2021-07-08 NOTE — PROGRESS NOTES
[facility]    NAME: Rita Felix  AGE: 48 y o  SEX: female  : 1975     DATE: 2021     Assessment and Plan:     Problem List Items Addressed This Visit        Other    Insomnia    Relevant Medications    zolpidem (AMBIEN) 5 mg tablet    Vitamin D deficiency    Relevant Orders    Vitamin D 25 hydroxy    Weight gain    Relevant Orders    TSH, 3rd generation with Free T4 reflex      Other Visit Diagnoses     Annual physical exam    -  Primary    Encounter for screening mammogram for breast cancer        Relevant Orders    Mammo screening bilateral w 3d & cad    B12 deficiency        Relevant Orders    Vitamin B12    Lipid screening        Relevant Orders    Lipid Panel with Direct LDL reflex    Need for hepatitis C screening test        Relevant Orders    Hepatitis C antibody          Immunizations and preventive care screenings were discussed with patient today  Appropriate education was printed on patient's after visit summary  Counseling:  Alcohol/drug use: discussed moderation in alcohol intake, the recommendations for healthy alcohol use, and avoidance of illicit drug use  Dental Health: discussed importance of regular tooth brushing, flossing, and dental visits  · Exercise: the importance of regular exercise/physical activity was discussed  Recommend exercise 3-5 times per week for at least 30 minutes  · Update labs   · Ambien to use PRN for insomnia  Discussed common side effects and that this is not intended for regular or long term use  Return in about 1 year (around 2022) for Annual physical      Chief Complaint:     Chief Complaint   Patient presents with    Physical Exam     Patient seen in office today for a yearly PE      History of Present Illness:     Adult Annual Physical   Patient here for a comprehensive physical exam  The patient reports problems - fatigue  Does not sleep well  Wakes up frequently throughout the night  She has a h/o myeloma and MS and feels that the medications make her tired  She has also gained about 10# in the past year  CBC, CMP reviewed from 5/8/21  Diet and Physical Activity  · Diet/Nutrition: well balanced diet and consuming 3-5 servings of fruits/vegetables daily  · Exercise: no formal exercise  Depression Screening  PHQ-9 Depression Screening    PHQ-9:   Frequency of the following problems over the past two weeks:      Little interest or pleasure in doing things: 0 - not at all  Feeling down, depressed, or hopeless: 0 - not at all  PHQ-2 Score: 0       General Health  · Sleep: gets 1-3 hours of sleep on average  · Hearing: no issues  · Vision: no vision problems  · Dental: regular dental visits and brushes teeth twice daily  /GYN Health  · Patient is: postmenopausal       Review of Systems:     Review of Systems   Constitutional: Positive for fatigue  Negative for activity change, appetite change, chills, fever and unexpected weight change  HENT: Negative for congestion, ear discharge, ear pain, postnasal drip, sinus pressure and sore throat  Eyes: Negative for discharge and visual disturbance  Respiratory: Negative for cough, chest tightness, shortness of breath and wheezing  Cardiovascular: Negative for chest pain, palpitations and leg swelling  Gastrointestinal: Negative for abdominal pain, constipation, diarrhea, nausea and vomiting  Endocrine: Negative for cold intolerance, heat intolerance, polydipsia and polyuria  Genitourinary: Negative for difficulty urinating and frequency  Musculoskeletal: Negative for arthralgias, back pain, joint swelling and myalgias  Skin: Negative for rash  Neurological: Negative for dizziness, weakness, light-headedness, numbness and headaches  Hematological: Negative for adenopathy  Psychiatric/Behavioral: Positive for sleep disturbance   Negative for behavioral problems, confusion, dysphoric mood and suicidal ideas  The patient is not nervous/anxious  Past Medical History:     Past Medical History:   Diagnosis Date    Back pain     pinched nerve in back    CTS (carpal tunnel syndrome)     right wrist    Multiple myeloma (HCC)     chemotherapy    [facility] [address] [facility]     Multiple sclerosis ([facility] [address] )     skin lesion     mole removed from face      Past Surgical History:     Past Surgical History:   Procedure Laterality Date     SECTION      LIMBAL STEM CELL TRANSPLANT  2018      Social History:     Social History     Socioeconomic History    Marital status: /Civil Union     Spouse name: None    Number of children: None    Years of education: None    Highest education level: None   Occupational History    None   Tobacco Use    Smoking status: Never Smoker    Smokeless tobacco: Never Used   Vaping Use    Vaping Use: Never used   Substance and Sexual Activity    Alcohol use: No    Drug use: No    Sexual activity: Yes     Partners: Male     Birth control/protection: Post-menopausal   Other Topics Concern    None   Social History Narrative    None     Social Determinants of Health     Financial Resource Strain:     Difficulty of Paying Living Expenses:    Food Insecurity:     Worried About Running Out of Food in the Last Year:     Ran Out of Food in the Last Year:    Transportation Needs:     Lack of Transportation (Medical):      Lack of Transportation (Non-Medical):    Physical Activity:     Days of Exercise per Week:     Minutes of Exercise per Session:    Stress:     Feeling of Stress :    Social Connections:     Frequency of Communication with Friends and Family:     Frequency of Social Gatherings with Friends and Family:     Attends [employer] Services:     Active Member of Clubs or Organizations:     Attends Club or Organization Meetings:     Marital Status:    Intimate Partner Violence:     Fear of Current or Ex-Partner:     Emotionally Abused:     Physically Abused:     Sexually Abused:       Family History:     Family History   Problem Relation Age of Onset    No Known Problems Mother     Coronary artery disease Father     No Known Problems Sister     No Known Problems Brother     No Known Problems Daughter     No Known Problems Son     No Known Problems Brother       Current Medications:     Current Outpatient Medications   Medication Sig Dispense Refill    aspirin (ECOTRIN LOW STRENGTH) 81 mg EC tablet Take 81 mg by mouth daily      Calcium Carb-Cholecalciferol (CALCIUM 1000 + D PO) Take by mouth      Cyanocobalamin (VITAMIN B 12 PO) Take 1 tablet by mouth daily      dexamethasone (DECADRON) 4 mg tablet Take 1 tablet (4 mg total) by mouth daily with breakfast Take 5 tablets by mouth (20mg total) once a week  20 tablet 4    HM VITAMIN D3 2000 units CAPS Take by mouth      lenalidomide (REVLIMID) 5 MG CAPS Take 1 capsule (5 mg total) by mouth daily 28 capsule 0    zolpidem (AMBIEN) 5 mg tablet Take 1 tablet (5 mg total) by mouth daily at bedtime as needed for sleep 30 tablet 0     No current facility-administered medications for this visit  Allergies:     No Known Allergies   Physical Exam:     /80 (BP Location: Left arm, Patient Position: Sitting, Cuff Size: Standard)   Pulse 73   Temp 98 °F (36 7 °C)   Ht 5' 4" (1 626 m)   Wt 64 9 kg (143 lb)   SpO2 99%   BMI 24 55 kg/m²     Physical Exam  Constitutional:       General: She is not in acute distress  Appearance: Normal appearance  She is well-developed  She is not ill-appearing, toxic-appearing or diaphoretic  HENT:      Head: Normocephalic and atraumatic  Right Ear: External ear normal       Left Ear: External ear normal       Mouth/Throat:      Pharynx: No oropharyngeal exudate  Eyes:      General: No scleral icterus  Right eye: No discharge  Left eye: No discharge        Conjunctiva/sclera: Conjunctivae normal  Pupils: Pupils are equal, round, and reactive to light  Neck:      Thyroid: No thyromegaly  Cardiovascular:      Rate and Rhythm: Normal rate and regular rhythm  Heart sounds: Normal heart sounds  No murmur heard  No friction rub  No gallop  Pulmonary:      Effort: Pulmonary effort is normal  No respiratory distress  Breath sounds: Normal breath sounds  No wheezing or rales  Chest:      Chest wall: No tenderness  Abdominal:      General: Bowel sounds are normal  There is no distension  Palpations: Abdomen is soft  There is no mass  Tenderness: There is no abdominal tenderness  There is no guarding or rebound  Hernia: No hernia is present  Lymphadenopathy:      Cervical: No cervical adenopathy  Skin:     General: Skin is warm  Findings: No rash  Neurological:      Mental Status: She is alert and oriented to person, place, and time  Cranial Nerves: No cranial nerve deficit  Psychiatric:         Mood and Affect: Mood normal          Behavior: Behavior normal          Thought Content:  Thought content normal          Judgment: Judgment normal           Gwenlyn Schwab, MD  [address] [place] [address]

## 2021-07-08 NOTE — PATIENT INSTRUCTIONS

## 2021-07-08 NOTE — TELEPHONE ENCOUNTER
Will forward Revlimid refill request to RN with Dr Eulalio Zarate  Patient is scheduled to see Ayaka Aguila in October

## 2021-07-08 NOTE — TELEPHONE ENCOUNTER
[facility] patents Revlimid  Scripted was pended to Dr Dion Pablo for signature   [place] #[id number]

## 2021-07-08 NOTE — TELEPHONE ENCOUNTER
Medication Refill     Who is Calling patient   Medication lenalidomide (Revlimid) 5 MG CAPS       How many pills left    Preferred Pharmacy / Address [facility] [place] [address]    Call back number [phone]   Relevant Information

## 2021-07-10 ENCOUNTER — APPOINTMENT (OUTPATIENT)
Dept: LAB | Facility: CLINIC | Age: 46
End: 2021-07-10
Payer: COMMERCIAL

## 2021-07-10 DIAGNOSIS — E53.8 B12 DEFICIENCY: ICD-10-CM

## 2021-07-10 DIAGNOSIS — Z11.59 NEED FOR HEPATITIS C SCREENING TEST: ICD-10-CM

## 2021-07-10 DIAGNOSIS — R63.5 WEIGHT GAIN: ICD-10-CM

## 2021-07-10 DIAGNOSIS — Z13.220 LIPID SCREENING: ICD-10-CM

## 2021-07-10 DIAGNOSIS — E55.9 VITAMIN D DEFICIENCY: ICD-10-CM

## 2021-07-10 LAB
25(OH)D3 SERPL-MCNC: 35.7 NG/ML (ref 30–100)
CHOLEST SERPL-MCNC: 217 MG/DL (ref 50–200)
HCV AB SER QL: NORMAL
HDLC SERPL-MCNC: 85 MG/DL
LDLC SERPL CALC-MCNC: 109 MG/DL (ref 0–100)
TRIGL SERPL-MCNC: 116 MG/DL
TSH SERPL DL<=0.05 MIU/L-ACNC: 1.77 UIU/ML (ref 0.36–3.74)
VIT B12 SERPL-MCNC: 662 PG/ML (ref 100–900)

## 2021-07-10 PROCEDURE — 80061 LIPID PANEL: CPT

## 2021-07-10 PROCEDURE — 82306 VITAMIN D 25 HYDROXY: CPT

## 2021-07-10 PROCEDURE — 36415 COLL VENOUS BLD VENIPUNCTURE: CPT

## 2021-07-10 PROCEDURE — 84443 ASSAY THYROID STIM HORMONE: CPT

## 2021-07-10 PROCEDURE — 82607 VITAMIN B-12: CPT

## 2021-07-10 PROCEDURE — 86803 HEPATITIS C AB TEST: CPT

## 2021-08-01 DIAGNOSIS — C90.01 MULTIPLE MYELOMA IN REMISSION (HCC): ICD-10-CM

## 2021-08-01 RX ORDER — LENALIDOMIDE 5 MG/1
CAPSULE ORAL
Qty: 28 CAPSULE | Refills: 0 | Status: SHIPPED | OUTPATIENT
Start: 2021-08-01 | End: 2021-08-05 | Stop reason: SDUPTHER

## 2021-08-02 ENCOUNTER — TELEPHONE (OUTPATIENT)
Dept: HEMATOLOGY ONCOLOGY | Facility: CLINIC | Age: 46
End: 2021-08-02

## 2021-08-02 NOTE — TELEPHONE ENCOUNTER
Pt needs updated pregnancy test before obtaining authorization number  Will wait for her to complete testing

## 2021-08-03 ENCOUNTER — TELEPHONE (OUTPATIENT)
Dept: HEMATOLOGY ONCOLOGY | Facility: CLINIC | Age: 46
End: 2021-08-03

## 2021-08-03 NOTE — TELEPHONE ENCOUNTER
Pt needs pregnancy test before authorization can be obtained  I called and spoke with patient, she will be going tomorrow to have her pregnancy test obtained  Will await for those results

## 2021-08-03 NOTE — TELEPHONE ENCOUNTER
Saad calling for [facility] Leather authorization number for Revlimid Rx sent 8/1/21  Will send to RN to obtain and give to pharmacy so they can fill     Call back number [phone]

## 2021-08-04 ENCOUNTER — APPOINTMENT (OUTPATIENT)
Dept: LAB | Facility: CLINIC | Age: 46
End: 2021-08-04
Payer: COMMERCIAL

## 2021-08-04 ENCOUNTER — TELEPHONE (OUTPATIENT)
Dept: HEMATOLOGY ONCOLOGY | Facility: CLINIC | Age: 46
End: 2021-08-04

## 2021-08-04 NOTE — TELEPHONE ENCOUNTER
Confirmed with [facility] that patient did not have her pregnancy test yet    Once results are obtained Rx will be sent with auth number

## 2021-08-05 ENCOUNTER — TELEPHONE (OUTPATIENT)
Dept: HEMATOLOGY ONCOLOGY | Facility: CLINIC | Age: 46
End: 2021-08-05

## 2021-08-05 DIAGNOSIS — C90.01 MULTIPLE MYELOMA IN REMISSION (HCC): Primary | ICD-10-CM

## 2021-08-05 RX ORDER — LENALIDOMIDE 5 MG/1
5 CAPSULE ORAL DAILY
Qty: 28 CAPSULE | Refills: 0 | Status: SHIPPED | OUTPATIENT
Start: 2021-08-05 | End: 2021-09-02 | Stop reason: SDUPTHER

## 2021-08-05 NOTE — TELEPHONE ENCOUNTER
Leona from [facility] [phone]  Pharmacist states Berto Cantu remains flagged as patient answered incorrectly to a survey question  Asked pharmacist to double check acc'd to previous notes but auth # is still flagged  Will send to Dr Teofilo Briggs RNs

## 2021-08-05 NOTE — TELEPHONE ENCOUNTER
Called and spoke with Noe from risk and compliance with [employer], he stated there is no hold on the authorization number as she had a negative pregnancy test yesterday

## 2021-08-05 NOTE — TELEPHONE ENCOUNTER
Call from [facility]   Patient's Revlimid survey was flagged and revlimid cannot be dispensed  Will send to Dr Pablo King RNs

## 2021-09-01 ENCOUNTER — TELEPHONE (OUTPATIENT)
Dept: HEMATOLOGY ONCOLOGY | Facility: CLINIC | Age: 46
End: 2021-09-01

## 2021-09-01 ENCOUNTER — APPOINTMENT (OUTPATIENT)
Dept: LAB | Facility: CLINIC | Age: 46
End: 2021-09-01
Payer: COMMERCIAL

## 2021-09-01 DIAGNOSIS — C90.00 MULTIPLE MYELOMA, REMISSION STATUS UNSPECIFIED (HCC): ICD-10-CM

## 2021-09-01 DIAGNOSIS — C90.00 MULTIPLE MYELOMA, REMISSION STATUS UNSPECIFIED (HCC): Primary | ICD-10-CM

## 2021-09-01 LAB — B-HCG SERPL-ACNC: 3 MIU/ML

## 2021-09-01 PROCEDURE — 84702 CHORIONIC GONADOTROPIN TEST: CPT

## 2021-09-01 NOTE — TELEPHONE ENCOUNTER
Patient is calling to have an order for a pregnancy test placed in Epic  Patient is requesting that standing monthly pregnancy tests be ordered in Epic so there will not be a delay in getting her Revlimid prescription  Standing orders for HCG quantitative entered into [facility]  Patient will have test done now  RN-FYI

## 2021-09-02 DIAGNOSIS — C90.01 MULTIPLE MYELOMA IN REMISSION (HCC): Primary | ICD-10-CM

## 2021-09-02 RX ORDER — LENALIDOMIDE 5 MG/1
5 CAPSULE ORAL DAILY
Qty: 28 CAPSULE | Refills: 0 | Status: SHIPPED | OUTPATIENT
Start: 2021-09-02 | End: 2021-09-27

## 2021-09-03 ENCOUNTER — TELEPHONE (OUTPATIENT)
Dept: HEMATOLOGY ONCOLOGY | Facility: CLINIC | Age: 46
End: 2021-09-03

## 2021-09-03 NOTE — TELEPHONE ENCOUNTER
Called [facility] and spoke with risk management, which they stated the hold has been lifted once I verbalized her most recent pregnancy test being negative  The discrepancy was that the patient answered "yes" to natural menopause and we answered "no"  Called [facility] and notified them that the hold was lifted and they can fill the medication, the pharmacist verbalized understanding  Called the patient which she stated she isn't sure if she went through natural menopause and will proceed with having the pregnancy test completed

## 2021-09-03 NOTE — TELEPHONE ENCOUNTER
Patient calling about Revlimid  She was told her survey was flagged  I made patient aware that specialty pharmacy called this morning and it was forwarded to RN's with Dr Mahsa Aviles  Patient unsure why this is an ongoing problem  She would like a call back once resolved so she can reach out to the pharmacy to arrange delivery  Patient requesting a call back before 1pm

## 2021-09-03 NOTE — TELEPHONE ENCOUNTER
Call from Rudy from [facility]   [phone]  Revlimid survey was flagged when completed by patient  This issue has come up before and patient would like to know what she is completing incorrectly  Will send to Dr Nahed Wolfe RNs   Please call pharmacy once situation is rectified

## 2021-09-26 DIAGNOSIS — C90.01 MULTIPLE MYELOMA IN REMISSION (HCC): ICD-10-CM

## 2021-09-27 RX ORDER — LENALIDOMIDE 5 MG/1
CAPSULE ORAL
Qty: 30 CAPSULE | Refills: 3 | Status: SHIPPED | OUTPATIENT
Start: 2021-09-27 | End: 2021-10-05 | Stop reason: SDUPTHER

## 2021-09-28 ENCOUNTER — TELEPHONE (OUTPATIENT)
Dept: HEMATOLOGY ONCOLOGY | Facility: CLINIC | Age: 46
End: 2021-09-28

## 2021-09-28 NOTE — TELEPHONE ENCOUNTER
Kimberly Duran from [facility] called to get [employer] # for Revlimid   Kimberly Duran can be reached at [phone]

## 2021-09-29 DIAGNOSIS — C90.00 MULTIPLE MYELOMA NOT HAVING ACHIEVED REMISSION (HCC): ICD-10-CM

## 2021-09-29 DIAGNOSIS — Z51.11 ENCOUNTER FOR CHEMOTHERAPY MANAGEMENT: ICD-10-CM

## 2021-09-29 DIAGNOSIS — C90.01 MULTIPLE MYELOMA IN REMISSION (HCC): Primary | ICD-10-CM

## 2021-09-30 ENCOUNTER — TELEPHONE (OUTPATIENT)
Dept: HEMATOLOGY ONCOLOGY | Facility: CLINIC | Age: 46
End: 2021-09-30

## 2021-09-30 NOTE — TELEPHONE ENCOUNTER
Erin from [place] [id number] is requesting the [employer] auth for the lenalidomide (Revlimid) 5 MG CAPS   Best call back [phone]

## 2021-09-30 NOTE — TELEPHONE ENCOUNTER
Please see other telephone note, pt will be going for pregnancy test 10/2  Can't refill medication until this is obtained

## 2021-10-02 ENCOUNTER — APPOINTMENT (OUTPATIENT)
Dept: LAB | Facility: CLINIC | Age: 46
End: 2021-10-02
Payer: COMMERCIAL

## 2021-10-02 DIAGNOSIS — C90.00 MULTIPLE MYELOMA NOT HAVING ACHIEVED REMISSION (HCC): ICD-10-CM

## 2021-10-02 DIAGNOSIS — Z51.11 ENCOUNTER FOR CHEMOTHERAPY MANAGEMENT: ICD-10-CM

## 2021-10-02 DIAGNOSIS — C90.01 MULTIPLE MYELOMA IN REMISSION (HCC): ICD-10-CM

## 2021-10-02 LAB — B-HCG SERPL-ACNC: 4 MIU/ML

## 2021-10-02 PROCEDURE — 84165 PROTEIN E-PHORESIS SERUM: CPT | Performed by: PATHOLOGY

## 2021-10-02 PROCEDURE — 84702 CHORIONIC GONADOTROPIN TEST: CPT

## 2021-10-05 ENCOUNTER — TELEPHONE (OUTPATIENT)
Dept: HEMATOLOGY ONCOLOGY | Facility: CLINIC | Age: 46
End: 2021-10-05

## 2021-10-05 DIAGNOSIS — C90.01 MULTIPLE MYELOMA IN REMISSION (HCC): Primary | ICD-10-CM

## 2021-10-05 RX ORDER — LENALIDOMIDE 5 MG/1
5 CAPSULE ORAL DAILY
Qty: 28 CAPSULE | Refills: 0 | Status: SHIPPED | OUTPATIENT
Start: 2021-10-05 | End: 2021-11-03

## 2021-10-06 ENCOUNTER — OFFICE VISIT (OUTPATIENT)
Dept: HEMATOLOGY ONCOLOGY | Facility: CLINIC | Age: 46
End: 2021-10-06
Payer: COMMERCIAL

## 2021-10-06 VITALS
BODY MASS INDEX: 24.67 KG/M2 | TEMPERATURE: 97.5 F | RESPIRATION RATE: 16 BRPM | HEIGHT: 64 IN | WEIGHT: 144.5 LBS | SYSTOLIC BLOOD PRESSURE: 118 MMHG | DIASTOLIC BLOOD PRESSURE: 82 MMHG | HEART RATE: 83 BPM | OXYGEN SATURATION: 98 %

## 2021-10-06 DIAGNOSIS — E80.6 HYPERBILIRUBINEMIA: ICD-10-CM

## 2021-10-06 DIAGNOSIS — C90.00 MULTIPLE MYELOMA, REMISSION STATUS UNSPECIFIED (HCC): Primary | ICD-10-CM

## 2021-10-06 PROCEDURE — 3008F BODY MASS INDEX DOCD: CPT | Performed by: INTERNAL MEDICINE

## 2021-10-06 PROCEDURE — 99214 OFFICE O/P EST MOD 30 MIN: CPT | Performed by: INTERNAL MEDICINE

## 2021-10-06 PROCEDURE — 1036F TOBACCO NON-USER: CPT | Performed by: INTERNAL MEDICINE

## 2021-10-23 DIAGNOSIS — C90.00 MULTIPLE MYELOMA, REMISSION STATUS UNSPECIFIED (HCC): ICD-10-CM

## 2021-10-24 RX ORDER — DEXAMETHASONE 4 MG/1
TABLET ORAL
Qty: 20 TABLET | Refills: 4 | Status: SHIPPED | OUTPATIENT
Start: 2021-10-24

## 2021-11-01 ENCOUNTER — APPOINTMENT (OUTPATIENT)
Dept: LAB | Age: 46
End: 2021-11-01
Payer: COMMERCIAL

## 2021-11-01 LAB — B-HCG SERPL-ACNC: 3 MIU/ML

## 2021-11-01 PROCEDURE — 84702 CHORIONIC GONADOTROPIN TEST: CPT | Performed by: INTERNAL MEDICINE

## 2021-11-01 PROCEDURE — 36415 COLL VENOUS BLD VENIPUNCTURE: CPT | Performed by: INTERNAL MEDICINE

## 2021-11-02 DIAGNOSIS — C90.01 MULTIPLE MYELOMA IN REMISSION (HCC): ICD-10-CM

## 2021-11-03 ENCOUNTER — TELEPHONE (OUTPATIENT)
Dept: CARDIAC SURGERY | Facility: CLINIC | Age: 46
End: 2021-11-03

## 2021-11-03 DIAGNOSIS — C90.01 MULTIPLE MYELOMA IN REMISSION (HCC): Primary | ICD-10-CM

## 2021-11-03 RX ORDER — LENALIDOMIDE 5 MG/1
CAPSULE ORAL
Qty: 28 CAPSULE | Refills: 0 | Status: SHIPPED | OUTPATIENT
Start: 2021-11-03 | End: 2021-11-03 | Stop reason: SDUPTHER

## 2021-11-03 RX ORDER — LENALIDOMIDE 5 MG/1
5 CAPSULE ORAL DAILY
Qty: 28 CAPSULE | Refills: 0 | Status: SHIPPED | OUTPATIENT
Start: 2021-11-03 | End: 2021-12-01

## 2021-12-01 ENCOUNTER — APPOINTMENT (OUTPATIENT)
Dept: LAB | Facility: CLINIC | Age: 46
End: 2021-12-01
Payer: COMMERCIAL

## 2021-12-01 DIAGNOSIS — C90.01 MULTIPLE MYELOMA IN REMISSION (HCC): ICD-10-CM

## 2021-12-01 RX ORDER — LENALIDOMIDE 5 MG/1
CAPSULE ORAL
Qty: 30 CAPSULE | Refills: 3 | Status: SHIPPED | OUTPATIENT
Start: 2021-12-01 | End: 2021-12-02 | Stop reason: SDUPTHER

## 2021-12-02 ENCOUNTER — TELEPHONE (OUTPATIENT)
Dept: GYNECOLOGIC ONCOLOGY | Facility: CLINIC | Age: 46
End: 2021-12-02

## 2021-12-02 ENCOUNTER — TELEPHONE (OUTPATIENT)
Dept: HEMATOLOGY ONCOLOGY | Facility: CLINIC | Age: 46
End: 2021-12-02

## 2021-12-02 DIAGNOSIS — C90.01 MULTIPLE MYELOMA IN REMISSION (HCC): Primary | ICD-10-CM

## 2021-12-02 RX ORDER — LENALIDOMIDE 5 MG/1
5 CAPSULE ORAL DAILY
Qty: 30 CAPSULE | Refills: 3 | Status: SHIPPED | OUTPATIENT
Start: 2021-12-02 | End: 2021-12-29 | Stop reason: SDUPTHER

## 2021-12-28 ENCOUNTER — APPOINTMENT (OUTPATIENT)
Dept: LAB | Facility: CLINIC | Age: 46
End: 2021-12-28
Payer: COMMERCIAL

## 2021-12-28 DIAGNOSIS — C90.00 MULTIPLE MYELOMA, REMISSION STATUS UNSPECIFIED (HCC): ICD-10-CM

## 2021-12-28 DIAGNOSIS — E80.6 HYPERBILIRUBINEMIA: ICD-10-CM

## 2021-12-28 LAB
ALBUMIN SERPL BCP-MCNC: 3.7 G/DL (ref 3.5–5)
ALP SERPL-CCNC: 101 U/L (ref 46–116)
ALT SERPL W P-5'-P-CCNC: 82 U/L (ref 12–78)
ANION GAP SERPL CALCULATED.3IONS-SCNC: 8 MMOL/L (ref 4–13)
AST SERPL W P-5'-P-CCNC: 27 U/L (ref 5–45)
BASOPHILS # BLD AUTO: 0.06 THOUSANDS/ΜL (ref 0–0.1)
BASOPHILS NFR BLD AUTO: 1 % (ref 0–1)
BILIRUB SERPL-MCNC: 0.7 MG/DL (ref 0.2–1)
BUN SERPL-MCNC: 13 MG/DL (ref 5–25)
CALCIUM SERPL-MCNC: 9.4 MG/DL (ref 8.3–10.1)
CHLORIDE SERPL-SCNC: 106 MMOL/L (ref 100–108)
CO2 SERPL-SCNC: 24 MMOL/L (ref 21–32)
CREAT SERPL-MCNC: 0.74 MG/DL (ref 0.6–1.3)
EOSINOPHIL # BLD AUTO: 0.21 THOUSAND/ΜL (ref 0–0.61)
EOSINOPHIL NFR BLD AUTO: 4 % (ref 0–6)
ERYTHROCYTE [DISTWIDTH] IN BLOOD BY AUTOMATED COUNT: 12.7 % (ref 11.6–15.1)
GFR SERPL CREATININE-BSD FRML MDRD: 96 ML/MIN/1.73SQ M
GLUCOSE SERPL-MCNC: 103 MG/DL (ref 65–140)
HCT VFR BLD AUTO: 44.9 % (ref 34.8–46.1)
HGB BLD-MCNC: 14.6 G/DL (ref 11.5–15.4)
IMM GRANULOCYTES # BLD AUTO: 0.03 THOUSAND/UL (ref 0–0.2)
IMM GRANULOCYTES NFR BLD AUTO: 1 % (ref 0–2)
LYMPHOCYTES # BLD AUTO: 1.77 THOUSANDS/ΜL (ref 0.6–4.47)
LYMPHOCYTES NFR BLD AUTO: 34 % (ref 14–44)
MCH RBC QN AUTO: 31 PG (ref 26.8–34.3)
MCHC RBC AUTO-ENTMCNC: 32.5 G/DL (ref 31.4–37.4)
MCV RBC AUTO: 95 FL (ref 82–98)
MONOCYTES # BLD AUTO: 0.64 THOUSAND/ΜL (ref 0.17–1.22)
MONOCYTES NFR BLD AUTO: 12 % (ref 4–12)
NEUTROPHILS # BLD AUTO: 2.5 THOUSANDS/ΜL (ref 1.85–7.62)
NEUTS SEG NFR BLD AUTO: 48 % (ref 43–75)
NRBC BLD AUTO-RTO: 0 /100 WBCS
PLATELET # BLD AUTO: 200 THOUSANDS/UL (ref 149–390)
PMV BLD AUTO: 9.6 FL (ref 8.9–12.7)
POTASSIUM SERPL-SCNC: 3.6 MMOL/L (ref 3.5–5.3)
PROT SERPL-MCNC: 7.6 G/DL (ref 6.4–8.2)
RBC # BLD AUTO: 4.71 MILLION/UL (ref 3.81–5.12)
SODIUM SERPL-SCNC: 138 MMOL/L (ref 136–145)
WBC # BLD AUTO: 5.21 THOUSAND/UL (ref 4.31–10.16)

## 2021-12-28 PROCEDURE — 36415 COLL VENOUS BLD VENIPUNCTURE: CPT

## 2021-12-28 PROCEDURE — 83883 ASSAY NEPHELOMETRY NOT SPEC: CPT

## 2021-12-28 PROCEDURE — 84165 PROTEIN E-PHORESIS SERUM: CPT

## 2021-12-28 PROCEDURE — 80053 COMPREHEN METABOLIC PANEL: CPT

## 2021-12-28 PROCEDURE — 84165 PROTEIN E-PHORESIS SERUM: CPT | Performed by: PATHOLOGY

## 2021-12-28 PROCEDURE — 85025 COMPLETE CBC W/AUTO DIFF WBC: CPT

## 2021-12-29 DIAGNOSIS — C90.01 MULTIPLE MYELOMA IN REMISSION (HCC): Primary | ICD-10-CM

## 2021-12-29 LAB
ALBUMIN SERPL ELPH-MCNC: 4.4 G/DL (ref 3.5–5)
ALBUMIN SERPL ELPH-MCNC: 59.5 % (ref 52–65)
ALPHA1 GLOB SERPL ELPH-MCNC: 0.3 G/DL (ref 0.1–0.4)
ALPHA1 GLOB SERPL ELPH-MCNC: 4.1 % (ref 2.5–5)
ALPHA2 GLOB SERPL ELPH-MCNC: 0.73 G/DL (ref 0.4–1.2)
ALPHA2 GLOB SERPL ELPH-MCNC: 9.9 % (ref 7–13)
BETA GLOB ABNORMAL SERPL ELPH-MCNC: 0.47 G/DL (ref 0.4–0.8)
BETA1 GLOB SERPL ELPH-MCNC: 6.3 % (ref 5–13)
BETA2 GLOB SERPL ELPH-MCNC: 4.5 % (ref 2–8)
BETA2+GAMMA GLOB SERPL ELPH-MCNC: 0.33 G/DL (ref 0.2–0.5)
GAMMA GLOB ABNORMAL SERPL ELPH-MCNC: 1.16 G/DL (ref 0.5–1.6)
GAMMA GLOB SERPL ELPH-MCNC: 15.7 % (ref 12–22)
IGG/ALB SER: 1.47 {RATIO} (ref 1.1–1.8)
KAPPA LC FREE SER-MCNC: 11.7 MG/L (ref 3.3–19.4)
KAPPA LC FREE/LAMBDA FREE SER: 0.7 {RATIO} (ref 0.26–1.65)
LAMBDA LC FREE SERPL-MCNC: 16.7 MG/L (ref 5.7–26.3)
M PROTEIN 1 MFR SERPL ELPH: 9.2 %
M PROTEIN 1 SERPL ELPH-MCNC: 0.68 G/DL
PROT PATTERN SERPL ELPH-IMP: NORMAL
PROT SERPL-MCNC: 7.4 G/DL (ref 6.4–8.2)

## 2021-12-29 RX ORDER — LENALIDOMIDE 5 MG/1
5 CAPSULE ORAL DAILY
Qty: 30 CAPSULE | Refills: 3 | Status: SHIPPED | OUTPATIENT
Start: 2021-12-29 | End: 2022-01-27 | Stop reason: SDUPTHER

## 2022-01-12 ENCOUNTER — OFFICE VISIT (OUTPATIENT)
Dept: HEMATOLOGY ONCOLOGY | Facility: CLINIC | Age: 47
End: 2022-01-12
Payer: COMMERCIAL

## 2022-01-12 VITALS
SYSTOLIC BLOOD PRESSURE: 118 MMHG | WEIGHT: 146 LBS | HEART RATE: 84 BPM | RESPIRATION RATE: 18 BRPM | DIASTOLIC BLOOD PRESSURE: 70 MMHG | OXYGEN SATURATION: 99 % | HEIGHT: 64 IN | TEMPERATURE: 97.9 F | BODY MASS INDEX: 24.92 KG/M2

## 2022-01-12 DIAGNOSIS — C90.01 MULTIPLE MYELOMA IN REMISSION (HCC): Primary | ICD-10-CM

## 2022-01-12 PROCEDURE — 99214 OFFICE O/P EST MOD 30 MIN: CPT | Performed by: INTERNAL MEDICINE

## 2022-01-12 PROCEDURE — 3008F BODY MASS INDEX DOCD: CPT | Performed by: INTERNAL MEDICINE
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
## 2022-01-26 ENCOUNTER — APPOINTMENT (OUTPATIENT)
Dept: LAB | Facility: CLINIC | Age: 47
End: 2022-01-26
Payer: COMMERCIAL

## 2022-01-26 ENCOUNTER — TELEPHONE (OUTPATIENT)
Dept: HEMATOLOGY ONCOLOGY | Facility: CLINIC | Age: 47
End: 2022-01-26

## 2022-01-26 NOTE — TELEPHONE ENCOUNTER
Pt was called and notified pregnancy test must be resulted before refilling  Pt stated she will be going today  I will await results to send refill

## 2022-01-26 NOTE — TELEPHONE ENCOUNTER
Medication Refill     Who is Calling  Patient   Medication  lenalidomide (REVLIMID)      How many pills left 4   Preferred Pharmacy / Address [facility] [address], [facility]    Call back number  [phone]   Relevant Information

## 2022-01-27 DIAGNOSIS — C90.01 MULTIPLE MYELOMA IN REMISSION (HCC): Primary | ICD-10-CM

## 2022-01-27 RX ORDER — LENALIDOMIDE 5 MG/1
5 CAPSULE ORAL DAILY
Qty: 28 CAPSULE | Refills: 0 | Status: SHIPPED | OUTPATIENT
Start: 2022-01-27 | End: 2022-02-24 | Stop reason: SDUPTHER

## 2022-02-22 ENCOUNTER — APPOINTMENT (OUTPATIENT)
Dept: LAB | Facility: CLINIC | Age: 47
End: 2022-02-22
Payer: COMMERCIAL

## 2022-02-24 ENCOUNTER — TELEPHONE (OUTPATIENT)
Dept: HEMATOLOGY ONCOLOGY | Facility: CLINIC | Age: 47
End: 2022-02-24

## 2022-02-24 DIAGNOSIS — C90.01 MULTIPLE MYELOMA IN REMISSION (HCC): ICD-10-CM

## 2022-02-24 RX ORDER — LENALIDOMIDE 5 MG/1
5 CAPSULE ORAL DAILY
Qty: 28 CAPSULE | Refills: 0 | Status: SHIPPED | OUTPATIENT
Start: 2022-02-24 | End: 2022-03-28 | Stop reason: SDUPTHER

## 2022-02-24 NOTE — TELEPHONE ENCOUNTER
Patient calling in regarding her lenalidomide (REVLIMID) 5 MG CAPS  Patient stating Pharmacy stating that they are still waiting for information from the doctor    Best call back number [phone]

## 2022-03-09 ENCOUNTER — TELEPHONE (OUTPATIENT)
Dept: HEMATOLOGY ONCOLOGY | Facility: CLINIC | Age: 47
End: 2022-03-09

## 2022-03-09 NOTE — TELEPHONE ENCOUNTER
Spoke w/ pt regarding FMLA paperwork and she confirmed information needed to fill out paperwork  I will finish FMLA and fax to [phone] when completed  Patient advised to contact office if any further questions

## 2022-03-19 ENCOUNTER — APPOINTMENT (OUTPATIENT)
Dept: LAB | Facility: CLINIC | Age: 47
End: 2022-03-19
Payer: COMMERCIAL

## 2022-03-19 DIAGNOSIS — C90.01 MULTIPLE MYELOMA IN REMISSION (HCC): ICD-10-CM

## 2022-03-19 LAB
BASOPHILS # BLD AUTO: 0.02 THOUSANDS/ΜL (ref 0–0.1)
BASOPHILS NFR BLD AUTO: 0 % (ref 0–1)
EOSINOPHIL # BLD AUTO: 0.1 THOUSAND/ΜL (ref 0–0.61)
EOSINOPHIL NFR BLD AUTO: 2 % (ref 0–6)
ERYTHROCYTE [DISTWIDTH] IN BLOOD BY AUTOMATED COUNT: 12.9 % (ref 11.6–15.1)
HCT VFR BLD AUTO: 38.3 % (ref 34.8–46.1)
HGB BLD-MCNC: 12 G/DL (ref 11.5–15.4)
IMM GRANULOCYTES # BLD AUTO: 0.02 THOUSAND/UL (ref 0–0.2)
IMM GRANULOCYTES NFR BLD AUTO: 0 % (ref 0–2)
LYMPHOCYTES # BLD AUTO: 1.51 THOUSANDS/ΜL (ref 0.6–4.47)
LYMPHOCYTES NFR BLD AUTO: 34 % (ref 14–44)
MCH RBC QN AUTO: 30.4 PG (ref 26.8–34.3)
MCHC RBC AUTO-ENTMCNC: 31.3 G/DL (ref 31.4–37.4)
MCV RBC AUTO: 97 FL (ref 82–98)
MONOCYTES # BLD AUTO: 0.89 THOUSAND/ΜL (ref 0.17–1.22)
MONOCYTES NFR BLD AUTO: 20 % (ref 4–12)
NEUTROPHILS # BLD AUTO: 1.91 THOUSANDS/ΜL (ref 1.85–7.62)
NEUTS SEG NFR BLD AUTO: 44 % (ref 43–75)
NRBC BLD AUTO-RTO: 0 /100 WBCS
PLATELET # BLD AUTO: 178 THOUSANDS/UL (ref 149–390)
PMV BLD AUTO: 10.1 FL (ref 8.9–12.7)
RBC # BLD AUTO: 3.95 MILLION/UL (ref 3.81–5.12)
WBC # BLD AUTO: 4.45 THOUSAND/UL (ref 4.31–10.16)

## 2022-03-19 PROCEDURE — 85025 COMPLETE CBC W/AUTO DIFF WBC: CPT

## 2022-03-19 PROCEDURE — 36415 COLL VENOUS BLD VENIPUNCTURE: CPT

## 2022-03-25 ENCOUNTER — TELEPHONE (OUTPATIENT)
Dept: HEMATOLOGY ONCOLOGY | Facility: CLINIC | Age: 47
End: 2022-03-25

## 2022-03-25 DIAGNOSIS — C90.01 MULTIPLE MYELOMA IN REMISSION (HCC): Primary | ICD-10-CM

## 2022-03-25 NOTE — TELEPHONE ENCOUNTER
Called the patient to inform her that we need the HCG results  Entered order and patient will go tomorrow  She stated she went last Saturday but the lab only nancy a cbc   Will refill med Monday once results are in

## 2022-03-25 NOTE — TELEPHONE ENCOUNTER
Medication Refill     Who is Calling  Patient   Medication  lenalidomide (REVLIMID) 5 MG CAPS       How many pills left  2   Preferred Pharmacy / Address Patient stated it arrives by mail requires signature   Who is your Physician? Dr Howard Bedoya   Call back number [phone]   Relevant Information  Patient did not seem certain which pharmacy this medication came from  She thought it was from [place]

## 2022-03-25 NOTE — TELEPHONE ENCOUNTER
Medication Refill     Who is Calling  Pharmacy    Medication  lenalidomide (REVLIMID) 5 MG CAPS      How many pills left 4   Preferred Pharmacy / Address [facility]  [place]    Who is your Physician? Gerson Ellis    Call back number  [phone]   Relevant Information

## 2022-03-26 ENCOUNTER — APPOINTMENT (OUTPATIENT)
Dept: LAB | Facility: CLINIC | Age: 47
End: 2022-03-26
Payer: COMMERCIAL

## 2022-03-26 LAB — B-HCG SERPL-ACNC: 4 MIU/ML

## 2022-03-26 PROCEDURE — 84702 CHORIONIC GONADOTROPIN TEST: CPT

## 2022-03-26 PROCEDURE — 36415 COLL VENOUS BLD VENIPUNCTURE: CPT

## 2022-03-28 DIAGNOSIS — C90.01 MULTIPLE MYELOMA IN REMISSION (HCC): ICD-10-CM

## 2022-03-28 RX ORDER — LENALIDOMIDE 5 MG/1
5 CAPSULE ORAL DAILY
Qty: 28 CAPSULE | Refills: 0 | Status: SHIPPED | OUTPATIENT
Start: 2022-03-28 | End: 2022-04-22 | Stop reason: SDUPTHER

## 2022-03-31 ENCOUNTER — OFFICE VISIT (OUTPATIENT)
Dept: URGENT CARE | Facility: CLINIC | Age: 47
End: 2022-03-31
Payer: COMMERCIAL

## 2022-03-31 VITALS
HEART RATE: 89 BPM | OXYGEN SATURATION: 96 % | BODY MASS INDEX: 24.92 KG/M2 | DIASTOLIC BLOOD PRESSURE: 67 MMHG | WEIGHT: 146 LBS | SYSTOLIC BLOOD PRESSURE: 117 MMHG | HEIGHT: 64 IN | TEMPERATURE: 100 F

## 2022-03-31 DIAGNOSIS — R05.9 COUGH: Primary | ICD-10-CM

## 2022-03-31 PROCEDURE — 99213 OFFICE O/P EST LOW 20 MIN: CPT | Performed by: PHYSICIAN ASSISTANT

## 2022-03-31 PROCEDURE — 87636 SARSCOV2 & INF A&B AMP PRB: CPT | Performed by: PHYSICIAN ASSISTANT

## 2022-03-31 RX ORDER — BENZONATATE 100 MG/1
100 CAPSULE ORAL 3 TIMES DAILY PRN
Qty: 20 CAPSULE | Refills: 0 | Status: SHIPPED | OUTPATIENT
Start: 2022-03-31 | End: 2022-07-08

## 2022-03-31 NOTE — PROGRESS NOTES
[address] [facility]        NAME: Caitlin Key is a 48 y o  female  : 1975    MRN: [id number]  DATE: 2022  TIME: 9:56 AM    Assessment and Plan   Cough [R05 9]  1  Cough  Covid/Flu-Office Collect    benzonatate (TESSALON PERLES) 100 mg capsule         Patient Instructions   Patient Instructions   Tessalon for cough         Follow up with PCP in 3-5 days  Proceed to  ER if symptoms worsen  Chief Complaint     Chief Complaint   Patient presents with    Cold Like Symptoms     for a few days now    Cough     for a few days now    Headache     on and off          History of Present Illness       The pt is a 67-year-old female presenting for cold like symptoms x 3 days  Reports a cough, chills and headache on and off  Review of Systems   Review of Systems   Constitutional: Positive for chills  Negative for activity change, appetite change, fatigue and fever  HENT: Negative for congestion, rhinorrhea, sinus pressure, sinus pain and sore throat  Respiratory: Positive for cough  Negative for chest tightness and shortness of breath  Cardiovascular: Negative for chest pain and palpitations  Gastrointestinal: Negative for diarrhea, nausea and vomiting  Musculoskeletal: Negative for arthralgias and myalgias  Skin: Negative for color change and pallor  Neurological: Positive for headaches           Current Medications       Current Outpatient Medications:     aspirin (ECOTRIN LOW STRENGTH) 81 mg EC tablet, Take 81 mg by mouth daily, Disp: , Rfl:     Calcium Carb-Cholecalciferol (CALCIUM 1000 + D PO), Take by mouth, Disp: , Rfl:     Cyanocobalamin (VITAMIN B 12 PO), Take 1 tablet by mouth daily, Disp: , Rfl:     HM VITAMIN D3 2000 units CAPS, Take by mouth, Disp: , Rfl:     lenalidomide (REVLIMID) 5 MG CAPS, Take 1 capsule (5 mg total) by mouth daily Auth# [id number] on 3/28, Disp: 28 capsule, Rfl: 0    zolpidem (AMBIEN) 5 mg tablet, Take 1 tablet (5 mg total) by mouth daily at bedtime as needed for sleep, Disp: 30 tablet, Rfl: 0    benzonatate (TESSALON PERLES) 100 mg capsule, Take 1 capsule (100 mg total) by mouth 3 (three) times a day as needed for cough, Disp: 20 capsule, Rfl: 0    dexamethasone (DECADRON) 4 mg tablet, TAKE 5 TABLETS (20 MG) BY MOUTH ONCE A WEEK, Disp: 20 tablet, Rfl: 4    Current Allergies     Allergies as of 2022    (No Known Allergies)            The following portions of the patient's history were reviewed and updated as appropriate: allergies, current medications, past family history, past medical history, past social history, past surgical history and problem list      Past Medical History:   Diagnosis Date    Back pain     pinched nerve in back    CTS (carpal tunnel syndrome)     right wrist    Multiple myeloma ([facility] [address] )     chemotherapy    Multiple myeloma ([facility] [address] )     Multiple sclerosis ([facility] [address] )     skin lesion     mole removed from face       Past Surgical History:   Procedure Laterality Date     SECTION      LIMBAL STEM CELL TRANSPLANT  2018       Family History   Problem Relation Age of Onset    No Known Problems Mother     Coronary artery disease Father     No Known Problems Sister     No Known Problems Brother     No Known Problems Daughter     No Known Problems Son     No Known Problems Brother          Medications have been verified  Objective   /67   Pulse 89   Temp 100 °F (37 8 °C)   Ht 5' 4" (1 626 m)   Wt 66 2 kg (146 lb)   SpO2 96%   BMI 25 06 kg/m²        Physical Exam     Physical Exam  Vitals and nursing note reviewed  Constitutional:       General: She is not in acute distress  Appearance: Normal appearance  She is well-developed and normal weight  She is not ill-appearing, toxic-appearing or diaphoretic  HENT:      Head: Normocephalic and atraumatic  Right Ear: Tympanic membrane, ear canal and external ear normal  No drainage, swelling or tenderness  No middle ear effusion  There is no impacted cerumen  Tympanic membrane is not erythematous  Left Ear: Tympanic membrane, ear canal and external ear normal  No drainage, swelling or tenderness  No middle ear effusion  There is no impacted cerumen  Tympanic membrane is not erythematous  Nose: Nose normal  No congestion or rhinorrhea  Mouth/Throat:      Mouth: Mucous membranes are moist  No oral lesions  Pharynx: Oropharynx is clear  Uvula midline  No pharyngeal swelling, oropharyngeal exudate, posterior oropharyngeal erythema or uvula swelling  Tonsils: No tonsillar exudate or tonsillar abscesses  0 on the right  0 on the left  Eyes:      Extraocular Movements: Extraocular movements intact  Right eye: Normal extraocular motion  Left eye: Normal extraocular motion  Conjunctiva/sclera: Conjunctivae normal       Pupils: Pupils are equal, round, and reactive to light  Cardiovascular:      Rate and Rhythm: Normal rate and regular rhythm  Heart sounds: Normal heart sounds  No murmur heard  No friction rub  No gallop  Pulmonary:      Effort: Pulmonary effort is normal  No respiratory distress  Breath sounds: Normal breath sounds  No stridor  No wheezing, rhonchi or rales  Chest:      Chest wall: No tenderness  Abdominal:      General: Abdomen is flat  Bowel sounds are normal       Palpations: Abdomen is soft  Skin:     General: Skin is warm and dry  Capillary Refill: Capillary refill takes less than 2 seconds  Neurological:      Mental Status: She is alert

## 2022-04-01 LAB
FLUAV RNA RESP QL NAA+PROBE: NEGATIVE
FLUBV RNA RESP QL NAA+PROBE: NEGATIVE
SARS-COV-2 RNA RESP QL NAA+PROBE: POSITIVE

## 2022-04-05 ENCOUNTER — APPOINTMENT (OUTPATIENT)
Dept: LAB | Facility: CLINIC | Age: 47
End: 2022-04-05
Payer: COMMERCIAL

## 2022-04-05 DIAGNOSIS — C90.01 MULTIPLE MYELOMA IN REMISSION (HCC): ICD-10-CM

## 2022-04-05 LAB
ALBUMIN SERPL BCP-MCNC: 3 G/DL (ref 3.5–5)
ALP SERPL-CCNC: 90 U/L (ref 46–116)
ALT SERPL W P-5'-P-CCNC: 63 U/L (ref 12–78)
ANION GAP SERPL CALCULATED.3IONS-SCNC: 4 MMOL/L (ref 4–13)
AST SERPL W P-5'-P-CCNC: 18 U/L (ref 5–45)
BILIRUB SERPL-MCNC: 0.52 MG/DL (ref 0.2–1)
BUN SERPL-MCNC: 15 MG/DL (ref 5–25)
CALCIUM ALBUM COR SERPL-MCNC: 9.6 MG/DL (ref 8.3–10.1)
CALCIUM SERPL-MCNC: 8.8 MG/DL (ref 8.3–10.1)
CHLORIDE SERPL-SCNC: 109 MMOL/L (ref 100–108)
CO2 SERPL-SCNC: 28 MMOL/L (ref 21–32)
CREAT SERPL-MCNC: 0.82 MG/DL (ref 0.6–1.3)
GFR SERPL CREATININE-BSD FRML MDRD: 84 ML/MIN/1.73SQ M
GLUCOSE SERPL-MCNC: 109 MG/DL (ref 65–140)
POTASSIUM SERPL-SCNC: 3.6 MMOL/L (ref 3.5–5.3)
PROT SERPL-MCNC: 6.9 G/DL (ref 6.4–8.2)
SODIUM SERPL-SCNC: 141 MMOL/L (ref 136–145)

## 2022-04-05 PROCEDURE — 83521 IG LIGHT CHAINS FREE EACH: CPT

## 2022-04-05 PROCEDURE — 36415 COLL VENOUS BLD VENIPUNCTURE: CPT

## 2022-04-05 PROCEDURE — 84165 PROTEIN E-PHORESIS SERUM: CPT | Performed by: PATHOLOGY

## 2022-04-05 PROCEDURE — 84165 PROTEIN E-PHORESIS SERUM: CPT

## 2022-04-05 PROCEDURE — 80053 COMPREHEN METABOLIC PANEL: CPT

## 2022-04-06 LAB
KAPPA LC FREE SER-MCNC: 14.3 MG/L (ref 3.3–19.4)
KAPPA LC FREE/LAMBDA FREE SER: 0.61 {RATIO} (ref 0.26–1.65)
LAMBDA LC FREE SERPL-MCNC: 23.5 MG/L (ref 5.7–26.3)

## 2022-04-07 LAB
ALBUMIN SERPL ELPH-MCNC: 3.39 G/DL (ref 3.5–5)
ALBUMIN SERPL ELPH-MCNC: 52.2 % (ref 52–65)
ALPHA1 GLOB SERPL ELPH-MCNC: 0.38 G/DL (ref 0.1–0.4)
ALPHA1 GLOB SERPL ELPH-MCNC: 5.9 % (ref 2.5–5)
ALPHA2 GLOB SERPL ELPH-MCNC: 0.88 G/DL (ref 0.4–1.2)
ALPHA2 GLOB SERPL ELPH-MCNC: 13.5 % (ref 7–13)
BETA GLOB ABNORMAL SERPL ELPH-MCNC: 0.41 G/DL (ref 0.4–0.8)
BETA1 GLOB SERPL ELPH-MCNC: 6.3 % (ref 5–13)
BETA2 GLOB SERPL ELPH-MCNC: 5.1 % (ref 2–8)
BETA2+GAMMA GLOB SERPL ELPH-MCNC: 0.33 G/DL (ref 0.2–0.5)
GAMMA GLOB ABNORMAL SERPL ELPH-MCNC: 1.11 G/DL (ref 0.5–1.6)
GAMMA GLOB SERPL ELPH-MCNC: 17 % (ref 12–22)
IGG/ALB SER: 1.09 {RATIO} (ref 1.1–1.8)
M PROTEIN 1 MFR SERPL ELPH: 9 %
M PROTEIN 1 SERPL ELPH-MCNC: 0.59 G/DL
PROT PATTERN SERPL ELPH-IMP: ABNORMAL
PROT SERPL-MCNC: 6.5 G/DL (ref 6.4–8.2)

## 2022-04-11 ENCOUNTER — OFFICE VISIT (OUTPATIENT)
Dept: HEMATOLOGY ONCOLOGY | Facility: CLINIC | Age: 47
End: 2022-04-11
Payer: COMMERCIAL

## 2022-04-11 VITALS
WEIGHT: 143 LBS | HEART RATE: 77 BPM | RESPIRATION RATE: 18 BRPM | HEIGHT: 64 IN | OXYGEN SATURATION: 96 % | SYSTOLIC BLOOD PRESSURE: 110 MMHG | TEMPERATURE: 97.3 F | BODY MASS INDEX: 24.41 KG/M2 | DIASTOLIC BLOOD PRESSURE: 74 MMHG

## 2022-04-11 DIAGNOSIS — C90.00 MULTIPLE MYELOMA NOT HAVING ACHIEVED REMISSION (HCC): Primary | ICD-10-CM

## 2022-04-11 PROCEDURE — 99214 OFFICE O/P EST MOD 30 MIN: CPT | Performed by: INTERNAL MEDICINE

## 2022-04-11 PROCEDURE — 1036F TOBACCO NON-USER: CPT | Performed by: INTERNAL MEDICINE

## 2022-04-11 PROCEDURE — 3008F BODY MASS INDEX DOCD: CPT | Performed by: INTERNAL MEDICINE

## 2022-04-11 RX ORDER — CHOLESTYRAMINE 4 G/9G
4 POWDER, FOR SUSPENSION ORAL 2 TIMES DAILY
COMMUNITY
Start: 2022-02-23

## 2022-04-11 NOTE — PROGRESS NOTES
Hematology Outpatient Follow - Up Note  Jesse Swenson 48 y o  female MRN: @ Encounter: [id number]        Date:  4/11/2022        Assessment/ Plan:       20-year-old female with history of smoldering multiple myeloma with bone marrow biopsy showing 25% plasma cell on 12/2017, M protein 2 5 g IgG lambda, normal cytogenetics and fish panel for multiple myeloma bony skeletal survey was reported normal she received VRD induction chemotherapy followed by autologous stem cell transplant on 08/2018, now on maintenance lenalidomide 5 mg po  Daily, dexamethasone 12 mg every week, M protein stable at 0 5, continue treatment and will follow-up in 3 months     Diarrhea secondary to Revlimid, she is on [employer] and imaging studies are reviewed by ordering provider once results are available  If there are findings that need immediate attention, you will be contacted when results available  Discussing results and the implication on your healthcare is best discussed in person at your follow-up visit  HPI:    - BM : 12/2017 : 25% plasma cell     - M spike : igG lambda, 2 5 g; IgG > 3000  2000 mg in 2016     - normal cytogenetics   - NO CRAB : bone survey in 2016 and cervical and thoracic spine MRI in May of 2017, with no bone lesions identified        overall prognosis discussed with pt; high risk SMM; need MM therapy  1) induction by VRd; 2) stem cell / auto transplant 3) maintenance therapy        induction : VRd:    - Velcade : 1 3 mg / m2 SC injection D 1, 8, 15 / 28d   - Revlimid : 25 mg po daily D1-21 / 28d   - Dex 40 mg po D1, 8, 15 / 28d   - supportive care : allopurinol 100 mg po daily x 1 m then stop ; AS A 81 mg po daily once on Revlimid  ; Acyclovir 400 mg po bid           C # 1 : start Vd on 1/17 ; Revlimid for 12 days : stop on 2/6 ( end of week 3 )     C # 2:  start on 2/14 - 3/7 ( week off from 3/8-14)  C # 3: 3/14, 3/21, 3/28,   revlimid : D1-21: 3/14-4/3   off 4/4-4/10  C# 4:  4/11, 18, 25 revlimid : D1-21: 4/11- 5/1 7/2018 : Autologous stem cell transplant in [facility] with melphalan  No major complications  Hospitalized for 3 weeks    11/2018:  Start maintenance Revlimid 5  mg daily and dexamethasone 12 mg weekly         12/2018:  Received proper posttransplant vaccination by PCP per patient      Patient still follows transplant doctor in [facility]        M spike 0 3g/dL (11/2018) , un-detectable  ( 2/2019 ),   0 4 ( 12/2019)  0 49 ( 2/2020) 0 39 (5/2020) 0 62 ( 11/2020) 0 5 ( 2/2021) 0 68 ( 1/2022)     0 5 on 04/2022  Interval History:        Previous Treatment:         Test Results:    Imaging: No results found  Labs:   Lab Results   Component Value Date    WBC 4 45 03/19/2022    HGB 12 0 03/19/2022    HCT 38 3 03/19/2022    MCV 97 03/19/2022     03/19/2022     Lab Results   Component Value Date     08/30/2017    K 3 6 04/05/2022     (H) 04/05/2022    CO2 28 04/05/2022    BUN 15 04/05/2022    CREATININE 0 82 04/05/2022    GLUF 115 (H) 10/02/2021    CALCIUM 8 8 04/05/2022    CORRECTEDCA 9 6 04/05/2022    AST 18 04/05/2022    ALT 63 04/05/2022    ALKPHOS 90 04/05/2022    PROT 8 3 (H) 08/30/2017    BILITOT 0 4 08/30/2017    EGFR 84 04/05/2022       No results found for: IRON, TIBC, FERRITIN    Lab Results   Component Value Date    HRWIQJON84 954 07/10/2021         ROS: Review of Systems   Constitutional: Negative for appetite change, chills, diaphoresis, fatigue and unexpected weight change  HENT:   Negative for mouth sores, nosebleeds, sore throat, trouble swallowing and voice change  Eyes: Negative for eye problems and icterus  Respiratory: Negative for chest tightness, cough, hemoptysis and wheezing  Cardiovascular: Negative for chest pain, leg swelling and palpitations  Gastrointestinal: Positive for diarrhea  Negative for abdominal distention, abdominal pain, blood in stool, constipation, nausea and vomiting  Endocrine: Negative for hot flashes  Genitourinary: Negative for bladder incontinence, difficulty urinating, dyspareunia, dysuria and frequency  Musculoskeletal: Negative for arthralgias, back pain, gait problem, neck pain and neck stiffness  Skin: Negative for itching and rash  Neurological: Negative for dizziness, gait problem, headaches, numbness, seizures and speech difficulty  Hematological: Negative for adenopathy  Does not bruise/bleed easily  Psychiatric/Behavioral: Negative for decreased concentration, depression, sleep disturbance and suicidal ideas  The patient is not nervous/anxious  Current Medications: Reviewed  Allergies: Reviewed  PMH/FH/SH:  Reviewed      Physical Exam:    Body surface area is 1 7 meters squared  Wt Readings from Last 3 Encounters:   04/11/22 64 9 kg (143 lb)   03/31/22 66 2 kg (146 lb)   01/12/22 66 2 kg (146 lb)        Temp Readings from Last 3 Encounters:   04/11/22 (!) 97 3 °F (36 3 °C)   03/31/22 100 °F (37 8 °C)   01/12/22 97 9 °F (36 6 °C)        BP Readings from Last 3 Encounters:   04/11/22 110/74   03/31/22 117/67   01/12/22 118/70         Pulse Readings from Last 3 Encounters:   04/11/22 77   03/31/22 89   01/12/22 84        Physical Exam  Vitals reviewed  Constitutional:       General: She is not in acute distress  Appearance: She is well-developed  She is not diaphoretic  HENT:      Head: Normocephalic and atraumatic  Eyes:      Conjunctiva/sclera: Conjunctivae normal    Neck:      Trachea: No tracheal deviation  Cardiovascular:      Rate and Rhythm: Normal rate and regular rhythm  Heart sounds: No murmur heard  No friction rub  No gallop  Pulmonary:      Effort: Pulmonary effort is normal  No respiratory distress  Breath sounds: Normal breath sounds  No wheezing or rales  Chest:      Chest wall: No tenderness  Abdominal:      General: There is no distension  Palpations: Abdomen is soft  Tenderness: There is no abdominal tenderness  Musculoskeletal:      Cervical back: Normal range of motion and neck supple  Right lower leg: No edema  Lymphadenopathy:      Cervical: No cervical adenopathy  Skin:     General: Skin is warm and dry  Coloration: Skin is not pale  Findings: No erythema  Neurological:      Mental Status: She is alert and oriented to person, place, and time  Psychiatric:         Behavior: Behavior normal          Thought Content: Thought content normal          Judgment: Judgment normal          ECO    Goals and Barriers:  Current Goal: Minimize effects of disease  Barriers: None  Patient's Capacity to Self Care:  Patient is able to self care      Code Status: [unfilled]

## 2022-04-20 ENCOUNTER — APPOINTMENT (OUTPATIENT)
Dept: LAB | Facility: HOSPITAL | Age: 47
End: 2022-04-20
Payer: COMMERCIAL

## 2022-04-20 LAB — B-HCG SERPL-ACNC: 3 MIU/ML

## 2022-04-20 PROCEDURE — 84702 CHORIONIC GONADOTROPIN TEST: CPT

## 2022-04-20 PROCEDURE — 36415 COLL VENOUS BLD VENIPUNCTURE: CPT

## 2022-04-21 DIAGNOSIS — C90.01 MULTIPLE MYELOMA IN REMISSION (HCC): ICD-10-CM

## 2022-04-22 ENCOUNTER — TELEPHONE (OUTPATIENT)
Dept: HEMATOLOGY ONCOLOGY | Facility: CLINIC | Age: 47
End: 2022-04-22

## 2022-04-22 RX ORDER — LENALIDOMIDE 5 MG/1
5 CAPSULE ORAL DAILY
Qty: 28 CAPSULE | Refills: 0 | Status: SHIPPED | OUTPATIENT
Start: 2022-04-22 | End: 2022-05-17

## 2022-04-22 NOTE — TELEPHONE ENCOUNTER
Medication Refill     Who is Calling  patient   Medication  lenalidomide (REVLIMID) 5 MG CAPS        How many pills left  4   Preferred Pharmacy / Address [place] [address] [facility] [place] [address]   [address], [address] [address]     Who is your Physician?  Dr Ashley aJy   Call back number  [phone]   Relevant Information  n/a

## 2022-05-15 DIAGNOSIS — C90.01 MULTIPLE MYELOMA IN REMISSION (HCC): ICD-10-CM

## 2022-05-16 ENCOUNTER — TELEPHONE (OUTPATIENT)
Dept: HEMATOLOGY ONCOLOGY | Facility: CLINIC | Age: 47
End: 2022-05-16

## 2022-05-16 ENCOUNTER — APPOINTMENT (OUTPATIENT)
Dept: LAB | Facility: CLINIC | Age: 47
End: 2022-05-16
Payer: COMMERCIAL

## 2022-05-16 DIAGNOSIS — C90.01 MULTIPLE MYELOMA IN REMISSION (HCC): ICD-10-CM

## 2022-05-16 LAB — B-HCG SERPL-ACNC: 3 MIU/ML

## 2022-05-16 PROCEDURE — 36415 COLL VENOUS BLD VENIPUNCTURE: CPT

## 2022-05-16 PROCEDURE — 84702 CHORIONIC GONADOTROPIN TEST: CPT

## 2022-05-16 NOTE — TELEPHONE ENCOUNTER
Could not leave a voice message to notify patient of date change   Mail correspondence is being sent out

## 2022-05-17 RX ORDER — LENALIDOMIDE 5 MG/1
5 CAPSULE ORAL DAILY
Qty: 28 CAPSULE | Refills: 0 | Status: SHIPPED | OUTPATIENT
Start: 2022-05-17 | End: 2022-06-16

## 2022-06-12 DIAGNOSIS — C90.01 MULTIPLE MYELOMA IN REMISSION (HCC): ICD-10-CM

## 2022-06-15 ENCOUNTER — APPOINTMENT (OUTPATIENT)
Dept: LAB | Facility: CLINIC | Age: 47
End: 2022-06-15
Payer: COMMERCIAL

## 2022-06-15 DIAGNOSIS — C90.00 MULTIPLE MYELOMA NOT HAVING ACHIEVED REMISSION (HCC): ICD-10-CM

## 2022-06-15 DIAGNOSIS — C90.01 MULTIPLE MYELOMA IN REMISSION (HCC): ICD-10-CM

## 2022-06-15 LAB — B-HCG SERPL-ACNC: 3 MIU/ML

## 2022-06-15 PROCEDURE — 84702 CHORIONIC GONADOTROPIN TEST: CPT

## 2022-06-15 PROCEDURE — 36415 COLL VENOUS BLD VENIPUNCTURE: CPT

## 2022-06-16 ENCOUNTER — TELEPHONE (OUTPATIENT)
Dept: HEMATOLOGY ONCOLOGY | Facility: CLINIC | Age: 47
End: 2022-06-16

## 2022-06-16 RX ORDER — LENALIDOMIDE 5 MG/1
CAPSULE ORAL
Qty: 28 CAPSULE | Refills: 0 | Status: SHIPPED | OUTPATIENT
Start: 2022-06-16 | End: 2022-07-14

## 2022-06-16 NOTE — TELEPHONE ENCOUNTER
CALL RETURN FORM   Reason for patient call? Prabha Bunn from [employer] is calling in regards to the patients revlamid survey   Patient's primary oncologist? Reyes Munster   Name of person the patient was calling for? Patricia/Sandra   Any additional information to add, if applicable? Best call back number is [phone]   Informed patient that the message will be forwarded to the team and someone will get back to them as soon as possible    Did you relay this information to the patient?  Yes

## 2022-06-16 NOTE — TELEPHONE ENCOUNTER
Medication Refill     Who is Calling  Aleida-Pharmacist   Medication lenalidomide (Revlimid) 5 MG CAPS       How many pills left 0   Preferred Pharmacy / Address [place] [address], [address], [address] [address]       Who is your Physician? Dr Beth Feliz   Call back number Phone:  [phone]  Fax:  [phone]    Relevant Information Pharmacist calling in to order refills for patient

## 2022-06-23 ENCOUNTER — OFFICE VISIT (OUTPATIENT)
Dept: URGENT CARE | Facility: CLINIC | Age: 47
End: 2022-06-23
Payer: COMMERCIAL

## 2022-06-23 VITALS
RESPIRATION RATE: 18 BRPM | BODY MASS INDEX: 22.49 KG/M2 | HEIGHT: 65 IN | WEIGHT: 135 LBS | OXYGEN SATURATION: 97 % | HEART RATE: 78 BPM | TEMPERATURE: 97.8 F

## 2022-06-23 DIAGNOSIS — A69.20 ERYTHEMA MIGRANS (LYME DISEASE): Primary | ICD-10-CM

## 2022-06-23 PROCEDURE — 99213 OFFICE O/P EST LOW 20 MIN: CPT | Performed by: PHYSICIAN ASSISTANT

## 2022-06-23 RX ORDER — DOXYCYCLINE 100 MG/1
100 TABLET ORAL 2 TIMES DAILY
Qty: 28 TABLET | Refills: 0 | Status: SHIPPED | OUTPATIENT
Start: 2022-06-23 | End: 2022-07-07

## 2022-06-23 NOTE — PATIENT INSTRUCTIONS
Take antibiotic as prescribed and follow-up with your primary care provider when the antibiotic is complete  If any new or worsening symptoms develop get re-evaluated sooner

## 2022-06-23 NOTE — PROGRESS NOTES
[facility]        NAME: Renato Vo is a 50 y o  female  : 1975    MRN: [id number]  DATE: 2022  TIME: 4:34 PM    Assessment and Plan   Erythema migrans (Lyme disease) [A69 20]  1  Erythema migrans (Lyme disease)  doxycycline (ADOXA) 100 MG tablet         Patient Instructions   Patient Instructions   Take antibiotic as prescribed and follow-up with your primary care provider when the antibiotic is complete  If any new or worsening symptoms develop get re-evaluated sooner  Follow up with PCP in 3-5 days  Proceed to  ER if symptoms worsen  Chief Complaint     Chief Complaint   Patient presents with    Rash     Possible bullseye rash, started Monday          History of Present Illness       Patient presents with a bull's-eye rash around the left knee and fatigue starting about 3 days ago  Denies visual disturbances, headache, neck or back stiffness, fever, muscle or body aches  Patient does work in her garden often but denies any ticks being on her  Review of Systems   Review of Systems   Constitutional: Positive for fatigue  Negative for fever  Musculoskeletal: Negative for arthralgias and myalgias  Skin: Positive for rash  Neurological: Negative for weakness and headaches  Psychiatric/Behavioral: Negative for confusion           Current Medications       Current Outpatient Medications:     aspirin (ECOTRIN LOW STRENGTH) 81 mg EC tablet, Take 81 mg by mouth daily, Disp: , Rfl:     Calcium Carb-Cholecalciferol (CALCIUM 1000 + D PO), Take by mouth, Disp: , Rfl:     cholestyramine (QUESTRAN) 4 g packet, Take 4 g by mouth 2 (two) times a day, Disp: , Rfl:     Cyanocobalamin (VITAMIN B 12 PO), Take 1 tablet by mouth daily, Disp: , Rfl:     dexamethasone (DECADRON) 4 mg tablet, TAKE 5 TABLETS (20 MG) BY MOUTH ONCE A WEEK, Disp: 20 tablet, Rfl: 4    doxycycline (ADOXA) 100 MG tablet, Take 1 tablet (100 mg total) by mouth 2 (two) times a day for 14 days, Disp: 28 tablet, Rfl: 0    HM VITAMIN D3 2000 units CAPS, Take by mouth, Disp: , Rfl:     lenalidomide (REVLIMID) 5 MG CAPS, Take 1 capsule by mouth daily  [facility]#[id number] on , Disp: 28 capsule, Rfl: 0    benzonatate (TESSALON PERLES) 100 mg capsule, Take 1 capsule (100 mg total) by mouth 3 (three) times a day as needed for cough (Patient not taking: Reported on 2022), Disp: 20 capsule, Rfl: 0    Current Allergies     Allergies as of 2022    (No Known Allergies)            The following portions of the patient's history were reviewed and updated as appropriate: allergies, current medications, past family history, past medical history, past social history, past surgical history and problem list      Past Medical History:   Diagnosis Date    Back pain     pinched nerve in back    CTS (carpal tunnel syndrome)     right wrist    Multiple myeloma ([address] )     chemotherapy    [facility]     Multiple sclerosis ([facility] )     skin lesion     mole removed from face       Past Surgical History:   Procedure Laterality Date     SECTION      LIMBAL STEM CELL TRANSPLANT  2018       Family History   Problem Relation Age of Onset    No Known Problems Mother     Coronary artery disease Father     No Known Problems Sister     No Known Problems Brother     No Known Problems Daughter     No Known Problems Son     No Known Problems Brother          Medications have been verified  Objective   Pulse 78   Temp 97 8 °F (36 6 °C) (Temporal)   Resp 18   Ht 5' 5" (1 651 m)   Wt 61 2 kg (135 lb)   SpO2 97%   BMI 22 47 kg/m²        Physical Exam     Physical Exam  Constitutional:       Appearance: Normal appearance  Skin:     Comments: Approximately 6 cm erythematous rash or lateral portion of the left knee  There is a ring around the edge and a darker red center given a bull's-eye appearance  Area is blanchable  Neurological:      Mental Status: She is alert     Psychiatric: Mood and Affect: Mood normal          Behavior: Behavior normal

## 2022-07-08 ENCOUNTER — OFFICE VISIT (OUTPATIENT)
Dept: FAMILY MEDICINE CLINIC | Facility: CLINIC | Age: 47
End: 2022-07-08
Payer: COMMERCIAL

## 2022-07-08 VITALS
TEMPERATURE: 97.5 F | BODY MASS INDEX: 23.82 KG/M2 | WEIGHT: 143 LBS | OXYGEN SATURATION: 98 % | HEIGHT: 65 IN | DIASTOLIC BLOOD PRESSURE: 62 MMHG | SYSTOLIC BLOOD PRESSURE: 104 MMHG | HEART RATE: 74 BPM

## 2022-07-08 DIAGNOSIS — Z12.31 ENCOUNTER FOR SCREENING MAMMOGRAM FOR MALIGNANT NEOPLASM OF BREAST: ICD-10-CM

## 2022-07-08 DIAGNOSIS — C90.00 MYELOMA ASSOCIATED AMYLOIDOSIS (HCC): ICD-10-CM

## 2022-07-08 DIAGNOSIS — E27.8 ADRENAL HYPERPLASIA (HCC): ICD-10-CM

## 2022-07-08 DIAGNOSIS — E85.9 MYELOMA ASSOCIATED AMYLOIDOSIS (HCC): ICD-10-CM

## 2022-07-08 DIAGNOSIS — A69.20 ERYTHEMA MIGRANS (LYME DISEASE): ICD-10-CM

## 2022-07-08 DIAGNOSIS — G35 MULTIPLE SCLEROSIS (HCC): Primary | ICD-10-CM

## 2022-07-08 DIAGNOSIS — R31.9 HEMATURIA, UNSPECIFIED TYPE: ICD-10-CM

## 2022-07-08 DIAGNOSIS — R31.29 MICROSCOPIC HEMATURIA: ICD-10-CM

## 2022-07-08 PROBLEM — R30.0 DYSURIA: Status: RESOLVED | Noted: 2022-07-08 | Resolved: 2022-07-08

## 2022-07-08 PROBLEM — R30.0 DYSURIA: Status: ACTIVE | Noted: 2022-07-08

## 2022-07-08 LAB
SL AMB  POCT GLUCOSE, UA: NEGATIVE
SL AMB LEUKOCYTE ESTERASE,UA: NEGATIVE
SL AMB POCT BILIRUBIN,UA: NEGATIVE
SL AMB POCT BLOOD,UA: ABNORMAL
SL AMB POCT KETONES,UA: NEGATIVE
SL AMB POCT NITRITE,UA: NEGATIVE
SL AMB POCT PH,UA: 5.5
SL AMB POCT SPECIFIC GRAVITY,UA: 1.01
SL AMB POCT URINE PROTEIN: NEGATIVE
SL AMB POCT UROBILINOGEN: 0.2

## 2022-07-08 PROCEDURE — 81001 URINALYSIS AUTO W/SCOPE: CPT | Performed by: FAMILY MEDICINE

## 2022-07-08 PROCEDURE — 3725F SCREEN DEPRESSION PERFORMED: CPT | Performed by: FAMILY MEDICINE

## 2022-07-08 PROCEDURE — 81003 URINALYSIS AUTO W/O SCOPE: CPT | Performed by: FAMILY MEDICINE

## 2022-07-08 PROCEDURE — 99214 OFFICE O/P EST MOD 30 MIN: CPT | Performed by: FAMILY MEDICINE

## 2022-07-08 RX ORDER — AMANTADINE HYDROCHLORIDE 100 MG/1
100 TABLET ORAL 2 TIMES DAILY
COMMUNITY
Start: 2022-05-11 | End: 2022-12-07

## 2022-07-08 NOTE — PROGRESS NOTES
Assessment/Plan:         Problem List Items Addressed This Visit        Endocrine    Adrenal hyperplasia ([facility] )     Seen on MRI in 2020              Nervous and Auditory    Multiple sclerosis ([facility] ) - Primary     Follows with Neurology              Musculoskeletal and Integument    Erythema migrans (Lyme disease)     Completed doxy  No further work up or treatment needed              Genitourinary    Microscopic hematuria     Will check CT Urogram           Relevant Orders    CT renal protocol    UA w Reflex to Microscopic w Reflex to Culture - Clinic Collect       Other    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[facility] )     Follows with Hematology             Other Visit Diagnoses     Encounter for screening mammogram for malignant neoplasm of breast        Relevant Orders    Mammo screening bilateral w cad    Hematuria, unspecified type        Relevant Orders    POCT urine dip auto non-scope (Completed)            Subjective:      Patient ID: Claudean Qua is a 50 y o  female  She was seen at Urgent care for erythema migrans  She was treated with Doxycycline 100 mg BID for 14 days  The rash is fading  She notes some fatigue but she is unsure if this is from her chronic conditions - she has myeloma, MS  She does see her specialist - Neurology and Hematology  She states she saw blood in her urine about a year ago, and then again 2 months ago  No dysuria or frequency  No h/o kidney stones  Doesn't get menstrual periods after stem cell transplant transplant  Urine dip is + for small blood  The following portions of the patient's history were reviewed and updated as appropriate:   Past Medical History:  She has a past medical history of Back pain, CTS (carpal tunnel syndrome), Multiple myeloma ([facility] ), Multiple myeloma ([facility] ), Multiple sclerosis ([facility] ), and skin lesion  ,  _______________________________________________________________________  Medical Problems:  does not have any pertinent problems on file ,  _______________________________________________________________________  Past Surgical History:   has a past surgical history that includes  section and Limbal stem cell transplant (2018)  ,  _______________________________________________________________________  Family History:  family history includes Coronary artery disease in her father; No Known Problems in her brother, brother, daughter, mother, sister, and son ,  _______________________________________________________________________  Social History:   reports that she has never smoked  She has never used smokeless tobacco  She reports that she does not drink alcohol and does not use drugs  ,  _______________________________________________________________________  Allergies:  has No Known Allergies     _______________________________________________________________________  Current Outpatient Medications   Medication Sig Dispense Refill    Amantadine HCl 100 MG tablet Take 100 mg by mouth 2 (two) times a day      aspirin (ECOTRIN LOW STRENGTH) 81 mg EC tablet Take 81 mg by mouth daily      Calcium Carb-Cholecalciferol (CALCIUM 1000 + D PO) Take by mouth      cholestyramine (QUESTRAN) 4 g packet Take 4 g by mouth 2 (two) times a day      Cyanocobalamin (VITAMIN B 12 PO) Take 1 tablet by mouth daily      dexamethasone (DECADRON) 4 mg tablet TAKE 5 TABLETS (20 MG) BY MOUTH ONCE A WEEK 20 tablet 4    HM VITAMIN D3 2000 units CAPS Take by mouth      lenalidomide (REVLIMID) 5 MG CAPS Take 1 capsule by mouth daily  [facility]#[id number] on  28 capsule 0     No current facility-administered medications for this visit      _______________________________________________________________________  Review of Systems   Constitutional: Positive for fatigue  Genitourinary: Positive for hematuria  Negative for difficulty urinating, dysuria, menstrual problem and urgency  Skin: Positive for rash           Objective:  Vitals:    22 1542   BP: 104/62   Pulse: 74   Temp: 97 5 °F (36 4 °C)   SpO2: 98%   Weight: 64 9 kg (143 lb)   Height: 5' 5" (1 651 m)     Body mass index is 23 8 kg/m²  Physical Exam  Vitals and nursing note reviewed  Constitutional:       General: She is not in acute distress  Appearance: Normal appearance  She is not ill-appearing, toxic-appearing or diaphoretic  HENT:      Head: Normocephalic and atraumatic  Right Ear: External ear normal       Left Ear: External ear normal    Cardiovascular:      Rate and Rhythm: Normal rate and regular rhythm  Heart sounds: No murmur heard  No friction rub  Pulmonary:      Effort: Pulmonary effort is normal  No respiratory distress  Breath sounds: Normal breath sounds  No stridor  No wheezing, rhonchi or rales  Musculoskeletal:         General: No swelling  Right lower leg: No edema  Left lower leg: No edema  Skin:         Neurological:      General: No focal deficit present  Mental Status: She is alert  Mental status is at baseline  Psychiatric:         Attention and Perception: Attention normal          Mood and Affect: Mood normal          Speech: Speech normal          Behavior: Behavior normal          Thought Content:  Thought content normal          Judgment: Judgment normal

## 2022-07-09 LAB
BACTERIA UR QL AUTO: ABNORMAL /HPF
BILIRUB UR QL STRIP: NEGATIVE
CLARITY UR: CLEAR
COLOR UR: COLORLESS
GLUCOSE UR STRIP-MCNC: NEGATIVE MG/DL
HGB UR QL STRIP.AUTO: ABNORMAL
KETONES UR STRIP-MCNC: NEGATIVE MG/DL
LEUKOCYTE ESTERASE UR QL STRIP: NEGATIVE
NITRITE UR QL STRIP: NEGATIVE
NON-SQ EPI CELLS URNS QL MICRO: ABNORMAL /HPF
PH UR STRIP.AUTO: 6 [PH]
PROT UR STRIP-MCNC: NEGATIVE MG/DL
RBC #/AREA URNS AUTO: ABNORMAL /HPF
SP GR UR STRIP.AUTO: 1.01 (ref 1–1.03)
UROBILINOGEN UR STRIP-ACNC: <2 MG/DL
WBC #/AREA URNS AUTO: ABNORMAL /HPF

## 2022-07-10 DIAGNOSIS — C90.01 MULTIPLE MYELOMA IN REMISSION (HCC): ICD-10-CM

## 2022-07-13 ENCOUNTER — TELEPHONE (OUTPATIENT)
Dept: OBGYN CLINIC | Facility: HOSPITAL | Age: 47
End: 2022-07-13

## 2022-07-13 ENCOUNTER — TELEPHONE (OUTPATIENT)
Dept: HEMATOLOGY ONCOLOGY | Facility: CLINIC | Age: 47
End: 2022-07-13

## 2022-07-13 ENCOUNTER — APPOINTMENT (OUTPATIENT)
Dept: LAB | Facility: CLINIC | Age: 47
End: 2022-07-13
Payer: COMMERCIAL

## 2022-07-13 DIAGNOSIS — C90.01 MULTIPLE MYELOMA IN REMISSION (HCC): ICD-10-CM

## 2022-07-13 LAB — B-HCG SERPL-ACNC: 3 MIU/ML

## 2022-07-13 PROCEDURE — 36415 COLL VENOUS BLD VENIPUNCTURE: CPT

## 2022-07-13 PROCEDURE — 84702 CHORIONIC GONADOTROPIN TEST: CPT

## 2022-07-13 NOTE — TELEPHONE ENCOUNTER
Medication Refill     Who is Calling  Pharmacy    Medication  lenalidomide (REVLIMID)      How many pills left 10   Preferred Pharmacy / Address  [facility], [address]    Who is your Physician?  Kourtney Huerta MD   Call back number  [phone]   Relevant Information

## 2022-07-13 NOTE — TELEPHONE ENCOUNTER
Hello,    Please advise if the following patient can be forced onto the schedule:    Patient: Severo See Ronda Argue    : 1975    MRN: [id number]    Call back #: [phone]  Patient works tomorrow  Insurance: [facility] [employer]    Reason for appointment: carpal tunnel right wrist  Seen by Dr Tigist Espinosa in  and it had been fine for a while  Seems like it is back  Requested doctor/location: Shabana/Pippa      Thank you  E-mail sent to [facility]

## 2022-07-14 RX ORDER — LENALIDOMIDE 5 MG/1
CAPSULE ORAL
Qty: 28 CAPSULE | Refills: 0 | Status: SHIPPED | OUTPATIENT
Start: 2022-07-14 | End: 2022-08-11

## 2022-08-03 ENCOUNTER — TELEPHONE (OUTPATIENT)
Dept: HEMATOLOGY ONCOLOGY | Facility: CLINIC | Age: 47
End: 2022-08-03

## 2022-08-03 NOTE — TELEPHONE ENCOUNTER
Appointment Cancellation Or Reschedule     Person calling in Patient    Provider [facility] Visit Date and Time 08/05 at 3:00pm   Office Visit Location [facility]   Did patient want to reschedule their office appointment? If so, when was it scheduled to? Yes, 08/29 at 9:20am   Is this patient calling to reschedule an infusion appointment? no   When is their next infusion appointment? n/a   Is this patient a Chemo patient? no   Reason for Cancellation or Reschedule Patient prefers to see Dr Dai Bull only      If the patient is a treatment patient, please route this to the office nurse  If the patient is not on treatment, please route to the office MA  If the patient is a surgical oncology patient, please route to surg/onc clinical pool

## 2022-08-07 DIAGNOSIS — C90.01 MULTIPLE MYELOMA IN REMISSION (HCC): ICD-10-CM

## 2022-08-10 ENCOUNTER — APPOINTMENT (OUTPATIENT)
Dept: LAB | Age: 47
End: 2022-08-10
Payer: COMMERCIAL

## 2022-08-10 DIAGNOSIS — C90.01 MULTIPLE MYELOMA IN REMISSION (HCC): ICD-10-CM

## 2022-08-10 LAB — B-HCG SERPL-ACNC: 3 MIU/ML

## 2022-08-10 PROCEDURE — 84702 CHORIONIC GONADOTROPIN TEST: CPT

## 2022-08-10 PROCEDURE — 36415 COLL VENOUS BLD VENIPUNCTURE: CPT

## 2022-08-11 ENCOUNTER — TELEPHONE (OUTPATIENT)
Dept: HEMATOLOGY ONCOLOGY | Facility: CLINIC | Age: 47
End: 2022-08-11

## 2022-08-11 RX ORDER — LENALIDOMIDE 5 MG/1
CAPSULE ORAL
Qty: 28 CAPSULE | Refills: 0 | Status: SHIPPED | OUTPATIENT
Start: 2022-08-11 | End: 2022-09-08

## 2022-08-11 NOTE — TELEPHONE ENCOUNTER
Medication Refill     Who is Calling  Saad   Medication  lenalidomide (Revlimid)      How many pills left    Preferred Pharmacy / Address [facility], [address]    Who is your Physician? Shanell Dixon    Call back number [phone]   Relevant Information

## 2022-08-24 ENCOUNTER — HOSPITAL ENCOUNTER (OUTPATIENT)
Dept: CT IMAGING | Facility: HOSPITAL | Age: 47
Discharge: HOME | End: 2022-08-24
Attending: FAMILY MEDICINE
Payer: COMMERCIAL

## 2022-08-24 ENCOUNTER — APPOINTMENT (OUTPATIENT)
Dept: LAB | Facility: CLINIC | Age: 47
End: 2022-08-24
Payer: COMMERCIAL

## 2022-08-24 DIAGNOSIS — R31.29 MICROSCOPIC HEMATURIA: ICD-10-CM

## 2022-08-24 LAB
ALBUMIN SERPL BCP-MCNC: 4 G/DL (ref 3.5–5)
ALP SERPL-CCNC: 71 U/L (ref 34–104)
ALT SERPL W P-5'-P-CCNC: 23 U/L (ref 7–52)
ANION GAP SERPL CALCULATED.3IONS-SCNC: 5 MMOL/L (ref 4–13)
AST SERPL W P-5'-P-CCNC: 23 U/L (ref 13–39)
BASOPHILS # BLD AUTO: 0.05 THOUSANDS/ΜL (ref 0–0.1)
BASOPHILS NFR BLD AUTO: 1 % (ref 0–1)
BILIRUB SERPL-MCNC: 0.87 MG/DL (ref 0.2–1)
BUN SERPL-MCNC: 15 MG/DL (ref 5–25)
CALCIUM SERPL-MCNC: 8.8 MG/DL (ref 8.4–10.2)
CHLORIDE SERPL-SCNC: 107 MMOL/L (ref 96–108)
CO2 SERPL-SCNC: 28 MMOL/L (ref 21–32)
CREAT SERPL-MCNC: 0.77 MG/DL (ref 0.6–1.3)
EOSINOPHIL # BLD AUTO: 0.17 THOUSAND/ΜL (ref 0–0.61)
EOSINOPHIL NFR BLD AUTO: 5 % (ref 0–6)
ERYTHROCYTE [DISTWIDTH] IN BLOOD BY AUTOMATED COUNT: 13.2 % (ref 11.6–15.1)
GFR SERPL CREATININE-BSD FRML MDRD: 90 ML/MIN/1.73SQ M
GLUCOSE P FAST SERPL-MCNC: 82 MG/DL (ref 65–99)
HCT VFR BLD AUTO: 39.7 % (ref 34.8–46.1)
HGB BLD-MCNC: 13 G/DL (ref 11.5–15.4)
IGA SERPL-MCNC: 106 MG/DL (ref 70–400)
IGG SERPL-MCNC: 1450 MG/DL (ref 700–1600)
IGM SERPL-MCNC: 26 MG/DL (ref 40–230)
IMM GRANULOCYTES # BLD AUTO: 0.01 THOUSAND/UL (ref 0–0.2)
IMM GRANULOCYTES NFR BLD AUTO: 0 % (ref 0–2)
LYMPHOCYTES # BLD AUTO: 1.54 THOUSANDS/ΜL (ref 0.6–4.47)
LYMPHOCYTES NFR BLD AUTO: 45 % (ref 14–44)
MCH RBC QN AUTO: 30.6 PG (ref 26.8–34.3)
MCHC RBC AUTO-ENTMCNC: 32.7 G/DL (ref 31.4–37.4)
MCV RBC AUTO: 93 FL (ref 82–98)
MONOCYTES # BLD AUTO: 0.42 THOUSAND/ΜL (ref 0.17–1.22)
MONOCYTES NFR BLD AUTO: 12 % (ref 4–12)
NEUTROPHILS # BLD AUTO: 1.27 THOUSANDS/ΜL (ref 1.85–7.62)
NEUTS SEG NFR BLD AUTO: 37 % (ref 43–75)
NRBC BLD AUTO-RTO: 0 /100 WBCS
PLATELET # BLD AUTO: 177 THOUSANDS/UL (ref 149–390)
PMV BLD AUTO: 9.4 FL (ref 8.9–12.7)
POTASSIUM SERPL-SCNC: 3.8 MMOL/L (ref 3.5–5.3)
PROT SERPL-MCNC: 7.3 G/DL (ref 6.4–8.4)
RBC # BLD AUTO: 4.25 MILLION/UL (ref 3.81–5.12)
SODIUM SERPL-SCNC: 140 MMOL/L (ref 135–147)
WBC # BLD AUTO: 3.46 THOUSAND/UL (ref 4.31–10.16)

## 2022-08-24 PROCEDURE — G1004 CDSM NDSC: HCPCS

## 2022-08-24 PROCEDURE — 74178 CT ABD&PLV WO CNTR FLWD CNTR: CPT

## 2022-08-24 PROCEDURE — 86334 IMMUNOFIX E-PHORESIS SERUM: CPT | Performed by: PATHOLOGY

## 2022-08-24 PROCEDURE — 84165 PROTEIN E-PHORESIS SERUM: CPT | Performed by: PATHOLOGY

## 2022-08-24 RX ADMIN — IOHEXOL 120 ML: 350 INJECTION, SOLUTION INTRAVENOUS at 13:23

## 2022-08-25 LAB
ALBUMIN SERPL ELPH-MCNC: 4.3 G/DL (ref 3.5–5)
ALBUMIN SERPL ELPH-MCNC: 58.9 % (ref 52–65)
ALPHA1 GLOB SERPL ELPH-MCNC: 0.24 G/DL (ref 0.1–0.4)
ALPHA1 GLOB SERPL ELPH-MCNC: 3.3 % (ref 2.5–5)
ALPHA2 GLOB SERPL ELPH-MCNC: 0.59 G/DL (ref 0.4–1.2)
ALPHA2 GLOB SERPL ELPH-MCNC: 8.1 % (ref 7–13)
BETA GLOB ABNORMAL SERPL ELPH-MCNC: 0.42 G/DL (ref 0.4–0.8)
BETA1 GLOB SERPL ELPH-MCNC: 5.7 % (ref 5–13)
BETA2 GLOB SERPL ELPH-MCNC: 3.8 % (ref 2–8)
BETA2+GAMMA GLOB SERPL ELPH-MCNC: 0.28 G/DL (ref 0.2–0.5)
GAMMA GLOB ABNORMAL SERPL ELPH-MCNC: 1.47 G/DL (ref 0.5–1.6)
GAMMA GLOB SERPL ELPH-MCNC: 20.2 % (ref 12–22)
IGG/ALB SER: 1.43 {RATIO} (ref 1.1–1.8)
INTERPRETATION UR IFE-IMP: NORMAL
KAPPA LC FREE SER-MCNC: 15.5 MG/L (ref 3.3–19.4)
KAPPA LC FREE/LAMBDA FREE SER: 0.68 {RATIO} (ref 0.26–1.65)
LAMBDA LC FREE SERPL-MCNC: 22.9 MG/L (ref 5.7–26.3)
M PROTEIN 1 MFR SERPL ELPH: 9.8 %
M PROTEIN 1 SERPL ELPH-MCNC: 0.72 G/DL
PROT PATTERN SERPL ELPH-IMP: NORMAL
PROT SERPL-MCNC: 7.3 G/DL (ref 6.4–8.2)

## 2022-08-29 ENCOUNTER — DOCUMENTATION (OUTPATIENT)
Dept: HEMATOLOGY ONCOLOGY | Facility: CLINIC | Age: 47
End: 2022-08-29

## 2022-08-29 ENCOUNTER — OFFICE VISIT (OUTPATIENT)
Dept: HEMATOLOGY ONCOLOGY | Facility: CLINIC | Age: 47
End: 2022-08-29
Payer: COMMERCIAL

## 2022-08-29 VITALS
TEMPERATURE: 97.5 F | DIASTOLIC BLOOD PRESSURE: 68 MMHG | HEART RATE: 70 BPM | WEIGHT: 136 LBS | BODY MASS INDEX: 22.66 KG/M2 | SYSTOLIC BLOOD PRESSURE: 102 MMHG | OXYGEN SATURATION: 99 % | HEIGHT: 65 IN | RESPIRATION RATE: 16 BRPM

## 2022-08-29 DIAGNOSIS — C90.00 MULTIPLE MYELOMA NOT HAVING ACHIEVED REMISSION (HCC): Primary | ICD-10-CM

## 2022-08-29 DIAGNOSIS — C90.00 MYELOMA ASSOCIATED AMYLOIDOSIS (HCC): ICD-10-CM

## 2022-08-29 DIAGNOSIS — G56.03 CARPAL TUNNEL SYNDROME, BILATERAL: ICD-10-CM

## 2022-08-29 DIAGNOSIS — E85.9 MYELOMA ASSOCIATED AMYLOIDOSIS (HCC): ICD-10-CM

## 2022-08-29 PROBLEM — R63.5 WEIGHT GAIN: Status: RESOLVED | Noted: 2021-07-08 | Resolved: 2022-08-29

## 2022-08-29 PROCEDURE — 99214 OFFICE O/P EST MOD 30 MIN: CPT | Performed by: INTERNAL MEDICINE

## 2022-08-29 NOTE — PROGRESS NOTES
FMLA forms scanned under media, brought in today by patient  Staff message sent to [facility] clinical asking for forms to be completed and submitted

## 2022-08-29 NOTE — PROGRESS NOTES
Hematology Outpatient Follow - Up Note  Ivanna Torres 50 y o  female MRN: @ Encounter: [id number]        Date:  8/29/2022        Assessment/ Plan:    53-year-old female with history of smoldering multiple myeloma with bone marrow biopsy showing 25% plasma cell on 12/2017, M protein 2 5 g IgG lambda, normal cytogenetics and fish panel for multiple myeloma bony skeletal survey was reported normal she received VRD induction chemotherapy followed by autologous stem cell transplant on 08/2018, now on maintenance lenalidomide 5 mg po  Daily,, she takes dexamethasone intermittently     M protein on August 2022 showed IgM lambda monoclonal protein of 0 72      (1 1 g) at [facility] of of pain relapse however it was polyclonal    Normal lambda light chain, IgG, no evidence of anemia, hypercalcemia or renal insufficiency    Will continue maintenance lenalidomide 5 mg p o  daily, she declined dexamethasone because of carpal tunnel syndrome, she will be evaluated by hand surgeon    She will follow-up at [facility] in 3 months and I will follow her in 6 months with CBC, CMP,  SPEP, free light chain, quantitative immunoglobulin beta 2 microglobulin        Labs and imaging studies are reviewed by ordering provider once results are available  If there are findings that need immediate attention, you will be contacted when results available  Discussing results and the implication on your healthcare is best discussed in person at your follow-up visit  HPI:    - BM : 12/2017 : 25% plasma cell     - M spike : igG lambda, 2 5 g; IgG > 3000  2000 mg in 2016     - normal cytogenetics   - NO CRAB : bone survey in 2016 and cervical and thoracic spine MRI in May of 2017, with no bone lesions identified        overall prognosis discussed with pt; high risk SMM; need MM therapy   1) induction by VRd; 2) stem cell / auto transplant 3) maintenance therapy        induction : VRd:    - Velcade : 1 3 mg / m2 SC injection D 1, 8, 15 / 28d   - Revlimid : 25 mg po daily D1-21 / 28d   - Dex 40 mg po D1, 8, 15 / 28d   - supportive care : allopurinol 100 mg po daily x 1 m then stop ; AS A 81 mg po daily once on Revlimid  ; Acyclovir 400 mg po bid           C # 1 : start Vd on 1/17 ; Revlimid for 12 days : stop on 2/6 ( end of week 3 )  C # 2:  start on 2/14 - 3/7 ( week off from 3/8-14)  C # 3: 3/14, 3/21, 3/28,   revlimid : D1-21: 3/14-4/3   off 4/4-4/10  C# 4:  4/11, 18, 25         revlimid : D1-21: 4/11- 5/1 7/2018 : Autologous stem cell transplant in [facility] with melphalan   No major complications   Hospitalized for 3 weeks    11/2018:  Start maintenance Revlimid 5  mg daily and dexamethasone 12 mg weekly         12/2018:  Received proper posttransplant vaccination by PCP per patient      Patient still follows transplant doctor in [facility]        M spike 0 3g/dL (11/2018) , un-detectable  ( 2/2019 ),   0 4 ( 12/2019)  0 49 ( 2/2020) 0 39 (5/2020) 0 62 ( 11/2020) 0 5 ( 2/2021) 0 68 ( 1/2022) 0 72 ( August 2022 ) IgG lambda, lambda light chain 22 with normal ratio, no evidence of hypercalcemia, anemia, normal IgG in the range of 1500   Interval History:        Previous Treatment:         Test Results:    Imaging: No results found      Labs:   Lab Results   Component Value Date    WBC 3 46 (L) 08/24/2022    HGB 13 0 08/24/2022    HCT 39 7 08/24/2022    MCV 93 08/24/2022     08/24/2022     Lab Results   Component Value Date     08/30/2017    K 3 8 08/24/2022     08/24/2022    CO2 28 08/24/2022    BUN 15 08/24/2022    CREATININE 0 77 08/24/2022    GLUF 82 08/24/2022    CALCIUM 8 8 08/24/2022    CORRECTEDCA 9 6 04/05/2022    AST 23 08/24/2022    ALT 23 08/24/2022    ALKPHOS 71 08/24/2022    PROT 8 3 (H) 08/30/2017    BILITOT 0 4 08/30/2017    EGFR 90 08/24/2022       No results found for: IRON, TIBC, FERRITIN    Lab Results   Component Value Date    AAKTAPAL44 539 07/10/2021         ROS: Review of Systems Constitutional: Negative for appetite change, chills, diaphoresis, fatigue and unexpected weight change  HENT:   Negative for mouth sores, nosebleeds, sore throat, trouble swallowing and voice change  Eyes: Negative for eye problems and icterus  Respiratory: Negative for chest tightness, cough, hemoptysis and wheezing  Cardiovascular: Negative for chest pain, leg swelling and palpitations  Gastrointestinal: Negative for abdominal distention, abdominal pain, blood in stool, constipation, diarrhea, nausea and vomiting  Endocrine: Negative for hot flashes  Genitourinary: Negative for bladder incontinence, difficulty urinating, dyspareunia, dysuria and frequency  Musculoskeletal: Negative for arthralgias, back pain, gait problem, neck pain and neck stiffness  Skin: Negative for itching and rash  Neurological: Negative for dizziness, gait problem, headaches, numbness, seizures and speech difficulty  Hematological: Negative for adenopathy  Does not bruise/bleed easily  Psychiatric/Behavioral: Negative for decreased concentration, depression, sleep disturbance and suicidal ideas  The patient is nervous/anxious  Current Medications: Reviewed  Allergies: Reviewed  PMH/FH/SH:  Reviewed      Physical Exam:    Body surface area is 1 68 meters squared  Wt Readings from Last 3 Encounters:   08/29/22 61 7 kg (136 lb)   07/08/22 64 9 kg (143 lb)   06/23/22 61 2 kg (135 lb)        Temp Readings from Last 3 Encounters:   08/29/22 97 5 °F (36 4 °C)   07/08/22 97 5 °F (36 4 °C)   06/23/22 97 8 °F (36 6 °C) (Temporal)        BP Readings from Last 3 Encounters:   08/29/22 102/68   07/08/22 104/62   04/11/22 110/74         Pulse Readings from Last 3 Encounters:   08/29/22 70   07/08/22 74   06/23/22 78        Physical Exam  Vitals reviewed  Constitutional:       General: She is not in acute distress  Appearance: She is well-developed  She is not diaphoretic     HENT:      Head: Normocephalic and atraumatic  Eyes:      Conjunctiva/sclera: Conjunctivae normal    Neck:      Trachea: No tracheal deviation  Cardiovascular:      Rate and Rhythm: Normal rate and regular rhythm  Heart sounds: No murmur heard  No friction rub  No gallop  Pulmonary:      Effort: Pulmonary effort is normal  No respiratory distress  Breath sounds: Normal breath sounds  No wheezing or rales  Chest:      Chest wall: No tenderness  Abdominal:      General: There is no distension  Palpations: Abdomen is soft  Tenderness: There is no abdominal tenderness  Musculoskeletal:      Cervical back: Normal range of motion and neck supple  Right lower leg: No edema  Left lower leg: No edema  Lymphadenopathy:      Cervical: No cervical adenopathy  Skin:     General: Skin is warm and dry  Coloration: Skin is not pale  Findings: No erythema  Neurological:      Mental Status: She is alert and oriented to person, place, and time  Psychiatric:         Behavior: Behavior normal          Thought Content: Thought content normal          Judgment: Judgment normal          ECO  Goals and Barriers:  Current Goal: Minimize effects of disease  Barriers: None  Patient's Capacity to Self Care:  Patient is able to self care      [facility]

## 2022-09-02 ENCOUNTER — TELEPHONE (OUTPATIENT)
Dept: HEMATOLOGY ONCOLOGY | Facility: CLINIC | Age: 47
End: 2022-09-02

## 2022-09-02 NOTE — TELEPHONE ENCOUNTER
Unable to lm vm is full to ad patient of the following    Jose Francisco Leonard MA  I discussed with Dr Jae Holloway today and based on pt's hem onc condition and performance status, he will not sign the FMLA form for her

## 2022-09-07 DIAGNOSIS — C90.01 MULTIPLE MYELOMA IN REMISSION (HCC): ICD-10-CM

## 2022-09-08 ENCOUNTER — APPOINTMENT (OUTPATIENT)
Dept: LAB | Facility: CLINIC | Age: 47
End: 2022-09-08
Payer: COMMERCIAL

## 2022-09-08 ENCOUNTER — TELEPHONE (OUTPATIENT)
Dept: HEMATOLOGY ONCOLOGY | Facility: CLINIC | Age: 47
End: 2022-09-08

## 2022-09-08 DIAGNOSIS — C90.01 MULTIPLE MYELOMA IN REMISSION (HCC): ICD-10-CM

## 2022-09-08 DIAGNOSIS — C90.00 MULTIPLE MYELOMA NOT HAVING ACHIEVED REMISSION (HCC): ICD-10-CM

## 2022-09-08 DIAGNOSIS — C90.00 MULTIPLE MYELOMA NOT HAVING ACHIEVED REMISSION (HCC): Primary | ICD-10-CM

## 2022-09-08 LAB — B-HCG SERPL-ACNC: 3 MIU/ML

## 2022-09-08 PROCEDURE — 84702 CHORIONIC GONADOTROPIN TEST: CPT

## 2022-09-08 PROCEDURE — 36415 COLL VENOUS BLD VENIPUNCTURE: CPT

## 2022-09-08 RX ORDER — LENALIDOMIDE 5 MG/1
CAPSULE ORAL
Qty: 28 CAPSULE | Refills: 0 | Status: SHIPPED | OUTPATIENT
Start: 2022-09-08 | End: 2022-09-09 | Stop reason: SDUPTHER

## 2022-09-08 NOTE — TELEPHONE ENCOUNTER
Medication Refill     Who is Calling pharmacy   Medication  lenalidomide (Revlimid) 5 MG CAPS       How many pills left  none   Preferred Pharmacy / Address [place] [address], [address] [address]   Phone:  [phone]  Fax:  [phone]    Who is your Physician?  Salvador Lee   Call back number  [phone]   Relevant Information  need authorization number update

## 2022-09-09 DIAGNOSIS — C90.01 MULTIPLE MYELOMA IN REMISSION (HCC): ICD-10-CM

## 2022-09-09 RX ORDER — LENALIDOMIDE 5 MG/1
CAPSULE ORAL
Qty: 28 CAPSULE | Refills: 0 | Status: SHIPPED | OUTPATIENT
Start: 2022-09-09 | End: 2022-10-07 | Stop reason: SDUPTHER

## 2022-09-15 ENCOUNTER — TELEPHONE (OUTPATIENT)
Dept: HEMATOLOGY ONCOLOGY | Facility: CLINIC | Age: 47
End: 2022-09-15

## 2022-09-15 NOTE — TELEPHONE ENCOUNTER
Reviewed with Dr Keisha Pathak, per him he will not extend patients FMLA as she is on oral treatment requiring every six month follow up  Pt was called and notified

## 2022-10-05 ENCOUNTER — APPOINTMENT (OUTPATIENT)
Dept: LAB | Facility: CLINIC | Age: 47
End: 2022-10-05
Payer: COMMERCIAL

## 2022-10-05 DIAGNOSIS — C90.01 MULTIPLE MYELOMA IN REMISSION (HCC): Primary | ICD-10-CM

## 2022-10-05 LAB — HCG SERPL QL: NEGATIVE

## 2022-10-05 PROCEDURE — 36415 COLL VENOUS BLD VENIPUNCTURE: CPT

## 2022-10-05 PROCEDURE — 84703 CHORIONIC GONADOTROPIN ASSAY: CPT

## 2022-10-06 DIAGNOSIS — C90.01 MULTIPLE MYELOMA IN REMISSION (HCC): ICD-10-CM

## 2022-10-06 RX ORDER — LENALIDOMIDE 5 MG/1
CAPSULE ORAL
Qty: 28 CAPSULE | Refills: 0 | Status: CANCELLED | OUTPATIENT
Start: 2022-10-06

## 2022-10-07 ENCOUNTER — TELEPHONE (OUTPATIENT)
Dept: HEMATOLOGY ONCOLOGY | Facility: CLINIC | Age: 47
End: 2022-10-07

## 2022-10-07 RX ORDER — LENALIDOMIDE 5 MG/1
CAPSULE ORAL
Qty: 28 CAPSULE | Refills: 0 | Status: SHIPPED | OUTPATIENT
Start: 2022-10-07

## 2022-10-07 NOTE — TELEPHONE ENCOUNTER
Medication Refill     Who is Calling  Patient   Medication  lenalidomide (Revlimid) 5 MG CAPS       How many pills left  N/A   Preferred Pharmacy / Address  [id number] [place], [address] [facility]    Who is your Physician?  Dr Cecille Sol   Call back number  [phone]   Relevant Information  Yes

## 2022-10-12 ENCOUNTER — OFFICE VISIT (OUTPATIENT)
Dept: OBGYN CLINIC | Facility: HOSPITAL | Age: 47
End: 2022-10-12
Payer: COMMERCIAL

## 2022-10-12 VITALS
WEIGHT: 135.6 LBS | BODY MASS INDEX: 22.59 KG/M2 | HEART RATE: 68 BPM | DIASTOLIC BLOOD PRESSURE: 72 MMHG | HEIGHT: 65 IN | SYSTOLIC BLOOD PRESSURE: 108 MMHG

## 2022-10-12 DIAGNOSIS — G56.03 CARPAL TUNNEL SYNDROME, BILATERAL: Primary | ICD-10-CM

## 2022-10-12 PROCEDURE — 99214 OFFICE O/P EST MOD 30 MIN: CPT | Performed by: ORTHOPAEDIC SURGERY

## 2022-10-12 NOTE — PROGRESS NOTES
ASSESSMENT/PLAN:    Assessment:   Carpal Tunnel Syndrome  bilateral    Plan:   Discussed with the patient that I would like to get an updated EMG given her history of MS with neuropathy and Multiple Myeloma an US would not be indicated  Pending EMG results we will consider carpal tunnel release    Follow Up: After Testing    To Do Next Visit:  Discuss EMG    General Discussions:  Carpal Tunnel Syndrome: The anatomy and physiology of carpal tunnel syndrome was discussed with the patient today  Increase pressure localized under the transverse carpal ligament can cause pain, numbness, tingling, or dysesthesias within the median nerve distribution as well as feelings of fatigue, clumsiness, or awkwardness  These symptoms typically occur at night and worse in the morning upon waking  Eventually, untreated carpal tunnel syndrome can result in weakness and permanent loss of muscle within the thenar compartment of the hand  Treatment options were discussed with the patient  Conservative treatment includes nocturnal resting splints to keep the nerve in a neutral position, ergonomic changes within the work or home environment, activity modification, and tendon gliding exercises  Steroid injections within the carpal canal can help a majority of patients, however this is often self-limited in a majority of patients  Surgical intervention to divide the transverse carpal ligament typically results in a long-lasting relief of the patient's complaints, with the recurrence rate of less than 1%        _____________________________________________________  CHIEF COMPLAINT:  Chief Complaint   Patient presents with   • Left Wrist - Carpal Tunnel   • Right Wrist - Carpal Tunnel         SUBJECTIVE:  Claritza Gayle is a 50 y o  female who presents for follow up regarding bilateral CTS  Patient was seen in 2020 for Right CTS  Symptoms improved with conservative treatment   Since last visit, Claritza Gayle has tried bracing without relief  She reports numbness and tingling in thumb, index and long fingers  Symptoms wake her from sleep at night           PAST MEDICAL HISTORY:  Past Medical History:   Diagnosis Date   • Back pain     pinched nerve in back   • CTS (carpal tunnel syndrome)     right wrist   • Multiple myeloma (HCC)     chemotherapy   • Multiple myeloma (HCC)    • Multiple sclerosis (HCC)    • skin lesion     mole removed from face       PAST SURGICAL HISTORY:  Past Surgical History:   Procedure Laterality Date   •  SECTION     • LIMBAL STEM CELL TRANSPLANT  2018       FAMILY HISTORY:  Family History   Problem Relation Age of Onset   • No Known Problems Mother    • Coronary artery disease Father    • No Known Problems Sister    • No Known Problems Brother    • No Known Problems Daughter    • No Known Problems Son    • No Known Problems Brother        SOCIAL HISTORY:  Social History     Tobacco Use   • Smoking status: Never Smoker   • Smokeless tobacco: Never Used   Vaping Use   • Vaping Use: Never used   Substance Use Topics   • Alcohol use: No   • Drug use: No       MEDICATIONS:    Current Outpatient Medications:   •  aspirin (ECOTRIN LOW STRENGTH) 81 mg EC tablet, Take 81 mg by mouth daily, Disp: , Rfl:   •  Calcium Carb-Cholecalciferol (CALCIUM 1000 + D PO), Take by mouth, Disp: , Rfl:   •  Cyanocobalamin (VITAMIN B 12 PO), Take 1 tablet by mouth daily, Disp: , Rfl:   •  dexamethasone (DECADRON) 4 mg tablet, TAKE 5 TABLETS (20 MG) BY MOUTH ONCE A WEEK, Disp: 20 tablet, Rfl: 4  •  HM VITAMIN D3 2000 units CAPS, Take by mouth, Disp: , Rfl:   •  lenalidomide (Revlimid) 5 MG CAPS, TAKE 1 CAPSULE BY MOUTH DAILY [facility]#[id number] on 10/6, Disp: 28 capsule, Rfl: 0  •  Amantadine HCl 100 MG tablet, Take 100 mg by mouth 2 (two) times a day, Disp: , Rfl:   •  cholestyramine (QUESTRAN) 4 g packet, Take 4 g by mouth 2 (two) times a day, Disp: , Rfl:     ALLERGIES:  No Known Allergies    REVIEW OF SYSTEMS:  Pertinent items are noted in HPI  A comprehensive review of systems was negative  LABS:  HgA1c: No results found for: HGBA1C  BMP:   Lab Results   Component Value Date    CALCIUM 8 8 08/24/2022     08/30/2017    K 3 8 08/24/2022    CO2 28 08/24/2022     08/24/2022    BUN 15 08/24/2022    CREATININE 0 77 08/24/2022           _____________________________________________________  PHYSICAL EXAMINATION:  Vital signs: /72   Pulse 68   Ht 5' 5" (1 651 m)   Wt 61 5 kg (135 lb 9 6 oz)   BMI 22 57 kg/m²   General: well developed and well nourished, alert, oriented times 3 and appears comfortable  Psychiatric: Normal  HEENT: Trachea Midline, No torticollis  Cardiovascular: No discernable arrhythmia  Pulmonary: No wheezing or stridor  Abdomen: No rebound or guarding  Extremities: No peripheral edema  Skin: No masses, erythema, lacerations, fluctation, ulcerations  Neurovascular: Sensation Intact to the Median, Ulnar, Radial Nerve, Motor Intact to the Median, Ulnar, Radial Nerve and Pulses Intact    MUSCULOSKELETAL EXAMINATION:  Left Carpal Tunnel Exam:  Negative thenar atrophy  Positive carpal tunnel compression  Positive tinels over median nerve at the wrist       Right Carpal Tunnel Exam:  Negative thenar atrophy  Positive carpal tunnel compression   Negative tinels over median nerve at the wrist       deltoid 5/5, biceps 5/5, triceps 5/5, wrist flexion 5/5, wrist extension 5/5, full elbow and wrist ROM, AIN 5/5, intrinsic 5/5, apb 5/5  _____________________________________________________  STUDIES REVIEWED:  EMG bilateral upper extremity 2018 shows moderate right CTS, no evidence of left CTS      PROCEDURES PERFORMED:  Procedures  No Procedures performed today   Scribe Attestation    I,:  Paul Cooper am acting as a scribe while in the presence of the attending physician :       I,:  Vikki Munguia MD personally performed the services described in this documentation    as scribed in my presence :

## 2022-10-24 ENCOUNTER — HOSPITAL ENCOUNTER (OUTPATIENT)
Dept: NEUROLOGY | Facility: CLINIC | Age: 47
Discharge: HOME | End: 2022-10-24
Payer: COMMERCIAL

## 2022-10-24 DIAGNOSIS — Z12.31 ENCOUNTER FOR SCREENING MAMMOGRAM FOR BREAST CANCER: ICD-10-CM

## 2022-10-24 DIAGNOSIS — G56.03 CARPAL TUNNEL SYNDROME, BILATERAL: ICD-10-CM

## 2022-10-24 PROCEDURE — 95886 MUSC TEST DONE W/N TEST COMP: CPT | Performed by: PSYCHIATRY & NEUROLOGY

## 2022-10-24 PROCEDURE — 95911 NRV CNDJ TEST 9-10 STUDIES: CPT | Performed by: PSYCHIATRY & NEUROLOGY

## 2022-11-02 ENCOUNTER — TELEPHONE (OUTPATIENT)
Dept: HEMATOLOGY ONCOLOGY | Facility: CLINIC | Age: 47
End: 2022-11-02

## 2022-11-02 NOTE — TELEPHONE ENCOUNTER
Pt called and notified pregnancy test it required to refill her Revlimid  Pt stated she is aware and will try to go soon

## 2022-11-03 ENCOUNTER — APPOINTMENT (OUTPATIENT)
Dept: LAB | Age: 47
End: 2022-11-03

## 2022-11-04 DIAGNOSIS — C90.01 MULTIPLE MYELOMA IN REMISSION (HCC): ICD-10-CM

## 2022-11-04 RX ORDER — LENALIDOMIDE 5 MG/1
CAPSULE ORAL
Qty: 28 CAPSULE | Refills: 0 | Status: SHIPPED | OUTPATIENT
Start: 2022-11-04

## 2022-11-13 ENCOUNTER — OFFICE VISIT (OUTPATIENT)
Dept: URGENT CARE | Facility: CLINIC | Age: 47
End: 2022-11-13

## 2022-11-13 VITALS — OXYGEN SATURATION: 97 % | RESPIRATION RATE: 18 BRPM | TEMPERATURE: 99.1 F | HEART RATE: 88 BPM

## 2022-11-13 DIAGNOSIS — R05.1 ACUTE COUGH: Primary | ICD-10-CM

## 2022-11-13 NOTE — PROGRESS NOTES
[facility]        NAME: Gemini Ward is a 50 y o  female  : 1975    MRN: [id number]  DATE: 2022  TIME: 10:44 AM    Assessment and Plan   Acute cough [R05 1]  1  Acute cough       Suspected viral illness will swab for COVID/Flu  Given education on supportive measures for symptoms in discharge instructions  Follow up with primary care in 3-5 days  Go to ER if symptoms get worse  Patient Instructions     --Rest, drink plenty of fluids     --For nasal/sinus congestion, you can try steam, warm compresses, saline nasal spray or Neti pot  Other medication options include: nasal steroid (Flonase, Nasocort) to be used at bedtime after the saline nasal spray or nasal decongestant (Afrin, Richi-synephrine - for 3 days max or you can get rebound symptoms) may be taken  Can also decongestant (such as Sudafed) if > 10years of age and no history of high blood pressure  --For nasal drainage, postnasal drip, sneezing and itching, an OTC antihistamine (Allegra, Benadryl, etc) can be taken  --For cough, you can take an OTC expectorant such as plain Robitussion or Mucinex (active ingredient guaifenesin)  A spoonful of honey at bedtime may also be helpful  Also recommended is the use of a cool mist humidifier (with or without Vicks) in the bedroom at night  --For sore throat, you can take OTC lozenges, use warm gargles (salt water or apple cider vinegar and honey), herbal teas, or an OTC throat spray (Chloraseptic)  --You can take Tylenol or Motrin/Advil as needed for fever, headache, body aches  Motrin/Advil should be avoided, however, if you have a history of heart disease, bleeding ulcers, or if you take blood thinners      -For cold symptoms with hypertension you can try Coricidin cough/cold  --You should contact your primary care provider and/or go to the ER if your symptoms are not improved or get worse over the next 7 days   This includes new onset fever, localized ear pain, sinus pain, as well as worsening cough, chest pain, shortness of breath, or significant weakness/fatigue  Chief Complaint     Chief Complaint   Patient presents with   • Cold Like Symptoms     States runny nose, cough, and h/a started this am  States her daughter was also sick on thurs  No interventions tried  History of Present Illness       Presents with sick symptoms that began this morning  Known sick contact with daughter this week  Symptoms include runny nose, cough and headache  These symptoms feel like when she last had COVID  Denies shortness of breath or chest pain  Is COVID vaccinated  Review of Systems   Review of Systems   Constitutional: Negative for chills, fatigue and fever  HENT: Positive for congestion and rhinorrhea  Negative for sore throat  Respiratory: Positive for cough  Negative for shortness of breath and wheezing  Cardiovascular: Negative for chest pain  Gastrointestinal: Negative for abdominal pain  Genitourinary: Negative for dysuria  Musculoskeletal: Negative for myalgias  Neurological: Positive for headaches  Negative for dizziness  Psychiatric/Behavioral: Negative for confusion           Current Medications       Current Outpatient Medications:   •  Amantadine HCl 100 MG tablet, Take 100 mg by mouth 2 (two) times a day, Disp: , Rfl:   •  aspirin (ECOTRIN LOW STRENGTH) 81 mg EC tablet, Take 81 mg by mouth daily, Disp: , Rfl:   •  Calcium Carb-Cholecalciferol (CALCIUM 1000 + D PO), Take by mouth, Disp: , Rfl:   •  cholestyramine (QUESTRAN) 4 g packet, Take 4 g by mouth 2 (two) times a day, Disp: , Rfl:   •  Cyanocobalamin (VITAMIN B 12 PO), Take 1 tablet by mouth daily, Disp: , Rfl:   •  dexamethasone (DECADRON) 4 mg tablet, TAKE 5 TABLETS (20 MG) BY MOUTH ONCE A WEEK, Disp: 20 tablet, Rfl: 4  •  HM VITAMIN D3 2000 units CAPS, Take by mouth, Disp: , Rfl:   •  lenalidomide (Revlimid) 5 MG CAPS, TAKE 1 CAPSULE BY MOUTH DAILY [facility]#[id number] on 2022, Disp: 28 capsule, Rfl: 0    Current Allergies     Allergies as of 2022   • (No Known Allergies)            The following portions of the patient's history were reviewed and updated as appropriate: allergies, current medications, past family history, past medical history, past social history, past surgical history and problem list      Past Medical History:   Diagnosis Date   • Back pain     pinched nerve in back   • CTS (carpal tunnel syndrome)     right wrist   • Multiple myeloma ([facility] [address] )     chemotherapy   • Multiple myeloma ([facility] [address] )    • Multiple sclerosis ([facility] [address] )    • skin lesion     mole removed from face       Past Surgical History:   Procedure Laterality Date   •  SECTION     • LIMBAL STEM CELL TRANSPLANT  2018       Family History   Problem Relation Age of Onset   • No Known Problems Mother    • Coronary artery disease Father    • No Known Problems Sister    • No Known Problems Brother    • No Known Problems Daughter    • No Known Problems Son    • No Known Problems Brother          Medications have been verified  Objective   Pulse 88   Temp 99 1 °F (37 3 °C)   Resp 18   SpO2 97%        Physical Exam     Physical Exam  Vitals reviewed  Constitutional:       General: She is not in acute distress  Appearance: Normal appearance  HENT:      Right Ear: Tympanic membrane, ear canal and external ear normal       Left Ear: Tympanic membrane, ear canal and external ear normal       Nose: Nose normal       Mouth/Throat:      Mouth: Mucous membranes are moist       Pharynx: No posterior oropharyngeal erythema  Eyes:      Conjunctiva/sclera: Conjunctivae normal    Cardiovascular:      Rate and Rhythm: Normal rate and regular rhythm  Pulses: Normal pulses  Heart sounds: Normal heart sounds  No murmur heard  Pulmonary:      Effort: Pulmonary effort is normal  No respiratory distress  Breath sounds: Normal breath sounds     Musculoskeletal:         General: Normal range of motion  Skin:     General: Skin is warm and dry  Neurological:      General: No focal deficit present  Mental Status: She is alert and oriented to person, place, and time     Psychiatric:         Mood and Affect: Mood normal          Behavior: Behavior normal

## 2022-11-14 LAB
FLUAV RNA RESP QL NAA+PROBE: POSITIVE
FLUBV RNA RESP QL NAA+PROBE: NEGATIVE
SARS-COV-2 RNA RESP QL NAA+PROBE: NEGATIVE

## 2022-11-15 NOTE — RESULT ENCOUNTER NOTE
Your Flu test result was positive  Continue supportive care for symptoms with medications you can get over the counter  I hope you start to feel better soon!

## 2022-11-30 ENCOUNTER — OFFICE VISIT (OUTPATIENT)
Dept: OBGYN CLINIC | Facility: HOSPITAL | Age: 47
End: 2022-11-30

## 2022-11-30 ENCOUNTER — APPOINTMENT (OUTPATIENT)
Dept: LAB | Age: 47
End: 2022-11-30

## 2022-11-30 VITALS
DIASTOLIC BLOOD PRESSURE: 70 MMHG | OXYGEN SATURATION: 99 % | HEIGHT: 65 IN | SYSTOLIC BLOOD PRESSURE: 110 MMHG | WEIGHT: 137.2 LBS | HEART RATE: 70 BPM | BODY MASS INDEX: 22.86 KG/M2

## 2022-11-30 DIAGNOSIS — G56.01 CARPAL TUNNEL SYNDROME OF RIGHT WRIST: Primary | ICD-10-CM

## 2022-11-30 RX ORDER — CEFAZOLIN SODIUM 1 G/50ML
1000 SOLUTION INTRAVENOUS ONCE
OUTPATIENT
Start: 2022-11-30 | End: 2022-11-30

## 2022-11-30 RX ORDER — LIDOCAINE HYDROCHLORIDE AND EPINEPHRINE 10; 10 MG/ML; UG/ML
20 INJECTION, SOLUTION INFILTRATION; PERINEURAL ONCE
OUTPATIENT
Start: 2022-11-30 | End: 2022-11-30

## 2022-11-30 NOTE — LETTER
November 30, 2022     Patient: Abilio Terrell  YOB: 1975  Date of Visit: 11/30/2022      To Whom it May Concern:    Abilio Terrell is under my professional care  Sam Hernandez was seen in my office on 11/30/2022  Sam Hernandez will remain out for work for an estimated 4-6 weeks following her surgery  If you have any questions or concerns, please don't hesitate to call           Sincerely,          Aaron Ramos MD        CC: No Recipients

## 2022-11-30 NOTE — PROGRESS NOTES
ASSESSMENT/PLAN:    Assessment:   Right carpal tunnel syndrome     Plan:   Endoscopic Carpal Tunnel Release  Right (local)  Patient will require 4-6 week off of work following surgery     Follow Up: After Surgery    To Do Next Visit:  Sutures out        Operative Discussions:  Endoscopic Carpal Tunnel Release: The anatomy and physiology of carpal tunnel syndrome was discussed with the patient today  Increase pressure localized under the transverse carpal ligament can cause pain, numbness, tingling, or dysesthesias within the median nerve distribution as well as feelings of fatigue, clumsiness, or awkwardness  These symptoms typically occur at night and worse in the morning upon waking  Eventually, untreated carpal tunnel syndrome can result in weakness and permanent loss of muscle within the thenar compartment of the hand  Treatment options were discussed with the patient  Conservative treatment includes nocturnal resting splints to keep the nerve in a neutral position, ergonomic changes within the work or home environment, activity modification, and tendon gliding exercises  Steroid injections within the carpal canal can help a majority of patients, however this is often self-limited in a majority of patients  Surgical intervention to divide the transverse carpal ligament typically results in a long-lasting relief of the patient's complaints, with the recurrence rate of less than 1%  The patient has elected to undergo an endoscopic carpal tunnel release  The 2 incision technique was discussed with the patient, which results in approximately a two-week less recovery time, and less wound complications  In the postoperative period, light activities are allowed immediately, driving is allowed when narcotic medication has stopped, and the bandages may be removed and incision may get wet after 2 days    Heavy activities (lifting more than approximately 10 pounds) will be allowed after follow up appointment in 1-2 weeks  While the pain and discomfort in the hands generally improves rapidly, the numbness and tingling as well as the strength will slowly improve over weeks to months depending on the severity of the carpal tunnel syndrome  Pillar pain was discussed with the patient, which is typically a common but self-limiting condition  The risks of bleeding and infection from the surgery are less than 1%  Risk of recurrence is approximately 0 5%  The risks of nerve injury or nerve damage or damage to the blood vessels is approximately 1 in 1200  The patient has an understanding of the above mentioned discussion  The risks and benefits of the procedure were explained to the patient, which include, but are not limited to: Bleeding, infection, recurrence, pain, scar, damage to tendons, damage to nerves, and damage to blood vessels, failure to give desired results and complications related to anesthesia   These risks, along with alternative conservative treatment options, and postoperative protocols were voiced back and understood by the patient   All questions were answered to the patient's satisfaction   The patient agrees to comply with a standard postoperative protocol, and is willing to proceed   Education was provided via written and auditory forms   There were no barriers to learning  Written handouts regarding wound care, incision and scar care, and general preoperative information was provided to the patient   Prior to surgery, the patient may be requested to stop all anti-inflammatory medications   Prophylactic aspirin, Plavix, and Coumadin may be allowed to be continued   Medications including vitamin E , ginkgo, and fish oil are requested to be stopped approximately one week prior to surgery   Hypertensive medications and beta blockers, if taken, should be continued        _____________________________________________________  CHIEF COMPLAINT:  Chief Complaint   Patient presents with   • Left Wrist - Follow-up EMG- 10/24/22 under encounters    • Right Wrist - Follow-up     EMG- 10/24/22 under encounters          SUBJECTIVE:  Tiffanie Strickland is a 50 y o  female who presents for follow up to discuss the findings of her B/L UE EMG  Patient has a hx of MS with neuropathy and Multiple Myeloma  She reports numbness and tingling in thumb, index and long fingers  Symptoms wake her from sleep at night           PAST MEDICAL HISTORY:  Past Medical History:   Diagnosis Date   • Back pain     pinched nerve in back   • CTS (carpal tunnel syndrome)     right wrist   • Multiple myeloma (HCC)     chemotherapy   • Multiple myeloma (HCC)    • Multiple sclerosis (HCC)    • skin lesion     mole removed from face       PAST SURGICAL HISTORY:  Past Surgical History:   Procedure Laterality Date   •  SECTION     • LIMBAL STEM CELL TRANSPLANT  2018       FAMILY HISTORY:  Family History   Problem Relation Age of Onset   • No Known Problems Mother    • Coronary artery disease Father    • No Known Problems Sister    • No Known Problems Brother    • No Known Problems Daughter    • No Known Problems Son    • No Known Problems Brother        SOCIAL HISTORY:  Social History     Tobacco Use   • Smoking status: Never   • Smokeless tobacco: Never   Vaping Use   • Vaping Use: Never used   Substance Use Topics   • Alcohol use: No   • Drug use: No       MEDICATIONS:    Current Outpatient Medications:   •  Amantadine HCl 100 MG tablet, Take 100 mg by mouth 2 (two) times a day, Disp: , Rfl:   •  aspirin (ECOTRIN LOW STRENGTH) 81 mg EC tablet, Take 81 mg by mouth daily, Disp: , Rfl:   •  Calcium Carb-Cholecalciferol (CALCIUM 1000 + D PO), Take by mouth, Disp: , Rfl:   •  cholestyramine (QUESTRAN) 4 g packet, Take 4 g by mouth 2 (two) times a day, Disp: , Rfl:   •  Cyanocobalamin (VITAMIN B 12 PO), Take 1 tablet by mouth daily, Disp: , Rfl:   •  HM VITAMIN D3 2000 units CAPS, Take by mouth, Disp: , Rfl:   •  lenalidomide (Revlimid) 5 MG CAPS, TAKE 1 CAPSULE BY MOUTH DAILY [facility]#[id number] on 11/4/2022, Disp: 28 capsule, Rfl: 0  •  dexamethasone (DECADRON) 4 mg tablet, TAKE 5 TABLETS (20 MG) BY MOUTH ONCE A WEEK (Patient not taking: Reported on 11/30/2022), Disp: 20 tablet, Rfl: 4    ALLERGIES:  No Known Allergies    REVIEW OF SYSTEMS:  Pertinent items are noted in HPI  A comprehensive review of systems was negative  LABS:  HgA1c: No results found for: HGBA1C  BMP:   Lab Results   Component Value Date    CALCIUM 8 8 08/24/2022     08/30/2017    K 3 8 08/24/2022    CO2 28 08/24/2022     08/24/2022    BUN 15 08/24/2022    CREATININE 0 77 08/24/2022           _____________________________________________________  PHYSICAL EXAMINATION:  Vital signs: /70   Pulse 70   Ht 5' 5" (1 651 m)   Wt 62 2 kg (137 lb 3 2 oz)   SpO2 99%   BMI 22 83 kg/m²   General: well developed and well nourished, alert, oriented times 3 and appears comfortable  Psychiatric: Normal  HEENT: Trachea Midline, No torticollis  Cardiovascular: No discernable arrhythmia  Pulmonary: No wheezing or stridor  Abdomen: No rebound or guarding  Extremities: No peripheral edema  Skin: No masses, erythema, lacerations, fluctation, ulcerations  Neurovascular: Sensation Intact to the Median, Ulnar, Radial Nerve, Motor Intact to the Median, Ulnar, Radial Nerve and Pulses Intact    MUSCULOSKELETAL EXAMINATION:  Left Carpal Tunnel Exam:  Negative thenar atrophy  Positive carpal tunnel compression  Positive tinels over median nerve at the wrist        Right Carpal Tunnel Exam:  Negative thenar atrophy  Positive carpal tunnel compression   Negative tinels over median nerve at the wrist        deltoid 5/5, biceps 5/5, triceps 5/5, wrist flexion 5/5, wrist extension 5/5, full elbow and wrist ROM, AIN 5/5, intrinsic 5/5, apb 5/5    _____________________________________________________  STUDIES REVIEWED:  EMG: Bilateral upper extremity 10/24/22  MNC Right median APB lat 5 73, Tonia Magaña right median peak lat 4 90      PROCEDURES PERFORMED:  Procedures  No Procedures performed today   Scribe Attestation    I,:  Amparo Caicedo am acting as a scribe while in the presence of the attending physician :       I,:  Anais Geronimo MD personally performed the services described in this documentation    as scribed in my presence :

## 2022-12-01 ENCOUNTER — TELEPHONE (OUTPATIENT)
Dept: HEMATOLOGY ONCOLOGY | Facility: CLINIC | Age: 47
End: 2022-12-01

## 2022-12-01 DIAGNOSIS — C90.01 MULTIPLE MYELOMA IN REMISSION (HCC): Primary | ICD-10-CM

## 2022-12-01 RX ORDER — LENALIDOMIDE 5 MG/1
CAPSULE ORAL
Qty: 28 CAPSULE | Refills: 0 | Status: SHIPPED | OUTPATIENT
Start: 2022-12-01

## 2022-12-01 NOTE — TELEPHONE ENCOUNTER
MEDICATION REFILL ROUTE TO RN    Who is Calling pharmacy   Medication  lenalidomide (Revlimid) 5 MG CAPS        How many pills left  none   Preferred Pharmacy / Address [place] [address], [address], [address] [place] [id number]   Phone:  [phone]  Fax:  [phone]    Who is your Physician?  [facility]   Call back number [phone]   Relevant Information  [facility] survey flagged, [facility]

## 2022-12-21 NOTE — PROGRESS NOTES
Daily Note     Today's date: 2018  Patient name: Charles Leigh  : 1975  MRN: [id number]  Referring provider: Malcolm Angela MD  Dx:   Encounter Diagnosis     ICD-10-CM    1  Lumbar disc herniation with radiculopathy M51 16    2  Spasm of lumbar paraspinous muscle M62 830                   Subjective: Patient complains of R lateral hip and calf pain rated 5/10 at start of session  Objective: See treatment diary below  Precautions: Ms, Multiple Myeloma     Daily Treatment Diary      Manual                        Right LE long axis txn  3'                                                                                                                           Exercise Diary                     UBE F/R    5'/5'                   Webslide rows H,M,L   Red 2x10 each                   Stand HS stretch akanksha   3x30"                   Body mechanics lifting JF                     Seated nerve glides right sciatic    15x                   PPT    5" hold 15x                   PPT with marches   20x                   Right piriformis stretch   3x30"                   T-ball ab isometrics   10" hold 10x                   Prone press-ups  2x10 2x10                                                                                                                                                                                                                                                                         Modalities                                                                                                   *unsupervised exercise for 10 minutes of session      Assessment: Initiated new exercise program today as indicated focusing on facilitation of proper technique  Tolerated treatment fair  Patient denies increase pain throughout session though had no significant change in symptoms  Patient would benefit from continued PT       Plan: Progress treatment as tolerated 
no

## 2022-12-27 ENCOUNTER — APPOINTMENT (OUTPATIENT)
Dept: LAB | Facility: CLINIC | Age: 47
End: 2022-12-27

## 2022-12-28 DIAGNOSIS — C90.01 MULTIPLE MYELOMA IN REMISSION (HCC): ICD-10-CM

## 2022-12-28 RX ORDER — LENALIDOMIDE 5 MG/1
CAPSULE ORAL
Qty: 28 CAPSULE | Refills: 0 | Status: SHIPPED | OUTPATIENT
Start: 2022-12-28

## 2023-01-18 ENCOUNTER — TELEPHONE (OUTPATIENT)
Dept: OBGYN CLINIC | Facility: HOSPITAL | Age: 48
End: 2023-01-18

## 2023-01-18 NOTE — TELEPHONE ENCOUNTER
Caller: Patient    Doctor: [facility]    Reason for call: Patient would like to reschedule her sx  Call back today if possible because she is off of work  Thanks!     Call back#: [phone]

## 2023-01-19 ENCOUNTER — OFFICE VISIT (OUTPATIENT)
Dept: FAMILY MEDICINE CLINIC | Facility: CLINIC | Age: 48
End: 2023-01-19

## 2023-01-19 VITALS
DIASTOLIC BLOOD PRESSURE: 74 MMHG | HEART RATE: 92 BPM | HEIGHT: 65 IN | OXYGEN SATURATION: 95 % | BODY MASS INDEX: 22.33 KG/M2 | TEMPERATURE: 100 F | SYSTOLIC BLOOD PRESSURE: 114 MMHG | WEIGHT: 134 LBS

## 2023-01-19 DIAGNOSIS — B34.9 ACUTE VIRAL SYNDROME: Primary | ICD-10-CM

## 2023-01-19 RX ORDER — DEXTROMETHORPHAN HYDROBROMIDE AND PROMETHAZINE HYDROCHLORIDE 15; 6.25 MG/5ML; MG/5ML
5 SOLUTION ORAL 4 TIMES DAILY PRN
Qty: 118 ML | Refills: 0 | Status: SHIPPED | OUTPATIENT
Start: 2023-01-19

## 2023-01-19 NOTE — PATIENT INSTRUCTIONS
Encourage rest, fluids  Tylenol every 8 hours for fever  Cough medication as prescribed  COVID/Flu test PCR pending

## 2023-01-19 NOTE — LETTER
January 19, 2023     Patient: Nikunj Frazier  YOB: 1975  Date of Visit: 1/19/2023      To Whom it May Concern:    Nikunj Frazier is under my professional care  Kae Key was seen in my office on 1/19/2023  Kae Key may be excused from work on 1/18/23 through 1/25/23  If you have any questions or concerns, please don't hesitate to call           Sincerely,          Tom Friedman PA-C        CC: No Recipients

## 2023-01-20 LAB
FLUAV RNA RESP QL NAA+PROBE: NEGATIVE
FLUBV RNA RESP QL NAA+PROBE: NEGATIVE
SARS-COV-2 RNA RESP QL NAA+PROBE: NEGATIVE

## 2023-01-20 NOTE — TELEPHONE ENCOUNTER
Caller: Patient     Doctor: Dea Rubio    Reason for call: Patient interested in rescheduling sx for later in February if possible      Call back#: [phone]

## 2023-01-22 DIAGNOSIS — C90.01 MULTIPLE MYELOMA IN REMISSION (HCC): ICD-10-CM

## 2023-01-23 LAB
SARS-COV-2 AG UPPER RESP QL IA: NEGATIVE
VALID CONTROL: NORMAL

## 2023-01-23 NOTE — PROGRESS NOTES
Name: Arabella Garrett      : 1975      MRN: [id number]  Encounter Provider: Dl Cohn PA-C  Encounter Date: 2023   Encounter department: [address] [place] [address]  Acute viral syndrome  -     POCT Rapid Covid Ag  -     Covid/Flu- Office Collect  -     Promethazine-DM (PHENERGAN-DM) 6 25-15 mg/5 mL oral syrup; Take 5 mL by mouth 4 (four) times a day as needed for cough         Subjective      Pt presents for 1 day of sore throat, body aches and fever  She states her  had similar symptoms   She tested negative for COVID   Low grade temp here- last dose of tylenol was this morning  Tolerating PO  Denies SOB    Review of Systems   Constitutional: Positive for chills, fatigue and fever  HENT: Positive for congestion and sore throat  Negative for ear pain, sinus pain and trouble swallowing  Eyes: Negative for pain, discharge and redness  Respiratory: Negative for cough, chest tightness, shortness of breath and wheezing  Cardiovascular: Negative for chest pain, palpitations and leg swelling  Gastrointestinal: Negative for abdominal pain, diarrhea, nausea and vomiting  Musculoskeletal: Negative for arthralgias, joint swelling and myalgias  Skin: Negative for rash  Neurological: Negative for dizziness, weakness, numbness and headaches         Current Outpatient Medications on File Prior to Visit   Medication Sig   • Amantadine HCl 100 MG tablet Take 100 mg by mouth 2 (two) times a day   • aspirin (ECOTRIN LOW STRENGTH) 81 mg EC tablet Take 81 mg by mouth daily   • Calcium Carb-Cholecalciferol (CALCIUM 1000 + D PO) Take by mouth   • cholestyramine (QUESTRAN) 4 g packet Take 4 g by mouth 2 (two) times a day   • Cyanocobalamin (VITAMIN B 12 PO) Take 1 tablet by mouth daily   • dexamethasone (DECADRON) 4 mg tablet TAKE 5 TABLETS (20 MG) BY MOUTH ONCE A WEEK (Patient not taking: Reported on 2022)   •  VITAMIN D3 2000 units CAPS Take by mouth   • lenalidomide (Revlimid) 5 MG CAPS TAKE 1 CAPSULE BY MOUTH DAILY [facility]#[id number] on 12/28/2022       Objective     /74 (BP Location: Left arm, Patient Position: Sitting, Cuff Size: Large)   Pulse 92   Temp 100 °F (37 8 °C)   Ht 5' 5" (1 651 m)   Wt 60 8 kg (134 lb)   SpO2 95%   BMI 22 30 kg/m²     Physical Exam  Vitals and nursing note reviewed  Constitutional:       Appearance: She is well-developed  HENT:      Head: Normocephalic  Right Ear: Hearing and tympanic membrane normal       Left Ear: Hearing and tympanic membrane normal       Nose: No mucosal edema  Mouth/Throat:      Pharynx: Uvula midline  Posterior oropharyngeal erythema present  Cardiovascular:      Rate and Rhythm: Normal rate and regular rhythm  Pulmonary:      Effort: Pulmonary effort is normal       Breath sounds: Normal breath sounds         Iman Friedman PA-C

## 2023-01-24 NOTE — TELEPHONE ENCOUNTER
Called patient she will stick with the date due to there not being an openings the next week as of right now   Will call her if something opens up

## 2023-01-25 ENCOUNTER — TELEPHONE (OUTPATIENT)
Dept: HEMATOLOGY ONCOLOGY | Facility: CLINIC | Age: 48
End: 2023-01-25

## 2023-01-25 ENCOUNTER — APPOINTMENT (OUTPATIENT)
Dept: LAB | Facility: CLINIC | Age: 48
End: 2023-01-25

## 2023-01-25 NOTE — TELEPHONE ENCOUNTER
MEDICATION REFILL ROUTE TO RN    Who is calling? Pharmacy , [facility]   If someone other than patient is calling, are they listed on the communication consent form?  N/A   Medication (name and dose)  lenalidomide (Revlimid) 5 MG      How many pills left     Preferred Pharmacy / Address [facility]    [address]  [place]   Call back number  [phone]   Relevant Information

## 2023-01-26 RX ORDER — LENALIDOMIDE 5 MG/1
CAPSULE ORAL
Qty: 28 CAPSULE | Refills: 0 | Status: SHIPPED | OUTPATIENT
Start: 2023-01-26

## 2023-02-07 ENCOUNTER — HOSPITAL ENCOUNTER (OUTPATIENT)
Facility: HOSPITAL | Age: 48
Setting detail: OUTPATIENT SURGERY
Discharge: HOME | End: 2023-02-07
Attending: ORTHOPAEDIC SURGERY | Admitting: ORTHOPAEDIC SURGERY

## 2023-02-07 VITALS
WEIGHT: 131 LBS | RESPIRATION RATE: 16 BRPM | HEART RATE: 62 BPM | DIASTOLIC BLOOD PRESSURE: 55 MMHG | SYSTOLIC BLOOD PRESSURE: 108 MMHG | HEIGHT: 65 IN | BODY MASS INDEX: 21.83 KG/M2 | TEMPERATURE: 98.1 F | OXYGEN SATURATION: 100 %

## 2023-02-07 DIAGNOSIS — G56.03 CARPAL TUNNEL SYNDROME, BILATERAL: Primary | ICD-10-CM

## 2023-02-07 DIAGNOSIS — G56.01 CARPAL TUNNEL SYNDROME OF RIGHT WRIST: ICD-10-CM

## 2023-02-07 RX ORDER — LIDOCAINE HYDROCHLORIDE AND EPINEPHRINE 10; 10 MG/ML; UG/ML
20 INJECTION, SOLUTION INFILTRATION; PERINEURAL ONCE
Status: DISCONTINUED | OUTPATIENT
Start: 2023-02-07 | End: 2023-02-07 | Stop reason: HOSPADM

## 2023-02-07 RX ORDER — COVID-19 ANTIGEN TEST
220 KIT MISCELLANEOUS 2 TIMES DAILY
Qty: 60 CAPSULE | Refills: 0 | Status: SHIPPED | OUTPATIENT
Start: 2023-02-07 | End: 2023-03-09

## 2023-02-07 RX ORDER — ONDANSETRON 2 MG/ML
4 INJECTION INTRAMUSCULAR; INTRAVENOUS EVERY 6 HOURS PRN
Status: CANCELLED | OUTPATIENT
Start: 2023-02-07

## 2023-02-07 RX ORDER — CEFAZOLIN SODIUM 1 G/50ML
1000 SOLUTION INTRAVENOUS ONCE
Status: DISCONTINUED | OUTPATIENT
Start: 2023-02-07 | End: 2023-02-07 | Stop reason: HOSPADM

## 2023-02-07 RX ORDER — HYDROCODONE BITARTRATE AND ACETAMINOPHEN 5; 325 MG/1; MG/1
1 TABLET ORAL EVERY 6 HOURS PRN
Qty: 5 TABLET | Refills: 0 | Status: SHIPPED | OUTPATIENT
Start: 2023-02-07 | End: 2023-02-17

## 2023-02-07 RX ORDER — TRAMADOL HYDROCHLORIDE 50 MG/1
50 TABLET ORAL EVERY 6 HOURS PRN
Status: CANCELLED | OUTPATIENT
Start: 2023-02-07

## 2023-02-07 RX ORDER — ACETAMINOPHEN 325 MG/1
650 TABLET ORAL EVERY 6 HOURS PRN
Status: CANCELLED | OUTPATIENT
Start: 2023-02-07

## 2023-02-07 RX ORDER — SENNOSIDES 8.6 MG
650 CAPSULE ORAL EVERY 8 HOURS PRN
Qty: 30 TABLET | Refills: 0 | Status: SHIPPED | OUTPATIENT
Start: 2023-02-07

## 2023-02-07 RX ORDER — MAGNESIUM HYDROXIDE 1200 MG/15ML
LIQUID ORAL AS NEEDED
Status: DISCONTINUED | OUTPATIENT
Start: 2023-02-07 | End: 2023-02-07 | Stop reason: HOSPADM

## 2023-02-07 RX ADMIN — SODIUM BICARBONATE: 84 INJECTION, SOLUTION INTRAVENOUS at 08:54

## 2023-02-07 NOTE — H&P
H&P Exam - Orthopedics   Denise Kitchen 50 y o  female MRN: [id number]  Unit/Bed#: P309 B PRE    Assessment/Plan   Assessment:  Right carpal tunnel syndrome    Plan:  Right endoscopic carpal tunnel release under local    History of Present Illness   HPI:  Denise Kitchen is a 50 y o  female who presents with right hand numbness and tingling  She has tried conservative treatment without relief of her symptoms  She would like to proceed with surgical intervention      Historical Information  Review Of Systems:   · Skin: Normal  · Neuro: See HPI  · Musculoskeletal: See HPI  · 14 point review of systems negative except as stated above     Past Medical History:   Past Medical History:   Diagnosis Date   • Back pain     pinched nerve in back   • CTS (carpal tunnel syndrome)     right wrist   • Multiple myeloma ([facility] [address] )     chemotherapy   • Multiple myeloma (HCC)    • Multiple sclerosis (HCC)    • skin lesion     mole removed from face       Past Surgical History:   Past Surgical History:   Procedure Laterality Date   •  SECTION     • LIMBAL STEM CELL TRANSPLANT  2018       Family History:  Family history reviewed and non-contributory  Family History   Problem Relation Age of Onset   • No Known Problems Mother    • Coronary artery disease Father    • No Known Problems Sister    • No Known Problems Brother    • No Known Problems Daughter    • No Known Problems Son    • No Known Problems Brother        Social History:  Social History     Socioeconomic History   • Marital status: /Civil Union     Spouse name: None   • Number of children: None   • Years of education: None   • Highest education level: None   Occupational History   • None   Tobacco Use   • Smoking status: Never   • Smokeless tobacco: Never   Vaping Use   • Vaping Use: Never used   Substance and Sexual Activity   • Alcohol use: No   • Drug use: No   • Sexual activity: Yes     Partners: Male     Birth control/protection: Post-menopausal   Other Topics Concern   • None   Social History Narrative   • None     Social Determinants of Health     Financial Resource Strain: Not on file   Food Insecurity: Not on file   Transportation Needs: Not on file   Physical Activity: Not on file   Stress: Not on file   Social Connections: Not on file   Intimate Partner Violence: Not on file   Housing Stability: Not on file       Allergies:   No Known Allergies        Labs:  0   Lab Value Date/Time    HCT 39 7 08/24/2022 1246    HCT 38 3 03/19/2022 1137    HCT 44 9 12/28/2021 1139    HCT 40 7 08/30/2017 1220    HCT 38 0 03/31/2017 1340    HCT 41 1 11/28/2016 1035    HCT 41 1 11/28/2016 1035    HGB 13 0 08/24/2022 1246    HGB 12 0 03/19/2022 1137    HGB 14 6 12/28/2021 1139    HGB 13 4 08/30/2017 1220    HGB 12 5 03/31/2017 1340    HGB 13 1 11/28/2016 1035    HGB 13 1 11/28/2016 1035    WBC 3 46 (L) 08/24/2022 1246    WBC 4 45 03/19/2022 1137    WBC 5 21 12/28/2021 1139    WBC 6 6 08/30/2017 1220    WBC 4 1 03/31/2017 1340    WBC 5 1 11/28/2016 1035    WBC 5 1 11/28/2016 1035       Meds:  No current facility-administered medications for this encounter  Blood Culture:   No results found for: BLOODCX    Wound Culture:   No results found for: WOUNDCULT    Ins and Outs:  No intake/output data recorded  Physical Exam  /55   Pulse 68   Temp 98 1 °F (36 7 °C) (Temporal)   Resp 16   Ht 5' 5" (1 651 m)   Wt 59 4 kg (131 lb)   SpO2 100%   BMI 21 80 kg/m²   /55   Pulse 68   Temp 98 1 °F (36 7 °C) (Temporal)   Resp 16   Ht 5' 5" (1 651 m)   Wt 59 4 kg (131 lb)   SpO2 100%   BMI 21 80 kg/m²   Gen: No acute distress, resting comfortably in bed  HEENT: Eyes clear, moist mucus membranes, hearing intact  Respiratory: No audible wheezing or stridor  Cardiovascular: Well Perfused peripherally, 2+ distal pulse  Abdomen: nondistended, no peritoneal signs  Ortho Exam: Right Carpal Tunnel Exam:    Negative thenar atrophy   Negative phalen's test  Positive carpal tunnel compression  Positive tinels over median nerve at the wrist   Opposition strength 5/5  Abduction strength 5/5  Neuro Exam: Patient is neurovascular intact for median, radial, ulnar nerve distribution    Capillary refill less than 2 seconds    Lab Results: Reviewed  Imaging: Reviewed

## 2023-02-07 NOTE — OP NOTE
OPERATIVE REPORT  PATIENT NAME: Kiet Daley  :  1975  MRN: [id number]  Pt Location: [facility]    SURGERY DATE: 23    Surgeon(s) and Role:     * Chinyere Clifton MD - Primary     * Manuel Gracia PA-C - Assisting    Pre-Op Diagnosis:  Carpal tunnel syndrome of right wrist [G56 01]    Post-Op Diagnosis:   Carpal tunnel syndrome of right wrist [G56 01]    Procedure(s) (LRB):  Right endoscopic carpal tunnel release (Right)    Specimen(s):  * No orders in the log *    Estimated Blood Loss:   Minimal      Anesthesia Type:   Local    Operative Indications: The patient has a history of Carpal Tunnel Syndrome  right that was recalcitrant to conservative management  The decision was made to bring the patient to the operating room for Endoscopic Carpal Tunnel Release  right  Risks of the procedure were explained which include, but are not limited to bleeding; infection; damage to nerves, arteries,veins, tendons; scar; pain; need for reoperation; failure to give desired result; and risks of anaesthesia  All questions were answered to satisfaction and they were willing to proceed  Operative Findings:  Right carpal tunnel syndrome    Complications:   None    Procedure and Technique:  After the patient, site, and procedure were identified, the patient was brought into the operating room in a supine position  Local anaesthesia was adminstered in the preoperative holding area  A tourniquet was not used  The  right upper extremity was then prepped and drapped in a normal, sterile, orthopedic fashion  After reconfirmation of the patient, site, and surgical procedure, which was agreed upon by the entire surgical team, attention was turned to the right wrist   The sites of the proximal and distal incisions were marked    The estela of the proximal incision was placed horizontally at the midline of the wrist   The distal incision estela was longitudinal extending distally from the point of intersection of the line between the long finger and ring finger and the line along the distal border of the fully abducted thumb  The proximal incision was performed  Subcutaneous tissues were dissected  Then the transverse volar antebrachial fascia was perforated with a scalpel  The edges of the skin incision where retracted and the forearm fascia was incised for approximately 1 5 cm proximally with care taken to identify and protect the median nerve  Retractors were used to inspect the transverse carpal ligament distally  A curved Pedersen dissector was used to glide under the transverse carpal ligament and superficial to the median nerve with confirmation via the washboard feeling  Then the curved Pedersen was pushed into the palm toward the distal incision site  When the location of the distal skin estela was adequate, the distal incision was made  Then with retraction of the skin, further dissection and perforation of the palmar fascia was performed with the use of tenotomy scissors  The curved Pedersen was guided from proximal to distal out the distal incisions without any twisting to allow for dilation of the tract  The curved Pedersen was removed, and the cannula for the camera was inserted along the same tract, making sure to keep the alignment post on the cannula perpendicular to the plane of the hand without twisting  Then while keeping the wrist in extension, and holding the cannula of the camera in place, the wrist was placed on the hyperextension board  The scope was inserted distally, and a cotton-tip applicator was used proximally to clean the tract as well as the scope  A curved cutting knife was introduced from proximal to distal while keeping visualization with the use of the camera  Without twisting of the canula, the knife was used to cut the transverse carpal ligament completely, making sure there were no remnant fibers    Then after this was accomplished, the hand was removed from the extension block   Three maneuvers were used to confirm the full release of the transverse carpal ligament  First, the ease of twisting the trocar of the camera confirmed the release of the ligament  Second, the curved Pedersen was introduced to make sure there were no remnant fibers that could be felt palmarly  Third, the scope was introduced again to visualize that the whole ligament was released proximally to distally  Additional confirmation of full release included retraction and inspection in the distal and proximal incisions to make sure there were no remnant fibers distally or proximally respectively  At the completion of the procedure, hemostasis was obtained with cautery and direct pressure  The wounds were copiously irrigated with sterile solution  The wounds were closed with Prolene  Sterile dressings were applied, including Xeroform, gauze, tweeners, webril, ACE  Please note, all sponge, needle, and instrument counts were correct prior to closure  Loupe magnification was utilized  The patient tolerated the procedure well       I was present for all critical portions of the procedure, A qualified resident physician was not available and A physician assistant was required during the procedure for retraction tissue handling,dissection and suturing    Patient Disposition:  PACU  and hemodynamically stable    SIGNATURE: Jack Napier MD  DATE: 02/07/23  TIME: 9:30 AM

## 2023-02-07 NOTE — LETTER
[id number] [address]  Dept: [phone]    February 7, 2023     Patient: Noble Hernandez   YOB: 1975   Date of Visit: 2/7/2023       To Whom it May Concern:    Noble Hernandez is under my professional care  She was seen in the hospital from 2/7/2023 to 02/07/23  She will be out of work for 6 weeks  If you have any questions or concerns, please don't hesitate to call  Sincerely,          Melo Wilcox PA-C

## 2023-02-17 ENCOUNTER — OFFICE VISIT (OUTPATIENT)
Dept: OBGYN CLINIC | Facility: HOSPITAL | Age: 48
End: 2023-02-17

## 2023-02-17 VITALS
SYSTOLIC BLOOD PRESSURE: 120 MMHG | DIASTOLIC BLOOD PRESSURE: 80 MMHG | HEART RATE: 90 BPM | HEIGHT: 65 IN | WEIGHT: 131 LBS | BODY MASS INDEX: 21.83 KG/M2

## 2023-02-17 DIAGNOSIS — G56.01 CARPAL TUNNEL SYNDROME OF RIGHT WRIST: Primary | ICD-10-CM

## 2023-02-17 NOTE — LETTER
February 17, 2023     Patient: Kena Jaime  YOB: 1975  Date of Visit: 2/17/2023      To Whom it May Concern:    Kena Jaime is under my professional care  Marli Ha was seen in my office on 2/17/2023  Marli Hurtado will be off of work until 3/21/2023  She can return to work full duty at that time without restrictions  If you have any questions or concerns, please don't hesitate to call  Sincerely,          Jasmin Frederick PA-C        CC: No Recipients

## 2023-02-17 NOTE — PROGRESS NOTES
Assessment:   S/P Right endoscopic carpal tunnel release - Right on 2/7/2023    Plan:   Patient was advised to avoid soaking for 2 to 3 days to allow the incision to finish healing  Patient was advised that they can have palmar pain for 3 to 4 months after surgery which is normal  They was advised to use heat massage as demonstrated in the office  They will follow-up with us as needed    Follow Up:  PRN    To Do Next Visit:  Reevaluation      CHIEF COMPLAINT:  Chief Complaint   Patient presents with   • Right Wrist - Post-op, Suture / Staple Removal     ECTR- 2/7/23         SUBJECTIVE:  Hector Saxena is a 50 y o  female who presents for follow up after Right endoscopic carpal tunnel release - Right on 2/7/2023  Today patient has normal pain and discomfort  She states her numbness and tingling is improving         PHYSICAL EXAMINATION:  Vital signs: Ht 5' 5" (1 651 m)   Wt 59 4 kg (131 lb)   BMI 21 80 kg/m²   General: well developed and well nourished, alert, oriented times 3 and appears comfortable  Psychiatric: Normal    MUSCULOSKELETAL EXAMINATION:  Incision: Clean, dry, intact  Range of Motion: As expected, opposition intact and full composite fist possible  Neurovascular status: Neuro intact, good cap refill  Activity Restrictions: No restrictions  Done today: Sutures out      STUDIES REVIEWED:  No Studies to review      PROCEDURES PERFORMED:  Procedures  No Procedures performed today

## 2023-02-19 DIAGNOSIS — C90.01 MULTIPLE MYELOMA IN REMISSION (HCC): ICD-10-CM

## 2023-02-23 ENCOUNTER — TELEPHONE (OUTPATIENT)
Dept: HEMATOLOGY ONCOLOGY | Facility: CLINIC | Age: 48
End: 2023-02-23

## 2023-02-23 ENCOUNTER — APPOINTMENT (OUTPATIENT)
Dept: LAB | Facility: CLINIC | Age: 48
End: 2023-02-23

## 2023-02-23 NOTE — TELEPHONE ENCOUNTER
MEDICATION REFILL ROUTE TO RN    Who is calling? Physician Office   If someone other than patient is calling, are they listed on the communication consent form?  No   Medication (name and dose) lenalidomide (Revlimid) 5 MG CAPS       How many pills left N/A   Preferred Pharmacy / Address [facility]   Physician Dr Glenn Steel   Call back number [phone]   Relevant Information  Yes

## 2023-02-24 RX ORDER — LENALIDOMIDE 5 MG/1
CAPSULE ORAL
Qty: 28 CAPSULE | Refills: 0 | Status: SHIPPED | OUTPATIENT
Start: 2023-02-24

## 2023-03-22 ENCOUNTER — APPOINTMENT (OUTPATIENT)
Dept: LAB | Facility: CLINIC | Age: 48
End: 2023-03-22

## 2023-03-22 DIAGNOSIS — C90.00 MULTIPLE MYELOMA NOT HAVING ACHIEVED REMISSION (HCC): ICD-10-CM

## 2023-03-22 DIAGNOSIS — G56.03 CARPAL TUNNEL SYNDROME, BILATERAL: ICD-10-CM

## 2023-03-23 DIAGNOSIS — C90.01 MULTIPLE MYELOMA IN REMISSION (HCC): Primary | ICD-10-CM

## 2023-03-23 RX ORDER — LENALIDOMIDE 5 MG/1
CAPSULE ORAL
Qty: 28 CAPSULE | Refills: 0 | Status: SHIPPED | OUTPATIENT
Start: 2023-03-23

## 2023-04-19 ENCOUNTER — APPOINTMENT (OUTPATIENT)
Dept: LAB | Facility: CLINIC | Age: 48
End: 2023-04-19

## 2023-05-02 ENCOUNTER — TELEPHONE (OUTPATIENT)
Dept: HEMATOLOGY ONCOLOGY | Facility: CLINIC | Age: 48
End: 2023-05-02

## 2023-05-02 NOTE — TELEPHONE ENCOUNTER
Medication Refill Request   Who are you speaking with? Patient   If it is not the patient, are they listed on an active communication consent form? N/A   Which medication is being requested for refill? Please list medication name and dosage  Revlimid 5MG   How many pills does the patient have left? About two weeks worth   Preferred Pharmacy / [address]  Fax: [phone]  Phone [phone] or [phone]   Who is the prescribing provider?  Dr Gordy Chang   Call back number  [phone]   Relevant Information Pharmacy change

## 2023-05-17 ENCOUNTER — APPOINTMENT (OUTPATIENT)
Dept: LAB | Facility: CLINIC | Age: 48
End: 2023-05-17

## 2023-05-18 DIAGNOSIS — C90.01 MULTIPLE MYELOMA IN REMISSION (HCC): ICD-10-CM

## 2023-05-18 RX ORDER — LENALIDOMIDE 5 MG/1
CAPSULE ORAL
Qty: 28 CAPSULE | Refills: 0 | Status: SHIPPED | OUTPATIENT
Start: 2023-05-18 | End: 2023-05-19 | Stop reason: SDUPTHER

## 2023-05-19 ENCOUNTER — TELEPHONE (OUTPATIENT)
Dept: HEMATOLOGY ONCOLOGY | Facility: CLINIC | Age: 48
End: 2023-05-19

## 2023-05-19 DIAGNOSIS — C90.01 MULTIPLE MYELOMA IN REMISSION (HCC): ICD-10-CM

## 2023-05-19 RX ORDER — LENALIDOMIDE 5 MG/1
CAPSULE ORAL
Qty: 28 CAPSULE | Refills: 0 | Status: SHIPPED | OUTPATIENT
Start: 2023-05-19

## 2023-05-19 NOTE — TELEPHONE ENCOUNTER
Medication Refill Request   Who are you speaking with? Patient   If it is not the patient, are they listed on an active communication consent form? N/A   Which medication is being requested for refill? Please list medication name and dosage  Revlimid 5MG   How many pills does the patient have left?  5 left  Preferred Pharmacy / [facility]:    Fax: [phone]    Phone: [phone] or [phone]   Who is the prescribing provider? Dr Kathrine Dixon   Call back number    [phone]  Patient is asking to receive a call back when this refill has been completed  Relevant Information  This is a new pharmacy change for the patient

## 2023-05-23 ENCOUNTER — TELEPHONE (OUTPATIENT)
Dept: HEMATOLOGY ONCOLOGY | Facility: CLINIC | Age: 48
End: 2023-05-23

## 2023-05-23 NOTE — TELEPHONE ENCOUNTER
Spoke with patient, she reports receiving her Revlimid  Medication was sent to two different pharmacies       Rober Pompa, can you please schedule patient for follow up appointment

## 2023-05-23 NOTE — TELEPHONE ENCOUNTER
Patient Call    Who are you speaking with? Pharmacy    If it is not the patient, are they listed on an active communication consent form? N/A   What is the reason for this call? Pharmacy calling for authorization number for patients revlimid   Does this require a call back? Yes   If a call back is required, please list best call back number [phone]   If a call back is required, advise that a message will be forwarded to their care team and someone will return their call as soon as possible  Did you relay this information to the patient?  Yes

## 2023-05-24 ENCOUNTER — TELEPHONE (OUTPATIENT)
Dept: HEMATOLOGY ONCOLOGY | Facility: CLINIC | Age: 48
End: 2023-05-24

## 2023-05-24 NOTE — TELEPHONE ENCOUNTER
Spoke with [facility] in [place], [place]  They filled pt's script this month and she received it  I was informed for billing purposes script should be sent to [facility] starting with June fill  Current auth number remains valid for May script which was filled at [facility]  Spoke with [facility] and they will cancel the script they received

## 2023-06-13 ENCOUNTER — APPOINTMENT (OUTPATIENT)
Dept: LAB | Facility: CLINIC | Age: 48
End: 2023-06-13
Payer: COMMERCIAL

## 2023-06-14 ENCOUNTER — TELEPHONE (OUTPATIENT)
Dept: HEMATOLOGY ONCOLOGY | Facility: CLINIC | Age: 48
End: 2023-06-14

## 2023-06-14 DIAGNOSIS — C90.01 MULTIPLE MYELOMA IN REMISSION (HCC): ICD-10-CM

## 2023-06-14 RX ORDER — LENALIDOMIDE 5 MG/1
CAPSULE ORAL
Qty: 28 CAPSULE | Refills: 0 | Status: SHIPPED | OUTPATIENT
Start: 2023-06-14

## 2023-06-14 NOTE — TELEPHONE ENCOUNTER
Medication Refill Request   Who are you speaking with? self   If it is not the patient, are they listed on an active communication consent form? self   Which medication is being requested for refill? Please list medication name and dosage lenalidomide (Revlimid) 5 MG CAPS        How many pills does the patient have left? 5   Preferred Pharmacy / Address [facility] [address] [address]   Phone:  [phone]  Fax: [phone]    Who is the prescribing provider?  Dr Dary Yanez   Call back number [phone]   Relevant Information refill

## 2023-07-15 ENCOUNTER — APPOINTMENT (OUTPATIENT)
Dept: LAB | Facility: CLINIC | Age: 48
End: 2023-07-15
Payer: COMMERCIAL

## 2023-07-15 LAB
ALBUMIN SERPL BCP-MCNC: 3.5 G/DL (ref 3.5–5)
ALP SERPL-CCNC: 90 U/L (ref 46–116)
ALT SERPL W P-5'-P-CCNC: 30 U/L (ref 12–78)
ANION GAP SERPL CALCULATED.3IONS-SCNC: 2 MMOL/L
AST SERPL W P-5'-P-CCNC: 17 U/L (ref 5–45)
BASOPHILS # BLD AUTO: 0.06 THOUSANDS/ÂΜL (ref 0–0.1)
BASOPHILS NFR BLD AUTO: 2 % (ref 0–1)
BILIRUB SERPL-MCNC: 0.36 MG/DL (ref 0.2–1)
BUN SERPL-MCNC: 12 MG/DL (ref 5–25)
CALCIUM SERPL-MCNC: 8.6 MG/DL (ref 8.3–10.1)
CHLORIDE SERPL-SCNC: 112 MMOL/L (ref 96–108)
CO2 SERPL-SCNC: 27 MMOL/L (ref 21–32)
CREAT SERPL-MCNC: 0.83 MG/DL (ref 0.6–1.3)
EOSINOPHIL # BLD AUTO: 0.18 THOUSAND/ÂΜL (ref 0–0.61)
EOSINOPHIL NFR BLD AUTO: 5 % (ref 0–6)
ERYTHROCYTE [DISTWIDTH] IN BLOOD BY AUTOMATED COUNT: 12.6 % (ref 11.6–15.1)
GFR SERPL CREATININE-BSD FRML MDRD: 82 ML/MIN/1.73SQ M
GLUCOSE P FAST SERPL-MCNC: 102 MG/DL (ref 65–99)
HCT VFR BLD AUTO: 39.2 % (ref 34.8–46.1)
HGB BLD-MCNC: 12.7 G/DL (ref 11.5–15.4)
IGA SERPL-MCNC: 134 MG/DL (ref 70–400)
IGG SERPL-MCNC: 1650 MG/DL (ref 700–1600)
IGM SERPL-MCNC: 45 MG/DL (ref 40–230)
IMM GRANULOCYTES # BLD AUTO: 0.01 THOUSAND/UL (ref 0–0.2)
IMM GRANULOCYTES NFR BLD AUTO: 0 % (ref 0–2)
LYMPHOCYTES # BLD AUTO: 1.7 THOUSANDS/ÂΜL (ref 0.6–4.47)
LYMPHOCYTES NFR BLD AUTO: 50 % (ref 14–44)
MCH RBC QN AUTO: 31.1 PG (ref 26.8–34.3)
MCHC RBC AUTO-ENTMCNC: 32.4 G/DL (ref 31.4–37.4)
MCV RBC AUTO: 96 FL (ref 82–98)
MONOCYTES # BLD AUTO: 0.35 THOUSAND/ÂΜL (ref 0.17–1.22)
MONOCYTES NFR BLD AUTO: 10 % (ref 4–12)
NEUTROPHILS # BLD AUTO: 1.12 THOUSANDS/ÂΜL (ref 1.85–7.62)
NEUTS SEG NFR BLD AUTO: 33 % (ref 43–75)
NRBC BLD AUTO-RTO: 0 /100 WBCS
PLATELET # BLD AUTO: 178 THOUSANDS/UL (ref 149–390)
PMV BLD AUTO: 9.7 FL (ref 8.9–12.7)
POTASSIUM SERPL-SCNC: 3.9 MMOL/L (ref 3.5–5.3)
PROT SERPL-MCNC: 7.2 G/DL (ref 6.4–8.4)
RBC # BLD AUTO: 4.09 MILLION/UL (ref 3.81–5.12)
SODIUM SERPL-SCNC: 141 MMOL/L (ref 135–147)
WBC # BLD AUTO: 3.42 THOUSAND/UL (ref 4.31–10.16)

## 2023-07-18 LAB
ALBUMIN SERPL ELPH-MCNC: 4.05 G/DL (ref 3.2–5.1)
ALBUMIN SERPL ELPH-MCNC: 56.2 % (ref 48–70)
ALPHA1 GLOB SERPL ELPH-MCNC: 0.24 G/DL (ref 0.15–0.47)
ALPHA1 GLOB SERPL ELPH-MCNC: 3.3 % (ref 1.8–7)
ALPHA2 GLOB SERPL ELPH-MCNC: 0.55 G/DL (ref 0.42–1.04)
ALPHA2 GLOB SERPL ELPH-MCNC: 7.7 % (ref 5.9–14.9)
B2 MICROGLOB SERPL-MCNC: 1.3 MG/L (ref 0.6–2.4)
BETA GLOB ABNORMAL SERPL ELPH-MCNC: 0.42 G/DL (ref 0.31–0.57)
BETA1 GLOB SERPL ELPH-MCNC: 5.9 % (ref 4.7–7.7)
BETA2 GLOB SERPL ELPH-MCNC: 4.6 % (ref 3.1–7.9)
BETA2+GAMMA GLOB SERPL ELPH-MCNC: 0.33 G/DL (ref 0.2–0.58)
GAMMA GLOB ABNORMAL SERPL ELPH-MCNC: 1.61 G/DL (ref 0.4–1.66)
GAMMA GLOB SERPL ELPH-MCNC: 22.3 % (ref 6.9–22.3)
IGG/ALB SER: 1.28 {RATIO} (ref 1.1–1.8)
KAPPA LC FREE SER-MCNC: 18.9 MG/L (ref 3.3–19.4)
KAPPA LC FREE/LAMBDA FREE SER: 0.6 {RATIO} (ref 0.26–1.65)
LAMBDA LC FREE SERPL-MCNC: 31.6 MG/L (ref 5.7–26.3)
M PROTEIN 1 MFR SERPL ELPH: 13.5 %
M PROTEIN 1 SERPL ELPH-MCNC: 0.97 G/DL
PROT PATTERN SERPL ELPH-IMP: NORMAL
PROT SERPL-MCNC: 7.2 G/DL (ref 6.4–8.2)

## 2023-07-18 PROCEDURE — 84165 PROTEIN E-PHORESIS SERUM: CPT | Performed by: STUDENT IN AN ORGANIZED HEALTH CARE EDUCATION/TRAINING PROGRAM

## 2023-07-19 ENCOUNTER — OFFICE VISIT (OUTPATIENT)
Dept: HEMATOLOGY ONCOLOGY | Facility: CLINIC | Age: 48
End: 2023-07-19
Payer: COMMERCIAL

## 2023-07-19 ENCOUNTER — TELEPHONE (OUTPATIENT)
Dept: HEMATOLOGY ONCOLOGY | Facility: CLINIC | Age: 48
End: 2023-07-19

## 2023-07-19 VITALS
TEMPERATURE: 97.7 F | SYSTOLIC BLOOD PRESSURE: 124 MMHG | HEART RATE: 65 BPM | WEIGHT: 130 LBS | RESPIRATION RATE: 17 BRPM | BODY MASS INDEX: 21.66 KG/M2 | HEIGHT: 65 IN | OXYGEN SATURATION: 99 % | DIASTOLIC BLOOD PRESSURE: 80 MMHG

## 2023-07-19 DIAGNOSIS — C90.00 MULTIPLE MYELOMA NOT HAVING ACHIEVED REMISSION (HCC): Primary | ICD-10-CM

## 2023-07-19 DIAGNOSIS — C90.01 MULTIPLE MYELOMA IN REMISSION (HCC): ICD-10-CM

## 2023-07-19 PROCEDURE — 99214 OFFICE O/P EST MOD 30 MIN: CPT | Performed by: INTERNAL MEDICINE

## 2023-07-19 NOTE — PROGRESS NOTES
Hematology Outpatient Follow - Up Note  Shivam Daley 46 y.o. 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 Daily    M protein 0.9 in April and August 2023, no evidence of endorgan damage, continue treatment with lenalidomide 5 mg p.o. daily    She declined dexamethasone because of carpal tunnel syndrome status post surgical procedure    Cholestyramine for lenalidomide induced diarrhea    Follow-up in 3 months with CBC, CMP, SPEP, free light chain, quantitative immunoglobulins    Multiple sclerosis        Labs and imaging studies are reviewed by ordering provider once results are available. If there are findings that need immediate attention, you will be contacted when results available. Discussing results and the implication on your healthcare is best discussed in person at your follow-up visit. HPI:    - BM : 12/2017 : 25% plasma cell.    - M spike : igG lambda, 2.5 g; IgG > 3000. 2000 mg in 2016.    - normal cytogenetics   - NO CRAB : bone survey in 2016 and cervical and thoracic spine MRI in May of 2017, with no bone lesions identified.       overall prognosis discussed with pt; high risk SMM; need MM therapy. 1) induction by VRd; 2) stem cell / auto transplant 3) maintenance therapy.       induction : VRd:    - Velcade : 1.3 mg / m2 SC injection D 1, 8, 15 / 28d   - Revlimid : 25 mg po daily D1-21 / 28d   - Dex 40 mg po D1, 8, 15 / 28d   - supportive care : allopurinol 100 mg po daily x 1 m then stop ; AS A 81 mg po daily once on Revlimid.  ; Acyclovir 400 mg po bid           C # 1 : start Vd on 1/17 ; Revlimid for 12 days : stop on 2/6 ( end of week 3 ). C # 2:  start on 2/14 - 3/7 ( week off from 3/8-14)  C # 3: 3/14, 3/21, 3/28,   revlimid : D1-21: 3/14-4/3   off 4/4-4/10  C# 4:  4/11, 18, 25         revlimid : D1-21: 4/11- 5/1 7/2018 : Autologous stem cell transplant in [facility] with melphalan.  No major complications.  Hospitalized for 3 weeks    11/2018:  Start maintenance Revlimid 5  mg daily and dexamethasone 12 mg weekly.        12/2018:  Received proper posttransplant vaccination by PCP per patient.     Patient still follows transplant doctor in [facility].       M spike 0.3g/dL (11/2018) , un-detectable  ( 2/2019 ),   0.4 ( 12/2019)  0.49 ( 2/2020) 0.39 (5/2020) 0.62 ( 11/2020) 0.5 ( 2/2021) 0.68 ( 1/2022) 0.72 ( August 2022 ) IgG lambda, lambda light chain 22 with normal ratio, no evidence of hypercalcemia, anemia, normal IgG in the range of 1500     M protein 0.9 in April 2023 and in August 2023  Interval History:        Previous Treatment:         Test Results:    Imaging: No results found. Labs:   Lab Results   Component Value Date    WBC 3.42 (L) 07/15/2023    HGB 12.7 07/15/2023    HCT 39.2 07/15/2023    MCV 96 07/15/2023     07/15/2023     Lab Results   Component Value Date     08/30/2017    K 3.9 07/15/2023     (H) 07/15/2023    CO2 27 07/15/2023    BUN 12 07/15/2023    CREATININE 0.83 07/15/2023    GLUF 102 (H) 07/15/2023    CALCIUM 8.6 07/15/2023    CORRECTEDCA 9.6 04/05/2022    AST 17 07/15/2023    ALT 30 07/15/2023    ALKPHOS 90 07/15/2023    PROT 8.3 (H) 08/30/2017    BILITOT 0.4 08/30/2017    EGFR 82 07/15/2023       No results found for: "IRON", "TIBC", "FERRITIN"    Lab Results   Component Value Date    [id number] 07/10/2021         ROS: Review of Systems   Constitutional: Negative for appetite change, chills, diaphoresis, fatigue and unexpected weight change.    HENT:   Negative for mouth sores, nosebleeds, sore throat, trouble swallowing and voice change. Eyes: Negative for eye problems and icterus. Respiratory: Negative for chest tightness, cough, hemoptysis and wheezing. Cardiovascular: Negative for chest pain, leg swelling and palpitations. Gastrointestinal: Negative for abdominal distention, abdominal pain, blood in stool, constipation, diarrhea, nausea and vomiting. Endocrine: Negative for hot flashes. Genitourinary: Negative for bladder incontinence, difficulty urinating, dyspareunia, dysuria and frequency. Musculoskeletal: Negative for arthralgias, back pain, gait problem, neck pain and neck stiffness. Skin: Negative for itching and rash. Neurological: Negative for dizziness, gait problem, headaches, numbness, seizures and speech difficulty. Hematological: Negative for adenopathy. Does not bruise/bleed easily. Psychiatric/Behavioral: Negative for decreased concentration, depression, sleep disturbance and suicidal ideas. The patient is nervous/anxious. Current Medications: Reviewed  Allergies: Reviewed  PMH/FH/SH:  Reviewed      Physical Exam:    Body surface area is 1.65 meters squared. Wt Readings from Last 3 Encounters:   07/19/23 59 kg (130 lb)   02/17/23 59.4 kg (131 lb)   02/07/23 59.4 kg (131 lb)        Temp Readings from Last 3 Encounters:   07/19/23 97.7 °F (36.5 °C)   02/07/23 98.1 °F (36.7 °C) (Temporal)   01/19/23 100 °F (37.8 °C)        BP Readings from Last 3 Encounters:   07/19/23 124/80   02/17/23 120/80   02/07/23 108/55         Pulse Readings from Last 3 Encounters:   07/19/23 65   02/17/23 90   02/07/23 62        Physical Exam  Vitals reviewed. Constitutional:       General: She is not in acute distress. Appearance: She is well-developed. She is not diaphoretic. HENT:      Head: Normocephalic and atraumatic. Eyes:      Conjunctiva/sclera: Conjunctivae normal.   Neck:      Trachea: No tracheal deviation.    Cardiovascular:      Rate and Rhythm: Normal rate and regular rhythm. Heart sounds: No murmur heard. No friction rub. No gallop. Pulmonary:      Effort: Pulmonary effort is normal. No respiratory distress. Breath sounds: Normal breath sounds. No wheezing or rales. Chest:      Chest wall: No tenderness. Abdominal:      General: There is no distension. Palpations: Abdomen is soft. Tenderness: There is no abdominal tenderness. Musculoskeletal:      Cervical back: Normal range of motion and neck supple. Right lower leg: No edema. Left lower leg: No edema. Lymphadenopathy:      Cervical: No cervical adenopathy. Skin:     General: Skin is warm and dry. Coloration: Skin is not pale. Findings: No erythema. Neurological:      General: No focal deficit present. Mental Status: She is alert and oriented to person, place, and time. Psychiatric:         Behavior: Behavior normal.         Thought Content: Thought content normal.         Judgment: Judgment normal.         ECO  Goals and Barriers:  Current Goal: Minimize effects of disease. Barriers: None. Patient's Capacity to Self Care:  Patient is able to self care.     Code Status: @CODESSelect Medical [facility], Edwin ShawUS@

## 2023-07-20 RX ORDER — LENALIDOMIDE 5 MG/1
CAPSULE ORAL
Qty: 28 CAPSULE | Refills: 0 | Status: SHIPPED | OUTPATIENT
Start: 2023-07-20

## 2023-08-14 ENCOUNTER — TELEPHONE (OUTPATIENT)
Dept: HEMATOLOGY ONCOLOGY | Facility: CLINIC | Age: 48
End: 2023-08-14

## 2023-08-17 ENCOUNTER — APPOINTMENT (OUTPATIENT)
Dept: LAB | Facility: CLINIC | Age: 48
End: 2023-08-17
Payer: COMMERCIAL

## 2023-08-18 DIAGNOSIS — C90.01 MULTIPLE MYELOMA IN REMISSION (HCC): ICD-10-CM

## 2023-08-18 RX ORDER — LENALIDOMIDE 5 MG/1
CAPSULE ORAL
Qty: 28 CAPSULE | Refills: 0 | Status: SHIPPED | OUTPATIENT
Start: 2023-08-18

## 2023-09-20 ENCOUNTER — APPOINTMENT (OUTPATIENT)
Dept: LAB | Facility: CLINIC | Age: 48
End: 2023-09-20
Payer: COMMERCIAL

## 2023-09-20 DIAGNOSIS — C90.01 MULTIPLE MYELOMA IN REMISSION (HCC): Primary | ICD-10-CM

## 2023-09-20 LAB — HCG SERPL QL: NEGATIVE

## 2023-09-20 PROCEDURE — 84703 CHORIONIC GONADOTROPIN ASSAY: CPT

## 2023-09-20 PROCEDURE — 36415 COLL VENOUS BLD VENIPUNCTURE: CPT

## 2023-09-21 DIAGNOSIS — C90.01 MULTIPLE MYELOMA IN REMISSION (HCC): ICD-10-CM

## 2023-09-21 RX ORDER — LENALIDOMIDE 5 MG/1
CAPSULE ORAL
Qty: 28 CAPSULE | Refills: 0 | Status: SHIPPED | OUTPATIENT
Start: 2023-09-21

## 2023-09-22 ENCOUNTER — TELEPHONE (OUTPATIENT)
Dept: HEMATOLOGY ONCOLOGY | Facility: CLINIC | Age: 48
End: 2023-09-22

## 2023-09-22 NOTE — TELEPHONE ENCOUNTER
Appointment Change  Cancel, Reschedule, Change to Virtual      Who are you speaking with? Patient   If it is not the patient, is the caller listed on the communication consent form? N/A   Which provider is the appointment scheduled with? Janie Claudio PA-C   When was the original appointment scheduled? Please list date and time 10/18/23 11:30AM   At which location is the appointment scheduled to take place? [facility]   Was the appointment rescheduled? Was the appointment changed from an in person visit to a virtual visit? If so, please list the details of the change. 11/8/23 11:30AM   What is the reason for the appointment change? Provider       Was STAR transport scheduled? No   Does STAR transport need to be scheduled for the new visit (if applicable) No   Does the patient need an infusion appointment rescheduled? No   Does the patient have an upcoming infusion appointment scheduled? If so, when? No   Is the patient undergoing chemotherapy? No   For appointments cancelled with less than 24 hours:  Was the no-show policy reviewed?  No

## 2023-10-21 ENCOUNTER — APPOINTMENT (OUTPATIENT)
Dept: LAB | Facility: CLINIC | Age: 48
End: 2023-10-21
Payer: COMMERCIAL

## 2023-10-21 DIAGNOSIS — C90.00 MULTIPLE MYELOMA NOT HAVING ACHIEVED REMISSION (HCC): ICD-10-CM

## 2023-10-21 LAB
ALBUMIN SERPL BCP-MCNC: 3.9 G/DL (ref 3.5–5)
ALP SERPL-CCNC: 72 U/L (ref 34–104)
ALT SERPL W P-5'-P-CCNC: 26 U/L (ref 7–52)
ANION GAP SERPL CALCULATED.3IONS-SCNC: 7 MMOL/L
AST SERPL W P-5'-P-CCNC: 28 U/L (ref 13–39)
BASOPHILS # BLD AUTO: 0.06 THOUSANDS/ÂΜL (ref 0–0.1)
BASOPHILS NFR BLD AUTO: 2 % (ref 0–1)
BILIRUB SERPL-MCNC: 0.86 MG/DL (ref 0.2–1)
BUN SERPL-MCNC: 15 MG/DL (ref 5–25)
CALCIUM SERPL-MCNC: 8.8 MG/DL (ref 8.4–10.2)
CHLORIDE SERPL-SCNC: 107 MMOL/L (ref 96–108)
CO2 SERPL-SCNC: 28 MMOL/L (ref 21–32)
CREAT SERPL-MCNC: 0.74 MG/DL (ref 0.6–1.3)
EOSINOPHIL # BLD AUTO: 0.16 THOUSAND/ÂΜL (ref 0–0.61)
EOSINOPHIL NFR BLD AUTO: 6 % (ref 0–6)
ERYTHROCYTE [DISTWIDTH] IN BLOOD BY AUTOMATED COUNT: 13 % (ref 11.6–15.1)
GFR SERPL CREATININE-BSD FRML MDRD: 94 ML/MIN/1.73SQ M
GLUCOSE SERPL-MCNC: 100 MG/DL (ref 65–140)
HCT VFR BLD AUTO: 40.9 % (ref 34.8–46.1)
HGB BLD-MCNC: 12.9 G/DL (ref 11.5–15.4)
IGA SERPL-MCNC: 143 MG/DL (ref 66–433)
IGG SERPL-MCNC: 1639 MG/DL (ref 635–1741)
IGM SERPL-MCNC: 21 MG/DL (ref 45–281)
IMM GRANULOCYTES # BLD AUTO: 0 THOUSAND/UL (ref 0–0.2)
IMM GRANULOCYTES NFR BLD AUTO: 0 % (ref 0–2)
LYMPHOCYTES # BLD AUTO: 1.14 THOUSANDS/ÂΜL (ref 0.6–4.47)
LYMPHOCYTES NFR BLD AUTO: 41 % (ref 14–44)
MCH RBC QN AUTO: 30.2 PG (ref 26.8–34.3)
MCHC RBC AUTO-ENTMCNC: 31.5 G/DL (ref 31.4–37.4)
MCV RBC AUTO: 96 FL (ref 82–98)
MONOCYTES # BLD AUTO: 0.24 THOUSAND/ÂΜL (ref 0.17–1.22)
MONOCYTES NFR BLD AUTO: 9 % (ref 4–12)
NEUTROPHILS # BLD AUTO: 1.18 THOUSANDS/ÂΜL (ref 1.85–7.62)
NEUTS SEG NFR BLD AUTO: 42 % (ref 43–75)
NRBC BLD AUTO-RTO: 0 /100 WBCS
PLATELET # BLD AUTO: 191 THOUSANDS/UL (ref 149–390)
PMV BLD AUTO: 9.3 FL (ref 8.9–12.7)
POTASSIUM SERPL-SCNC: 3.7 MMOL/L (ref 3.5–5.3)
PROT SERPL-MCNC: 7.1 G/DL (ref 6.4–8.4)
RBC # BLD AUTO: 4.27 MILLION/UL (ref 3.81–5.12)
SODIUM SERPL-SCNC: 142 MMOL/L (ref 135–147)
WBC # BLD AUTO: 2.78 THOUSAND/UL (ref 4.31–10.16)

## 2023-10-21 PROCEDURE — 84165 PROTEIN E-PHORESIS SERUM: CPT

## 2023-10-21 PROCEDURE — 82784 ASSAY IGA/IGD/IGG/IGM EACH: CPT

## 2023-10-21 PROCEDURE — 86334 IMMUNOFIX E-PHORESIS SERUM: CPT

## 2023-10-21 PROCEDURE — 36415 COLL VENOUS BLD VENIPUNCTURE: CPT

## 2023-10-21 PROCEDURE — 80053 COMPREHEN METABOLIC PANEL: CPT

## 2023-10-21 PROCEDURE — 83521 IG LIGHT CHAINS FREE EACH: CPT

## 2023-10-21 PROCEDURE — 85025 COMPLETE CBC W/AUTO DIFF WBC: CPT

## 2023-10-24 LAB
ALBUMIN SERPL ELPH-MCNC: 3.84 G/DL (ref 3.2–5.1)
ALBUMIN SERPL ELPH-MCNC: 55.7 % (ref 48–70)
ALPHA1 GLOB SERPL ELPH-MCNC: 0.23 G/DL (ref 0.15–0.47)
ALPHA1 GLOB SERPL ELPH-MCNC: 3.3 % (ref 1.8–7)
ALPHA2 GLOB SERPL ELPH-MCNC: 0.56 G/DL (ref 0.42–1.04)
ALPHA2 GLOB SERPL ELPH-MCNC: 8.1 % (ref 5.9–14.9)
BETA GLOB ABNORMAL SERPL ELPH-MCNC: 0.42 G/DL (ref 0.31–0.57)
BETA1 GLOB SERPL ELPH-MCNC: 6.1 % (ref 4.7–7.7)
BETA2 GLOB SERPL ELPH-MCNC: 4.6 % (ref 3.1–7.9)
BETA2+GAMMA GLOB SERPL ELPH-MCNC: 0.32 G/DL (ref 0.2–0.58)
GAMMA GLOB ABNORMAL SERPL ELPH-MCNC: 1.53 G/DL (ref 0.4–1.66)
GAMMA GLOB SERPL ELPH-MCNC: 22.2 % (ref 6.9–22.3)
IGG/ALB SER: 1.26 {RATIO} (ref 1.1–1.8)
INTERPRETATION UR IFE-IMP: NORMAL
KAPPA LC FREE SER-MCNC: 21.4 MG/L (ref 3.3–19.4)
KAPPA LC FREE/LAMBDA FREE SER: 0.66 {RATIO} (ref 0.26–1.65)
LAMBDA LC FREE SERPL-MCNC: 32.2 MG/L (ref 5.7–26.3)
M PROTEIN 1 MFR SERPL ELPH: 12.3 %
M PROTEIN 1 SERPL ELPH-MCNC: 0.85 G/DL
PROT PATTERN SERPL ELPH-IMP: NORMAL
PROT SERPL-MCNC: 6.9 G/DL (ref 6.4–8.4)

## 2023-10-24 PROCEDURE — 84165 PROTEIN E-PHORESIS SERUM: CPT | Performed by: PATHOLOGY

## 2023-10-26 DIAGNOSIS — C90.01 MULTIPLE MYELOMA IN REMISSION (HCC): ICD-10-CM

## 2023-10-26 NOTE — TELEPHONE ENCOUNTER
Medication Refill Request     Name Revlimid   Dose/Frequency 5 mg, take 1 cap QD  Quantity 90  Verified pharmacy   [x]  Verified ordering Provider   [x]  Does patient have enough for the next 3 days?  Yes [x] No []

## 2023-10-27 ENCOUNTER — TELEPHONE (OUTPATIENT)
Dept: HEMATOLOGY ONCOLOGY | Facility: CLINIC | Age: 48
End: 2023-10-27

## 2023-10-27 RX ORDER — LENALIDOMIDE 5 MG/1
CAPSULE ORAL
Qty: 90 CAPSULE | Refills: 0 | Status: SHIPPED | OUTPATIENT
Start: 2023-10-27

## 2023-10-27 NOTE — TELEPHONE ENCOUNTER
Medication Refill Request   Who are you speaking with? Patient   If it is not the patient, are they listed on an active communication consent form? N/A   Which medication is being requested for refill? Please list medication name and dosage  Revlimid 5 mg    How many pills does the patient have left? 0         Preferred Pharmacy / Address    [facility] [address] [facility], [address] [facility] [address] [facility] [phone], [place] [address]  Phone: [phone]  Fax: [phone]      Who is the prescribing provider? Dr. Boom Meyer   Call back number  [phone]   Relevant Information  Patient states if she does not get the medication by 3:00PM she is going to stop taking the medication because our office makes it too difficult to get a refill.

## 2023-10-30 ENCOUNTER — TELEPHONE (OUTPATIENT)
Dept: HEMATOLOGY ONCOLOGY | Facility: CLINIC | Age: 48
End: 2023-10-30

## 2023-10-30 NOTE — TELEPHONE ENCOUNTER
Patient Call    Who are you speaking with? Pharmacy- [facility]    If it is not the patient, are they listed on an active communication consent form? na   What is the reason for this call? Medication lenalidomide (Revlimid) 5 MG CAPS  requires a [employer] Auth. The current authorization is no longer valid. Dr. Nicolás Nova can call in, fax and sent new script with [facility]-  fax [phone]   Does this require a call back? NA   If a call back is required, please list best call back number [phone]   If a call back is required, advise that a message will be forwarded to their care team and someone will return their call as soon as possible. Did you relay this information to the patient?  yes

## 2023-10-31 ENCOUNTER — TELEPHONE (OUTPATIENT)
Dept: ADMINISTRATIVE | Facility: OTHER | Age: 48
End: 2023-10-31

## 2023-10-31 NOTE — TELEPHONE ENCOUNTER
----- Message from Corie Pizarro MA sent at 10/31/2023  8:17 AM EDT -----  Regarding: Care Gap Request  10/31/23 8:17 AM    Hello, our patient Glenny Burnham has had Mammogram completed/performed. Please assist in updating the patient chart by pulling the Care Everywhere (CE) document. The date of service is 10/25/2023.      Thank you,  Aixa GARAY

## 2023-10-31 NOTE — TELEPHONE ENCOUNTER
Upon review of the In Basket request we were able to locate, review, and update the patient chart as requested for Mammogram.    Any additional questions or concerns should be emailed to the Practice Liaisons via the appropriate education email address, please do not reply via In Basket.     Thank you  Fly Dan

## 2023-11-08 ENCOUNTER — OFFICE VISIT (OUTPATIENT)
Dept: HEMATOLOGY ONCOLOGY | Facility: CLINIC | Age: 48
End: 2023-11-08
Payer: COMMERCIAL

## 2023-11-08 ENCOUNTER — APPOINTMENT (OUTPATIENT)
Dept: LAB | Facility: CLINIC | Age: 48
End: 2023-11-08
Payer: COMMERCIAL

## 2023-11-08 VITALS
BODY MASS INDEX: 22.33 KG/M2 | WEIGHT: 134 LBS | TEMPERATURE: 98 F | HEIGHT: 65 IN | RESPIRATION RATE: 17 BRPM | DIASTOLIC BLOOD PRESSURE: 82 MMHG | SYSTOLIC BLOOD PRESSURE: 128 MMHG | HEART RATE: 77 BPM | OXYGEN SATURATION: 97 %

## 2023-11-08 DIAGNOSIS — C90.01 MULTIPLE MYELOMA IN REMISSION (HCC): ICD-10-CM

## 2023-11-08 DIAGNOSIS — C90.00 MULTIPLE MYELOMA NOT HAVING ACHIEVED REMISSION (HCC): Primary | ICD-10-CM

## 2023-11-08 DIAGNOSIS — G35 MULTIPLE SCLEROSIS (HCC): ICD-10-CM

## 2023-11-08 DIAGNOSIS — Z79.899 OTHER LONG TERM (CURRENT) DRUG THERAPY: ICD-10-CM

## 2023-11-08 LAB — HCG SERPL QL: NEGATIVE

## 2023-11-08 PROCEDURE — 36415 COLL VENOUS BLD VENIPUNCTURE: CPT

## 2023-11-08 PROCEDURE — 84703 CHORIONIC GONADOTROPIN ASSAY: CPT

## 2023-11-08 PROCEDURE — 99215 OFFICE O/P EST HI 40 MIN: CPT | Performed by: PHYSICIAN ASSISTANT

## 2023-11-08 NOTE — PROGRESS NOTES
Hematology/Oncology Outpatient Follow- up Note  Curly Vincent 46 y.o. 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 Bony skeletal survey was reported normal.  She received VRD induction chemotherapy 1/2018 - 5/2018 followed by Autologous stem cell transplant on 08/2018, now on maintenance lenalidomide 5 mg po. Daily. M protein; 0.28 - 0.49 5/2019 -5/2020;     0.59 - 0.97 since 4/2022. Normal light chain ratio and lambda free light chain since 8/2019. IgG 1600 range since 2/2018     M protein 0.9 in April and August 2023, no evidence of endorgan damage, continue treatment with lenalidomide 5 mg p.o. daily    She continues to see Dr. Nikita Willard. “Should her M-spike significantly rise greater than 1.1, we will consider the Patricia-Pom-Dex regimen.”  6 month f/u requested at her 8/30/23 office visit. She declined dexamethasone because of carpal tunnel syndrome status post surgical procedure     Cholestyramine for lenalidomide induced diarrhea     Follow-up in 3 months with CBC, CMP, SPEP, free light chain, quantitative immunoglobulins     Multiple sclerosis. Follows with Freddy Rush [facility] at [facility] and Dr. Henrik Diego locally. On observation. HPI:   Curly Vincent is a 47 y/o female seen initially by Dr. Paul Coyne 11/8/2017 regarding MGUS. She has PMH arsenic toxicity, MS. She was started on Copaxone in 2016 for a presumed diagnosis of MS, which was well tolerated and was followed with Dr. Henrik Diego at [facility] neurology. When seen at [facility] "When seen by us, we felt that she may have MS though does not fulfill 2017 [employer] criteria."     At her initial consultation, “she reported 2 years ago had a physical examination with blood work for insurance purposes, bear day found elevated immunoglobulin level, which lead to diagnosis of MGUS. She reported it is IgG MGUS, she cannot recall the exact protein amount, she had bone survey about a year half ago, and MRI of cervical and thoracic spine which is not revealing. She was following Dr. Reena Lopez hematology oncologist in [facility] [place] the [address] for a year in 2015 to 2016, then lost to follow-up after physician left the practice”        BM : 12/27/2017 : Plasma cell neoplasm, morphologically mature, lambda light chain-restricted, comprising ~ 25% of marrow cells  - M spike : igG lambda, 2.5 g; IgG > 3000. 2000 mg in 2016.    - normal cytogenetics   - NO CRAB : bone survey in 2016 and cervical and thoracic spine MRI in May of 2017, with no bone lesions identified. Overall prognosis discussed with pt; high risk SMM; need MM therapy. 1) induction by VRd; 2) stem cell / auto transplant 3) maintenance therapy. induction : VRd 1/2018 - 5/2018  - Velcade : 1.3 mg / m2 SC injection D 1, 8, 15 / 28d   - Revlimid : 25 mg po daily D1-21 / 28d   - Dex 40 mg po D1, 8, 15 / 28d   - supportive care : allopurinol 100 mg po daily x 1 m then stop ; AS A 81 mg po daily once on Revlimid. ; Acyclovir 400 mg po bid           C # 1 : start Vd on 1/17 ; Revlimid for 12 days : stop on 2/6 ( end of week 3 ). C # 2:  start on 2/14 - 3/7 ( week off from 3/8-14)  C # 3: 3/14, 3/21, 3/28,   revlimid : D1-21: 3/14-4/3   off 4/4-4/10  C# 4:  4/11, 18, 25         revlimid : D1-21: 4/11- 5/1 7/2018 : Autologous stem cell transplant at [facility] with melphalan. No major complications. Hospitalized for 3 weeks    11/2018:  Start maintenance Revlimid 5  mg daily and dexamethasone 12 mg weekly. 12/2018:  Received proper posttransplant vaccination by PCP per patient. 10/2019 EGD and colonoscopy.      M spike 0.3g/dL (11/2018) , un-detectable  ( 2/2019 ),   0.4 ( 12/2019)  0.49 ( 2/2020) 0.39 (5/2020) 0.62 ( 11/2020) 0.5 ( 2/2021) 0.68 ( 1/2022) 0.72 ( August 2022 )   M protein 0.9 in April 2023 and in August 2023    Interval History:   8/30/23 f/u with Dr. Tj Cadet. “Should her M-spike significantly rise greater than 1.1, we will consider the Patricia-Pom-Dex regimen.”  6 month f/u requested    10/21/23 M = 0.85        Review of Systems   Constitutional:  Negative for appetite change, chills, diaphoresis, fatigue, fever and unexpected weight change. HENT:   Negative for mouth sores, nosebleeds, sore throat, tinnitus and voice change. Eyes:  Negative for eye problems. Respiratory:  Negative for chest tightness, cough, shortness of breath and wheezing. Cardiovascular:  Negative for chest pain, leg swelling and palpitations. Gastrointestinal:  Negative for abdominal distention, abdominal pain, blood in stool, constipation, diarrhea, nausea, rectal pain and vomiting. Endocrine: Negative for hot flashes. Genitourinary: Negative. Musculoskeletal:  Negative for gait problem and myalgias. Skin:  Negative for itching and rash. Neurological:  Negative for dizziness, gait problem, headaches, light-headedness and numbness. Hematological:  Negative for adenopathy. Psychiatric/Behavioral:  Negative for confusion and sleep disturbance. The patient is not nervous/anxious.          Test Results:        Labs:   Lab Results   Component Value Date    HGB 12.9 10/21/2023    HCT 40.9 10/21/2023    MCV 96 10/21/2023     10/21/2023    WBC 2.78 (L) 10/21/2023    NRBC 0 10/21/2023    BANDSPCT 1 07/30/2018     Lab Results   Component Value Date     08/30/2017    K 3.7 10/21/2023     10/21/2023    CO2 28 10/21/2023    BUN 15 10/21/2023    CREATININE 0.74 10/21/2023    GLUF 102 (H) 07/15/2023    CALCIUM 8.8 10/21/2023    CORRECTEDCA 9.6 04/05/2022    AST 28 10/21/2023    ALT 26 10/21/2023    ALKPHOS 72 10/21/2023    PROT 8.3 (H) 08/30/2017    BILITOT 0.4 08/30/2017    EGFR 94 10/21/2023 Imaging: Mammo screening bilateral w 3d & cad    Result Date: 10/27/2023  Narrative: This result has an attachment that is not available. HISTORY: Patient is 46years old and is seen for a screening mammogram of both breasts. No relevant medical history has been documented for this patient. No relevant surgical history has been documented for this patient. No relevant family history has been documented for this patient. No relevant hormone history has been documented for this patient. FILMS COMPARED: The present examination has been compared to prior imaging studies. MAMMOGRAM FINDINGS: A minimum of two views were obtained. 3D tomosynthesis was performed. Computer-aided detection was utilized by the radiologist in the interpretation of this examination. The breasts are heterogeneously dense, which may obscure small masses. No suspicious masses, calcifications or other abnormalities are seen. Impression: Impression: There is no mammographic evidence of malignancy. BI-RADS Category Overall: 1 - Negative Follow Up Mamm in 1 Yr. is recommended. 5 Year Tyrer-Cuzick: 0.86 % 10 Year Tyrer-Cuzick: 1.8 % Lifetime Tyrer-Cuzick: 7.81 % Based on the information provided by the patient, risk scores for breast cancer for 5 years, 10 years and lifetime was calculated using both breast density and family history. Patients should consult with their referring providers regarding the significance of the score, potential need for additional risk assessment and supplemental screening including whole breast ultrasound and MRI. Workstation: [id number]            Allergies: No Known Allergies  Current Medications: Reviewed  PMH/FH/SH:  Reviewed      Physical Exam:    There is no height or weight on file to calculate BSA.     Ht Readings from Last 3 Encounters:   07/19/23 5' 5" (1.651 m)   02/17/23 5' 5" (1.651 m)   02/07/23 5' 5" (1.651 m)        Wt Readings from Last 3 Encounters:   07/19/23 59 kg (130 lb)   02/17/23 59.4 kg (131 lb)   23 59.4 kg (131 lb)        Temp Readings from Last 3 Encounters:   23 97.7 °F (36.5 °C)   23 98.1 °F (36.7 °C) (Temporal)   23 100 °F (37.8 °C)        BP Readings from Last 3 Encounters:   23 124/80   23 120/80   23 108/55             Physical Exam  Vitals reviewed. Constitutional:       General: She is not in acute distress. Appearance: She is well-developed. She is not diaphoretic. HENT:      Head: Normocephalic and atraumatic. Eyes:      Conjunctiva/sclera: Conjunctivae normal.   Neck:      Trachea: No tracheal deviation. Cardiovascular:      Rate and Rhythm: Normal rate and regular rhythm. Heart sounds: No murmur heard. No friction rub. No gallop. Pulmonary:      Effort: Pulmonary effort is normal. No respiratory distress. Breath sounds: Normal breath sounds. No wheezing or rales. Chest:      Chest wall: No tenderness. Abdominal:      General: There is no distension. Palpations: Abdomen is soft. Tenderness: There is no abdominal tenderness. Musculoskeletal:      Cervical back: Normal range of motion and neck supple. Lymphadenopathy:      Cervical: No cervical adenopathy. Skin:     General: Skin is warm and dry. Coloration: Skin is not pale. Findings: No erythema. Neurological:      Mental Status: She is alert and oriented to person, place, and time. Psychiatric:         Behavior: Behavior normal.         Thought Content:  Thought content normal.         Judgment: Judgment normal.         ECO      Emergency Contacts:    Extended Emergency Contact Information  Primary Emergency Contact: Klarissa  Address: [address] [place] [address] of [address] Phone: [phone]  Mobile Phone: [phone]  Relation: Spouse

## 2023-11-09 DIAGNOSIS — C90.01 MULTIPLE MYELOMA IN REMISSION (HCC): ICD-10-CM

## 2023-11-09 RX ORDER — LENALIDOMIDE 5 MG/1
5 CAPSULE ORAL DAILY
Qty: 28 CAPSULE | Refills: 0 | Status: SHIPPED | OUTPATIENT
Start: 2023-11-09

## 2023-12-02 ENCOUNTER — APPOINTMENT (OUTPATIENT)
Dept: LAB | Facility: CLINIC | Age: 48
End: 2023-12-02
Payer: COMMERCIAL

## 2023-12-02 DIAGNOSIS — C90.00 MULTIPLE MYELOMA NOT HAVING ACHIEVED REMISSION (HCC): ICD-10-CM

## 2023-12-02 LAB
IGA SERPL-MCNC: 137 MG/DL (ref 66–433)
IGG SERPL-MCNC: 1484 MG/DL (ref 635–1741)
IGM SERPL-MCNC: <20 MG/DL (ref 45–281)

## 2023-12-02 PROCEDURE — 36415 COLL VENOUS BLD VENIPUNCTURE: CPT

## 2023-12-02 PROCEDURE — 82784 ASSAY IGA/IGD/IGG/IGM EACH: CPT

## 2023-12-04 ENCOUNTER — TELEPHONE (OUTPATIENT)
Dept: HEMATOLOGY ONCOLOGY | Facility: CLINIC | Age: 48
End: 2023-12-04

## 2023-12-04 NOTE — TELEPHONE ENCOUNTER
Medication Refill Request   Who are you speaking with? Pharmacy   If it is not the patient, are they listed on an active communication consent form? Yes   Which medication is being requested for refill? Please list medication name and dosage  Lenalidomide (Revlimid) 5mg   How many pills does the patient have left?  need new order placed   Preferred Pharmacy / Address  [facility] [address], [place] [address]    Who is the prescribing provider?  Dr. Feliciano Common   Call back number  [phone]   Relevant Information They have faxed over the request a few times already

## 2023-12-06 ENCOUNTER — TELEPHONE (OUTPATIENT)
Dept: HEMATOLOGY ONCOLOGY | Facility: CLINIC | Age: 48
End: 2023-12-06

## 2023-12-06 ENCOUNTER — APPOINTMENT (OUTPATIENT)
Dept: LAB | Facility: CLINIC | Age: 48
End: 2023-12-06
Payer: COMMERCIAL

## 2023-12-06 DIAGNOSIS — C90.01 MULTIPLE MYELOMA IN REMISSION (HCC): ICD-10-CM

## 2023-12-06 LAB — HCG SERPL QL: NEGATIVE

## 2023-12-06 PROCEDURE — 36415 COLL VENOUS BLD VENIPUNCTURE: CPT

## 2023-12-06 PROCEDURE — 84703 CHORIONIC GONADOTROPIN ASSAY: CPT

## 2023-12-06 NOTE — TELEPHONE ENCOUNTER
Patient returned my call. I explained that she needs to have her pregnancy test completed before Revlimid can be re ordered. Patient states she went to the lab on 12/2/23. Reviewed with patient that pregnancy test was not completed at that time  Patient will need to go back to the lab to have his drawn.   Patient became upset and ended the call

## 2023-12-06 NOTE — TELEPHONE ENCOUNTER
Patient Call    Who are you speaking with? Patient    If it is not the patient, are they listed on an active communication consent form? N/A   What is the reason for this call? Patient is calling back very upset because she needs her medication (Revlimid 5MG) and she would like to speak to the office about this. The patient states this needs to be sent over no questions asked, she doesn't have the time to speak to anybody and she works. Patient states she needs this medication and the authorization sent to the pharmacy today. Does this require a call back? Yes   If a call back is required, please list best call back number [phone]   If a call back is required, advise that a message will be forwarded to their care team and someone will return their call as soon as possible. Did you relay this information to the patient?  Yes

## 2023-12-06 NOTE — TELEPHONE ENCOUNTER
Returned call to patient - no answer  Msg left for patient explaining that she needs to have her pregnancy test completed before we can send her refill for Revlimid  Asked that she call back with any additional questions or concerns

## 2023-12-07 DIAGNOSIS — C90.01 MULTIPLE MYELOMA IN REMISSION (HCC): ICD-10-CM

## 2023-12-07 RX ORDER — LENALIDOMIDE 5 MG/1
5 CAPSULE ORAL DAILY
Qty: 28 CAPSULE | Refills: 0 | Status: SHIPPED | OUTPATIENT
Start: 2023-12-07

## 2023-12-27 ENCOUNTER — TELEPHONE (OUTPATIENT)
Dept: HEMATOLOGY ONCOLOGY | Facility: CLINIC | Age: 48
End: 2023-12-27

## 2023-12-27 NOTE — TELEPHONE ENCOUNTER
Medication Refill Request   Who are you speaking with? Patient   If it is not the patient, are they listed on an active communication consent form?     Yes   Which medication is being requested for refill?  Please list medication name and dosage   lenalidomide (Revlimid) 5 MG CAPS    How many pills does the patient have left? none   Preferred Pharmacy / Address [facility] [address]  [address], [facility] [address]  Phone: [phone]  Fax: [phone]    Who is the prescribing provider? Dr. Ortiz   Call back number [phone]    Relevant Information Need prior auth

## 2023-12-30 ENCOUNTER — APPOINTMENT (OUTPATIENT)
Dept: LAB | Facility: CLINIC | Age: 48
End: 2023-12-30
Payer: COMMERCIAL

## 2023-12-30 DIAGNOSIS — C90.01 MULTIPLE MYELOMA IN REMISSION (HCC): ICD-10-CM

## 2023-12-30 LAB — HCG SERPL QL: NEGATIVE

## 2023-12-30 PROCEDURE — 84703 CHORIONIC GONADOTROPIN ASSAY: CPT

## 2023-12-30 PROCEDURE — 36415 COLL VENOUS BLD VENIPUNCTURE: CPT

## 2024-01-01 DIAGNOSIS — C90.01 MULTIPLE MYELOMA IN REMISSION (HCC): ICD-10-CM

## 2024-01-02 RX ORDER — LENALIDOMIDE 5 MG/1
5 CAPSULE ORAL DAILY
Qty: 28 CAPSULE | Refills: 0 | Status: SHIPPED | OUTPATIENT
Start: 2024-01-02

## 2024-01-24 ENCOUNTER — APPOINTMENT (OUTPATIENT)
Dept: LAB | Facility: CLINIC | Age: 49
End: 2024-01-24
Payer: COMMERCIAL

## 2024-01-24 DIAGNOSIS — C90.01 MULTIPLE MYELOMA IN REMISSION (HCC): ICD-10-CM

## 2024-01-24 LAB — HCG SERPL QL: NEGATIVE

## 2024-01-24 PROCEDURE — 84703 CHORIONIC GONADOTROPIN ASSAY: CPT

## 2024-01-24 PROCEDURE — 36415 COLL VENOUS BLD VENIPUNCTURE: CPT

## 2024-01-26 ENCOUNTER — TELEPHONE (OUTPATIENT)
Dept: HEMATOLOGY ONCOLOGY | Facility: CLINIC | Age: 49
End: 2024-01-26

## 2024-01-26 DIAGNOSIS — C90.01 MULTIPLE MYELOMA IN REMISSION (HCC): ICD-10-CM

## 2024-01-26 RX ORDER — LENALIDOMIDE 5 MG/1
5 CAPSULE ORAL DAILY
Qty: 28 CAPSULE | Refills: 0 | Status: SHIPPED | OUTPATIENT
Start: 2024-01-26

## 2024-01-26 NOTE — TELEPHONE ENCOUNTER
Patient Call    Who are you speaking with? Patient    If it is not the patient, are they listed on an active communication consent form? Yes   What is the reason for this call? Patient wants to speak to nurse. Phone cut off before she was bale to advise    Does this require a call back? Yes   If a call back is required, please list best call back number [phone]   If a call back is required, advise that a message will be forwarded to their care team and someone will return their call as soon as possible.   Did you relay this information to the patient? Yes

## 2024-01-26 NOTE — TELEPHONE ENCOUNTER
Medication Refill Request   Who are you speaking with? Patient   If it is not the patient, are they listed on an active communication consent form?     N/A   Which medication is being requested for refill?  Please list medication name and dosage  lenalidomide (Revlimid) 5 MG CAPS    How many pills does the patient have left?  Patient has a few pills left   Preferred Pharmacy / Address  [facility] [address]  [address], [facility] [address]  Phone: [phone]      Who is the prescribing provider? Dr. Ortiz   Call back number  [phone]    Relevant Information Patient states she needs the refill sent to the pharmacy by lunchtime so patient can order the medication for it to be overnighted to her for tomorrow.

## 2024-01-26 NOTE — TELEPHONE ENCOUNTER
Patient Call    Who are you speaking with? Patient    If it is not the patient, are they listed on an active communication consent form? N/A   What is the reason for this call? Patient calling in regards to her prescription lenalidomide (Revlimid) 5 MG CAPS .  Patient states that the pharmacy told her this medication needs a prior authorization. Patient is trying to have the medication over nighted to her home so she receives it tomorrow.  Patient states someone at her home needs to sign for the medication when it is delivered and someone will be home tomorrow to receive the medication. Patient would like a call back.    Patient stated that the pharmacy did receive the prescription now they need a prior authorization.   [facility] [address]  [address], [facility] [address]  Phone: [phone]  Fax: [phone]      Does this require a call back? Yes   If a call back is required, please list best call back number [phone]   If a call back is required, advise that a message will be forwarded to their care team and someone will return their call as soon as possible.   Did you relay this information to the patient? Yes

## 2024-01-30 ENCOUNTER — TELEPHONE (OUTPATIENT)
Dept: HEMATOLOGY ONCOLOGY | Facility: CLINIC | Age: 49
End: 2024-01-30

## 2024-01-30 NOTE — TELEPHONE ENCOUNTER
Patient Call    Who are you speaking with? Patient    If it is not the patient, are they listed on an active communication consent form? N/A   What is the reason for this call? Patient called in stating no one has gotten back to her about her medication refill. She says its always the reason as to why she never hears back   Does this require a call back? Yes   If a call back is required, please list [facility] call back number [phone]   If a call back is required, advise that a message will be forwarded to their care team and someone will return their call as soon as possible.   Did you relay this information to the patient? Yes

## 2024-01-30 NOTE — TELEPHONE ENCOUNTER
Provided update to pt. Explained we will work on the prior auth and let her know when we receive the approval. Pt is upset because she will need a new pregnancy test so a new [employer] auth# can be obtained. I apologized and told her unfortunately this is a [employer] requirement that cannot be overridden. Pt ended the call abruptly.

## 2024-01-31 ENCOUNTER — DOCUMENTATION (OUTPATIENT)
Dept: HEMATOLOGY ONCOLOGY | Facility: CLINIC | Age: 49
End: 2024-01-31

## 2024-01-31 NOTE — PROGRESS NOTES
Received request from clinical that patient needs a new PA on her Revlimid 5 mg caps. Auth has been submitted via cover my meds and this has been marked urgent.(Key: [id number]) RX Card in [employer].

## 2024-02-10 ENCOUNTER — APPOINTMENT (OUTPATIENT)
Dept: LAB | Facility: CLINIC | Age: 49
End: 2024-02-10
Payer: COMMERCIAL

## 2024-02-10 DIAGNOSIS — C90.01 MULTIPLE MYELOMA IN REMISSION (HCC): ICD-10-CM

## 2024-02-10 DIAGNOSIS — C90.00 MULTIPLE MYELOMA NOT HAVING ACHIEVED REMISSION (HCC): ICD-10-CM

## 2024-02-10 PROCEDURE — 36415 COLL VENOUS BLD VENIPUNCTURE: CPT

## 2024-02-10 PROCEDURE — 84703 CHORIONIC GONADOTROPIN ASSAY: CPT

## 2024-02-11 DIAGNOSIS — C90.01 MULTIPLE MYELOMA IN REMISSION (HCC): ICD-10-CM

## 2024-02-11 LAB — HCG SERPL QL: NEGATIVE

## 2024-02-11 RX ORDER — LENALIDOMIDE 5 MG/1
5 CAPSULE ORAL DAILY
Qty: 28 CAPSULE | Refills: 0 | Status: SHIPPED | OUTPATIENT
Start: 2024-02-11

## 2024-02-23 ENCOUNTER — TELEPHONE (OUTPATIENT)
Dept: HEMATOLOGY ONCOLOGY | Facility: CLINIC | Age: 49
End: 2024-02-23

## 2024-02-23 NOTE — TELEPHONE ENCOUNTER
I called Kathie regarding an appointment that they have scheduled with Dr. Ortiz scheduled on  3/13/24           Appointment Change  Cancel, Reschedule, Change to Virtual      Who are you speaking with? Patient   If it is not the patient, is the caller listed on the communication consent form? N/A   Which provider is the appointment scheduled with? Dr. Ortiz   When was the original appointment scheduled?    Please list date and time 3/13/24 11:40am   At which location is the appointment scheduled to take place? [place]   Was the appointment rescheduled?     Was the appointment changed from an in person visit to a virtual visit?    If so, please list the details of the change. Patient stated she was going to call to cancel this appointment due to a schedule conflict.  Patient was informed that Dr Ortiz sees patients at the [place] office now.  Patient requested to cancel the appointment and states that she will call back to reschedule.      What is the reason for the appointment change? Provider location change/ patient schedule conflict       Was STAR transport scheduled? No   Does STAR transport need to be scheduled for the new visit (if applicable) No   Does the patient need an infusion appointment rescheduled? No   Does the patient have an upcoming infusion appointment scheduled? If so, when? No   Is the patient undergoing chemotherapy? No   For appointments cancelled with less than 24 hours:  Was the no-show policy reviewed? Yes

## 2024-03-08 ENCOUNTER — TELEPHONE (OUTPATIENT)
Dept: FAMILY MEDICINE CLINIC | Facility: CLINIC | Age: 49
End: 2024-03-08

## 2024-03-08 NOTE — TELEPHONE ENCOUNTER
Patient received a call from Hematology about her B12 being low and needing injections. They told her that they would reach out to you to see if you would be willing to order this for her to have done here. I did not see a message so I was not sure if anyone reached out. Is this something we can do for her?

## 2024-03-09 ENCOUNTER — APPOINTMENT (OUTPATIENT)
Dept: LAB | Facility: CLINIC | Age: 49
End: 2024-03-09
Payer: COMMERCIAL

## 2024-03-09 DIAGNOSIS — C90.01 MULTIPLE MYELOMA IN REMISSION (HCC): ICD-10-CM

## 2024-03-09 LAB — HCG SERPL QL: NEGATIVE

## 2024-03-09 PROCEDURE — 36415 COLL VENOUS BLD VENIPUNCTURE: CPT

## 2024-03-09 PROCEDURE — 84703 CHORIONIC GONADOTROPIN ASSAY: CPT

## 2024-03-11 DIAGNOSIS — C90.01 MULTIPLE MYELOMA IN REMISSION (HCC): ICD-10-CM

## 2024-03-11 RX ORDER — LENALIDOMIDE 5 MG/1
5 CAPSULE ORAL DAILY
Qty: 28 CAPSULE | Refills: 0 | Status: SHIPPED | OUTPATIENT
Start: 2024-03-11

## 2024-04-06 ENCOUNTER — APPOINTMENT (OUTPATIENT)
Dept: LAB | Facility: CLINIC | Age: 49
End: 2024-04-06
Payer: COMMERCIAL

## 2024-04-06 DIAGNOSIS — C90.01 MULTIPLE MYELOMA IN REMISSION (HCC): ICD-10-CM

## 2024-04-06 LAB — HCG SERPL QL: NEGATIVE

## 2024-04-06 PROCEDURE — 84703 CHORIONIC GONADOTROPIN ASSAY: CPT

## 2024-04-06 PROCEDURE — 36415 COLL VENOUS BLD VENIPUNCTURE: CPT

## 2024-04-08 DIAGNOSIS — C90.01 MULTIPLE MYELOMA IN REMISSION (HCC): ICD-10-CM

## 2024-04-08 RX ORDER — LENALIDOMIDE 5 MG/1
5 CAPSULE ORAL DAILY
Qty: 28 CAPSULE | Refills: 0 | Status: SHIPPED | OUTPATIENT
Start: 2024-04-08

## 2024-04-10 ENCOUNTER — OFFICE VISIT (OUTPATIENT)
Dept: FAMILY MEDICINE CLINIC | Facility: CLINIC | Age: 49
End: 2024-04-10
Payer: COMMERCIAL

## 2024-04-10 ENCOUNTER — TELEPHONE (OUTPATIENT)
Dept: HEMATOLOGY ONCOLOGY | Facility: CLINIC | Age: 49
End: 2024-04-10

## 2024-04-10 VITALS
TEMPERATURE: 97.8 F | DIASTOLIC BLOOD PRESSURE: 82 MMHG | BODY MASS INDEX: 22.23 KG/M2 | HEART RATE: 95 BPM | WEIGHT: 133.4 LBS | SYSTOLIC BLOOD PRESSURE: 126 MMHG | OXYGEN SATURATION: 99 % | HEIGHT: 65 IN

## 2024-04-10 DIAGNOSIS — G35 MULTIPLE SCLEROSIS (HCC): ICD-10-CM

## 2024-04-10 DIAGNOSIS — E53.8 B12 DEFICIENCY: ICD-10-CM

## 2024-04-10 DIAGNOSIS — E27.8 ADRENAL HYPERPLASIA (HCC): ICD-10-CM

## 2024-04-10 DIAGNOSIS — Z00.00 ANNUAL PHYSICAL EXAM: ICD-10-CM

## 2024-04-10 DIAGNOSIS — C90.00 MULTIPLE MYELOMA NOT HAVING ACHIEVED REMISSION (HCC): Primary | ICD-10-CM

## 2024-04-10 DIAGNOSIS — E55.9 VITAMIN D DEFICIENCY: ICD-10-CM

## 2024-04-10 PROBLEM — A69.20 ERYTHEMA MIGRANS (LYME DISEASE): Status: RESOLVED | Noted: 2022-07-08 | Resolved: 2024-04-10

## 2024-04-10 PROCEDURE — 96372 THER/PROPH/DIAG INJ SC/IM: CPT | Performed by: FAMILY MEDICINE

## 2024-04-10 PROCEDURE — 99214 OFFICE O/P EST MOD 30 MIN: CPT | Performed by: FAMILY MEDICINE

## 2024-04-10 PROCEDURE — 99396 PREV VISIT EST AGE 40-64: CPT | Performed by: FAMILY MEDICINE

## 2024-04-10 RX ORDER — CYANOCOBALAMIN 1000 UG/ML
1000 INJECTION, SOLUTION INTRAMUSCULAR; SUBCUTANEOUS
Status: SHIPPED | OUTPATIENT
Start: 2024-04-10

## 2024-04-10 RX ADMIN — CYANOCOBALAMIN 1000 MCG: 1000 INJECTION, SOLUTION INTRAMUSCULAR; SUBCUTANEOUS at 08:56

## 2024-04-10 RX ADMIN — CYANOCOBALAMIN 1000 MCG: 1000 INJECTION, SOLUTION INTRAMUSCULAR; SUBCUTANEOUS at 09:31

## 2024-04-10 NOTE — PROGRESS NOTES
ADULT ANNUAL PHYSICAL  [facility]    NAME: Kathie SerranozWraga  AGE: 48 y.o. SEX: female  : 1975     DATE: 4/10/2024     Assessment and Plan:     Problem List Items Addressed This Visit        Endocrine    Adrenal hyperplasia (HCC)     Seen on CT in , mild thickening noted.            Nervous and Auditory    Multiple sclerosis (HCC)     Sees Neurology yearly  Not on medication at this time            Oncology    Multiple myeloma not having achieved remission (HCC) - Primary     Follows with JORGE LUIS Tan heme/onc and is on revlimid             Other    Vitamin D deficiency     Encouraged her to take daily Vit D and will check level with labs         Relevant Orders    Vitamin D 25 hydroxy    B12 deficiency     Start B12 injections, weekly for 4 wks, then monthly.  Repeat level in 3 months          Relevant Medications    cyanocobalamin injection 1,000 mcg    Other Relevant Orders    Vitamin B12   Other Visit Diagnoses     Annual physical exam              Immunizations and preventive care screenings were discussed with patient today. Appropriate education was printed on patient's after visit summary.    Counseling:  Alcohol/drug use: discussed moderation in alcohol intake, the recommendations for healthy alcohol use, and avoidance of illicit drug use.  Dental Health: discussed importance of regular tooth brushing, flossing, and dental visits.  Exercise: the importance of regular exercise/physical activity was discussed. Recommend exercise 3-5 times per week for at least 30 minutes.          Return in about 1 year (around 4/10/2025) for Annual physical. will need weekly B12 shots for 4 weeks, then monthly .     Chief Complaint:     Chief Complaint   Patient presents with   • Annual Exam     Review labs   • B12 Injection      History of Present Illness:     Adult Annual Physical   Patient here for a comprehensive physical exam. The patient reports  problems - she had labs done at [facility] and her B12 was low  at 174. Having some tingling in her lips. She previously was on B12 shots in 2018.  Reviewed chart, labs, HCC codes. Updated chart.  She would also like to have her Vit D checked with next labs.     Diet and Physical Activity  Diet/Nutrition: well balanced diet.   Exercise: no formal exercise.      Depression Screening  PHQ-2/9 Depression Screening    Little interest or pleasure in doing things: 0 - not at all  Feeling down, depressed, or hopeless: 0 - not at all  PHQ-2 Score: 0  PHQ-2 Interpretation: Negative depression screen       General Health  Sleep:  sleep is okay .   Hearing:  no issues .  Vision: goes for regular eye exams and wears glasses.   Dental: regular dental visits.     Colonoscopy due end of 2024    /GYN Health  Follows with gynecology? no   Pap 2019, Mammogram 2023  Patient is: postmenopausal    Advanced Care Planning  Do you have an advanced directive? no  Do you have a durable medical power of ? no  ACP document given to the patient? No. Declined      Review of Systems:     Review of Systems   Constitutional:  Negative for activity change, appetite change, chills, fatigue, fever and unexpected weight change.   HENT:  Negative for congestion, ear discharge, ear pain, postnasal drip, sinus pressure and sore throat.    Eyes:  Negative for discharge and visual disturbance.   Respiratory:  Negative for cough, shortness of breath and wheezing.    Cardiovascular:  Negative for chest pain, palpitations and leg swelling.   Gastrointestinal:  Negative for abdominal pain, constipation, diarrhea, nausea and vomiting.   Endocrine: Negative for cold intolerance, heat intolerance, polydipsia and polyuria.   Genitourinary:  Negative for difficulty urinating and frequency.   Musculoskeletal:  Negative for arthralgias, back pain, joint swelling and myalgias.   Skin:  Negative for rash.   Neurological:  Positive for numbness (tingling). Negative  for dizziness, weakness, light-headedness and headaches.   Hematological:  Negative for adenopathy.   Psychiatric/Behavioral:  Negative for behavioral problems, confusion, dysphoric mood, sleep disturbance and suicidal ideas. The patient is not nervous/anxious.       Past Medical History:     Past Medical History:   Diagnosis Date   • Back pain     pinched nerve in back   • CTS (carpal tunnel syndrome)     right wrist   • Multiple myeloma (HCC)     chemotherapy   • Multiple myeloma (HCC)    • Multiple sclerosis (HCC)    • skin lesion     mole removed from face      Past Surgical History:     Past Surgical History:   Procedure Laterality Date   •  SECTION     • LIMBAL STEM CELL TRANSPLANT  2018   • OR [facility] WRST SURG W/RLS TRANSVRS CARPL LIGM Right 2023    Procedure: Right endoscopic carpal tunnel release;  Surgeon: Richard Donald MD;  Location: [facility];  Service: Orthopedics      Social History:     Social History     Socioeconomic History   • Marital status: /Civil Union     Spouse name: None   • Number of children: None   • Years of education: None   • Highest education level: None   Occupational History   • None   Tobacco Use   • Smoking status: Never   • Smokeless tobacco: Never   Vaping Use   • Vaping status: Never Used   Substance and Sexual Activity   • Alcohol use: No   • Drug use: No   • Sexual activity: Yes     Partners: Male     Birth control/protection: Post-menopausal   Other Topics Concern   • None   Social History Narrative   • None     Social Determinants of Health     Financial Resource Strain: Not on file   Food Insecurity: Not on file   Transportation Needs: Not on file   Physical Activity: Not on file   Stress: Not on file   Social Connections: Not on file   Intimate Partner Violence: Not on file   Housing Stability: Not on file      Family History:     Family History   Problem Relation Age of Onset   • No Known Problems Mother    • Coronary artery disease Father    •  "No Known Problems Sister    • No Known Problems Brother    • No Known Problems Daughter    • No Known Problems Son    • No Known Problems Brother       Current Medications:     Current Outpatient Medications   Medication Sig Dispense Refill   • acetaminophen (TYLENOL) 650 mg CR tablet Take 1 tablet (650 mg total) by mouth every 8 (eight) hours as needed for mild pain 30 tablet 0   • aspirin (ECOTRIN LOW STRENGTH) 81 mg EC tablet Take 81 mg by mouth daily     • Calcium Carb-Cholecalciferol (CALCIUM 1000 + D PO) Take by mouth     • cholestyramine (QUESTRAN) 4 g packet Take 4 g by mouth 2 (two) times a day     • lenalidomide (Revlimid) 5 MG CAPS Take 1 capsule (5 mg total) by mouth daily 28 capsule 0   • Cyanocobalamin (VITAMIN B 12 PO) Take 1 tablet by mouth daily (Patient not taking: Reported on 4/10/2024)     • HM VITAMIN D3 2000 units CAPS Take by mouth (Patient not taking: Reported on 4/10/2024)       Current Facility-Administered Medications   Medication Dose Route Frequency Provider Last Rate Last Admin   • cyanocobalamin injection 1,000 mcg  1,000 mcg Intramuscular Q30 Days    1,000 mcg at 04/10/24 0856      Allergies:     No Known Allergies   Physical Exam:     /82 (BP Location: Left arm, Patient Position: Sitting)   Pulse 95   Temp 97.8 °F (36.6 °C) (Temporal)   Ht 5' 5\" (1.651 m)   Wt 60.5 kg (133 lb 6.4 oz)   SpO2 99%   BMI 22.20 kg/m²     Physical Exam  Constitutional:       General: She is not in acute distress.     Appearance: Normal appearance. She is well-developed. She is not ill-appearing, toxic-appearing or diaphoretic.   HENT:      Head: Normocephalic and atraumatic.      Right Ear: Tympanic membrane, ear canal and external ear normal.      Left Ear: Tympanic membrane, ear canal and external ear normal.      Mouth/Throat:      Mouth: Mucous membranes are moist.      Pharynx: Oropharynx is clear. No oropharyngeal exudate.   Eyes:      General: No scleral icterus.        Right eye: No " discharge.         Left eye: No discharge.      Conjunctiva/sclera: Conjunctivae normal.      Pupils: Pupils are equal, round, and reactive to light.   Neck:      Thyroid: No thyromegaly.   Cardiovascular:      Rate and Rhythm: Normal rate and regular rhythm.      Heart sounds: Normal heart sounds. No murmur heard.     No friction rub. No gallop.   Pulmonary:      Effort: Pulmonary effort is normal. No respiratory distress.      Breath sounds: Normal breath sounds. No wheezing or rales.   Chest:      Chest wall: No tenderness.   Abdominal:      General: Bowel sounds are normal. There is no distension.      Palpations: Abdomen is soft. There is no mass.      Tenderness: There is no abdominal tenderness. There is no guarding or rebound.      Hernia: No hernia is present.   Neurological:      General: No focal deficit present.      Mental Status: She is alert and oriented to person, place, and time. Mental status is at baseline.      Cranial Nerves: No cranial nerve deficit.   Psychiatric:         Mood and Affect: Mood normal.         Behavior: Behavior normal.         Thought Content: Thought content normal.         Judgment: Judgment normal.          Erika Mojica MD  [facility]

## 2024-04-10 NOTE — TELEPHONE ENCOUNTER
Patient Call    Who are you speaking with? Patient    If it is not the patient, are they listed on an active communication consent form? N/A   What is the reason for this call? Patient is requesting a call back to go over the prior authorization for her medication   Does this require a call back? Yes   If a call back is required, please list [facility] call back number [phone]   If a call back is required, advise that a message will be forwarded to their care team and someone will return their call as soon as possible.   Did you relay this information to the patient? Yes

## 2024-04-11 ENCOUNTER — DOCUMENTATION (OUTPATIENT)
Dept: HEMATOLOGY ONCOLOGY | Facility: CLINIC | Age: 49
End: 2024-04-11

## 2024-04-11 NOTE — PROGRESS NOTES
[employer] co-payment program co-payment applied. Spoke with Elizabeth at [phone] and provided RX details.  Copay ID: [id number]  RxGroup: [id number]  RxBin: [id number]  RxPCN: [id number]    Previous Dispenses:    DOS 01/05/2024 Program Covered $1,560.00  DOS 02/16/2024 Program Covered $60.00  DOS 03/14/2024 Program Covered $60.00    Rosana Smith MPH  Phone:[phone]  Email: [email or web address]

## 2024-04-11 NOTE — TELEPHONE ENCOUNTER
Called back and requested this be expedited. Was told it should be looked at by a supervisor within an hour or so.

## 2024-04-12 ENCOUNTER — TELEPHONE (OUTPATIENT)
Dept: HEMATOLOGY ONCOLOGY | Facility: CLINIC | Age: 49
End: 2024-04-12

## 2024-04-12 NOTE — TELEPHONE ENCOUNTER
Spoke with patient was told her that per my conversation with the pharmacist everything went through and script will be expedited. Pt plans to call [facility] in a couple hours to follow up and request overnight shipment.

## 2024-04-12 NOTE — TELEPHONE ENCOUNTER
Patient Call    Who are you speaking with? Patient    If it is not the patient, are they listed on an active communication consent form? N/A   What is the reason for this call? She called regarding revlimid. Advised they are working on [employer] and nurse Patricia will call her back   Does this require a call back? Yes   If a call back is required, please list [facility] call back number [phone]    If a call back is required, advise that a message will be forwarded to their care team and someone will return their call as soon as possible.   Did you relay this information to the patient? Yes

## 2024-04-12 NOTE — TELEPHONE ENCOUNTER
Spoke with pharmacist and script was ran for brand which came back covered. [employer] auth# still valid.

## 2024-04-15 ENCOUNTER — DOCUMENTATION (OUTPATIENT)
Dept: HEMATOLOGY ONCOLOGY | Facility: CLINIC | Age: 49
End: 2024-04-15

## 2024-04-15 NOTE — PROGRESS NOTES
"\"Ryan Wayne, this is Norbert calling from [employer] regarding mutual patient Kathie SerranozWruvaldo YOB: 1975 for the REVLIMID.  We were able to reach the patient yesterday who confirmed that the insurance information that we have on file should still be active. We did call the insurance back today but they won't release information at this time. They will not release information without the patient verbal consent on file. I did call the patient again to again to see if she can conference in with the insurance, but the patient advised that she just placed an order with her specialty pharmacy today for the Revlimid with a $0.00 copay, so she said that she no longer needed any assistance from the [employer]. So if you have any questions about this, you can go ahead and call us at [phone]. I am going to go ahead and close the case now since the patient stated that they no longer need assistance. Thank you.\"    "

## 2024-04-17 ENCOUNTER — CLINICAL SUPPORT (OUTPATIENT)
Dept: FAMILY MEDICINE CLINIC | Facility: CLINIC | Age: 49
End: 2024-04-17
Payer: COMMERCIAL

## 2024-04-17 DIAGNOSIS — E53.8 B12 DEFICIENCY: Primary | ICD-10-CM

## 2024-04-17 PROCEDURE — 96372 THER/PROPH/DIAG INJ SC/IM: CPT | Performed by: FAMILY MEDICINE

## 2024-04-17 RX ADMIN — CYANOCOBALAMIN 1000 MCG: 1000 INJECTION, SOLUTION INTRAMUSCULAR; SUBCUTANEOUS at 11:04

## 2024-04-24 ENCOUNTER — CLINICAL SUPPORT (OUTPATIENT)
Dept: FAMILY MEDICINE CLINIC | Facility: CLINIC | Age: 49
End: 2024-04-24
Payer: COMMERCIAL

## 2024-04-24 DIAGNOSIS — E53.8 B12 DEFICIENCY: Primary | ICD-10-CM

## 2024-04-24 PROCEDURE — 96372 THER/PROPH/DIAG INJ SC/IM: CPT | Performed by: FAMILY MEDICINE

## 2024-04-24 RX ADMIN — CYANOCOBALAMIN 1000 MCG: 1000 INJECTION, SOLUTION INTRAMUSCULAR; SUBCUTANEOUS at 10:52

## 2024-05-01 ENCOUNTER — CLINICAL SUPPORT (OUTPATIENT)
Dept: FAMILY MEDICINE CLINIC | Facility: CLINIC | Age: 49
End: 2024-05-01
Payer: COMMERCIAL

## 2024-05-01 DIAGNOSIS — E53.8 B12 DEFICIENCY: Primary | ICD-10-CM

## 2024-05-01 PROCEDURE — 96372 THER/PROPH/DIAG INJ SC/IM: CPT | Performed by: FAMILY MEDICINE

## 2024-05-01 RX ADMIN — CYANOCOBALAMIN 1000 MCG: 1000 INJECTION, SOLUTION INTRAMUSCULAR; SUBCUTANEOUS at 11:06

## 2024-05-06 DIAGNOSIS — C90.01 MULTIPLE MYELOMA IN REMISSION (HCC): ICD-10-CM

## 2024-05-07 ENCOUNTER — APPOINTMENT (OUTPATIENT)
Dept: LAB | Facility: CLINIC | Age: 49
End: 2024-05-07
Payer: COMMERCIAL

## 2024-05-07 ENCOUNTER — TELEPHONE (OUTPATIENT)
Dept: HEMATOLOGY ONCOLOGY | Facility: CLINIC | Age: 49
End: 2024-05-07

## 2024-05-07 DIAGNOSIS — C90.01 MULTIPLE MYELOMA IN REMISSION (HCC): ICD-10-CM

## 2024-05-07 LAB — HCG SERPL QL: NEGATIVE

## 2024-05-07 PROCEDURE — 36415 COLL VENOUS BLD VENIPUNCTURE: CPT

## 2024-05-07 PROCEDURE — 84703 CHORIONIC GONADOTROPIN ASSAY: CPT

## 2024-05-08 ENCOUNTER — TELEPHONE (OUTPATIENT)
Dept: HEMATOLOGY ONCOLOGY | Facility: CLINIC | Age: 49
End: 2024-05-08

## 2024-05-08 DIAGNOSIS — C90.00 MULTIPLE MYELOMA NOT HAVING ACHIEVED REMISSION (HCC): Primary | ICD-10-CM

## 2024-05-08 RX ORDER — LENALIDOMIDE 5 MG/1
5 CAPSULE ORAL DAILY
Qty: 28 CAPSULE | Refills: 0 | Status: SHIPPED | OUTPATIENT
Start: 2024-05-08

## 2024-05-08 NOTE — TELEPHONE ENCOUNTER
Left voicemail for pt letting her know Dr. Ortiz signed her Revlimid script today. She must be seen in office and have updated myeloma labs before continuing to receiving Revlimid next month. Appt scheduled for 5/21 at [facility] - details provided in email. Gave my direct line for call back if needed.

## 2024-05-10 ENCOUNTER — TELEPHONE (OUTPATIENT)
Dept: HEMATOLOGY ONCOLOGY | Facility: CLINIC | Age: 49
End: 2024-05-10

## 2024-05-10 NOTE — TELEPHONE ENCOUNTER
"Spoke with Beatris at [facility]. She spoke with their prior auth team and \"figured out what we were doing wrong\". They will reach out to the patient to schedule delivery.   "

## 2024-05-10 NOTE — TELEPHONE ENCOUNTER
Received voicemail from pt stating she tried to order Revlimid from [facility] but was told a PA is needed.

## 2024-05-15 ENCOUNTER — APPOINTMENT (OUTPATIENT)
Dept: LAB | Facility: CLINIC | Age: 49
End: 2024-05-15
Payer: COMMERCIAL

## 2024-05-15 DIAGNOSIS — C90.00 MULTIPLE MYELOMA NOT HAVING ACHIEVED REMISSION (HCC): ICD-10-CM

## 2024-05-15 DIAGNOSIS — C90.01 MULTIPLE MYELOMA IN REMISSION (HCC): ICD-10-CM

## 2024-05-15 LAB
ALBUMIN SERPL BCP-MCNC: 4 G/DL (ref 3.5–5)
ALP SERPL-CCNC: 65 U/L (ref 34–104)
ALT SERPL W P-5'-P-CCNC: 23 U/L (ref 7–52)
ANION GAP SERPL CALCULATED.3IONS-SCNC: 6 MMOL/L (ref 4–13)
AST SERPL W P-5'-P-CCNC: 21 U/L (ref 13–39)
BASOPHILS # BLD AUTO: 0.06 THOUSANDS/ÂΜL (ref 0–0.1)
BASOPHILS NFR BLD AUTO: 2 % (ref 0–1)
BILIRUB SERPL-MCNC: 0.69 MG/DL (ref 0.2–1)
BUN SERPL-MCNC: 13 MG/DL (ref 5–25)
CALCIUM SERPL-MCNC: 8.6 MG/DL (ref 8.4–10.2)
CHLORIDE SERPL-SCNC: 107 MMOL/L (ref 96–108)
CO2 SERPL-SCNC: 28 MMOL/L (ref 21–32)
CREAT SERPL-MCNC: 0.67 MG/DL (ref 0.6–1.3)
EOSINOPHIL # BLD AUTO: 0.14 THOUSAND/ÂΜL (ref 0–0.61)
EOSINOPHIL NFR BLD AUTO: 5 % (ref 0–6)
ERYTHROCYTE [DISTWIDTH] IN BLOOD BY AUTOMATED COUNT: 12.9 % (ref 11.6–15.1)
GFR SERPL CREATININE-BSD FRML MDRD: 102 ML/MIN/1.73SQ M
GLUCOSE SERPL-MCNC: 81 MG/DL (ref 65–140)
HCT VFR BLD AUTO: 38.3 % (ref 34.8–46.1)
HGB BLD-MCNC: 12.2 G/DL (ref 11.5–15.4)
IGA SERPL-MCNC: 162 MG/DL (ref 66–433)
IGG SERPL-MCNC: 1658 MG/DL (ref 635–1741)
IGM SERPL-MCNC: <20 MG/DL (ref 45–281)
IMM GRANULOCYTES # BLD AUTO: 0.01 THOUSAND/UL (ref 0–0.2)
IMM GRANULOCYTES NFR BLD AUTO: 0 % (ref 0–2)
LYMPHOCYTES # BLD AUTO: 1.41 THOUSANDS/ÂΜL (ref 0.6–4.47)
LYMPHOCYTES NFR BLD AUTO: 51 % (ref 14–44)
MCH RBC QN AUTO: 31.4 PG (ref 26.8–34.3)
MCHC RBC AUTO-ENTMCNC: 31.9 G/DL (ref 31.4–37.4)
MCV RBC AUTO: 99 FL (ref 82–98)
MONOCYTES # BLD AUTO: 0.26 THOUSAND/ÂΜL (ref 0.17–1.22)
MONOCYTES NFR BLD AUTO: 9 % (ref 4–12)
NEUTROPHILS # BLD AUTO: 0.91 THOUSANDS/ÂΜL (ref 1.85–7.62)
NEUTS SEG NFR BLD AUTO: 33 % (ref 43–75)
NRBC BLD AUTO-RTO: 0 /100 WBCS
PLATELET # BLD AUTO: 184 THOUSANDS/UL (ref 149–390)
PMV BLD AUTO: 9.6 FL (ref 8.9–12.7)
POTASSIUM SERPL-SCNC: 3.6 MMOL/L (ref 3.5–5.3)
PROT SERPL-MCNC: 7.2 G/DL (ref 6.4–8.4)
RBC # BLD AUTO: 3.89 MILLION/UL (ref 3.81–5.12)
SODIUM SERPL-SCNC: 141 MMOL/L (ref 135–147)
WBC # BLD AUTO: 2.79 THOUSAND/UL (ref 4.31–10.16)

## 2024-05-15 PROCEDURE — 80053 COMPREHEN METABOLIC PANEL: CPT

## 2024-05-15 PROCEDURE — 82784 ASSAY IGA/IGD/IGG/IGM EACH: CPT

## 2024-05-15 PROCEDURE — 36415 COLL VENOUS BLD VENIPUNCTURE: CPT

## 2024-05-15 PROCEDURE — 85025 COMPLETE CBC W/AUTO DIFF WBC: CPT

## 2024-05-15 PROCEDURE — 83521 IG LIGHT CHAINS FREE EACH: CPT

## 2024-05-16 LAB
KAPPA LC FREE SER-MCNC: 19.2 MG/L (ref 3.3–19.4)
KAPPA LC FREE/LAMBDA FREE SER: 0.54 {RATIO} (ref 0.26–1.65)
LAMBDA LC FREE SERPL-MCNC: 35.4 MG/L (ref 5.7–26.3)

## 2024-05-17 LAB
ALBUMIN SERPL ELPH-MCNC: 3.86 G/DL (ref 3.2–5.1)
ALBUMIN SERPL ELPH-MCNC: 56.7 % (ref 48–70)
ALPHA1 GLOB SERPL ELPH-MCNC: 0.23 G/DL (ref 0.15–0.47)
ALPHA1 GLOB SERPL ELPH-MCNC: 3.4 % (ref 1.8–7)
ALPHA2 GLOB SERPL ELPH-MCNC: 0.52 G/DL (ref 0.42–1.04)
ALPHA2 GLOB SERPL ELPH-MCNC: 7.6 % (ref 5.9–14.9)
BETA GLOB ABNORMAL SERPL ELPH-MCNC: 0.41 G/DL (ref 0.31–0.57)
BETA1 GLOB SERPL ELPH-MCNC: 6.1 % (ref 4.7–7.7)
BETA2 GLOB SERPL ELPH-MCNC: 4.5 % (ref 3.1–7.9)
BETA2+GAMMA GLOB SERPL ELPH-MCNC: 0.31 G/DL (ref 0.2–0.58)
GAMMA GLOB ABNORMAL SERPL ELPH-MCNC: 1.48 G/DL (ref 0.4–1.66)
GAMMA GLOB SERPL ELPH-MCNC: 21.7 % (ref 6.9–22.3)
IGG/ALB SER: 1.31 {RATIO} (ref 1.1–1.8)
M PROTEIN 1 MFR SERPL ELPH: 10 %
M PROTEIN 1 SERPL ELPH-MCNC: 0.68 G/DL
PROT PATTERN SERPL ELPH-IMP: NORMAL
PROT SERPL-MCNC: 6.8 G/DL (ref 6.4–8.2)

## 2024-05-17 PROCEDURE — 84165 PROTEIN E-PHORESIS SERUM: CPT | Performed by: STUDENT IN AN ORGANIZED HEALTH CARE EDUCATION/TRAINING PROGRAM

## 2024-05-21 ENCOUNTER — OFFICE VISIT (OUTPATIENT)
Dept: HEMATOLOGY ONCOLOGY | Facility: CLINIC | Age: 49
End: 2024-05-21
Payer: COMMERCIAL

## 2024-05-21 VITALS
TEMPERATURE: 98 F | BODY MASS INDEX: 21.58 KG/M2 | SYSTOLIC BLOOD PRESSURE: 124 MMHG | DIASTOLIC BLOOD PRESSURE: 80 MMHG | RESPIRATION RATE: 17 BRPM | OXYGEN SATURATION: 98 % | WEIGHT: 129.5 LBS | HEIGHT: 65 IN | HEART RATE: 72 BPM

## 2024-05-21 DIAGNOSIS — C90.00 MULTIPLE MYELOMA NOT HAVING ACHIEVED REMISSION (HCC): Primary | ICD-10-CM

## 2024-05-21 PROCEDURE — 99214 OFFICE O/P EST MOD 30 MIN: CPT | Performed by: INTERNAL MEDICINE

## 2024-05-21 NOTE — PROGRESS NOTES
Hematology/Oncology Outpatient Follow- up Note  Kathie Zhong 49 y.o. female MRN: @ Encounter: [id number]        Date:  5/21/2024        Assessment / Plan:    Smoldering IgG Lambda Multiple Myeloma   Lenalidomide induced Neutropenia     MGUS since 2015, Smoldering MM diagnosed via BM 12/2027 1/2018 - 5/2018 induction therapy with VRd   7/2018 : Autologous stem cell transplant at [facility] with melphalan.  No major complications.   11/2018: started on maintenance Lenalidomide 5  mg daily and dexamethasone 12 mg weekly.  8/30/23 f/u with Dr. Ball.  “Should her M-spike significantly rise greater than 1.1, we will consider the Patricia-Pom-Dex regimen.”       5/15/2024 M peak 1 is 0.68 g/dL, Kappa 19.2, Lambda 35.4, ratio 0.54, WBC 2.79, Hemoglobin 12.2, Calcium 8.6 and Cr 0.67     PLAN   - continue maintenance Lenalidomide 5  mg daily and dexamethasone 12 mg weekly.  -Monthly pregnancy test ordered while on lenalidomide    3.  B12 deficiency with bilateral hand neuropathy  Patient is receiving B12 supplementation/parenteral as per PCP Dr. Mojica      HPI:    Kathie Zhong is 49 yo female , with hx of MS and Arsenic Toxicity, and in 2015 was diagnosed with IgG MGUS for which followed up with [place]. Lopez [place] [facility] [place] then saw Dr. Vega in 11/8/2017. M spike : igG lambda, 2.5 g; IgG > 3000.     12/27/2017 bone marrow: Plasma cell neoplasm, morphologically mature, lambda light chain-restricted, comprising ~ 25% of marrow cells. normal cytogenetics. No CRAB symptoms. bone survey in 2016 and C/T MRI in 2017, with no bone lesions identified.     1/2018 - 5/2018 induction therapy with VRd   7/2018 : Autologous stem cell transplant at [facility] with melphalan.  No major complications.   11/2018:  Start maintenance Lenalidomide 5  mg daily and dexamethasone 12 mg weekly. She takes Cholestyramine for lenalidomide induced diarrhea  8/30/23 f/u with Dr. Ball.  “Should her M-spike significantly rise  greater than 1.1, we will consider the Patricia-Pom-Dex regimen.”        M spike (g/dL)   11/2018 0.3   2/2019 Undetected    12/2019 0.4   2/2020 0.49   5/2020 0.39   11/2020 0.62   2/2021 0.5   1/2022 0.68   8/2022 0.72   4/2023 0.9   8/2023 0.9   10/21/23 0.85   5/15/2024 0.68       Interval History:    Patient presenting today to follow-up as above, she is on maintenance Velcade, denies any new symptoms or concern, she has been having some mild hand neuropathy and diagnosed with B12 deficiency receiving repletion per primary care provider, she also noted some occasional insomnia otherwise she is doing well.  Above assessment and plan including the new lab results reviewed with the patient, no evidence of CRAB signs and blood work stable/unremarkable except neutropenia suspected due to the Velcade, no signs of infection .  Patient was also advised that Velcade may increase her risk for skin cancers and to notify if any skin lesion or concern.     Cancer Staging:  Cancer Staging   No matching staging information was found for the patient.      Molecular Testing:     Previous Hematologic/ Oncologic History:    Oncology History    No history exists.       Current Hematologic/ Oncologic Treatment:       Cycle 1         Test Results:    Imaging: No results found.          Labs:   Lab Results   Component Value Date    WBC 2.79 (L) 05/15/2024    HGB 12.2 05/15/2024    HCT 38.3 05/15/2024    MCV 99 (H) 05/15/2024     05/15/2024     Lab Results   Component Value Date     08/30/2017    K 3.6 05/15/2024     05/15/2024    CO2 28 05/15/2024    BUN 13 05/15/2024    CREATININE 0.67 05/15/2024    GLUF 102 (H) 07/15/2023    CALCIUM 8.6 05/15/2024    CORRECTEDCA 9.6 04/05/2022    AST 21 05/15/2024    ALT 23 05/15/2024    ALKPHOS 65 05/15/2024    PROT 8.3 (H) 08/30/2017    BILITOT 0.4 08/30/2017    EGFR 102 05/15/2024         Lab Results   Component Value Date    SPEP See Comment 05/15/2024       No results found for:  "\"PSA\"    No results found for: \"CEA\"    No results found for: \"\"    No results found for: \"AFP\"    Lab Results   Component Value Date    FERRITIN 37.4 11/09/2022       Lab Results   Component Value Date    LMISNBPQ32 164 (L) 02/29/2024         ROS: Review of Systems  - GENERAL: Occasional insomnia  ,negative for any nausea, vomiting, fevers, chills, or weight loss.  - HEENT: Negative for any head/Neck trauma, pain, double/blurry vision, sinusitis, rhinitis, nose bleeding.  - CARDIAC: Negative for any chest pain, palpitation, Dyspnea on exertion, peripheral edema.  - PULMONARY: Negative for any SOB, cough, wheezing.   - GASTROINTESTINAL: Negative for any abdominal pain, N/V/D/C, blood in stool.   - GENITOURINARY: Negative for any dysuria, hematuria, incontinence.  - NEUROLOGIC: Mild intermittent hand neuropathy stable/improving, negative for any muscle weakness, numbness/tingling, memory changes.    - MUSCULOSKELETAL: Negative for any joint pains/swelling, limited ROM.   - INTEGUMENTARY: Negative for any rashes, cuts/ lesions.  - HEMATOLOGIC: Negative for any abnormal bruising, frequent infections or bleeding.      Current Medications: Reviewed  Allergies: Reviewed  PMH/FH/SH:  Reviewed      Physical Exam:    There is no height or weight on file to calculate BSA.    Wt Readings from Last 3 Encounters:   04/10/24 60.5 kg (133 lb 6.4 oz)   11/08/23 60.8 kg (134 lb)   07/19/23 59 kg (130 lb)        Temp Readings from Last 3 Encounters:   04/10/24 97.8 °F (36.6 °C) (Temporal)   11/08/23 98 °F (36.7 °C) (Temporal)   07/19/23 97.7 °F (36.5 °C)        BP Readings from Last 3 Encounters:   04/10/24 126/82   11/08/23 128/82   07/19/23 124/80         Pulse Readings from Last 3 Encounters:   04/10/24 95   11/08/23 77   07/19/23 65     @LASTSAO2(3)@      Physical Exam  - GEN: Appears well, alert and oriented x 3, pleasant and cooperative, in no acute distress  - HEENT: Anicteric, mucous membranes moist, PERRL and EOMI   - " NECK: No lymphadenopathy, JVD or carotid bruits   - HEART: RRR, normal S1 and S2, no murmurs, clicks, gallops or rubs   - LUNGS: Clear to auscultation bilaterally; no wheezes, rales, or rhonchi  - ABDOMEN: Normal bowel sounds, soft, no tenderness, no distention, no organomegaly or masses felt on exam.   - EXTREMITIES: Peripheral pulses normal; no clubbing, cyanosis, or edema  - NEURO: No focal findings, CN II-XII are grossly intact.   - Musculoskeletal: 5/5 strength, normal ROM, no swollen or erythematous joints.   - SKIN: Normal without suspicious lesions on exposed skin      Goals and Barriers:  Current Goal: Prolong Survival from Cancer.   Barriers: None.      Patient's Capacity to Self Care:  Patient is able to self care.    Time spent in this visit, chart review, H&P, discussing about A&P is estimated 40 minutes.

## 2024-06-04 DIAGNOSIS — C90.01 MULTIPLE MYELOMA IN REMISSION (HCC): ICD-10-CM

## 2024-06-05 ENCOUNTER — CLINICAL SUPPORT (OUTPATIENT)
Dept: FAMILY MEDICINE CLINIC | Facility: CLINIC | Age: 49
End: 2024-06-05
Payer: COMMERCIAL

## 2024-06-05 ENCOUNTER — APPOINTMENT (OUTPATIENT)
Dept: LAB | Facility: CLINIC | Age: 49
End: 2024-06-05
Payer: COMMERCIAL

## 2024-06-05 DIAGNOSIS — E53.8 B12 DEFICIENCY: Primary | ICD-10-CM

## 2024-06-05 DIAGNOSIS — C90.00 MULTIPLE MYELOMA NOT HAVING ACHIEVED REMISSION (HCC): ICD-10-CM

## 2024-06-05 LAB — HCG SERPL QL: NEGATIVE

## 2024-06-05 PROCEDURE — 36415 COLL VENOUS BLD VENIPUNCTURE: CPT

## 2024-06-05 PROCEDURE — 84703 CHORIONIC GONADOTROPIN ASSAY: CPT

## 2024-06-05 RX ORDER — LENALIDOMIDE 5 MG/1
5 CAPSULE ORAL DAILY
Qty: 30 CAPSULE | Refills: 0 | Status: SHIPPED | OUTPATIENT
Start: 2024-06-05 | End: 2024-06-06 | Stop reason: SDUPTHER

## 2024-06-05 RX ADMIN — CYANOCOBALAMIN 1000 MCG: 1000 INJECTION, SOLUTION INTRAMUSCULAR; SUBCUTANEOUS at 09:01

## 2024-06-06 DIAGNOSIS — C90.01 MULTIPLE MYELOMA IN REMISSION (HCC): ICD-10-CM

## 2024-06-07 ENCOUNTER — TELEPHONE (OUTPATIENT)
Dept: HEMATOLOGY ONCOLOGY | Facility: CLINIC | Age: 49
End: 2024-06-07

## 2024-06-07 RX ORDER — LENALIDOMIDE 5 MG/1
5 CAPSULE ORAL DAILY
Qty: 28 CAPSULE | Refills: 0 | Status: SHIPPED | OUTPATIENT
Start: 2024-06-07

## 2024-06-07 NOTE — TELEPHONE ENCOUNTER
"Received attached VM via teams:  \"Hello, my name is Patsy Sanz and I tried to order a medication and I wanted to schedule delivery for tomorrow but the pharmacy told me that they didn't get a prior authorization. So could you please send it as fast as you can please? Because after if I don't order for Saturday delivery I cannot do it during the week. So please I would try call and order like about 4:00 PM just in case. My phone number is [phone] and it's Kathie Sanz and date of birth April 30th, 1975. Thank you.\"    Spoke with Mandy from [facility] who reports Revlimid refill was received this morning and is in process. She does not see any rejections or anything that would cause delay. Patient will be called within 24 to 48 hours for refill. Shipment date not later than 06/11.    Attempted to call patient with update, no answer and VM box was full. Unable to leave message.            "

## 2024-06-28 ENCOUNTER — TELEPHONE (OUTPATIENT)
Dept: HEMATOLOGY ONCOLOGY | Facility: CLINIC | Age: 49
End: 2024-06-28

## 2024-06-28 NOTE — TELEPHONE ENCOUNTER
Attempted to call pt but both numbers in pt's chart are either not available or no longer in service. Left pt a MYC message informing her that her appt with Karen Sprague on 11/27 at 9:40 is now at the [place] location. Left Lopez Line [phone] to confirm or rs appt.

## 2024-07-02 DIAGNOSIS — C90.01 MULTIPLE MYELOMA IN REMISSION (HCC): ICD-10-CM

## 2024-07-02 NOTE — TELEPHONE ENCOUNTER
Waiting for pregnancy test results before I am able to obtain and [employer] auth number and send the script

## 2024-07-03 ENCOUNTER — CLINICAL SUPPORT (OUTPATIENT)
Dept: FAMILY MEDICINE CLINIC | Facility: CLINIC | Age: 49
End: 2024-07-03
Payer: COMMERCIAL

## 2024-07-03 ENCOUNTER — APPOINTMENT (OUTPATIENT)
Dept: LAB | Facility: CLINIC | Age: 49
End: 2024-07-03
Payer: COMMERCIAL

## 2024-07-03 DIAGNOSIS — C90.00 MULTIPLE MYELOMA NOT HAVING ACHIEVED REMISSION (HCC): ICD-10-CM

## 2024-07-03 DIAGNOSIS — E53.8 B12 DEFICIENCY: Primary | ICD-10-CM

## 2024-07-03 LAB — HCG SERPL QL: NEGATIVE

## 2024-07-03 PROCEDURE — 84703 CHORIONIC GONADOTROPIN ASSAY: CPT

## 2024-07-03 PROCEDURE — 96372 THER/PROPH/DIAG INJ SC/IM: CPT | Performed by: FAMILY MEDICINE

## 2024-07-03 PROCEDURE — 36415 COLL VENOUS BLD VENIPUNCTURE: CPT

## 2024-07-03 RX ADMIN — CYANOCOBALAMIN 1000 MCG: 1000 INJECTION, SOLUTION INTRAMUSCULAR; SUBCUTANEOUS at 08:37

## 2024-07-05 ENCOUNTER — TELEPHONE (OUTPATIENT)
Age: 49
End: 2024-07-05

## 2024-07-05 RX ORDER — LENALIDOMIDE 5 MG/1
5 CAPSULE ORAL DAILY
Qty: 28 CAPSULE | Refills: 0 | Status: SHIPPED | OUTPATIENT
Start: 2024-07-05

## 2024-07-07 NOTE — TELEPHONE ENCOUNTER
Medication Refill Request   Who are you speaking with? Pharmacy   If it is not the patient, are they listed on an active communication consent form?     Yes   Which medication is being requested for refill?  Please list medication name and dosage lenalidomide (Revlimid) 5 MG CAPS    How many pills does the patient have left? 5   Preferred Pharmacy / Address [facility] [address]  [address], [facility] [address]  Phone: [phone]  Fax: [phone]      Who is the prescribing provider? Dr. Ortiz   Call back number  [phone]   Relevant Information Need script for refill       28

## 2024-07-25 DIAGNOSIS — C90.01 MULTIPLE MYELOMA IN REMISSION (HCC): ICD-10-CM

## 2024-08-03 ENCOUNTER — APPOINTMENT (OUTPATIENT)
Dept: LAB | Facility: CLINIC | Age: 49
End: 2024-08-03
Payer: COMMERCIAL

## 2024-08-03 DIAGNOSIS — E53.8 B12 DEFICIENCY: ICD-10-CM

## 2024-08-03 DIAGNOSIS — C90.00 MULTIPLE MYELOMA NOT HAVING ACHIEVED REMISSION (HCC): ICD-10-CM

## 2024-08-03 LAB
HCG SERPL QL: NEGATIVE
VIT B12 SERPL-MCNC: 211 PG/ML (ref 180–914)

## 2024-08-03 PROCEDURE — 84703 CHORIONIC GONADOTROPIN ASSAY: CPT

## 2024-08-03 PROCEDURE — 36415 COLL VENOUS BLD VENIPUNCTURE: CPT

## 2024-08-03 PROCEDURE — 82607 VITAMIN B-12: CPT

## 2024-08-05 RX ORDER — LENALIDOMIDE 5 MG/1
5 CAPSULE ORAL DAILY
Qty: 28 CAPSULE | Refills: 0 | Status: SHIPPED | OUTPATIENT
Start: 2024-08-05

## 2024-08-06 ENCOUNTER — TELEPHONE (OUTPATIENT)
Age: 49
End: 2024-08-06

## 2024-08-06 NOTE — TELEPHONE ENCOUNTER
Suzanne with [facility] specialty pharmacy calling to inform Dr. Ortiz that a prior authorization has been started via cover my meds for Lienalidomide 5 mg. To access the prior authorization through cover my meds portal please use key #[id number]    If there are any questions please call [phone]

## 2024-08-07 NOTE — TELEPHONE ENCOUNTER
Spoke with pharmacist and script updated to reflect brand necessary. Script will be reprocessed. PA already in place for brand.

## 2024-08-20 ENCOUNTER — TELEPHONE (OUTPATIENT)
Dept: HEMATOLOGY ONCOLOGY | Facility: CLINIC | Age: 49
End: 2024-08-20

## 2024-08-20 NOTE — TELEPHONE ENCOUNTER
Spoke w/ pt. She has been rs for 12/11 at 1:40 due to a change in Karen's schedule. Pt verbalized understanding and accepted new appt.

## 2024-08-27 ENCOUNTER — TELEPHONE (OUTPATIENT)
Age: 49
End: 2024-08-27

## 2024-08-27 NOTE — TELEPHONE ENCOUNTER
"This patient does have evidence of infective focus  My overall impression is sepsis. Vital signs were reviewed and noted in progress note.  Antibiotics given-   Antibiotics (From admission, onward)            Start     Stop Route Frequency Ordered    02/20/22 0900  mupirocin 2 % ointment         02/25 0859 Nasl 2 times daily 02/19/22 2331    02/20/22 0500  piperacillin-tazobactam 4.5 g in sodium chloride 0.9% 100 mL IVPB (ready to mix system)         -- IV Every 12 hours (non-standard times) 02/19/22 2209 02/19/22 2300  doxycycline tablet 100 mg         -- Oral Every 12 hours 02/19/22 2204 02/19/22 2245  vancomycin - pharmacy to dose  (vancomycin IVPB)        "And" Linked Group Details    -- IV pharmacy to manage frequency 02/19/22 2148 02/19/22 2115  vancomycin (VANCOCIN) 2,250 mg in dextrose 5 % 500 mL IVPB         02/20 0914 IV ED 1 Time 02/19/22 2016        Cultures were taken-   Microbiology Results (last 7 days)     Procedure Component Value Units Date/Time    Urine culture [[id number]] Collected: 02/19/22 2047    Order Status: No result Specimen: Urine Updated: 02/19/22 2241    Culture, Respiratory with Gram Stain [[id number]]     Order Status: No result Specimen: Respiratory     Blood culture x two cultures. Draw prior to antibiotics. [[id number]] Collected: 02/19/22 1932    Order Status: Sent Specimen: Blood from Peripheral, Hand, Left Updated: 02/1943    Blood culture x two cultures. Draw prior to antibiotics. [[id number]] Collected: 02/19/22 1932    Order Status: Sent Specimen: Blood from Peripheral, Hand, Left Updated: 02/1943        Latest lactate reviewed, they are-  Recent Labs   Lab 02/19/22 1932   LACTATE 11.5*       Organ dysfunction indicated by Acute kidney injury, Encephalopathy  and Acute respiratory failure  Source- Unclear. CXR with diffuse interstitial opacities, however no consolidation noted. UA with pyuria, without elevated leuks. Blood cultures pending. Possibly due to " Colon recall mailed  Due October 2024    sacral wound, although doesn't look grossly infected.     Plan  - Continue IV vancomycin, zosyn and doxycycline (to cover for atypical respiratory organisms- unabel to use azithro due to prolonged QTc)  - F/u Urine and blood cultures  - Wound care consulted for sacral decubitus wound.   - trend lactic acid until improves

## 2024-09-03 ENCOUNTER — TELEPHONE (OUTPATIENT)
Dept: HEMATOLOGY ONCOLOGY | Facility: CLINIC | Age: 49
End: 2024-09-03

## 2024-09-07 ENCOUNTER — APPOINTMENT (OUTPATIENT)
Dept: LAB | Age: 49
End: 2024-09-07
Payer: COMMERCIAL

## 2024-09-07 DIAGNOSIS — C90.00 MULTIPLE MYELOMA NOT HAVING ACHIEVED REMISSION (HCC): ICD-10-CM

## 2024-09-07 PROCEDURE — 84703 CHORIONIC GONADOTROPIN ASSAY: CPT

## 2024-09-07 PROCEDURE — 36415 COLL VENOUS BLD VENIPUNCTURE: CPT

## 2024-09-09 LAB — HCG SERPL QL: NEGATIVE

## 2024-09-10 DIAGNOSIS — C90.01 MULTIPLE MYELOMA IN REMISSION (HCC): ICD-10-CM

## 2024-09-10 RX ORDER — LENALIDOMIDE 5 MG/1
5 CAPSULE ORAL DAILY
Qty: 28 CAPSULE | Refills: 0 | Status: SHIPPED | OUTPATIENT
Start: 2024-09-10

## 2024-10-05 ENCOUNTER — APPOINTMENT (OUTPATIENT)
Dept: LAB | Facility: CLINIC | Age: 49
End: 2024-10-05
Payer: COMMERCIAL

## 2024-10-05 DIAGNOSIS — C90.00 MULTIPLE MYELOMA NOT HAVING ACHIEVED REMISSION (HCC): ICD-10-CM

## 2024-10-05 LAB — HCG SERPL QL: NEGATIVE

## 2024-10-05 PROCEDURE — 36415 COLL VENOUS BLD VENIPUNCTURE: CPT

## 2024-10-05 PROCEDURE — 84703 CHORIONIC GONADOTROPIN ASSAY: CPT

## 2024-10-07 DIAGNOSIS — C90.01 MULTIPLE MYELOMA IN REMISSION (HCC): ICD-10-CM

## 2024-10-07 RX ORDER — LENALIDOMIDE 5 MG/1
5 CAPSULE ORAL DAILY
Qty: 28 CAPSULE | Refills: 0 | Status: SHIPPED | OUTPATIENT
Start: 2024-10-07

## 2024-10-07 NOTE — TELEPHONE ENCOUNTER
"Patient's pharmacy called the RX Refill Line. Message is being forwarded to the office.     Patient's pharmacy called for refill of this medication, I told her it was sent earlier today, she stated that it more than likely just needed to be \"attached to pt's chart\" but if they still don't see it tomorrow, they will reach back out to us    "

## 2024-11-04 DIAGNOSIS — C90.01 MULTIPLE MYELOMA IN REMISSION (HCC): ICD-10-CM

## 2024-11-06 DIAGNOSIS — C90.01 MULTIPLE MYELOMA IN REMISSION (HCC): ICD-10-CM

## 2024-11-06 RX ORDER — LENALIDOMIDE 5 MG/1
5 CAPSULE ORAL DAILY
Qty: 28 CAPSULE | Refills: 5 | Status: CANCELLED | OUTPATIENT
Start: 2024-11-06

## 2024-11-06 NOTE — TELEPHONE ENCOUNTER
Reason for call:   [x] Refill   [] Prior Auth  [] Other:     Office:   [] PCP/Provider -   [x] Specialty/Provider - hem onc    Medication: lenalidomide    Dose/Frequency: 5 mg take daily     Quantity: 28    Pharmacy: [facility]     Does the patient have enough for 3 days?   [] Yes   [x] No - Send as HP to POD- pharmacist wasn't sure but believes pt is low

## 2024-11-09 ENCOUNTER — APPOINTMENT (OUTPATIENT)
Dept: LAB | Age: 49
End: 2024-11-09
Payer: COMMERCIAL

## 2024-11-09 DIAGNOSIS — C90.00 MULTIPLE MYELOMA NOT HAVING ACHIEVED REMISSION (HCC): ICD-10-CM

## 2024-11-09 LAB — HCG SERPL QL: NEGATIVE

## 2024-11-09 PROCEDURE — 84703 CHORIONIC GONADOTROPIN ASSAY: CPT

## 2024-11-09 PROCEDURE — 36415 COLL VENOUS BLD VENIPUNCTURE: CPT

## 2024-11-11 DIAGNOSIS — C90.01 MULTIPLE MYELOMA IN REMISSION (HCC): ICD-10-CM

## 2024-11-11 RX ORDER — LENALIDOMIDE 5 MG/1
5 CAPSULE ORAL DAILY
Qty: 28 CAPSULE | Refills: 0 | Status: SHIPPED | OUTPATIENT
Start: 2024-11-11

## 2024-11-11 RX ORDER — LENALIDOMIDE 5 MG/1
CAPSULE ORAL
Refills: 0 | OUTPATIENT
Start: 2024-11-11

## 2024-12-10 DIAGNOSIS — C90.01 MULTIPLE MYELOMA IN REMISSION (HCC): ICD-10-CM

## 2024-12-10 NOTE — TELEPHONE ENCOUNTER
Reason for call:   [x] Refill   [] Prior Auth  [] Other:     Office:   [] PCP/Provider -   [x] Specialty/Provider - Hem/onc, Dr Ortiz     Medication:  Revlimid 5 mg, 1 qd 28       Pharmacy:   [facility]    Does the patient have enough for 3 days?   [x] Yes   [] No - Send as HP to POD

## 2025-01-02 ENCOUNTER — TELEPHONE (OUTPATIENT)
Dept: HEMATOLOGY ONCOLOGY | Facility: CLINIC | Age: 50
End: 2025-01-02

## 2025-01-02 NOTE — TELEPHONE ENCOUNTER
Pt has not rescheduled her follow up appt, and has not gone for a monthly pregnancy test to allow her Revlimid to be refilled. I called and voicemail was full, unable to leave a message.

## 2025-01-03 NOTE — TELEPHONE ENCOUNTER
1/3 Spoke with pt and offered to schedule her follow up appt. She did not wish to do so at this time.

## 2025-01-04 ENCOUNTER — APPOINTMENT (OUTPATIENT)
Dept: LAB | Facility: CLINIC | Age: 50
End: 2025-01-04
Payer: COMMERCIAL

## 2025-01-04 DIAGNOSIS — C90.00 MULTIPLE MYELOMA NOT HAVING ACHIEVED REMISSION (HCC): ICD-10-CM

## 2025-01-04 DIAGNOSIS — E55.9 VITAMIN D DEFICIENCY: ICD-10-CM

## 2025-01-04 LAB
25(OH)D3 SERPL-MCNC: 21 NG/ML (ref 30–100)
ALBUMIN SERPL BCG-MCNC: 4 G/DL (ref 3.5–5)
ALP SERPL-CCNC: 61 U/L (ref 34–104)
ALT SERPL W P-5'-P-CCNC: 22 U/L (ref 7–52)
ANION GAP SERPL CALCULATED.3IONS-SCNC: 5 MMOL/L (ref 4–13)
AST SERPL W P-5'-P-CCNC: 20 U/L (ref 13–39)
BASOPHILS # BLD AUTO: 0.05 THOUSANDS/ΜL (ref 0–0.1)
BASOPHILS NFR BLD AUTO: 2 % (ref 0–1)
BILIRUB SERPL-MCNC: 0.98 MG/DL (ref 0.2–1)
BUN SERPL-MCNC: 15 MG/DL (ref 5–25)
CALCIUM SERPL-MCNC: 8.7 MG/DL (ref 8.4–10.2)
CHLORIDE SERPL-SCNC: 107 MMOL/L (ref 96–108)
CO2 SERPL-SCNC: 28 MMOL/L (ref 21–32)
CREAT SERPL-MCNC: 0.69 MG/DL (ref 0.6–1.3)
EOSINOPHIL # BLD AUTO: 0.2 THOUSAND/ΜL (ref 0–0.61)
EOSINOPHIL NFR BLD AUTO: 6 % (ref 0–6)
ERYTHROCYTE [DISTWIDTH] IN BLOOD BY AUTOMATED COUNT: 12.9 % (ref 11.6–15.1)
GFR SERPL CREATININE-BSD FRML MDRD: 101 ML/MIN/1.73SQ M
GLUCOSE P FAST SERPL-MCNC: 86 MG/DL (ref 65–99)
HCG SERPL QL: NEGATIVE
HCT VFR BLD AUTO: 39.7 % (ref 34.8–46.1)
HGB BLD-MCNC: 12.6 G/DL (ref 11.5–15.4)
IGA SERPL-MCNC: 127 MG/DL (ref 66–433)
IGG SERPL-MCNC: 1549 MG/DL (ref 635–1741)
IGM SERPL-MCNC: <20 MG/DL (ref 45–281)
IMM GRANULOCYTES # BLD AUTO: 0 THOUSAND/UL (ref 0–0.2)
IMM GRANULOCYTES NFR BLD AUTO: 0 % (ref 0–2)
LYMPHOCYTES # BLD AUTO: 1.82 THOUSANDS/ΜL (ref 0.6–4.47)
LYMPHOCYTES NFR BLD AUTO: 58 % (ref 14–44)
MCH RBC QN AUTO: 31.7 PG (ref 26.8–34.3)
MCHC RBC AUTO-ENTMCNC: 31.7 G/DL (ref 31.4–37.4)
MCV RBC AUTO: 100 FL (ref 82–98)
MONOCYTES # BLD AUTO: 0.29 THOUSAND/ΜL (ref 0.17–1.22)
MONOCYTES NFR BLD AUTO: 9 % (ref 4–12)
NEUTROPHILS # BLD AUTO: 0.8 THOUSANDS/ΜL (ref 1.85–7.62)
NEUTS SEG NFR BLD AUTO: 25 % (ref 43–75)
NRBC BLD AUTO-RTO: 0 /100 WBCS
PLATELET # BLD AUTO: 184 THOUSANDS/UL (ref 149–390)
PMV BLD AUTO: 9.8 FL (ref 8.9–12.7)
POTASSIUM SERPL-SCNC: 3.7 MMOL/L (ref 3.5–5.3)
PROT SERPL-MCNC: 7.1 G/DL (ref 6.4–8.4)
RBC # BLD AUTO: 3.97 MILLION/UL (ref 3.81–5.12)
SODIUM SERPL-SCNC: 140 MMOL/L (ref 135–147)
WBC # BLD AUTO: 3.16 THOUSAND/UL (ref 4.31–10.16)

## 2025-01-04 PROCEDURE — 83521 IG LIGHT CHAINS FREE EACH: CPT

## 2025-01-04 PROCEDURE — 36415 COLL VENOUS BLD VENIPUNCTURE: CPT

## 2025-01-04 PROCEDURE — 85025 COMPLETE CBC W/AUTO DIFF WBC: CPT

## 2025-01-04 PROCEDURE — 82784 ASSAY IGA/IGD/IGG/IGM EACH: CPT

## 2025-01-04 PROCEDURE — 80053 COMPREHEN METABOLIC PANEL: CPT

## 2025-01-04 PROCEDURE — 86334 IMMUNOFIX E-PHORESIS SERUM: CPT

## 2025-01-04 PROCEDURE — 84165 PROTEIN E-PHORESIS SERUM: CPT

## 2025-01-04 PROCEDURE — 82306 VITAMIN D 25 HYDROXY: CPT

## 2025-01-04 PROCEDURE — 84703 CHORIONIC GONADOTROPIN ASSAY: CPT

## 2025-01-05 ENCOUNTER — RESULTS FOLLOW-UP (OUTPATIENT)
Dept: FAMILY MEDICINE CLINIC | Facility: CLINIC | Age: 50
End: 2025-01-05

## 2025-01-06 DIAGNOSIS — C90.01 MULTIPLE MYELOMA IN REMISSION (HCC): ICD-10-CM

## 2025-01-06 RX ORDER — LENALIDOMIDE 5 MG/1
5 CAPSULE ORAL DAILY
Qty: 28 CAPSULE | Refills: 0 | OUTPATIENT
Start: 2025-01-06

## 2025-01-06 RX ORDER — LENALIDOMIDE 5 MG/1
5 CAPSULE ORAL DAILY
Qty: 28 CAPSULE | Refills: 0 | Status: SHIPPED | OUTPATIENT
Start: 2025-01-06

## 2025-01-07 LAB
ALBUMIN SERPL ELPH-MCNC: 3.98 G/DL (ref 3.2–5.1)
ALBUMIN SERPL ELPH-MCNC: 56.1 % (ref 48–70)
ALPHA1 GLOB SERPL ELPH-MCNC: 0.22 G/DL (ref 0.15–0.47)
ALPHA1 GLOB SERPL ELPH-MCNC: 3.1 % (ref 1.8–7)
ALPHA2 GLOB SERPL ELPH-MCNC: 0.54 G/DL (ref 0.42–1.04)
ALPHA2 GLOB SERPL ELPH-MCNC: 7.6 % (ref 5.9–14.9)
BETA GLOB ABNORMAL SERPL ELPH-MCNC: 0.43 G/DL (ref 0.31–0.57)
BETA1 GLOB SERPL ELPH-MCNC: 6.1 % (ref 4.7–7.7)
BETA2 GLOB SERPL ELPH-MCNC: 4.5 % (ref 3.1–7.9)
BETA2+GAMMA GLOB SERPL ELPH-MCNC: 0.32 G/DL (ref 0.2–0.58)
GAMMA GLOB ABNORMAL SERPL ELPH-MCNC: 1.6 G/DL (ref 0.4–1.66)
GAMMA GLOB SERPL ELPH-MCNC: 22.6 % (ref 6.9–22.3)
IGG/ALB SER: 1.28 {RATIO} (ref 1.1–1.8)
INTERPRETATION UR IFE-IMP: NORMAL
KAPPA LC FREE SER-MCNC: 17.9 MG/L (ref 3.3–19.4)
KAPPA LC FREE/LAMBDA FREE SER: 0.4 {RATIO} (ref 0.26–1.65)
LAMBDA LC FREE SERPL-MCNC: 45.1 MG/L (ref 5.7–26.3)
M PROTEIN 1 MFR SERPL ELPH: 14.3 %
M PROTEIN 1 SERPL ELPH-MCNC: 1.02 G/DL
PROT PATTERN SERPL ELPH-IMP: ABNORMAL
PROT SERPL-MCNC: 7.1 G/DL (ref 6.4–8.2)

## 2025-01-07 PROCEDURE — 86334 IMMUNOFIX E-PHORESIS SERUM: CPT | Performed by: STUDENT IN AN ORGANIZED HEALTH CARE EDUCATION/TRAINING PROGRAM

## 2025-01-07 PROCEDURE — 84165 PROTEIN E-PHORESIS SERUM: CPT | Performed by: STUDENT IN AN ORGANIZED HEALTH CARE EDUCATION/TRAINING PROGRAM

## 2025-01-08 ENCOUNTER — TELEPHONE (OUTPATIENT)
Age: 50
End: 2025-01-08

## 2025-01-08 NOTE — TELEPHONE ENCOUNTER
Call received from patient. Stated that she called [facility] and they do not have the prescription for patients revlimid. Asking if this was sent in to them and if not, if this can be done since prior auth was approved.

## 2025-01-08 NOTE — TELEPHONE ENCOUNTER
Patient called again to follow up on her Revlimid. She spoke to her insurance a couple of minutes ago, and they advised they did not receive a prior authorization request for the medication. They advised that we submit it through Cover My Meds using the following codes:    Revlimid code. [id number]  Lenalidomide: [id number]    Patient very upset as she works 6 days a week and was on vacation, so she was able to call to order the refill today. She stated that she spent the entire day on this process, but achieved no result. She is returning to work tomorrow, and may not be able to call until late in the evening when she closes.      Patient will like us to work on this asap, as she has been on the medication for 6 years, and continues to have issues refilling. She is not sure if this is due to her insurance, or this is the way it usually goes.    She will like to be called with an update tomorrow. Please leave a message if she is unable to  due to work.

## 2025-01-08 NOTE — TELEPHONE ENCOUNTER
Called [facility]. Pt has several accounts with various spellings of her name. They confirmed they do have the script and asked about prior auth. Explained we submitted it and came back approved/duplicate.

## 2025-01-09 NOTE — TELEPHONE ENCOUNTER
Call placed to patient, I notified her our finance team is actively working on this. Pt wanted to know if refill would be ready by today at 7 when she is done at work. Stated I will have the finance team call her for update. Pt became frustrated because she is at work. I requested I have more information for her. Pt did not want to listen and hung up the phone.     Attempted to call patient back, goes right to  and  box is full and no message could be left.     If patient returns call, please notify her her NEEDS to be seen in the office for additional refills. She has not been seen since may of 2024. Pt has been scheduled for 1/28 11:20 AM with Dr. Ortiz.

## 2025-01-10 ENCOUNTER — DOCUMENTATION (OUTPATIENT)
Age: 50
End: 2025-01-10

## 2025-01-10 ENCOUNTER — TELEPHONE (OUTPATIENT)
Age: 50
End: 2025-01-10

## 2025-01-10 NOTE — TELEPHONE ENCOUNTER
I called [facility] and provided the key codes below. Explained both are coming back no PA needed. [facility] called pt's insurance and was told a PA exception is needed for the brand. Told them we already called and were told no PA needed. Aleyda will try again...

## 2025-01-10 NOTE — TELEPHONE ENCOUNTER
Per Aleyda: in 2025 her plan changed and removed Revlimid off the formulary so i need to do a PA over the phone because cover my meds isn't up to date

## 2025-01-10 NOTE — TELEPHONE ENCOUNTER
Aleyda submitted for both brand and generic using the keys provided below and they all came back as    Prior Authorization duplicate/approved  No PA is needed for brand or generic for this medication and patient.     Pharmacy should be able to proceed with filling. Called pt but got voicemail which is full, unable to leave a message.

## 2025-01-10 NOTE — PROGRESS NOTES
Received request from clinical that patient needs a PA for Revlimid 5 mg caps. Auth has been submitted and came back as NO PA NEEDED. Patricia was still not able to get the pharmacy to process this and said this needed an exception form filled out.    I called the insurance company and they stated 2025 they changed their plan and Revlimid was no longer covered on the formulary for her. I submitted a exception form for the patient to continue getting Revlimid.     Case reference number: [id number]    I also faxed all chart notes over to the insurance as well [phone]    I asked for this to be urgent and they stated the turn around time for urgent is before 12 days. I asked if there was any way to have this moved up to at least 3 business days and they stated they cannot make this any more urgent then I already have it.    Clinical aware.

## 2025-01-10 NOTE — TELEPHONE ENCOUNTER
Pt would like a call back to discuss her Revlimid medication. She was frustrated about having issues getting this medication. Pt can be reached at [phone]. Thank you.

## 2025-01-14 NOTE — TELEPHONE ENCOUNTER
1/14 Per Aleyda this was approved! Pt will need repeat pregnancy test so I can obtain a [employer] auth number before script can be sent to the pharmacy. Pt's voicemail is still full, unable to leave a message.

## 2025-01-15 ENCOUNTER — TELEPHONE (OUTPATIENT)
Dept: HEMATOLOGY ONCOLOGY | Facility: CLINIC | Age: 50
End: 2025-01-15

## 2025-01-15 NOTE — TELEPHONE ENCOUNTER
1/15 Spoke with [employer] and updated pt's risk category. She will need to sign a new enrollment form which is being faxed to me.

## 2025-01-15 NOTE — TELEPHONE ENCOUNTER
Pt called and plans to come to the [place] office to sign the Revlimid form on Wednesday 1/22, likely in the afternoon.     Pt was told she needs a follow up appt before Revlimid will be refilled the next time. She stated [place] is closest, and Wednesday is her only day off. Message sent to clerical pool for scheduling.

## 2025-01-15 NOTE — TELEPHONE ENCOUNTER
1/15 Spoke with pt and provided update. I explained a new pregnancy test would be needed, and asked if she is still having periods. Pt stated her last was back in 2018 when she went for transplant. I will reach out to [employer] and see how to go about removing the pregnancy test requirement.

## 2025-01-15 NOTE — TELEPHONE ENCOUNTER
1/15 Form sent to pt via [employer]. Attempted to provide update via phone, but voicemail is full.

## 2025-01-22 ENCOUNTER — TELEPHONE (OUTPATIENT)
Age: 50
End: 2025-01-22

## 2025-01-22 DIAGNOSIS — C90.01 MULTIPLE MYELOMA IN REMISSION (HCC): ICD-10-CM

## 2025-01-22 RX ORDER — LENALIDOMIDE 5 MG/1
5 CAPSULE ORAL DAILY
Qty: 28 CAPSULE | Refills: 0 | Status: SHIPPED | OUTPATIENT
Start: 2025-01-22

## 2025-01-22 NOTE — TELEPHONE ENCOUNTER
Provider: Dr. Angel Mujica from [facility] called in reporting they have made several attempts to reach patient to schedule Revlimid delivery and wanted to make us aware. I confirmed with her we would reach out to patient to call them so that they can confirm delivery. They were appreciative of the assistance and have no other questions or concerns at this moment.       Called patient. Patient reports she is aware they are calling, she still has to come in and sign a paper and will call [facility] after signing the Revlimid paper, today. Patient has no additional questions or concerns at this moment.

## 2025-02-14 DIAGNOSIS — C90.01 MULTIPLE MYELOMA IN REMISSION (HCC): ICD-10-CM

## 2025-02-14 RX ORDER — LENALIDOMIDE 5 MG/1
5 CAPSULE ORAL DAILY
Qty: 28 CAPSULE | Refills: 0 | Status: SHIPPED | OUTPATIENT
Start: 2025-02-14

## 2025-02-19 ENCOUNTER — OFFICE VISIT (OUTPATIENT)
Dept: HEMATOLOGY ONCOLOGY | Facility: CLINIC | Age: 50
End: 2025-02-19
Payer: COMMERCIAL

## 2025-02-19 VITALS
HEART RATE: 62 BPM | RESPIRATION RATE: 16 BRPM | TEMPERATURE: 97.8 F | DIASTOLIC BLOOD PRESSURE: 80 MMHG | BODY MASS INDEX: 21.49 KG/M2 | SYSTOLIC BLOOD PRESSURE: 114 MMHG | WEIGHT: 129 LBS | HEIGHT: 65 IN | OXYGEN SATURATION: 98 %

## 2025-02-19 DIAGNOSIS — C90.00 MULTIPLE MYELOMA NOT HAVING ACHIEVED REMISSION (HCC): Primary | ICD-10-CM

## 2025-02-19 PROCEDURE — 99214 OFFICE O/P EST MOD 30 MIN: CPT | Performed by: INTERNAL MEDICINE

## 2025-02-19 NOTE — PROGRESS NOTES
Name: Kathie Zhong      : 1975      MRN: [id number]  Encounter Provider: Praful Ortiz MD  Encounter Date: 2025   Encounter department: [facility] [place]  :  Assessment & Plan  Multiple myeloma not having achieved remission (HCC)  Smoldering IgG Lambda Multiple Myeloma   Lenalidomide induced Neutropenia      MGUS since , Smoldering MM diagnosed via BM 2027 - 2018 induction therapy with VRd   2018 : Autologous stem cell transplant at [facility] with melphalan.  No major complications.   2018: started on maintenance Lenalidomide 5  mg daily and dexamethasone 12 mg weekly.  23 f/u with Dr. Ball.  “Should her M-spike significantly rise greater than 1.1, we will consider the Patricia-Pom-Dex regimen.”         5/15/2024 M peak 1 is 0.68 g/dL, Kappa 19.2, Lambda 35.4, ratio 0.54, WBC 2.79, Hemoglobin 12.2, Calcium 8.6 and Cr 0.67      PLAN   - continue maintenance Lenalidomide 5  mg.  Psychosis on dexamethasone as it was discontinued    Vitamin B12 deficiency and neuropathy she is taking vitamin B12 supplements    Now with interruption of Revlimid for few months M protein went up to 1.0, will repeat SPEP, free light chain, quantitative immunoglobulins in 3-month           No follow-ups on file.    History of Present Illness   Chief Complaint   Patient presents with    Follow-up   Kathie Zhong is 48 yo female , with hx of MS and Arsenic Toxicity, and in  was diagnosed with IgG MGUS for which followed up with Dr. Lopez at [facility][note 3] then saw Dr. Vega in 2017. M spike : igG lambda, 2.5 g; IgG > 3000.      2017 bone marrow: Plasma cell neoplasm, morphologically mature, lambda light chain-restricted, comprising ~ 25% of marrow cells. normal cytogenetics. No CRAB symptoms. bone survey in  and C/T MRI in , with no bone lesions identified.      2018 - 2018 induction therapy with VRd   2018 : Autologous  stem cell transplant at [facility] with melphalan.  No major complications.   11/2018:  Start maintenance Lenalidomide 5  mg daily and dexamethasone 12 mg weekly. She takes Cholestyramine for lenalidomide induced diarrhea  8/30/23 f/u with Dr. Ball.  “Should her M-spike significantly rise greater than 1.1, we will consider the Patricia-Pom-Dex regimen.”   Oncology History   Multiple myeloma not having achieved remission (HCC)   12/4/2017 -  Cancer Staged    Staging form: Plasma Cell Myeloma and Disorders, AJCC 8th Edition  - Clinical stage from 12/4/2017: High-risk cytogenetics: Absent, LDH: Normal - Signed by Praful Ortiz MD on 5/21/2024  Stage prefix: Initial diagnosis  Cytogenetics: No abnormalities       2/6/2018 Initial Diagnosis    Multiple myeloma not having achieved remission (HCC)        Oncology History   Cancer Staging   Multiple myeloma not having achieved remission (HCC)  Staging form: Plasma Cell Myeloma and Disorders, AJCC 8th Edition  - Clinical stage from 12/4/2017: High-risk cytogenetics: Absent, LDH: Normal - Signed by Praful Ortiz MD on 5/21/2024  Stage prefix: Initial diagnosis  Cytogenetics: No abnormalities  Oncology History   Multiple myeloma not having achieved remission (HCC)   12/4/2017 -  Cancer Staged    Staging form: Plasma Cell Myeloma and Disorders, AJCC 8th Edition  - Clinical stage from 12/4/2017: High-risk cytogenetics: Absent, LDH: Normal - Signed by Praful Ortiz MD on 5/21/2024  Stage prefix: Initial diagnosis  Cytogenetics: No abnormalities       2/6/2018 Initial Diagnosis    Multiple myeloma not having achieved remission (HCC)          Review of Systems   Constitutional:  Negative for chills and fever.   HENT:  Negative for ear pain and sore throat.    Eyes:  Negative for pain and visual disturbance.   Respiratory:  Negative for cough and shortness of breath.    Cardiovascular:  Negative for chest pain and palpitations.   Gastrointestinal:  Negative for abdominal pain and  "vomiting.   Genitourinary:  Negative for dysuria and hematuria.   Musculoskeletal:  Negative for arthralgias and back pain.   Skin:  Negative for color change and rash.   Neurological:  Negative for seizures and syncope.   All other systems reviewed and are negative.          Objective   /80 (BP Location: Left arm, Patient Position: Sitting, Cuff Size: Adult)   Pulse 62   Temp 97.8 °F (36.6 °C) (Temporal)   Resp 16   Ht 5' 5\" (1.651 m)   Wt 58.5 kg (129 lb)   SpO2 98%   BMI 21.47 kg/m²     Pain Screening:  Pain Score: 0-No pain  ECOG   0  Physical Exam  Vitals reviewed.   Constitutional:       General: She is not in acute distress.     Appearance: She is well-developed. She is not diaphoretic.   HENT:      Head: Normocephalic and atraumatic.   Eyes:      Conjunctiva/sclera: Conjunctivae normal.   Neck:      Trachea: No tracheal deviation.   Cardiovascular:      Rate and Rhythm: Normal rate and regular rhythm.      Heart sounds: No murmur heard.     No friction rub. No gallop.   Pulmonary:      Effort: Pulmonary effort is normal. No respiratory distress.      Breath sounds: Normal breath sounds. No wheezing or rales.   Chest:      Chest wall: No tenderness.   Abdominal:      General: There is no distension.      Palpations: Abdomen is soft.      Tenderness: There is no abdominal tenderness.   Musculoskeletal:      Cervical back: Normal range of motion and neck supple.   Lymphadenopathy:      Cervical: No cervical adenopathy.   Skin:     General: Skin is warm and dry.      Coloration: Skin is not pale.      Findings: No erythema.   Neurological:      Mental Status: She is alert.   Psychiatric:         Behavior: Behavior normal.         Thought Content: Thought content normal.         Labs: I have reviewed the following labs:  Lab Results   Component Value Date/Time    WBC 3.16 (L) 01/04/2025 09:49 AM    RBC 3.97 01/04/2025 09:49 AM    Hemoglobin 12.6 01/04/2025 09:49 AM    Hematocrit 39.7 01/04/2025 09:49 " AM     (H) 01/04/2025 09:49 AM    MCH 31.7 01/04/2025 09:49 AM    RDW 12.9 01/04/2025 09:49 AM    Platelets 184 01/04/2025 09:49 AM    Segmented % 25 (L) 01/04/2025 09:49 AM    Lymphocytes % 58 (H) 01/04/2025 09:49 AM    Monocytes % 9 01/04/2025 09:49 AM    Eosinophils Relative 6 01/04/2025 09:49 AM    Basophils Relative 2 (H) 01/04/2025 09:49 AM    Immature Grans % 0 01/04/2025 09:49 AM    Absolute Neutrophils 0.80 (L) 01/04/2025 09:49 AM     Lab Results   Component Value Date/Time    Potassium 3.7 01/04/2025 09:49 AM    Potassium 3.7 08/28/2024 02:22 PM    Chloride 107 01/04/2025 09:49 AM    Chloride 104 08/28/2024 02:22 PM    Carbon Dioxide 29 08/28/2024 02:22 PM    CO2 28 01/04/2025 09:49 AM    BUN 15 01/04/2025 09:49 AM    BUN 17 08/28/2024 02:22 PM    Creatinine 0.69 01/04/2025 09:49 AM    Creatinine 0.80 08/28/2024 02:22 PM    Glucose, Fasting 86 01/04/2025 09:49 AM    Calcium 8.7 01/04/2025 09:49 AM    Calcium 9.0 08/28/2024 02:22 PM    AST 20 01/04/2025 09:49 AM    ASPAR AMINOTRANSFERASE 18 08/28/2024 02:22 PM    ALT 22 01/04/2025 09:49 AM    ALANINE AMINOTRANSFERASE 19 08/28/2024 02:22 PM    Alkaline Phosphatase 61 01/04/2025 09:49 AM    Alkaline Phosphatase 74 08/28/2024 02:22 PM    Total Protein 7.1 01/04/2025 09:49 AM    Total Protein 7.1 01/04/2025 09:49 AM    Protein, Total 7.4 08/28/2024 02:22 PM    Albumin 4.0 01/04/2025 09:49 AM    ALBUMIN 4.2 08/28/2024 02:22 PM    Total Bilirubin 0.98 01/04/2025 09:49 AM    Total Bilirubin 0.8 08/28/2024 02:22 PM    eGFRcr 89 08/28/2024 02:22 PM    eGFR 101 01/04/2025 09:49 AM

## 2025-02-19 NOTE — ASSESSMENT & PLAN NOTE
[facility] for PT to office for Dr Joanne Quinn to sign Smoldering IgG Lambda Multiple Myeloma   Lenalidomide induced Neutropenia      MGUS since 2015, Smoldering MM diagnosed via BM 12/2027 1/2018 - 5/2018 induction therapy with VRd   7/2018 : Autologous stem cell transplant at [facility] with melphalan.  No major complications.   11/2018: started on maintenance Lenalidomide 5  mg daily and dexamethasone 12 mg weekly.  8/30/23 f/u with Dr. Ball.  “Should her M-spike significantly rise greater than 1.1, we will consider the Patricia-Pom-Dex regimen.”         5/15/2024 M peak 1 is 0.68 g/dL, Kappa 19.2, Lambda 35.4, ratio 0.54, WBC 2.79, Hemoglobin 12.2, Calcium 8.6 and Cr 0.67      PLAN   - continue maintenance Lenalidomide 5  mg.  Psychosis on dexamethasone as it was discontinued    Vitamin B12 deficiency and neuropathy she is taking vitamin B12 supplements    Now with interruption of Revlimid for few months M protein went up to 1.0, will repeat SPEP, free light chain, quantitative immunoglobulins in 3-month

## 2025-02-21 ENCOUNTER — TELEPHONE (OUTPATIENT)
Age: 50
End: 2025-02-21

## 2025-02-21 NOTE — TELEPHONE ENCOUNTER
"Spoke with [facility] and confirmed pt's status was changed in the [employer] system to \"not of reproductive potential\"   "

## 2025-02-21 NOTE — TELEPHONE ENCOUNTER
Ethan from [facility] called regarding 2/14 revlimid prescription. States the AUTH# ending in [id number] on the portal states she is an adult female with non reproductive potential. However, she has been counseled with reproductive potential. States if her reproductive status has changed they would just need a call to let them know. However, if this has not changed they would need new auth # on the script stating she still has reproductive potential. Please call [phone]  and ask to speak to Pharmacist.

## 2025-03-21 DIAGNOSIS — C90.01 MULTIPLE MYELOMA IN REMISSION (HCC): ICD-10-CM

## 2025-03-21 RX ORDER — LENALIDOMIDE 5 MG/1
5 CAPSULE ORAL DAILY
Qty: 28 CAPSULE | Refills: 0 | Status: SHIPPED | OUTPATIENT
Start: 2025-03-21

## 2025-04-17 DIAGNOSIS — C90.01 MULTIPLE MYELOMA IN REMISSION (HCC): ICD-10-CM

## 2025-04-17 RX ORDER — LENALIDOMIDE 5 MG/1
5 CAPSULE ORAL DAILY
Qty: 28 CAPSULE | Refills: 0 | Status: SHIPPED | OUTPATIENT
Start: 2025-04-17 | End: 2025-04-18 | Stop reason: SDUPTHER

## 2025-04-18 DIAGNOSIS — C90.01 MULTIPLE MYELOMA IN REMISSION (HCC): ICD-10-CM

## 2025-04-18 RX ORDER — LENALIDOMIDE 5 MG/1
5 CAPSULE ORAL DAILY
Qty: 28 CAPSULE | Refills: 0 | Status: SHIPPED | OUTPATIENT
Start: 2025-04-18

## 2025-05-15 ENCOUNTER — APPOINTMENT (OUTPATIENT)
Dept: LAB | Facility: CLINIC | Age: 50
End: 2025-05-15
Payer: COMMERCIAL

## 2025-05-15 DIAGNOSIS — C90.00 MULTIPLE MYELOMA NOT HAVING ACHIEVED REMISSION (HCC): ICD-10-CM

## 2025-05-15 LAB
ALBUMIN SERPL BCG-MCNC: 4.1 G/DL (ref 3.5–5)
ALP SERPL-CCNC: 70 U/L (ref 34–104)
ALT SERPL W P-5'-P-CCNC: 20 U/L (ref 7–52)
ANION GAP SERPL CALCULATED.3IONS-SCNC: 9 MMOL/L (ref 4–13)
AST SERPL W P-5'-P-CCNC: 21 U/L (ref 13–39)
BASOPHILS # BLD AUTO: 0.04 THOUSANDS/ÂΜL (ref 0–0.1)
BASOPHILS NFR BLD AUTO: 1 % (ref 0–1)
BILIRUB SERPL-MCNC: 0.84 MG/DL (ref 0.2–1)
BUN SERPL-MCNC: 14 MG/DL (ref 5–25)
CALCIUM SERPL-MCNC: 8.6 MG/DL (ref 8.4–10.2)
CHLORIDE SERPL-SCNC: 107 MMOL/L (ref 96–108)
CO2 SERPL-SCNC: 26 MMOL/L (ref 21–32)
CREAT SERPL-MCNC: 0.76 MG/DL (ref 0.6–1.3)
EOSINOPHIL # BLD AUTO: 0.21 THOUSAND/ÂΜL (ref 0–0.61)
EOSINOPHIL NFR BLD AUTO: 5 % (ref 0–6)
ERYTHROCYTE [DISTWIDTH] IN BLOOD BY AUTOMATED COUNT: 12.5 % (ref 11.6–15.1)
GFR SERPL CREATININE-BSD FRML MDRD: 90 ML/MIN/1.73SQ M
GLUCOSE P FAST SERPL-MCNC: 99 MG/DL (ref 65–99)
HCG SERPL QL: NEGATIVE
HCT VFR BLD AUTO: 39.2 % (ref 34.8–46.1)
HGB BLD-MCNC: 12.7 G/DL (ref 11.5–15.4)
IGA SERPL-MCNC: 152 MG/DL (ref 66–433)
IGG SERPL-MCNC: 1790 MG/DL (ref 635–1741)
IGM SERPL-MCNC: <20 MG/DL (ref 45–281)
IMM GRANULOCYTES # BLD AUTO: 0 THOUSAND/UL (ref 0–0.2)
IMM GRANULOCYTES NFR BLD AUTO: 0 % (ref 0–2)
LYMPHOCYTES # BLD AUTO: 2.22 THOUSANDS/ÂΜL (ref 0.6–4.47)
LYMPHOCYTES NFR BLD AUTO: 54 % (ref 14–44)
MCH RBC QN AUTO: 31.9 PG (ref 26.8–34.3)
MCHC RBC AUTO-ENTMCNC: 32.4 G/DL (ref 31.4–37.4)
MCV RBC AUTO: 99 FL (ref 82–98)
MONOCYTES # BLD AUTO: 0.37 THOUSAND/ÂΜL (ref 0.17–1.22)
MONOCYTES NFR BLD AUTO: 9 % (ref 4–12)
NEUTROPHILS # BLD AUTO: 1.26 THOUSANDS/ÂΜL (ref 1.85–7.62)
NEUTS SEG NFR BLD AUTO: 31 % (ref 43–75)
NRBC BLD AUTO-RTO: 0 /100 WBCS
PLATELET # BLD AUTO: 196 THOUSANDS/UL (ref 149–390)
PMV BLD AUTO: 10.1 FL (ref 8.9–12.7)
POTASSIUM SERPL-SCNC: 3.8 MMOL/L (ref 3.5–5.3)
PROT SERPL-MCNC: 7.2 G/DL (ref 6.4–8.4)
RBC # BLD AUTO: 3.98 MILLION/UL (ref 3.81–5.12)
SODIUM SERPL-SCNC: 142 MMOL/L (ref 135–147)
WBC # BLD AUTO: 4.1 THOUSAND/UL (ref 4.31–10.16)

## 2025-05-15 PROCEDURE — 36415 COLL VENOUS BLD VENIPUNCTURE: CPT

## 2025-05-15 PROCEDURE — 84165 PROTEIN E-PHORESIS SERUM: CPT

## 2025-05-15 PROCEDURE — 80053 COMPREHEN METABOLIC PANEL: CPT

## 2025-05-15 PROCEDURE — 83521 IG LIGHT CHAINS FREE EACH: CPT

## 2025-05-15 PROCEDURE — 85025 COMPLETE CBC W/AUTO DIFF WBC: CPT

## 2025-05-15 PROCEDURE — 84703 CHORIONIC GONADOTROPIN ASSAY: CPT

## 2025-05-15 PROCEDURE — 82784 ASSAY IGA/IGD/IGG/IGM EACH: CPT

## 2025-05-16 DIAGNOSIS — C90.01 MULTIPLE MYELOMA IN REMISSION (HCC): ICD-10-CM

## 2025-05-16 LAB
ALBUMIN SERPL ELPH-MCNC: 3.94 G/DL (ref 3.2–5.1)
ALBUMIN SERPL ELPH-MCNC: 55.5 % (ref 48–70)
ALPHA1 GLOB SERPL ELPH-MCNC: 0.22 G/DL (ref 0.15–0.47)
ALPHA1 GLOB SERPL ELPH-MCNC: 3.1 % (ref 1.8–7)
ALPHA2 GLOB SERPL ELPH-MCNC: 0.53 G/DL (ref 0.42–1.04)
ALPHA2 GLOB SERPL ELPH-MCNC: 7.5 % (ref 5.9–14.9)
BETA GLOB ABNORMAL SERPL ELPH-MCNC: 0.43 G/DL (ref 0.31–0.57)
BETA1 GLOB SERPL ELPH-MCNC: 6.1 % (ref 4.7–7.7)
BETA2 GLOB SERPL ELPH-MCNC: 4.3 % (ref 3.1–7.9)
BETA2+GAMMA GLOB SERPL ELPH-MCNC: 0.31 G/DL (ref 0.2–0.58)
GAMMA GLOB ABNORMAL SERPL ELPH-MCNC: 1.67 G/DL (ref 0.4–1.66)
GAMMA GLOB SERPL ELPH-MCNC: 23.5 % (ref 6.9–22.3)
IGG/ALB SER: 1.25 {RATIO} (ref 1.1–1.8)
KAPPA LC FREE SER-MCNC: 20.5 MG/L (ref 3.3–19.4)
KAPPA LC FREE/LAMBDA FREE SER: 0.38 {RATIO} (ref 0.26–1.65)
LAMBDA LC FREE SERPL-MCNC: 54.1 MG/L (ref 5.7–26.3)
M PROTEIN 1 SERPL ELPH-MCNC: 1.07 G/DL
PROT SERPL-MCNC: 7.1 G/DL (ref 6.4–8.4)

## 2025-05-16 PROCEDURE — 84165 PROTEIN E-PHORESIS SERUM: CPT | Performed by: PATHOLOGY

## 2025-05-16 RX ORDER — LENALIDOMIDE 5 MG/1
5 CAPSULE ORAL DAILY
Qty: 28 CAPSULE | Refills: 0 | Status: SHIPPED | OUTPATIENT
Start: 2025-05-16

## 2025-05-20 ENCOUNTER — TELEPHONE (OUTPATIENT)
Age: 50
End: 2025-05-20

## 2025-05-20 ENCOUNTER — DOCUMENTATION (OUTPATIENT)
Age: 50
End: 2025-05-20

## 2025-05-20 NOTE — PROGRESS NOTES
Received request from clinical for patient to get a PA on Lenalidomide 5 mg caps. Auth has been submitted via cover my meds and this has been approved    20-MAY-25:20-MAY-26 Lenalidomide 5MG OR CAPS Quantity:28;

## 2025-05-20 NOTE — TELEPHONE ENCOUNTER
Need prior auth    lenalidomide (REVLIMID) 5 MG CAPS     [facility] - [facility] [place] - [address]  [address], [facility] [address]  Phone: [phone]  Fax: [phone]

## 2025-05-21 ENCOUNTER — OFFICE VISIT (OUTPATIENT)
Dept: HEMATOLOGY ONCOLOGY | Facility: CLINIC | Age: 50
End: 2025-05-21
Payer: COMMERCIAL

## 2025-05-21 VITALS
SYSTOLIC BLOOD PRESSURE: 122 MMHG | BODY MASS INDEX: 21.33 KG/M2 | HEART RATE: 66 BPM | RESPIRATION RATE: 16 BRPM | WEIGHT: 128 LBS | TEMPERATURE: 97.2 F | DIASTOLIC BLOOD PRESSURE: 82 MMHG | HEIGHT: 65 IN | OXYGEN SATURATION: 98 %

## 2025-05-21 DIAGNOSIS — C90.00 MULTIPLE MYELOMA NOT HAVING ACHIEVED REMISSION (HCC): Primary | ICD-10-CM

## 2025-05-21 PROCEDURE — 99214 OFFICE O/P EST MOD 30 MIN: CPT | Performed by: INTERNAL MEDICINE

## 2025-05-21 NOTE — ASSESSMENT & PLAN NOTE
Smoldering IgG Lambda Multiple Myeloma   Lenalidomide induced Neutropenia      MGUS since 2015, Smoldering MM diagnosed via BM 12/2027 1/2018 - 5/2018 induction therapy with VRd   7/2018 : Autologous stem cell transplant at [facility] with melphalan.  No major complications.   11/2018: started on maintenance Lenalidomide 5  mg daily and dexamethasone 12 mg weekly.  8/30/23 f/u with Dr. aBll.  “Should her M-spike significantly rise greater than 1.1, we will consider the Patricia-Pom-Dex regimen.”      Component  Ref Range & Units (hover) 5/15/25  1:13 PM 1/4/25  9:49 AM 5/15/24 10:19 AM 10/21/23 10:25 AM 7/15/23  8:42 AM 8/24/22 12:46 PM 4/5/22 12:00 PM   Ig [place] Free Light Chain 20.5 High  17.9 19.2 21.4 High  18.9 15.5 14.3   Ig Lambda Free Light Chain 54.1 High  45.1 High  35.4 High  32.2 High  31.6 High  22.9 23.5   Kappa/Lambda FluidC Ratio 0.38 0.40 0.54 0.66 0.60 0.68 0.61   M protein 1.07 on May 2025, no evidence of anemia, hypercalcemia, renal failure, will continue watchful observation however we will do it every 2 to 3-month       Orders:    CBC and differential; Future    Beta 2 microglobulin, serum; Future    Immunoglobulin free LT chains blood; Future    IgG, IgA, IgM; Future    Protein electrophoresis, serum; Future    LD,Blood; Future    Comprehensive metabolic panel; Future

## 2025-05-21 NOTE — PROGRESS NOTES
Name: Kathie Zhong      : 1975      MRN: [id number]  Encounter Provider: Praful Ortiz MD  Encounter Date: 2025   Encounter department: [facility]  :  Assessment & Plan  Multiple myeloma not having achieved remission (HCC)  Smoldering IgG Lambda Multiple Myeloma   Lenalidomide induced Neutropenia      MGUS since , Smoldering MM diagnosed via BM 2027 - 2018 induction therapy with VRd   2018 : Autologous stem cell transplant at [facility] with melphalan.  No major complications.   2018: started on maintenance Lenalidomide 5  mg daily and dexamethasone 12 mg weekly.  23 f/u with Dr. Blal.  “Should her M-spike significantly rise greater than 1.1, we will consider the Patricia-Pom-Dex regimen.”      Component  Ref Range & Units (hover) 5/15/25  1:13 PM 25  9:49 AM 5/15/24 10:19 AM 10/21/23 10:25 AM 7/15/23  8:42 AM 22 12:46 PM 22 12:00 PM   Ig [place] Free Light Chain 20.5 High  17.9 19.2 21.4 High  18.9 15.5 14.3   Ig Lambda Free Light Chain 54.1 High  45.1 High  35.4 High  32.2 High  31.6 High  22.9 23.5   Kappa/Lambda FluidC Ratio 0.38 0.40 0.54 0.66 0.60 0.68 0.61   M protein 1.07 on May 2025, no evidence of anemia, hypercalcemia, renal failure, will continue watchful observation however we will do it every 2 to 3-month       Orders:    CBC and differential; Future    Beta 2 microglobulin, serum; Future    Immunoglobulin free LT chains blood; Future    IgG, IgA, IgM; Future    Protein electrophoresis, serum; Future    LD,Blood; Future    Comprehensive metabolic panel; Future      Assessment & Plan        No follow-ups on file.    History of Present Illness   Chief Complaint   Patient presents with    Follow-up     History of Present Illness  Kathie Zhong is 49 yo female , with hx of MS and Arsenic Toxicity, and in  was diagnosed with IgG MGUS for which followed up with Dr. Lopez at [facility]  then saw Dr. Vega in 11/8/2017. M spike : igG lambda, 2.5 g; IgG > 3000.      12/27/2017 bone marrow: Plasma cell neoplasm, morphologically mature, lambda light chain-restricted, comprising ~ 25% of marrow cells. normal cytogenetics. No CRAB symptoms. bone survey in 2016 and C/T MRI in 2017, with no bone lesions identified.      1/2018 - 5/2018 induction therapy with VRd   7/2018 : Autologous stem cell transplant at [facility] with melphalan.  No major complications.   11/2018:  Start maintenance Lenalidomide 5  mg daily and dexamethasone 12 mg weekly. She takes Cholestyramine for lenalidomide induced diarrhea  8/30/23 f/u with Dr. Ball.  “Should her M-spike significantly rise greater than 1.1, we will consider the Patricia-Pom-Dex regimen.”   Oncology History   Cancer Staging   Multiple myeloma not having achieved remission (HCC)  Staging form: Plasma Cell Myeloma and Disorders, AJCC 8th Edition  - Clinical stage from 12/4/2017: High-risk cytogenetics: Absent, LDH: Normal - Signed by Praful Ortiz MD on 5/21/2024  Stage prefix: Initial diagnosis  Cytogenetics: No abnormalities  Oncology History   Multiple myeloma not having achieved remission (HCC)   12/4/2017 -  Cancer Staged    Staging form: Plasma Cell Myeloma and Disorders, AJCC 8th Edition  - Clinical stage from 12/4/2017: High-risk cytogenetics: Absent, LDH: Normal - Signed by Praful Ortiz MD on 5/21/2024  Stage prefix: Initial diagnosis  Cytogenetics: No abnormalities       2/6/2018 Initial Diagnosis    Multiple myeloma not having achieved remission (HCC)       Review of Systems   Constitutional:  Negative for chills and fever.   HENT:  Negative for ear pain and sore throat.    Eyes:  Negative for pain and visual disturbance.   Respiratory:  Negative for cough and shortness of breath.    Cardiovascular:  Negative for chest pain and palpitations.   Gastrointestinal:  Negative for abdominal pain and vomiting.   Genitourinary:  Negative for dysuria and  "hematuria.   Musculoskeletal:  Negative for arthralgias and back pain.   Skin:  Negative for color change and rash.   Neurological:  Negative for seizures and syncope.   All other systems reviewed and are negative.          Objective   /82 (Patient Position: Sitting, Cuff Size: Large)   Pulse 66   Temp (!) 97.2 °F (36.2 °C) (Temporal)   Resp 16   Ht 5' 5\" (1.651 m)   Wt 58.1 kg (128 lb)   SpO2 98%   BMI 21.30 kg/m²     Pain Screening:  Pain Score: 0-No pain  ECOG   0  Physical Exam  Vitals reviewed.   Constitutional:       General: She is not in acute distress.     Appearance: She is well-developed. She is not diaphoretic.   HENT:      Head: Normocephalic and atraumatic.     Eyes:      Conjunctiva/sclera: Conjunctivae normal.     Neck:      Trachea: No tracheal deviation.     Cardiovascular:      Rate and Rhythm: Normal rate and regular rhythm.      Heart sounds: No murmur heard.     No friction rub. No gallop.   Pulmonary:      Effort: Pulmonary effort is normal. No respiratory distress.      Breath sounds: Normal breath sounds. No wheezing or rales.   Chest:      Chest wall: No tenderness.   Abdominal:      General: There is no distension.      Palpations: Abdomen is soft.      Tenderness: There is no abdominal tenderness.     Musculoskeletal:      Cervical back: Normal range of motion and neck supple.   Lymphadenopathy:      Cervical: No cervical adenopathy.     Skin:     General: Skin is warm and dry.      Coloration: Skin is not pale.      Findings: No erythema.     Neurological:      Mental Status: She is alert.     Psychiatric:         Behavior: Behavior normal.         Thought Content: Thought content normal.       Physical Exam      Results    Labs: I have reviewed the following labs:  Lab Results   Component Value Date/Time    WBC 4.10 (L) 05/15/2025 01:13 PM    RBC 3.98 05/15/2025 01:13 PM    Hemoglobin 12.7 05/15/2025 01:13 PM    Hematocrit 39.2 05/15/2025 01:13 PM    MCV 99 (H) 05/15/2025 " 01:13 PM    MCH 31.9 05/15/2025 01:13 PM    RDW 12.5 05/15/2025 01:13 PM    Platelets 196 05/15/2025 01:13 PM    Segmented % 31 (L) 05/15/2025 01:13 PM    Lymphocytes % 54 (H) 05/15/2025 01:13 PM    Monocytes % 9 05/15/2025 01:13 PM    Eosinophils Relative 5 05/15/2025 01:13 PM    Basophils Relative 1 05/15/2025 01:13 PM    Immature Grans % 0 05/15/2025 01:13 PM    Absolute Neutrophils 1.26 (L) 05/15/2025 01:13 PM     Lab Results   Component Value Date/Time    Potassium 3.8 05/15/2025 01:13 PM    Potassium 4 02/26/2025 02:31 PM    Chloride 107 05/15/2025 01:13 PM    Chloride 104 02/26/2025 02:31 PM    Carbon Dioxide 30 02/26/2025 02:31 PM    CO2 26 05/15/2025 01:13 PM    BUN 14 05/15/2025 01:13 PM    BUN 15 02/26/2025 02:31 PM    Creatinine 0.76 05/15/2025 01:13 PM    Creatinine 0.77 02/26/2025 02:31 PM    Glucose, Fasting 99 05/15/2025 01:13 PM    Calcium 8.6 05/15/2025 01:13 PM    Calcium 9 02/26/2025 02:31 PM    AST 21 05/15/2025 01:13 PM    ASPAR AMINOTRANSFERASE 19 02/26/2025 02:31 PM    ALT 20 05/15/2025 01:13 PM    ALANINE AMINOTRANSFERASE 19 02/26/2025 02:31 PM    Alkaline Phosphatase 70 05/15/2025 01:13 PM    Alkaline Phosphatase 63 02/26/2025 02:31 PM    Total Protein 7.2 05/15/2025 01:13 PM    Total Protein 7.1 05/15/2025 01:13 PM    Protein, Total 7.5 02/26/2025 02:31 PM    Albumin 4.1 05/15/2025 01:13 PM    ALBUMIN 4.3 02/26/2025 02:31 PM    Total Bilirubin 0.84 05/15/2025 01:13 PM    Total Bilirubin 1 02/26/2025 02:31 PM    eGFRcr 93 02/26/2025 02:31 PM    eGFR 90 05/15/2025 01:13 PM

## 2025-05-21 NOTE — LETTER
May 21, 2025     Erika Mojica MD  [address]  [address]  [facility] [address]    Patient: Kathie Zhong   YOB: 1975   Date of Visit: 5/21/2025       Dear MD Camilla Coates MD Edward Stadtmauer, MD:    Thank you for referring Kathie Zhong to me for evaluation. Below are my notes for this consultation.    If you have questions, please do not hesitate to call me. I look forward to following your patient along with you.         Sincerely,        Praful Ortiz MD        CC: MD Singh Bradford MD

## 2025-06-12 DIAGNOSIS — C90.01 MULTIPLE MYELOMA IN REMISSION (HCC): ICD-10-CM

## 2025-06-13 RX ORDER — LENALIDOMIDE 5 MG/1
5 CAPSULE ORAL DAILY
Qty: 28 CAPSULE | Refills: 0 | Status: SHIPPED | OUTPATIENT
Start: 2025-06-13

## 2025-07-11 DIAGNOSIS — C90.01 MULTIPLE MYELOMA IN REMISSION (HCC): ICD-10-CM

## 2025-07-11 RX ORDER — LENALIDOMIDE 5 MG/1
5 CAPSULE ORAL DAILY
Qty: 28 CAPSULE | Refills: 0 | Status: SHIPPED | OUTPATIENT
Start: 2025-07-11

## 2025-07-18 RX ORDER — LENALIDOMIDE 5 MG/1
5 CAPSULE ORAL DAILY
Refills: 0 | OUTPATIENT
Start: 2025-07-18

## 2025-07-30 ENCOUNTER — TELEPHONE (OUTPATIENT)
Dept: HEMATOLOGY ONCOLOGY | Facility: CLINIC | Age: 50
End: 2025-07-30

## 2025-07-31 ENCOUNTER — APPOINTMENT (OUTPATIENT)
Dept: LAB | Facility: CLINIC | Age: 50
End: 2025-07-31
Payer: COMMERCIAL

## 2025-07-31 DIAGNOSIS — C90.00 MULTIPLE MYELOMA NOT HAVING ACHIEVED REMISSION (HCC): ICD-10-CM

## 2025-07-31 LAB
ALBUMIN SERPL BCG-MCNC: 3.8 G/DL (ref 3.5–5)
ALP SERPL-CCNC: 60 U/L (ref 34–104)
ALT SERPL W P-5'-P-CCNC: 17 U/L (ref 7–52)
ANION GAP SERPL CALCULATED.3IONS-SCNC: 4 MMOL/L (ref 4–13)
AST SERPL W P-5'-P-CCNC: 19 U/L (ref 13–39)
BASOPHILS # BLD AUTO: 0.05 THOUSANDS/ÂΜL (ref 0–0.1)
BASOPHILS NFR BLD AUTO: 2 % (ref 0–1)
BILIRUB SERPL-MCNC: 0.51 MG/DL (ref 0.2–1)
BUN SERPL-MCNC: 14 MG/DL (ref 5–25)
CALCIUM SERPL-MCNC: 8.4 MG/DL (ref 8.4–10.2)
CHLORIDE SERPL-SCNC: 110 MMOL/L (ref 96–108)
CO2 SERPL-SCNC: 25 MMOL/L (ref 21–32)
CREAT SERPL-MCNC: 0.85 MG/DL (ref 0.6–1.3)
EOSINOPHIL # BLD AUTO: 0.13 THOUSAND/ÂΜL (ref 0–0.61)
EOSINOPHIL NFR BLD AUTO: 4 % (ref 0–6)
ERYTHROCYTE [DISTWIDTH] IN BLOOD BY AUTOMATED COUNT: 13 % (ref 11.6–15.1)
GFR SERPL CREATININE-BSD FRML MDRD: 79 ML/MIN/1.73SQ M
GLUCOSE P FAST SERPL-MCNC: 97 MG/DL (ref 65–99)
HCT VFR BLD AUTO: 35.7 % (ref 34.8–46.1)
HGB BLD-MCNC: 11.5 G/DL (ref 11.5–15.4)
IGA SERPL-MCNC: 141 MG/DL (ref 66–433)
IGG SERPL-MCNC: 1525 MG/DL (ref 635–1741)
IGM SERPL-MCNC: <20 MG/DL (ref 45–281)
IMM GRANULOCYTES # BLD AUTO: 0 THOUSAND/UL (ref 0–0.2)
IMM GRANULOCYTES NFR BLD AUTO: 0 % (ref 0–2)
LDH SERPL-CCNC: 220 U/L (ref 140–271)
LYMPHOCYTES # BLD AUTO: 1.97 THOUSANDS/ÂΜL (ref 0.6–4.47)
LYMPHOCYTES NFR BLD AUTO: 63 % (ref 14–44)
MCH RBC QN AUTO: 31.7 PG (ref 26.8–34.3)
MCHC RBC AUTO-ENTMCNC: 32.2 G/DL (ref 31.4–37.4)
MCV RBC AUTO: 98 FL (ref 82–98)
MONOCYTES # BLD AUTO: 0.27 THOUSAND/ÂΜL (ref 0.17–1.22)
MONOCYTES NFR BLD AUTO: 9 % (ref 4–12)
NEUTROPHILS # BLD AUTO: 0.69 THOUSANDS/ÂΜL (ref 1.85–7.62)
NEUTS SEG NFR BLD AUTO: 22 % (ref 43–75)
NRBC BLD AUTO-RTO: 0 /100 WBCS
PLATELET # BLD AUTO: 173 THOUSANDS/UL (ref 149–390)
PMV BLD AUTO: 10 FL (ref 8.9–12.7)
POTASSIUM SERPL-SCNC: 3.9 MMOL/L (ref 3.5–5.3)
PROT SERPL-MCNC: 6.8 G/DL (ref 6.4–8.4)
RBC # BLD AUTO: 3.63 MILLION/UL (ref 3.81–5.12)
SODIUM SERPL-SCNC: 139 MMOL/L (ref 135–147)
WBC # BLD AUTO: 3.11 THOUSAND/UL (ref 4.31–10.16)

## 2025-07-31 PROCEDURE — 83615 LACTATE (LD) (LDH) ENZYME: CPT

## 2025-07-31 PROCEDURE — 84165 PROTEIN E-PHORESIS SERUM: CPT

## 2025-07-31 PROCEDURE — 82232 ASSAY OF BETA-2 PROTEIN: CPT

## 2025-07-31 PROCEDURE — 85025 COMPLETE CBC W/AUTO DIFF WBC: CPT

## 2025-07-31 PROCEDURE — 36415 COLL VENOUS BLD VENIPUNCTURE: CPT

## 2025-07-31 PROCEDURE — 83521 IG LIGHT CHAINS FREE EACH: CPT

## 2025-07-31 PROCEDURE — 82784 ASSAY IGA/IGD/IGG/IGM EACH: CPT

## 2025-07-31 PROCEDURE — 80053 COMPREHEN METABOLIC PANEL: CPT

## 2025-08-02 LAB
KAPPA LC FREE SER-MCNC: 16.6 MG/L (ref 3.3–19.4)
KAPPA LC FREE/LAMBDA FREE SER: 0.31 {RATIO} (ref 0.26–1.65)
LAMBDA LC FREE SERPL-MCNC: 54.1 MG/L (ref 5.7–26.3)

## 2025-08-03 LAB — B2 MICROGLOB SERPL-MCNC: 1.6 MG/L (ref 0.6–2.4)

## 2025-08-05 LAB
ALBUMIN SERPL ELPH-MCNC: 3.55 G/DL (ref 3.2–5.1)
ALBUMIN SERPL ELPH-MCNC: 55.5 % (ref 48–70)
ALPHA1 GLOB SERPL ELPH-MCNC: 0.2 G/DL (ref 0.15–0.47)
ALPHA1 GLOB SERPL ELPH-MCNC: 3.2 % (ref 1.8–7)
ALPHA2 GLOB SERPL ELPH-MCNC: 0.49 G/DL (ref 0.42–1.04)
ALPHA2 GLOB SERPL ELPH-MCNC: 7.6 % (ref 5.9–14.9)
BETA GLOB ABNORMAL SERPL ELPH-MCNC: 0.37 G/DL (ref 0.31–0.57)
BETA1 GLOB SERPL ELPH-MCNC: 5.8 % (ref 4.7–7.7)
BETA2 GLOB SERPL ELPH-MCNC: 4.6 % (ref 3.1–7.9)
BETA2+GAMMA GLOB SERPL ELPH-MCNC: 0.29 G/DL (ref 0.2–0.58)
GAMMA GLOB ABNORMAL SERPL ELPH-MCNC: 1.49 G/DL (ref 0.4–1.66)
GAMMA GLOB SERPL ELPH-MCNC: 23.3 % (ref 6.9–22.3)
IGG/ALB SER: 1.25 {RATIO} (ref 1.1–1.8)
M PROTEIN 1 SERPL ELPH-MCNC: 0.73 G/DL
PROT SERPL-MCNC: 6.4 G/DL (ref 6.4–8.4)

## 2025-08-05 PROCEDURE — 84165 PROTEIN E-PHORESIS SERUM: CPT | Performed by: STUDENT IN AN ORGANIZED HEALTH CARE EDUCATION/TRAINING PROGRAM

## 2025-08-06 ENCOUNTER — OFFICE VISIT (OUTPATIENT)
Dept: HEMATOLOGY ONCOLOGY | Facility: CLINIC | Age: 50
End: 2025-08-06
Payer: COMMERCIAL

## 2025-08-06 VITALS
OXYGEN SATURATION: 98 % | BODY MASS INDEX: 21.08 KG/M2 | TEMPERATURE: 97.6 F | SYSTOLIC BLOOD PRESSURE: 118 MMHG | DIASTOLIC BLOOD PRESSURE: 82 MMHG | HEART RATE: 75 BPM | RESPIRATION RATE: 16 BRPM | HEIGHT: 65 IN | WEIGHT: 126.5 LBS

## 2025-08-06 DIAGNOSIS — C90.00 MULTIPLE MYELOMA NOT HAVING ACHIEVED REMISSION (HCC): Primary | ICD-10-CM

## 2025-08-06 PROCEDURE — 99213 OFFICE O/P EST LOW 20 MIN: CPT | Performed by: INTERNAL MEDICINE

## 2025-08-06 RX ORDER — IBUPROFEN 200 MG
TABLET ORAL EVERY 6 HOURS PRN
COMMUNITY

## (undated) DEVICE — ACE WRAP 3 IN UNSTERILE

## (undated) DEVICE — PADDING CAST 4 IN  COTTON STRL

## (undated) DEVICE — GLOVE INDICATOR PI UNDERGLOVE SZ 8 BLUE

## (undated) DEVICE — ADHESIVE SKN CLSR HISTOACRYL FLEX 0.5ML LF

## (undated) DEVICE — STERILE BETHLEHEM PLASTIC HAND: Brand: CARDINAL HEALTH

## (undated) DEVICE — SUT PROLENE 4-0 PS-2 18 IN 8682G

## (undated) DEVICE — NEEDLE 25G X 1 1/2

## (undated) DEVICE — SPONGE PVP SCRUB WING STERILE

## (undated) DEVICE — STRL COTTON TIP APPLCTR 6IN PK: Brand: CARDINAL HEALTH

## (undated) DEVICE — RETROGRADE KNIFE BOX OF 6: Brand: ECTRA

## (undated) DEVICE — PREMIUM DRY TRAY LF: Brand: MEDLINE INDUSTRIES, INC.

## (undated) DEVICE — GAUZE SPONGES,16 PLY: Brand: CURITY

## (undated) DEVICE — DISPOSABLE EQUIPMENT COVER: Brand: SMALL TOWEL DRAPE

## (undated) DEVICE — OCCLUSIVE GAUZE STRIP,3% BISMUTH TRIBROMOPHENATE IN PETROLATUM BLEND: Brand: XEROFORM

## (undated) DEVICE — GLOVE SRG BIOGEL 7.5